# Patient Record
Sex: MALE | Race: ASIAN | NOT HISPANIC OR LATINO | Employment: FULL TIME | URBAN - METROPOLITAN AREA
[De-identification: names, ages, dates, MRNs, and addresses within clinical notes are randomized per-mention and may not be internally consistent; named-entity substitution may affect disease eponyms.]

---

## 2021-07-07 ENCOUNTER — TELEPHONE (OUTPATIENT)
Dept: FAMILY MEDICINE | Facility: CLINIC | Age: 61
End: 2021-07-07

## 2021-07-28 ENCOUNTER — OFFICE VISIT (OUTPATIENT)
Dept: CARDIOLOGY | Facility: CLINIC | Age: 61
End: 2021-07-28
Payer: COMMERCIAL

## 2021-07-28 VITALS
HEIGHT: 67 IN | OXYGEN SATURATION: 98 % | HEART RATE: 66 BPM | WEIGHT: 169.31 LBS | BODY MASS INDEX: 26.57 KG/M2 | DIASTOLIC BLOOD PRESSURE: 64 MMHG | SYSTOLIC BLOOD PRESSURE: 118 MMHG

## 2021-07-28 DIAGNOSIS — E78.5 HYPERLIPIDEMIA, UNSPECIFIED HYPERLIPIDEMIA TYPE: Primary | ICD-10-CM

## 2021-07-28 DIAGNOSIS — I25.10 CORONARY ARTERY DISEASE INVOLVING NATIVE CORONARY ARTERY OF NATIVE HEART WITHOUT ANGINA PECTORIS: ICD-10-CM

## 2021-07-28 PROCEDURE — 93005 ELECTROCARDIOGRAM TRACING: CPT

## 2021-07-28 PROCEDURE — 1159F MED LIST DOCD IN RCRD: CPT | Mod: CPTII,S$GLB,, | Performed by: INTERNAL MEDICINE

## 2021-07-28 PROCEDURE — 99204 OFFICE O/P NEW MOD 45 MIN: CPT | Mod: S$GLB,,, | Performed by: INTERNAL MEDICINE

## 2021-07-28 PROCEDURE — 99999 PR PBB SHADOW E&M-EST. PATIENT-LVL III: CPT | Mod: PBBFAC,,, | Performed by: INTERNAL MEDICINE

## 2021-07-28 PROCEDURE — 1126F PR PAIN SEVERITY QUANTIFIED, NO PAIN PRESENT: ICD-10-PCS | Mod: CPTII,S$GLB,, | Performed by: INTERNAL MEDICINE

## 2021-07-28 PROCEDURE — 1126F AMNT PAIN NOTED NONE PRSNT: CPT | Mod: CPTII,S$GLB,, | Performed by: INTERNAL MEDICINE

## 2021-07-28 PROCEDURE — 1160F PR REVIEW ALL MEDS BY PRESCRIBER/CLIN PHARMACIST DOCUMENTED: ICD-10-PCS | Mod: CPTII,S$GLB,, | Performed by: INTERNAL MEDICINE

## 2021-07-28 PROCEDURE — 99999 PR PBB SHADOW E&M-EST. PATIENT-LVL III: ICD-10-PCS | Mod: PBBFAC,,, | Performed by: INTERNAL MEDICINE

## 2021-07-28 PROCEDURE — 93010 ELECTROCARDIOGRAM REPORT: CPT | Mod: S$GLB,,, | Performed by: INTERNAL MEDICINE

## 2021-07-28 PROCEDURE — 93010 EKG 12-LEAD: ICD-10-PCS | Mod: S$GLB,,, | Performed by: INTERNAL MEDICINE

## 2021-07-28 PROCEDURE — 1160F RVW MEDS BY RX/DR IN RCRD: CPT | Mod: CPTII,S$GLB,, | Performed by: INTERNAL MEDICINE

## 2021-07-28 PROCEDURE — 3008F PR BODY MASS INDEX (BMI) DOCUMENTED: ICD-10-PCS | Mod: CPTII,S$GLB,, | Performed by: INTERNAL MEDICINE

## 2021-07-28 PROCEDURE — 3008F BODY MASS INDEX DOCD: CPT | Mod: CPTII,S$GLB,, | Performed by: INTERNAL MEDICINE

## 2021-07-28 PROCEDURE — 99204 PR OFFICE/OUTPT VISIT, NEW, LEVL IV, 45-59 MIN: ICD-10-PCS | Mod: S$GLB,,, | Performed by: INTERNAL MEDICINE

## 2021-07-28 PROCEDURE — 1159F PR MEDICATION LIST DOCUMENTED IN MEDICAL RECORD: ICD-10-PCS | Mod: CPTII,S$GLB,, | Performed by: INTERNAL MEDICINE

## 2021-07-28 RX ORDER — OLMESARTAN MEDOXOMIL AND HYDROCHLOROTHIAZIDE 40/12.5 40; 12.5 MG/1; MG/1
1 TABLET ORAL DAILY
COMMUNITY

## 2021-07-28 RX ORDER — ROSUVASTATIN CALCIUM 20 MG/1
20 TABLET, COATED ORAL DAILY
COMMUNITY
Start: 2021-03-16 | End: 2022-01-27

## 2021-07-28 RX ORDER — ASPIRIN 81 MG/1
81 TABLET ORAL
COMMUNITY

## 2022-01-21 ENCOUNTER — TELEPHONE (OUTPATIENT)
Dept: CARDIOLOGY | Facility: CLINIC | Age: 62
End: 2022-01-21
Payer: COMMERCIAL

## 2022-01-21 NOTE — TELEPHONE ENCOUNTER
LM for pt with new appt date and time. Left office phone number for pt to call back if appointment needs to be rescheduled.

## 2022-01-27 ENCOUNTER — OFFICE VISIT (OUTPATIENT)
Dept: CARDIOLOGY | Facility: CLINIC | Age: 62
End: 2022-01-27
Payer: COMMERCIAL

## 2022-01-27 VITALS
HEART RATE: 66 BPM | HEIGHT: 67 IN | OXYGEN SATURATION: 99 % | SYSTOLIC BLOOD PRESSURE: 124 MMHG | WEIGHT: 170.19 LBS | DIASTOLIC BLOOD PRESSURE: 68 MMHG | BODY MASS INDEX: 26.71 KG/M2

## 2022-01-27 DIAGNOSIS — I25.10 CORONARY ARTERY DISEASE INVOLVING NATIVE CORONARY ARTERY OF NATIVE HEART WITHOUT ANGINA PECTORIS: ICD-10-CM

## 2022-01-27 DIAGNOSIS — E78.5 HYPERLIPIDEMIA LDL GOAL <70: Primary | ICD-10-CM

## 2022-01-27 PROCEDURE — 3078F PR MOST RECENT DIASTOLIC BLOOD PRESSURE < 80 MM HG: ICD-10-PCS | Mod: CPTII,S$GLB,, | Performed by: INTERNAL MEDICINE

## 2022-01-27 PROCEDURE — 99999 PR PBB SHADOW E&M-EST. PATIENT-LVL III: ICD-10-PCS | Mod: PBBFAC,,, | Performed by: INTERNAL MEDICINE

## 2022-01-27 PROCEDURE — 3078F DIAST BP <80 MM HG: CPT | Mod: CPTII,S$GLB,, | Performed by: INTERNAL MEDICINE

## 2022-01-27 PROCEDURE — 99999 PR PBB SHADOW E&M-EST. PATIENT-LVL III: CPT | Mod: PBBFAC,,, | Performed by: INTERNAL MEDICINE

## 2022-01-27 PROCEDURE — 93005 ELECTROCARDIOGRAM TRACING: CPT

## 2022-01-27 PROCEDURE — 99214 OFFICE O/P EST MOD 30 MIN: CPT | Mod: 25,S$GLB,, | Performed by: INTERNAL MEDICINE

## 2022-01-27 PROCEDURE — 3074F SYST BP LT 130 MM HG: CPT | Mod: CPTII,S$GLB,, | Performed by: INTERNAL MEDICINE

## 2022-01-27 PROCEDURE — 3008F PR BODY MASS INDEX (BMI) DOCUMENTED: ICD-10-PCS | Mod: CPTII,S$GLB,, | Performed by: INTERNAL MEDICINE

## 2022-01-27 PROCEDURE — 1159F PR MEDICATION LIST DOCUMENTED IN MEDICAL RECORD: ICD-10-PCS | Mod: CPTII,S$GLB,, | Performed by: INTERNAL MEDICINE

## 2022-01-27 PROCEDURE — 99214 PR OFFICE/OUTPT VISIT, EST, LEVL IV, 30-39 MIN: ICD-10-PCS | Mod: 25,S$GLB,, | Performed by: INTERNAL MEDICINE

## 2022-01-27 PROCEDURE — 3008F BODY MASS INDEX DOCD: CPT | Mod: CPTII,S$GLB,, | Performed by: INTERNAL MEDICINE

## 2022-01-27 PROCEDURE — 3074F PR MOST RECENT SYSTOLIC BLOOD PRESSURE < 130 MM HG: ICD-10-PCS | Mod: CPTII,S$GLB,, | Performed by: INTERNAL MEDICINE

## 2022-01-27 PROCEDURE — 1159F MED LIST DOCD IN RCRD: CPT | Mod: CPTII,S$GLB,, | Performed by: INTERNAL MEDICINE

## 2022-01-27 PROCEDURE — 93010 ELECTROCARDIOGRAM REPORT: CPT | Mod: S$GLB,,, | Performed by: INTERNAL MEDICINE

## 2022-01-27 PROCEDURE — 93010 EKG 12-LEAD: ICD-10-PCS | Mod: S$GLB,,, | Performed by: INTERNAL MEDICINE

## 2022-01-27 RX ORDER — ROSUVASTATIN CALCIUM 40 MG/1
40 TABLET, COATED ORAL NIGHTLY
Qty: 90 TABLET | Refills: 3 | Status: SHIPPED | OUTPATIENT
Start: 2022-01-27 | End: 2023-02-03

## 2022-01-27 NOTE — PROGRESS NOTES
Cardiology    1/27/2022  2:09 PM    Problem list  Patient Active Problem List   Diagnosis    Coronary artery disease involving native coronary artery without angina pectoris    Hyperlipidemia LDL goal <70       CC:  No complaints.      HPI:  Doing well.  No angina, SOB, PND, OLIVERA, syncope.  Had labs done with PCP (scanned in) which showed A1C of 5.8 and LDL 81, .  Has not discussed labs with PCP yet.      Medications  Current Outpatient Medications   Medication Sig Dispense Refill    aspirin (ECOTRIN) 81 MG EC tablet Take 81 mg by mouth.      olmesartan-hydrochlorothiazide (BENICAR HCT) 40-12.5 mg Tab Take 1 tablet by mouth once daily.      rosuvastatin (CRESTOR) 20 MG tablet Take 20 mg by mouth once daily.       No current facility-administered medications for this visit.      Prior to Admission medications    Medication Sig Start Date End Date Taking? Authorizing Provider   aspirin (ECOTRIN) 81 MG EC tablet Take 81 mg by mouth.   Yes Historical Provider   olmesartan-hydrochlorothiazide (BENICAR HCT) 40-12.5 mg Tab Take 1 tablet by mouth once daily.   Yes Historical Provider   rosuvastatin (CRESTOR) 20 MG tablet Take 20 mg by mouth once daily. 3/16/21  Yes Historical Provider         History  No past medical history on file.  No past surgical history on file.  Social History     Socioeconomic History    Marital status:    Tobacco Use    Smoking status: Former Smoker    Smokeless tobacco: Never Used    Tobacco comment: quit 30yrs ago         Allergies  Review of patient's allergies indicates:  No Known Allergies      Review of Systems   Review of Systems   Constitutional: Negative for decreased appetite, fever and weight loss.   HENT: Negative for congestion and nosebleeds.    Eyes: Negative for double vision, vision loss in left eye, vision loss in right eye and visual disturbance.   Cardiovascular: Negative for chest pain, claudication, cyanosis, dyspnea on exertion, irregular  heartbeat, leg swelling, near-syncope, orthopnea, palpitations, paroxysmal nocturnal dyspnea and syncope.   Respiratory: Negative for cough, hemoptysis, shortness of breath, sleep disturbances due to breathing, snoring, sputum production and wheezing.    Endocrine: Negative for cold intolerance and heat intolerance.   Skin: Negative for nail changes and rash.   Musculoskeletal: Negative for joint pain, muscle cramps, muscle weakness and myalgias.   Gastrointestinal: Negative for change in bowel habit, heartburn, hematemesis, hematochezia, hemorrhoids and melena.   Neurological: Negative for dizziness, focal weakness and headaches.         Physical Exam  Wt Readings from Last 1 Encounters:   01/27/22 77.2 kg (170 lb 3.2 oz)     BP Readings from Last 3 Encounters:   01/27/22 124/68   07/28/21 118/64     Pulse Readings from Last 1 Encounters:   01/27/22 66     Body mass index is 26.66 kg/m².    Physical Exam  Constitutional:       Appearance: He is well-developed.   HENT:      Head: Atraumatic.   Eyes:      General: No scleral icterus.  Neck:      Vascular: Normal carotid pulses. No carotid bruit, hepatojugular reflux or JVD.   Cardiovascular:      Rate and Rhythm: Normal rate and regular rhythm.      Chest Wall: PMI is not displaced.      Pulses: Intact distal pulses.           Carotid pulses are 2+ on the right side and 2+ on the left side.       Radial pulses are 2+ on the right side and 2+ on the left side.        Dorsalis pedis pulses are 2+ on the right side and 2+ on the left side.      Heart sounds: Normal heart sounds, S1 normal and S2 normal. No murmur heard.  No friction rub.   Pulmonary:      Effort: Pulmonary effort is normal. No respiratory distress.      Breath sounds: Normal breath sounds. No stridor. No wheezing or rales.   Chest:      Chest wall: No tenderness.   Abdominal:      General: Bowel sounds are normal.      Palpations: Abdomen is soft.   Musculoskeletal:      Cervical back: Neck supple. No  edema.   Skin:     General: Skin is warm and dry.      Nails: There is no clubbing.   Neurological:      Mental Status: He is alert and oriented to person, place, and time.   Psychiatric:         Behavior: Behavior normal.         Thought Content: Thought content normal.             EKG:  Sinus rate 64.      Assessment  1. Hyperlipidemia LDL goal <70  Not at goal    2. Coronary artery disease involving native coronary artery of native heart without angina pectoris  Stable, no angina        Plan and Discussion  Increase rosuvastatin to 40mg qd to achieve LDL goal < 70.  Encourage to avoid sweets since A1C is 5.8.  EKG given to patient to share with PCP at next visit.    Follow Up  6 months with labs      Avery Boyd MD, F.A.C.C, F.S.C.A.I.

## 2022-07-28 ENCOUNTER — OFFICE VISIT (OUTPATIENT)
Dept: CARDIOLOGY | Facility: CLINIC | Age: 62
End: 2022-07-28
Payer: COMMERCIAL

## 2022-07-28 VITALS
DIASTOLIC BLOOD PRESSURE: 62 MMHG | HEART RATE: 62 BPM | WEIGHT: 167 LBS | OXYGEN SATURATION: 97 % | HEIGHT: 67 IN | BODY MASS INDEX: 26.21 KG/M2 | SYSTOLIC BLOOD PRESSURE: 120 MMHG

## 2022-07-28 DIAGNOSIS — I25.10 CORONARY ARTERY DISEASE INVOLVING NATIVE CORONARY ARTERY OF NATIVE HEART WITHOUT ANGINA PECTORIS: ICD-10-CM

## 2022-07-28 DIAGNOSIS — E78.5 HYPERLIPIDEMIA LDL GOAL <70: Primary | ICD-10-CM

## 2022-07-28 PROCEDURE — 3008F BODY MASS INDEX DOCD: CPT | Mod: CPTII,S$GLB,, | Performed by: INTERNAL MEDICINE

## 2022-07-28 PROCEDURE — 3078F PR MOST RECENT DIASTOLIC BLOOD PRESSURE < 80 MM HG: ICD-10-PCS | Mod: CPTII,S$GLB,, | Performed by: INTERNAL MEDICINE

## 2022-07-28 PROCEDURE — 3078F DIAST BP <80 MM HG: CPT | Mod: CPTII,S$GLB,, | Performed by: INTERNAL MEDICINE

## 2022-07-28 PROCEDURE — 3074F SYST BP LT 130 MM HG: CPT | Mod: CPTII,S$GLB,, | Performed by: INTERNAL MEDICINE

## 2022-07-28 PROCEDURE — 93010 ELECTROCARDIOGRAM REPORT: CPT | Mod: S$GLB,,, | Performed by: INTERNAL MEDICINE

## 2022-07-28 PROCEDURE — 1159F MED LIST DOCD IN RCRD: CPT | Mod: CPTII,S$GLB,, | Performed by: INTERNAL MEDICINE

## 2022-07-28 PROCEDURE — 93005 ELECTROCARDIOGRAM TRACING: CPT

## 2022-07-28 PROCEDURE — 3074F PR MOST RECENT SYSTOLIC BLOOD PRESSURE < 130 MM HG: ICD-10-PCS | Mod: CPTII,S$GLB,, | Performed by: INTERNAL MEDICINE

## 2022-07-28 PROCEDURE — 99214 PR OFFICE/OUTPT VISIT, EST, LEVL IV, 30-39 MIN: ICD-10-PCS | Mod: S$GLB,,, | Performed by: INTERNAL MEDICINE

## 2022-07-28 PROCEDURE — 99214 OFFICE O/P EST MOD 30 MIN: CPT | Mod: S$GLB,,, | Performed by: INTERNAL MEDICINE

## 2022-07-28 PROCEDURE — 1159F PR MEDICATION LIST DOCUMENTED IN MEDICAL RECORD: ICD-10-PCS | Mod: CPTII,S$GLB,, | Performed by: INTERNAL MEDICINE

## 2022-07-28 PROCEDURE — 99999 PR PBB SHADOW E&M-EST. PATIENT-LVL III: CPT | Mod: PBBFAC,,, | Performed by: INTERNAL MEDICINE

## 2022-07-28 PROCEDURE — 99999 PR PBB SHADOW E&M-EST. PATIENT-LVL III: ICD-10-PCS | Mod: PBBFAC,,, | Performed by: INTERNAL MEDICINE

## 2022-07-28 PROCEDURE — 93010 EKG 12-LEAD: ICD-10-PCS | Mod: S$GLB,,, | Performed by: INTERNAL MEDICINE

## 2022-07-28 PROCEDURE — 3008F PR BODY MASS INDEX (BMI) DOCUMENTED: ICD-10-PCS | Mod: CPTII,S$GLB,, | Performed by: INTERNAL MEDICINE

## 2022-07-28 NOTE — PROGRESS NOTES
Cardiology    7/28/2022  1:40 PM    Problem list  Patient Active Problem List   Diagnosis    Coronary artery disease involving native coronary artery without angina pectoris    Hyperlipidemia LDL goal <70       CC:  f/u    HPI:  Had labs on 7/22/22 with PCP:  , HDL 56, , LDL 80, A1C 5.8, normal LFT's.  He is doing very well.  He denies any angina.  His blood pressure is well controlled at home.  He tolerates his medication.  His Crestor was increased to 40 mg at the last visit.    Medications  Current Outpatient Medications   Medication Sig Dispense Refill    aspirin (ECOTRIN) 81 MG EC tablet Take 81 mg by mouth.      olmesartan-hydrochlorothiazide (BENICAR HCT) 40-12.5 mg Tab Take 1 tablet by mouth once daily.      rosuvastatin (CRESTOR) 40 MG Tab Take 1 tablet (40 mg total) by mouth every evening. 90 tablet 3     No current facility-administered medications for this visit.      Prior to Admission medications    Medication Sig Start Date End Date Taking? Authorizing Provider   aspirin (ECOTRIN) 81 MG EC tablet Take 81 mg by mouth.   Yes Historical Provider   olmesartan-hydrochlorothiazide (BENICAR HCT) 40-12.5 mg Tab Take 1 tablet by mouth once daily.   Yes Historical Provider   rosuvastatin (CRESTOR) 40 MG Tab Take 1 tablet (40 mg total) by mouth every evening. 1/27/22 1/27/23 Yes Avery Boyd MD         History  No past medical history on file.  No past surgical history on file.  Social History     Socioeconomic History    Marital status:    Tobacco Use    Smoking status: Former Smoker    Smokeless tobacco: Never Used    Tobacco comment: quit 30yrs ago         Allergies  Review of patient's allergies indicates:  No Known Allergies      Review of Systems   Review of Systems   Constitutional: Negative for decreased appetite, fever and weight loss.   HENT: Negative for congestion and nosebleeds.    Eyes: Negative for double vision, vision loss in left eye, vision loss in right  eye and visual disturbance.   Cardiovascular: Negative for chest pain, claudication, cyanosis, dyspnea on exertion, irregular heartbeat, leg swelling, near-syncope, orthopnea, palpitations, paroxysmal nocturnal dyspnea and syncope.   Respiratory: Negative for cough, hemoptysis, shortness of breath, sleep disturbances due to breathing, snoring, sputum production and wheezing.    Endocrine: Negative for cold intolerance and heat intolerance.   Skin: Negative for nail changes and rash.   Musculoskeletal: Negative for joint pain, muscle cramps, muscle weakness and myalgias.   Gastrointestinal: Negative for change in bowel habit, heartburn, hematemesis, hematochezia, hemorrhoids and melena.   Neurological: Negative for dizziness, focal weakness and headaches.         Physical Exam  Wt Readings from Last 1 Encounters:   07/28/22 75.8 kg (167 lb)     BP Readings from Last 3 Encounters:   07/28/22 120/62   01/27/22 124/68   07/28/21 118/64     Pulse Readings from Last 1 Encounters:   07/28/22 62     Body mass index is 26.16 kg/m².    Physical Exam  Constitutional:       Appearance: He is well-developed.   HENT:      Head: Atraumatic.   Eyes:      General: No scleral icterus.  Neck:      Vascular: Normal carotid pulses. No carotid bruit, hepatojugular reflux or JVD.   Cardiovascular:      Rate and Rhythm: Normal rate and regular rhythm.      Chest Wall: PMI is not displaced.      Pulses: Intact distal pulses.           Carotid pulses are 2+ on the right side and 2+ on the left side.       Radial pulses are 2+ on the right side and 2+ on the left side.        Dorsalis pedis pulses are 2+ on the right side and 2+ on the left side.      Heart sounds: Normal heart sounds, S1 normal and S2 normal. No murmur heard.    No friction rub.   Pulmonary:      Effort: Pulmonary effort is normal. No respiratory distress.      Breath sounds: Normal breath sounds. No stridor. No wheezing or rales.   Chest:      Chest wall: No tenderness.    Abdominal:      General: Bowel sounds are normal.      Palpations: Abdomen is soft.   Musculoskeletal:      Cervical back: Neck supple. No edema.   Skin:     General: Skin is warm and dry.      Nails: There is no clubbing.   Neurological:      Mental Status: He is alert and oriented to person, place, and time.   Psychiatric:         Behavior: Behavior normal.         Thought Content: Thought content normal.         EKG:  Normal sinus rhythm.    Assessment  1. Hyperlipidemia LDL goal <70  On simvastatin 40 mg     2. Coronary artery disease involving native coronary artery of native heart without angina pectoris  Stable angina    3.  HTN controlled        Plan and Discussion  Continue current medications.    Follow Up  6 months      Avery Boyd MD, F.A.C.C, F.S.C.A.I.

## 2023-02-02 ENCOUNTER — LAB VISIT (OUTPATIENT)
Dept: LAB | Facility: OTHER | Age: 63
End: 2023-02-02
Attending: INTERNAL MEDICINE
Payer: COMMERCIAL

## 2023-02-02 ENCOUNTER — OFFICE VISIT (OUTPATIENT)
Dept: CARDIOLOGY | Facility: CLINIC | Age: 63
End: 2023-02-02
Payer: COMMERCIAL

## 2023-02-02 VITALS
HEART RATE: 55 BPM | DIASTOLIC BLOOD PRESSURE: 80 MMHG | WEIGHT: 173 LBS | SYSTOLIC BLOOD PRESSURE: 132 MMHG | BODY MASS INDEX: 27.15 KG/M2 | OXYGEN SATURATION: 98 % | HEIGHT: 67 IN

## 2023-02-02 DIAGNOSIS — I25.10 CORONARY ARTERY DISEASE INVOLVING NATIVE CORONARY ARTERY OF NATIVE HEART WITHOUT ANGINA PECTORIS: ICD-10-CM

## 2023-02-02 DIAGNOSIS — E78.5 HYPERLIPIDEMIA LDL GOAL <70: Primary | ICD-10-CM

## 2023-02-02 LAB
ALBUMIN SERPL BCP-MCNC: 4.4 G/DL (ref 3.5–5.2)
ALP SERPL-CCNC: 58 U/L (ref 55–135)
ALT SERPL W/O P-5'-P-CCNC: 33 U/L (ref 10–44)
ANION GAP SERPL CALC-SCNC: 7 MMOL/L (ref 8–16)
AST SERPL-CCNC: 30 U/L (ref 10–40)
BILIRUB SERPL-MCNC: 0.6 MG/DL (ref 0.1–1)
BUN SERPL-MCNC: 15 MG/DL (ref 8–23)
CALCIUM SERPL-MCNC: 9.7 MG/DL (ref 8.7–10.5)
CHLORIDE SERPL-SCNC: 106 MMOL/L (ref 95–110)
CHOLEST SERPL-MCNC: 166 MG/DL (ref 120–199)
CHOLEST/HDLC SERPL: 3.9 {RATIO} (ref 2–5)
CO2 SERPL-SCNC: 28 MMOL/L (ref 23–29)
COMPLEXED PSA SERPL-MCNC: 0.74 NG/ML (ref 0–4)
CREAT SERPL-MCNC: 0.8 MG/DL (ref 0.5–1.4)
ERYTHROCYTE [DISTWIDTH] IN BLOOD BY AUTOMATED COUNT: 13.1 % (ref 11.5–14.5)
EST. GFR  (NO RACE VARIABLE): >60 ML/MIN/1.73 M^2
GLUCOSE SERPL-MCNC: 93 MG/DL (ref 70–110)
HCT VFR BLD AUTO: 50.9 % (ref 40–54)
HDLC SERPL-MCNC: 43 MG/DL (ref 40–75)
HDLC SERPL: 25.9 % (ref 20–50)
HGB BLD-MCNC: 16.5 G/DL (ref 14–18)
LDLC SERPL CALC-MCNC: 97.8 MG/DL (ref 63–159)
MCH RBC QN AUTO: 30.4 PG (ref 27–31)
MCHC RBC AUTO-ENTMCNC: 32.4 G/DL (ref 32–36)
MCV RBC AUTO: 94 FL (ref 82–98)
NONHDLC SERPL-MCNC: 123 MG/DL
PLATELET # BLD AUTO: 217 K/UL (ref 150–450)
PMV BLD AUTO: 9.6 FL (ref 9.2–12.9)
POTASSIUM SERPL-SCNC: 4.4 MMOL/L (ref 3.5–5.1)
PROT SERPL-MCNC: 7.2 G/DL (ref 6–8.4)
RBC # BLD AUTO: 5.43 M/UL (ref 4.6–6.2)
SODIUM SERPL-SCNC: 141 MMOL/L (ref 136–145)
TRIGL SERPL-MCNC: 126 MG/DL (ref 30–150)
TSH SERPL DL<=0.005 MIU/L-ACNC: 0.92 UIU/ML (ref 0.4–4)
WBC # BLD AUTO: 4.57 K/UL (ref 3.9–12.7)

## 2023-02-02 PROCEDURE — 1159F MED LIST DOCD IN RCRD: CPT | Mod: CPTII,S$GLB,, | Performed by: INTERNAL MEDICINE

## 2023-02-02 PROCEDURE — 93005 ELECTROCARDIOGRAM TRACING: CPT

## 2023-02-02 PROCEDURE — 36415 COLL VENOUS BLD VENIPUNCTURE: CPT | Performed by: INTERNAL MEDICINE

## 2023-02-02 PROCEDURE — 3075F SYST BP GE 130 - 139MM HG: CPT | Mod: CPTII,S$GLB,, | Performed by: INTERNAL MEDICINE

## 2023-02-02 PROCEDURE — 85027 COMPLETE CBC AUTOMATED: CPT | Performed by: INTERNAL MEDICINE

## 2023-02-02 PROCEDURE — 80061 LIPID PANEL: CPT | Performed by: INTERNAL MEDICINE

## 2023-02-02 PROCEDURE — 93010 EKG 12-LEAD: ICD-10-PCS | Mod: S$GLB,,, | Performed by: INTERNAL MEDICINE

## 2023-02-02 PROCEDURE — 1159F PR MEDICATION LIST DOCUMENTED IN MEDICAL RECORD: ICD-10-PCS | Mod: CPTII,S$GLB,, | Performed by: INTERNAL MEDICINE

## 2023-02-02 PROCEDURE — 93010 ELECTROCARDIOGRAM REPORT: CPT | Mod: S$GLB,,, | Performed by: INTERNAL MEDICINE

## 2023-02-02 PROCEDURE — 3008F PR BODY MASS INDEX (BMI) DOCUMENTED: ICD-10-PCS | Mod: CPTII,S$GLB,, | Performed by: INTERNAL MEDICINE

## 2023-02-02 PROCEDURE — 80053 COMPREHEN METABOLIC PANEL: CPT | Performed by: INTERNAL MEDICINE

## 2023-02-02 PROCEDURE — 99999 PR PBB SHADOW E&M-EST. PATIENT-LVL III: ICD-10-PCS | Mod: PBBFAC,,, | Performed by: INTERNAL MEDICINE

## 2023-02-02 PROCEDURE — 3075F PR MOST RECENT SYSTOLIC BLOOD PRESS GE 130-139MM HG: ICD-10-PCS | Mod: CPTII,S$GLB,, | Performed by: INTERNAL MEDICINE

## 2023-02-02 PROCEDURE — 3079F PR MOST RECENT DIASTOLIC BLOOD PRESSURE 80-89 MM HG: ICD-10-PCS | Mod: CPTII,S$GLB,, | Performed by: INTERNAL MEDICINE

## 2023-02-02 PROCEDURE — 99999 PR PBB SHADOW E&M-EST. PATIENT-LVL III: CPT | Mod: PBBFAC,,, | Performed by: INTERNAL MEDICINE

## 2023-02-02 PROCEDURE — 99214 OFFICE O/P EST MOD 30 MIN: CPT | Mod: S$GLB,,, | Performed by: INTERNAL MEDICINE

## 2023-02-02 PROCEDURE — 84153 ASSAY OF PSA TOTAL: CPT | Performed by: INTERNAL MEDICINE

## 2023-02-02 PROCEDURE — 3079F DIAST BP 80-89 MM HG: CPT | Mod: CPTII,S$GLB,, | Performed by: INTERNAL MEDICINE

## 2023-02-02 PROCEDURE — 99214 PR OFFICE/OUTPT VISIT, EST, LEVL IV, 30-39 MIN: ICD-10-PCS | Mod: S$GLB,,, | Performed by: INTERNAL MEDICINE

## 2023-02-02 PROCEDURE — 3008F BODY MASS INDEX DOCD: CPT | Mod: CPTII,S$GLB,, | Performed by: INTERNAL MEDICINE

## 2023-02-02 PROCEDURE — 84443 ASSAY THYROID STIM HORMONE: CPT | Performed by: INTERNAL MEDICINE

## 2023-02-02 RX ORDER — AMLODIPINE BESYLATE 5 MG/1
5 TABLET ORAL DAILY
Qty: 90 TABLET | Refills: 3 | Status: SHIPPED | OUTPATIENT
Start: 2023-02-02 | End: 2024-02-02

## 2023-02-02 NOTE — PROGRESS NOTES
Cardiology    2/2/2023  11:08 AM    Problem list  Patient Active Problem List   Diagnosis    Coronary artery disease involving native coronary artery without angina pectoris    Hyperlipidemia LDL goal <70       CC:  F/u    HPI:  Patient is here for follow-up.  He did not have any labs done for this visit.  Denies any chest pain or shortness of breath.  He reports that when it is cold, both his hands will turn white.      Medications  Current Outpatient Medications   Medication Sig Dispense Refill    aspirin (ECOTRIN) 81 MG EC tablet Take 81 mg by mouth.      olmesartan-hydrochlorothiazide (BENICAR HCT) 40-12.5 mg Tab Take 1 tablet by mouth once daily.      rosuvastatin (CRESTOR) 40 MG Tab Take 1 tablet (40 mg total) by mouth every evening. 90 tablet 3     No current facility-administered medications for this visit.      Prior to Admission medications    Medication Sig Start Date End Date Taking? Authorizing Provider   aspirin (ECOTRIN) 81 MG EC tablet Take 81 mg by mouth.   Yes Historical Provider   olmesartan-hydrochlorothiazide (BENICAR HCT) 40-12.5 mg Tab Take 1 tablet by mouth once daily.   Yes Historical Provider   rosuvastatin (CRESTOR) 40 MG Tab Take 1 tablet (40 mg total) by mouth every evening. 1/27/22 1/27/23  Avery Boyd MD         History  No past medical history on file.  No past surgical history on file.  Social History     Socioeconomic History    Marital status:    Tobacco Use    Smoking status: Former    Smokeless tobacco: Never    Tobacco comments:     quit 30yrs ago         Allergies  Review of patient's allergies indicates:  No Known Allergies      Review of Systems   Review of Systems   Constitutional: Negative for decreased appetite, fever and weight loss.   HENT:  Negative for congestion and nosebleeds.    Eyes:  Negative for double vision, vision loss in left eye, vision loss in right eye and visual disturbance.   Cardiovascular:  Negative for chest pain, claudication,  cyanosis, dyspnea on exertion, irregular heartbeat, leg swelling, near-syncope, orthopnea, palpitations, paroxysmal nocturnal dyspnea and syncope.   Respiratory:  Negative for cough, hemoptysis, shortness of breath, sleep disturbances due to breathing, snoring, sputum production and wheezing.    Endocrine: Negative for cold intolerance and heat intolerance.   Skin:  Negative for nail changes and rash.   Musculoskeletal:  Negative for joint pain, muscle cramps, muscle weakness and myalgias.   Gastrointestinal:  Negative for change in bowel habit, heartburn, hematemesis, hematochezia, hemorrhoids and melena.   Neurological:  Negative for dizziness, focal weakness and headaches.       Physical Exam  Wt Readings from Last 1 Encounters:   02/02/23 78.5 kg (173 lb)     BP Readings from Last 3 Encounters:   02/02/23 132/80   07/28/22 120/62   01/27/22 124/68     Pulse Readings from Last 1 Encounters:   02/02/23 (!) 55     Body mass index is 27.1 kg/m².    Physical Exam  Constitutional:       Appearance: He is well-developed.   HENT:      Head: Atraumatic.   Eyes:      General: No scleral icterus.  Neck:      Vascular: Normal carotid pulses. No carotid bruit, hepatojugular reflux or JVD.   Cardiovascular:      Rate and Rhythm: Normal rate and regular rhythm.      Chest Wall: PMI is not displaced.      Pulses: Intact distal pulses.           Carotid pulses are 2+ on the right side and 2+ on the left side.       Radial pulses are 2+ on the right side and 2+ on the left side.        Dorsalis pedis pulses are 2+ on the right side and 2+ on the left side.      Heart sounds: Normal heart sounds, S1 normal and S2 normal. No murmur heard.    No friction rub.   Pulmonary:      Effort: Pulmonary effort is normal. No respiratory distress.      Breath sounds: Normal breath sounds. No stridor. No wheezing or rales.   Chest:      Chest wall: No tenderness.   Abdominal:      General: Bowel sounds are normal.      Palpations: Abdomen is  soft.   Musculoskeletal:      Cervical back: Neck supple. No edema.   Skin:     General: Skin is warm and dry.      Nails: There is no clubbing.   Neurological:      Mental Status: He is alert and oriented to person, place, and time.   Psychiatric:         Behavior: Behavior normal.         Thought Content: Thought content normal.           Assessment  1. Hyperlipidemia LDL goal <70  On statin    2. Coronary artery disease involving native coronary artery of native heart without angina pectoris  Stable, no angina.        Plan and Discussion  Discussed that his EKG showed normal sinus rhythm heart rate of 56.  Review his pictures of his hands which his fingertips turn white when it is cold.  He has normal radial pulses and capillary refills in both hands.  It is possible that he has spasm.  Will try amlodipine 5 mg daily.  Will get labs done today    Follow Up  3 months      Avery Boyd MD, F.A.C.C, F.S.C.A.I.        35 minutes were spent in chart review, documentation and review of results, and evaluation, treatment, and counseling of patient on the same day of service.    Disclaimer: This document was created using voice recognition software (&[employer] Direct). Although it may be edited, this document may contain errors related to incorrect recognition of the spoken word. Please call the physician if clarification is needed.

## 2023-03-06 ENCOUNTER — TELEPHONE (OUTPATIENT)
Dept: CARDIOLOGY | Facility: CLINIC | Age: 63
End: 2023-03-06
Payer: COMMERCIAL

## 2023-03-06 NOTE — TELEPHONE ENCOUNTER
Called pt to reschedule follow up appointment with Dr. Boyd. LM for pt with new appt date and time. Will mail letter to pt with appointment information.

## 2023-05-12 ENCOUNTER — OFFICE VISIT (OUTPATIENT)
Dept: CARDIOLOGY | Facility: CLINIC | Age: 63
End: 2023-05-12
Payer: COMMERCIAL

## 2023-05-12 VITALS
SYSTOLIC BLOOD PRESSURE: 116 MMHG | OXYGEN SATURATION: 98 % | HEART RATE: 58 BPM | WEIGHT: 169 LBS | DIASTOLIC BLOOD PRESSURE: 78 MMHG | BODY MASS INDEX: 26.47 KG/M2

## 2023-05-12 DIAGNOSIS — E78.5 HYPERLIPIDEMIA LDL GOAL <70: Primary | ICD-10-CM

## 2023-05-12 DIAGNOSIS — I25.10 CORONARY ARTERY DISEASE INVOLVING NATIVE CORONARY ARTERY OF NATIVE HEART WITHOUT ANGINA PECTORIS: ICD-10-CM

## 2023-05-12 PROCEDURE — 1159F PR MEDICATION LIST DOCUMENTED IN MEDICAL RECORD: ICD-10-PCS | Mod: CPTII,S$GLB,, | Performed by: INTERNAL MEDICINE

## 2023-05-12 PROCEDURE — 3078F PR MOST RECENT DIASTOLIC BLOOD PRESSURE < 80 MM HG: ICD-10-PCS | Mod: CPTII,S$GLB,, | Performed by: INTERNAL MEDICINE

## 2023-05-12 PROCEDURE — 99214 OFFICE O/P EST MOD 30 MIN: CPT | Mod: S$GLB,,, | Performed by: INTERNAL MEDICINE

## 2023-05-12 PROCEDURE — 99999 PR PBB SHADOW E&M-EST. PATIENT-LVL III: CPT | Mod: PBBFAC,,, | Performed by: INTERNAL MEDICINE

## 2023-05-12 PROCEDURE — 3008F PR BODY MASS INDEX (BMI) DOCUMENTED: ICD-10-PCS | Mod: CPTII,S$GLB,, | Performed by: INTERNAL MEDICINE

## 2023-05-12 PROCEDURE — 99999 PR PBB SHADOW E&M-EST. PATIENT-LVL III: ICD-10-PCS | Mod: PBBFAC,,, | Performed by: INTERNAL MEDICINE

## 2023-05-12 PROCEDURE — 3074F PR MOST RECENT SYSTOLIC BLOOD PRESSURE < 130 MM HG: ICD-10-PCS | Mod: CPTII,S$GLB,, | Performed by: INTERNAL MEDICINE

## 2023-05-12 PROCEDURE — 99214 PR OFFICE/OUTPT VISIT, EST, LEVL IV, 30-39 MIN: ICD-10-PCS | Mod: S$GLB,,, | Performed by: INTERNAL MEDICINE

## 2023-05-12 PROCEDURE — 3078F DIAST BP <80 MM HG: CPT | Mod: CPTII,S$GLB,, | Performed by: INTERNAL MEDICINE

## 2023-05-12 PROCEDURE — 3074F SYST BP LT 130 MM HG: CPT | Mod: CPTII,S$GLB,, | Performed by: INTERNAL MEDICINE

## 2023-05-12 PROCEDURE — 1159F MED LIST DOCD IN RCRD: CPT | Mod: CPTII,S$GLB,, | Performed by: INTERNAL MEDICINE

## 2023-05-12 PROCEDURE — 3008F BODY MASS INDEX DOCD: CPT | Mod: CPTII,S$GLB,, | Performed by: INTERNAL MEDICINE

## 2023-05-12 RX ORDER — EVOLOCUMAB 140 MG/ML
140 INJECTION, SOLUTION SUBCUTANEOUS
Qty: 2 ML | Refills: 3 | Status: ACTIVE | OUTPATIENT
Start: 2023-05-12

## 2023-05-12 NOTE — PROGRESS NOTES
Cardiology    5/12/2023  10:41 AM    Problem list  Patient Active Problem List   Diagnosis    Coronary artery disease involving native coronary artery without angina pectoris    Hyperlipidemia LDL goal <70       CC:  F/u    HPI:  He is doing well.  He denies any angina, shortness of breath, dyspnea on exertion.  At the last visit amlodipine was given for possible vasospasm in his fingertips when exposure to the cold.  He still has the same problem when he carries frozen items.  Had labs done and LDL was 98.    Medications  Current Outpatient Medications   Medication Sig Dispense Refill    amLODIPine (NORVASC) 5 MG tablet Take 1 tablet (5 mg total) by mouth once daily. 90 tablet 3    aspirin (ECOTRIN) 81 MG EC tablet Take 81 mg by mouth.      olmesartan-hydrochlorothiazide (BENICAR HCT) 40-12.5 mg Tab Take 1 tablet by mouth once daily.      rosuvastatin (CRESTOR) 40 MG Tab TAKE 1 TABLET BY MOUTH ONCE DAILY IN THE EVENING 90 tablet 0     No current facility-administered medications for this visit.      Prior to Admission medications    Medication Sig Start Date End Date Taking? Authorizing Provider   amLODIPine (NORVASC) 5 MG tablet Take 1 tablet (5 mg total) by mouth once daily. 2/2/23 2/2/24 Yes Avery Boyd MD   aspirin (ECOTRIN) 81 MG EC tablet Take 81 mg by mouth.   Yes Historical Provider   olmesartan-hydrochlorothiazide (BENICAR HCT) 40-12.5 mg Tab Take 1 tablet by mouth once daily.   Yes Historical Provider   rosuvastatin (CRESTOR) 40 MG Tab TAKE 1 TABLET BY MOUTH ONCE DAILY IN THE EVENING 2/3/23  Yes Avery Boyd MD         History  No past medical history on file.  No past surgical history on file.  Social History     Socioeconomic History    Marital status:    Tobacco Use    Smoking status: Former    Smokeless tobacco: Never    Tobacco comments:     quit 30yrs ago         Allergies  Review of patient's allergies indicates:  No Known Allergies      Review of Systems   Review of Systems    Constitutional: Negative for decreased appetite, fever and weight loss.   HENT:  Negative for congestion and nosebleeds.    Eyes:  Negative for double vision, vision loss in left eye, vision loss in right eye and visual disturbance.   Cardiovascular:  Negative for chest pain, claudication, cyanosis, dyspnea on exertion, irregular heartbeat, leg swelling, near-syncope, orthopnea, palpitations, paroxysmal nocturnal dyspnea and syncope.   Respiratory:  Negative for cough, hemoptysis, shortness of breath, sleep disturbances due to breathing, snoring, sputum production and wheezing.    Endocrine: Negative for cold intolerance and heat intolerance.   Skin:  Negative for nail changes and rash.   Musculoskeletal:  Negative for joint pain, muscle cramps, muscle weakness and myalgias.   Gastrointestinal:  Negative for change in bowel habit, heartburn, hematemesis, hematochezia, hemorrhoids and melena.   Neurological:  Negative for dizziness, focal weakness and headaches.       Physical Exam  Wt Readings from Last 1 Encounters:   05/12/23 76.7 kg (169 lb)     BP Readings from Last 3 Encounters:   05/12/23 116/78   02/02/23 132/80   07/28/22 120/62     Pulse Readings from Last 1 Encounters:   05/12/23 (!) 58     Body mass index is 26.47 kg/m².    Physical Exam  Constitutional:       Appearance: He is well-developed.   HENT:      Head: Atraumatic.   Eyes:      General: No scleral icterus.  Neck:      Vascular: Normal carotid pulses. No carotid bruit, hepatojugular reflux or JVD.   Cardiovascular:      Rate and Rhythm: Normal rate and regular rhythm.      Chest Wall: PMI is not displaced.      Pulses: Intact distal pulses.           Carotid pulses are 2+ on the right side and 2+ on the left side.       Radial pulses are 2+ on the right side and 2+ on the left side.        Dorsalis pedis pulses are 2+ on the right side and 2+ on the left side.      Heart sounds: Normal heart sounds, S1 normal and S2 normal. No murmur heard.     No friction rub.   Pulmonary:      Effort: Pulmonary effort is normal. No respiratory distress.      Breath sounds: Normal breath sounds. No stridor. No wheezing or rales.   Chest:      Chest wall: No tenderness.   Abdominal:      General: Bowel sounds are normal.      Palpations: Abdomen is soft.   Musculoskeletal:      Cervical back: Neck supple. No edema.   Skin:     General: Skin is warm and dry.      Nails: There is no clubbing.   Neurological:      Mental Status: He is alert and oriented to person, place, and time.   Psychiatric:         Behavior: Behavior normal.         Thought Content: Thought content normal.           Assessment  1. Hyperlipidemia LDL goal <70  Not at goal despite max dose of rosuvastatin 40mg.  - Comprehensive Metabolic Panel; Future  - Lipid Panel; Future    2. Coronary artery disease involving native coronary artery of native heart without angina pectoris  Stable, no angina        Plan and Discussion  Discussed his lab results which showed LDL 98 which is not at goal despite taking maximum dose of rosuvastatin 40 milligrams.  Discussed PCSK9 and he has agreed to try Repatha.    Follow Up  6 months with labs      Avery Boyd MD, F.A.C.C, F.S.C.A.I.        35 minutes were spent in chart review, documentation and review of results, and evaluation, treatment, and counseling of patient on the same day of service.    Disclaimer: This document was created using voice recognition software ([employer]). Although it may be edited, this document may contain errors related to incorrect recognition of the spoken word. Please call the physician if clarification is needed.

## 2023-05-18 ENCOUNTER — TELEPHONE (OUTPATIENT)
Dept: PHARMACY | Facility: CLINIC | Age: 63
End: 2023-05-18
Payer: COMMERCIAL

## 2023-05-18 NOTE — TELEPHONE ENCOUNTER
Hello, this is Sammie Eli, clinical pharmacist with [facility] that is part of your care team.  We have begun working on your prescription that your doctor has sent us. Our next steps include:     Working with your insurance company to obtain approval for your medication  Working with you to ensure your medication is affordable     We will be calling you along the way with updates on your medication but if you have any concerns or receive information that you would like to discuss please reach us at [phone].    Welcome call outcome: Left voicemail. See if patient would like a $5 copay card.

## 2023-05-22 ENCOUNTER — SPECIALTY PHARMACY (OUTPATIENT)
Dept: PHARMACY | Facility: CLINIC | Age: 63
End: 2023-05-22
Payer: COMMERCIAL

## 2023-05-23 ENCOUNTER — SPECIALTY PHARMACY (OUTPATIENT)
Dept: PHARMACY | Facility: CLINIC | Age: 63
End: 2023-05-23
Payer: COMMERCIAL

## 2023-05-23 NOTE — TELEPHONE ENCOUNTER
Specialty Pharmacy - Initial Clinical Assessment    Specialty Medication Orders Linked to Encounter      Flowsheet Row Most Recent Value   Medication #1 evolocumab (REPATHA SURECLICK) 140 mg/mL PnIj (Order#[id number], Rx#[id number])          Patient Diagnosis   I25.10 - Coronary artery disease involving native coronary artery without angina pectoris  E78.5 - Hyperlipidemia LDL goal <70    Subjective    Darrell Corona is a 63 y.o. male, who is followed by the specialty pharmacy service for management and education.    Recent Encounters       Date Type Provider Description    05/23/2023 Specialty Pharmacy Sammie Eli, Justin Initial Clinical Assessment    05/22/2023 Specialty Pharmacy Sammie Eli, Justin Referral Authorization            Current Outpatient Medications   Medication Sig    amLODIPine (NORVASC) 5 MG tablet Take 1 tablet (5 mg total) by mouth once daily.    aspirin (ECOTRIN) 81 MG EC tablet Take 81 mg by mouth.    evolocumab (REPATHA SURECLICK) 140 mg/mL PnIj Inject 1 mL (140 mg total) into the skin every 14 (fourteen) days.    olmesartan-hydrochlorothiazide (BENICAR HCT) 40-12.5 mg Tab Take 1 tablet by mouth once daily.    rosuvastatin (CRESTOR) 40 MG Tab TAKE 1 TABLET BY MOUTH ONCE DAILY IN THE EVENING   Last reviewed on 5/12/2023 10:29 AM by Dina Cannon RN    Review of patient's allergies indicates:  No Known AllergiesLast reviewed on  5/12/2023 10:29 AM by Dina Cannon          Assessment Questions - Documented Responses      Flowsheet Row Most Recent Value   Assessment    Goals of Therapy Status Discussed (new start)   Status of the patients ability to self-administer: Is Able   All education points have been covered with patient? Yes, supplemental printed education provided   Assesment completed? Yes   Plan Therapy being initiated   Do you need to open a clinical intervention (i-vent)? No   Do you want to schedule first shipment? Yes   Medication #1 Assessment Info    Patient status New medication  "  Is this medication appropriate for the patient? Yes   Is this medication effective? Not yet started          Refill Questions - Documented Responses      Flowsheet Row Most Recent Value   Refill Delivery Questions    How will the patient receive the medication? [facility]   Shipping Address Home   Address in [facility] confirmed and updated if neccessary? Yes   Expected Copay ($) 5   Is the patient able to afford the medication copay? Yes   Payment Method CC on file   Days supply of Refill 28   Supplies needed? No supplies needed   Refill activity completed? Yes   Refill activity plan Refill scheduled   Shipment/Pickup Date: 05/24/23            Objective    He has no past medical history on file.    Tried/failed medications: statins    BP Readings from Last 4 Encounters:   05/12/23 116/78   02/02/23 132/80   07/28/22 120/62   01/27/22 124/68     Ht Readings from Last 4 Encounters:   02/02/23 5' 7" (1.702 m)   07/28/22 5' 7" (1.702 m)   01/27/22 5' 7" (1.702 m)   07/28/21 5' 7" (1.702 m)     Wt Readings from Last 4 Encounters:   05/12/23 76.7 kg (169 lb)   02/02/23 78.5 kg (173 lb)   07/28/22 75.8 kg (167 lb)   01/27/22 77.2 kg (170 lb 3.2 oz)       The goals of prescribed drug therapy management include:  Supporting patient to meet the prescriber's medical treatment objectives  Improving or maintaining quality of life  Maintaining optimal therapy adherence  Minimizing and managing side effects      Goals of Therapy Status: Discussed (new start)    Assessment/Plan  Patient plans to start therapy on        Indication, dosage, appropriateness, effectiveness, safety and convenience of his specialty medication(s) were reviewed today.     Patient Education   Patient received education on the following:   Expectations and possible outcomes of therapy  Proper use, timely administration, and missed dose management  Duration of therapy  Side effects, including prevention, minimization, and management  Contraindications " and safety precautions  New or changed medications, including prescribe and over the counter medications and supplements  Reviews recommended vaccinations, as appropriate  Storage, safe handling, and disposal    Pt expressed understanding and had no questions at this time.    Tasks added this encounter   No tasks added.   Tasks due within next 3 months   No tasks due.     Sammie Eli, PharmD  [facility]  [address] [facility] [address]  Phone: [phone]  Fax: [phone]

## 2023-06-14 ENCOUNTER — PATIENT MESSAGE (OUTPATIENT)
Dept: PHARMACY | Facility: CLINIC | Age: 63
End: 2023-06-14
Payer: COMMERCIAL

## 2023-06-20 ENCOUNTER — PATIENT MESSAGE (OUTPATIENT)
Dept: PHARMACY | Facility: CLINIC | Age: 63
End: 2023-06-20
Payer: COMMERCIAL

## 2023-06-30 ENCOUNTER — SPECIALTY PHARMACY (OUTPATIENT)
Dept: PHARMACY | Facility: CLINIC | Age: 63
End: 2023-06-30
Payer: COMMERCIAL

## 2023-06-30 NOTE — TELEPHONE ENCOUNTER
Specialty Pharmacy - Refill Coordination    Specialty Medication Orders Linked to Encounter      Flowsheet Row Most Recent Value   Medication #1 evolocumab (REPATHA SURECLICK) 140 mg/mL PnIj (Order#[id number], Rx#[id number])          Refill Questions - Documented Responses      Flowsheet Row Most Recent Value   Patient Availability and HIPAA Verification    Does patient want to proceed with activity? Unable to Reach          We have had multiple attempts to the patient and have been unsuccessful to reach the patient. We will stop reaching out to the patient but in the event that the patient needs the med and contacts us, we will communicate and begin dispensing for the patient. At your next visit with the patient, please review the importance of being in contact with our specialty pharmacy as a part of our care team.      Sammie Eli, PharmD  [facility]  [address] [place] [id number]  Phone: [phone]  Fax: [phone]

## 2023-07-10 RX ORDER — ROSUVASTATIN CALCIUM 40 MG/1
TABLET, COATED ORAL
Qty: 90 TABLET | Refills: 0 | Status: SHIPPED | OUTPATIENT
Start: 2023-07-10

## 2023-09-11 ENCOUNTER — TELEPHONE (OUTPATIENT)
Dept: CARDIOLOGY | Facility: CLINIC | Age: 63
End: 2023-09-11
Payer: COMMERCIAL

## 2023-09-11 NOTE — TELEPHONE ENCOUNTER
Rescheduled cardiology follow up appointment due to MD out of office. Mailed letter to pt with new appt date and time.

## 2023-11-08 ENCOUNTER — LAB VISIT (OUTPATIENT)
Dept: LAB | Facility: HOSPITAL | Age: 63
End: 2023-11-08
Attending: INTERNAL MEDICINE
Payer: COMMERCIAL

## 2023-11-08 DIAGNOSIS — E78.5 HYPERLIPIDEMIA LDL GOAL <70: ICD-10-CM

## 2023-11-08 LAB
ALBUMIN SERPL BCP-MCNC: 4.2 G/DL (ref 3.5–5.2)
ALP SERPL-CCNC: 61 U/L (ref 55–135)
ALT SERPL W/O P-5'-P-CCNC: 23 U/L (ref 10–44)
ANION GAP SERPL CALC-SCNC: 11 MMOL/L (ref 8–16)
AST SERPL-CCNC: 25 U/L (ref 10–40)
BILIRUB SERPL-MCNC: 0.8 MG/DL (ref 0.1–1)
BUN SERPL-MCNC: 15 MG/DL (ref 8–23)
CALCIUM SERPL-MCNC: 9.2 MG/DL (ref 8.7–10.5)
CHLORIDE SERPL-SCNC: 105 MMOL/L (ref 95–110)
CHOLEST SERPL-MCNC: 200 MG/DL (ref 120–199)
CHOLEST/HDLC SERPL: 4.2 {RATIO} (ref 2–5)
CO2 SERPL-SCNC: 24 MMOL/L (ref 23–29)
CREAT SERPL-MCNC: 0.8 MG/DL (ref 0.5–1.4)
EST. GFR  (NO RACE VARIABLE): >60 ML/MIN/1.73 M^2
GLUCOSE SERPL-MCNC: 96 MG/DL (ref 70–110)
HDLC SERPL-MCNC: 48 MG/DL (ref 40–75)
HDLC SERPL: 24 % (ref 20–50)
LDLC SERPL CALC-MCNC: 132.6 MG/DL (ref 63–159)
NONHDLC SERPL-MCNC: 152 MG/DL
POTASSIUM SERPL-SCNC: 3.8 MMOL/L (ref 3.5–5.1)
PROT SERPL-MCNC: 6.8 G/DL (ref 6–8.4)
SODIUM SERPL-SCNC: 140 MMOL/L (ref 136–145)
TRIGL SERPL-MCNC: 97 MG/DL (ref 30–150)

## 2023-11-08 PROCEDURE — 80053 COMPREHEN METABOLIC PANEL: CPT | Performed by: INTERNAL MEDICINE

## 2023-11-08 PROCEDURE — 80061 LIPID PANEL: CPT | Performed by: INTERNAL MEDICINE

## 2023-11-08 PROCEDURE — 36415 COLL VENOUS BLD VENIPUNCTURE: CPT | Performed by: INTERNAL MEDICINE

## 2023-11-14 ENCOUNTER — OFFICE VISIT (OUTPATIENT)
Dept: CARDIOLOGY | Facility: CLINIC | Age: 63
End: 2023-11-14
Payer: COMMERCIAL

## 2023-11-14 VITALS
BODY MASS INDEX: 26.25 KG/M2 | OXYGEN SATURATION: 98 % | SYSTOLIC BLOOD PRESSURE: 132 MMHG | WEIGHT: 167.63 LBS | DIASTOLIC BLOOD PRESSURE: 80 MMHG | HEART RATE: 72 BPM

## 2023-11-14 DIAGNOSIS — I25.10 CORONARY ARTERY DISEASE INVOLVING NATIVE CORONARY ARTERY OF NATIVE HEART WITHOUT ANGINA PECTORIS: Primary | ICD-10-CM

## 2023-11-14 DIAGNOSIS — E78.5 HYPERLIPIDEMIA LDL GOAL <70: ICD-10-CM

## 2023-11-14 PROCEDURE — 99999 PR PBB SHADOW E&M-EST. PATIENT-LVL III: ICD-10-PCS | Mod: PBBFAC,,, | Performed by: INTERNAL MEDICINE

## 2023-11-14 PROCEDURE — 93010 ELECTROCARDIOGRAM REPORT: CPT | Mod: ,,, | Performed by: INTERNAL MEDICINE

## 2023-11-14 PROCEDURE — 93005 ELECTROCARDIOGRAM TRACING: CPT

## 2023-11-14 PROCEDURE — 99999 PR PBB SHADOW E&M-EST. PATIENT-LVL III: CPT | Mod: PBBFAC,,, | Performed by: INTERNAL MEDICINE

## 2023-11-14 PROCEDURE — 99214 PR OFFICE/OUTPT VISIT, EST, LEVL IV, 30-39 MIN: ICD-10-PCS | Mod: 25,S$GLB,, | Performed by: INTERNAL MEDICINE

## 2023-11-14 PROCEDURE — 99214 OFFICE O/P EST MOD 30 MIN: CPT | Mod: 25,S$GLB,, | Performed by: INTERNAL MEDICINE

## 2023-11-14 PROCEDURE — 93010 EKG 12-LEAD: ICD-10-PCS | Mod: ,,, | Performed by: INTERNAL MEDICINE

## 2024-05-13 ENCOUNTER — LAB VISIT (OUTPATIENT)
Dept: LAB | Facility: HOSPITAL | Age: 64
End: 2024-05-13
Attending: INTERNAL MEDICINE
Payer: COMMERCIAL

## 2024-05-13 DIAGNOSIS — E78.5 HYPERLIPIDEMIA LDL GOAL <70: ICD-10-CM

## 2024-05-13 LAB
ALBUMIN SERPL BCP-MCNC: 4.2 G/DL (ref 3.5–5.2)
ALP SERPL-CCNC: 62 U/L (ref 55–135)
ALT SERPL W/O P-5'-P-CCNC: 29 U/L (ref 10–44)
ANION GAP SERPL CALC-SCNC: 9 MMOL/L (ref 8–16)
AST SERPL-CCNC: 29 U/L (ref 10–40)
BILIRUB SERPL-MCNC: 0.7 MG/DL (ref 0.1–1)
BUN SERPL-MCNC: 12 MG/DL (ref 8–23)
CALCIUM SERPL-MCNC: 9.5 MG/DL (ref 8.7–10.5)
CHLORIDE SERPL-SCNC: 106 MMOL/L (ref 95–110)
CHOLEST SERPL-MCNC: 133 MG/DL (ref 120–199)
CHOLEST/HDLC SERPL: 2.8 {RATIO} (ref 2–5)
CO2 SERPL-SCNC: 25 MMOL/L (ref 23–29)
CREAT SERPL-MCNC: 0.8 MG/DL (ref 0.5–1.4)
EST. GFR  (NO RACE VARIABLE): >60 ML/MIN/1.73 M^2
GLUCOSE SERPL-MCNC: 108 MG/DL (ref 70–110)
HDLC SERPL-MCNC: 47 MG/DL (ref 40–75)
HDLC SERPL: 35.3 % (ref 20–50)
LDLC SERPL CALC-MCNC: 68.6 MG/DL (ref 63–159)
NONHDLC SERPL-MCNC: 86 MG/DL
POTASSIUM SERPL-SCNC: 4.3 MMOL/L (ref 3.5–5.1)
PROT SERPL-MCNC: 7 G/DL (ref 6–8.4)
SODIUM SERPL-SCNC: 140 MMOL/L (ref 136–145)
TRIGL SERPL-MCNC: 87 MG/DL (ref 30–150)

## 2024-05-13 PROCEDURE — 36415 COLL VENOUS BLD VENIPUNCTURE: CPT | Performed by: INTERNAL MEDICINE

## 2024-05-13 PROCEDURE — 80053 COMPREHEN METABOLIC PANEL: CPT | Performed by: INTERNAL MEDICINE

## 2024-05-13 PROCEDURE — 80061 LIPID PANEL: CPT | Performed by: INTERNAL MEDICINE

## 2024-05-16 ENCOUNTER — OFFICE VISIT (OUTPATIENT)
Dept: CARDIOLOGY | Facility: CLINIC | Age: 64
End: 2024-05-16
Payer: COMMERCIAL

## 2024-05-16 VITALS
BODY MASS INDEX: 27.03 KG/M2 | SYSTOLIC BLOOD PRESSURE: 112 MMHG | DIASTOLIC BLOOD PRESSURE: 64 MMHG | WEIGHT: 172.63 LBS | HEART RATE: 72 BPM

## 2024-05-16 DIAGNOSIS — E78.5 HYPERLIPIDEMIA LDL GOAL <70: ICD-10-CM

## 2024-05-16 DIAGNOSIS — I25.10 CORONARY ARTERY DISEASE INVOLVING NATIVE CORONARY ARTERY OF NATIVE HEART WITHOUT ANGINA PECTORIS: Primary | ICD-10-CM

## 2024-05-16 PROCEDURE — 99214 OFFICE O/P EST MOD 30 MIN: CPT | Mod: S$GLB,,, | Performed by: INTERNAL MEDICINE

## 2024-05-16 PROCEDURE — 99999 PR PBB SHADOW E&M-EST. PATIENT-LVL III: CPT | Mod: PBBFAC,,, | Performed by: INTERNAL MEDICINE

## 2024-05-16 RX ORDER — ROSUVASTATIN CALCIUM 40 MG/1
40 TABLET, COATED ORAL NIGHTLY
Qty: 90 TABLET | Refills: 3 | Status: SHIPPED | OUTPATIENT
Start: 2024-05-16

## 2024-05-16 NOTE — PROGRESS NOTES
Cardiology    5/16/2024  10:29 AM    Problem list  Patient Active Problem List   Diagnosis    Coronary artery disease involving native coronary artery without angina pectoris    Hyperlipidemia LDL goal <70       CC:  Six-month follow-up    HPI:  He has been doing well.  He denies any angina, dyspnea on exertion, palpitation or syncope.  He has not received his Repatha.  He is started exercising by walking 2 miles every day.  He feels great.  He also has not been taking his blood pressure medication on a daily basis because his blood pressure is well controlled.  He is on omega-3 supplements.    Medications  Current Outpatient Medications   Medication Sig Dispense Refill    aspirin (ECOTRIN) 81 MG EC tablet Take 81 mg by mouth.      olmesartan-hydrochlorothiazide (BENICAR HCT) 40-12.5 mg Tab Take 1 tablet by mouth once daily.      amLODIPine (NORVASC) 5 MG tablet Take 1 tablet (5 mg total) by mouth once daily. 90 tablet 3    rosuvastatin (CRESTOR) 40 MG Tab Take 1 tablet (40 mg total) by mouth every evening. 90 tablet 3     No current facility-administered medications for this visit.      Prior to Admission medications    Medication Sig Start Date End Date Taking? Authorizing Provider   aspirin (ECOTRIN) 81 MG EC tablet Take 81 mg by mouth.   Yes Provider, Historical   olmesartan-hydrochlorothiazide (BENICAR HCT) 40-12.5 mg Tab Take 1 tablet by mouth once daily.   Yes Provider, Historical   evolocumab (REPATHA SURECLICK) 140 mg/mL PnIj Inject 1 mL (140 mg total) into the skin every 14 (fourteen) days. 5/12/23 5/16/24 Yes Avery Boyd MD   rosuvastatin (CRESTOR) 40 MG Tab TAKE 1 TABLET BY MOUTH ONCE DAILY IN THE EVENING 7/10/23 5/16/24 Yes Avery Boyd MD   amLODIPine (NORVASC) 5 MG tablet Take 1 tablet (5 mg total) by mouth once daily. 2/2/23 2/2/24  Avery Boyd MD   rosuvastatin (CRESTOR) 40 MG Tab Take 1 tablet (40 mg total) by mouth every evening. 5/16/24   Avery Boyd MD          History  No past medical history on file.  No past surgical history on file.  Social History     Socioeconomic History    Marital status:    Tobacco Use    Smoking status: Former    Smokeless tobacco: Never    Tobacco comments:     quit 30yrs ago         Allergies  Review of patient's allergies indicates:  No Known Allergies      Review of Systems   Review of Systems   Constitutional: Negative for decreased appetite, fever and weight loss.   HENT:  Negative for congestion and nosebleeds.    Eyes:  Negative for double vision, vision loss in left eye, vision loss in right eye and visual disturbance.   Cardiovascular:  Negative for chest pain, claudication, cyanosis, dyspnea on exertion, irregular heartbeat, leg swelling, near-syncope, orthopnea, palpitations, paroxysmal nocturnal dyspnea and syncope.   Respiratory:  Negative for cough, hemoptysis, shortness of breath, sleep disturbances due to breathing, snoring, sputum production and wheezing.    Endocrine: Negative for cold intolerance and heat intolerance.   Skin:  Negative for nail changes and rash.   Musculoskeletal:  Negative for joint pain, muscle cramps, muscle weakness and myalgias.   Gastrointestinal:  Negative for change in bowel habit, heartburn, hematemesis, hematochezia, hemorrhoids and melena.   Neurological:  Negative for dizziness, focal weakness and headaches.         Physical Exam  Wt Readings from Last 1 Encounters:   05/16/24 78.3 kg (172 lb 9.6 oz)     BP Readings from Last 3 Encounters:   05/16/24 112/64   11/14/23 132/80   05/12/23 116/78     Pulse Readings from Last 1 Encounters:   05/16/24 72     Body mass index is 27.03 kg/m².    Physical Exam  Constitutional:       Appearance: He is well-developed.   HENT:      Head: Atraumatic.   Eyes:      General: No scleral icterus.  Neck:      Vascular: Normal carotid pulses. No carotid bruit, hepatojugular reflux or JVD.   Cardiovascular:      Rate and Rhythm: Normal rate and regular  rhythm.      Chest Wall: PMI is not displaced.      Pulses: Intact distal pulses.           Carotid pulses are 2+ on the right side and 2+ on the left side.       Radial pulses are 2+ on the right side and 2+ on the left side.        Dorsalis pedis pulses are 2+ on the right side and 2+ on the left side.      Heart sounds: Normal heart sounds, S1 normal and S2 normal. No murmur heard.     No friction rub.   Pulmonary:      Effort: Pulmonary effort is normal. No respiratory distress.      Breath sounds: Normal breath sounds. No stridor. No wheezing or rales.   Chest:      Chest wall: No tenderness.   Abdominal:      General: Bowel sounds are normal.      Palpations: Abdomen is soft.   Musculoskeletal:      Cervical back: Neck supple. No edema.   Skin:     General: Skin is warm and dry.      Nails: There is no clubbing.   Neurological:      Mental Status: He is alert and oriented to person, place, and time.   Psychiatric:         Behavior: Behavior normal.         Thought Content: Thought content normal.             Assessment  1. Coronary artery disease involving native coronary artery of native heart without angina pectoris  Stable.  Continue current treatment and monitor for anginal symptoms    2. Hyperlipidemia LDL goal <70  At goal with LDL of 68 on rosuvastatin 40 mg.  Patient has not been on Repatha.  - Comprehensive Metabolic Panel; Future  - Lipid Panel; Future        Plan and Discussion  Discussed his lab results which showed improved LDL to 68 on rosuvastatin only.  He did not receive his Repatha.  He has been exercising daily.  His blood pressure is well controlled.  Recommend to continue with current regimen.    Follow Up  6 months with labs      Avery Boyd MD, F.A.C.C, F.S.C.A.I.      Total professional time spent for the encounter: 30 minutes  Time was spent preparing to see the patient, reviewing results of prior testing, obtaining and/or reviewing separately obtained history, performing a  medically appropriate examination and interview, counseling and educating the patient/family, ordering medications/tests/procedures, referring and communicating with other health care professionals, documenting clinical information in the electronic health record, and independently interpreting results.    Disclaimer: This document was created using voice recognition software (BitDefender Direct). Although it may be edited, this document may contain errors related to incorrect recognition of the spoken word. Please call the physician if clarification is needed.

## 2024-07-17 ENCOUNTER — OFFICE VISIT (OUTPATIENT)
Dept: CARDIOLOGY | Facility: CLINIC | Age: 64
End: 2024-07-17
Payer: COMMERCIAL

## 2024-07-17 VITALS — BODY MASS INDEX: 27.17 KG/M2 | DIASTOLIC BLOOD PRESSURE: 72 MMHG | SYSTOLIC BLOOD PRESSURE: 126 MMHG | WEIGHT: 173.5 LBS

## 2024-07-17 DIAGNOSIS — E78.5 HYPERLIPIDEMIA LDL GOAL <70: ICD-10-CM

## 2024-07-17 DIAGNOSIS — R06.09 DYSPNEA ON EXERTION: ICD-10-CM

## 2024-07-17 DIAGNOSIS — I25.10 CORONARY ARTERY DISEASE INVOLVING NATIVE CORONARY ARTERY OF NATIVE HEART WITHOUT ANGINA PECTORIS: Primary | ICD-10-CM

## 2024-07-17 PROCEDURE — 99214 OFFICE O/P EST MOD 30 MIN: CPT | Mod: S$GLB,,, | Performed by: INTERNAL MEDICINE

## 2024-07-17 PROCEDURE — 99999 PR PBB SHADOW E&M-EST. PATIENT-LVL III: CPT | Mod: PBBFAC,,, | Performed by: INTERNAL MEDICINE

## 2024-07-17 NOTE — PROGRESS NOTES
Cardiology    7/17/2024  2:12 PM    Problem list  Patient Active Problem List   Diagnosis    Coronary artery disease involving native coronary artery without angina pectoris    Hyperlipidemia LDL goal <70    Dyspnea on exertion       CC:  OLIVERA    HPI:  Patient called and asked for same-day appointment to evaluate his OLIVERA.  He was seen in May.  Since then, he has been having OLIVERA/SOB when he is walking upstairs carrying things.  No PND.  He went to see his PCP who ordered CXR (atelectasis) and referred to pulmonary MD.  He has been compliant with his meds.      Medications  Current Outpatient Medications   Medication Sig Dispense Refill    aspirin (ECOTRIN) 81 MG EC tablet Take 81 mg by mouth.      olmesartan-hydrochlorothiazide (BENICAR HCT) 40-12.5 mg Tab Take 1 tablet by mouth once daily.      rosuvastatin (CRESTOR) 40 MG Tab Take 1 tablet (40 mg total) by mouth every evening. 90 tablet 3    amLODIPine (NORVASC) 5 MG tablet Take 1 tablet (5 mg total) by mouth once daily. 90 tablet 3     No current facility-administered medications for this visit.      Prior to Admission medications    Medication Sig Start Date End Date Taking? Authorizing Provider   aspirin (ECOTRIN) 81 MG EC tablet Take 81 mg by mouth.   Yes Provider, Historical   olmesartan-hydrochlorothiazide (BENICAR HCT) 40-12.5 mg Tab Take 1 tablet by mouth once daily.   Yes Provider, Historical   rosuvastatin (CRESTOR) 40 MG Tab Take 1 tablet (40 mg total) by mouth every evening. 5/16/24  Yes Avery Boyd MD   amLODIPine (NORVASC) 5 MG tablet Take 1 tablet (5 mg total) by mouth once daily. 2/2/23 2/2/24  Avery Boyd MD         History  No past medical history on file.  No past surgical history on file.  Social History     Socioeconomic History    Marital status:    Tobacco Use    Smoking status: Former    Smokeless tobacco: Never    Tobacco comments:     quit 30yrs ago         Allergies  Review of patient's allergies indicates:  No  Known Allergies      Review of Systems   Review of Systems   Constitutional: Negative for decreased appetite, fever and weight loss.   HENT:  Negative for congestion and nosebleeds.    Eyes:  Negative for double vision, vision loss in left eye, vision loss in right eye and visual disturbance.   Cardiovascular:  Positive for dyspnea on exertion. Negative for chest pain, claudication, cyanosis, irregular heartbeat, leg swelling, near-syncope, orthopnea, palpitations, paroxysmal nocturnal dyspnea and syncope.   Respiratory:  Negative for cough, hemoptysis, shortness of breath, sleep disturbances due to breathing, snoring, sputum production and wheezing.    Endocrine: Negative for cold intolerance and heat intolerance.   Skin:  Negative for nail changes and rash.   Musculoskeletal:  Negative for joint pain, muscle cramps, muscle weakness and myalgias.   Gastrointestinal:  Negative for change in bowel habit, heartburn, hematemesis, hematochezia, hemorrhoids and melena.   Neurological:  Negative for dizziness, focal weakness and headaches.         Physical Exam  Wt Readings from Last 1 Encounters:   07/17/24 78.7 kg (173 lb 8 oz)     BP Readings from Last 3 Encounters:   07/17/24 126/72   05/16/24 112/64   11/14/23 132/80     Pulse Readings from Last 1 Encounters:   07/17/24 (P) 70     Body mass index is 27.17 kg/m².    Physical Exam  Constitutional:       Appearance: He is well-developed.   HENT:      Head: Atraumatic.   Eyes:      General: No scleral icterus.  Neck:      Vascular: Normal carotid pulses. No carotid bruit, hepatojugular reflux or JVD.   Cardiovascular:      Rate and Rhythm: Normal rate and regular rhythm.      Chest Wall: PMI is not displaced.      Pulses: Intact distal pulses.           Carotid pulses are 2+ on the right side and 2+ on the left side.       Radial pulses are 2+ on the right side and 2+ on the left side.        Dorsalis pedis pulses are 2+ on the right side and 2+ on the left side.       Heart sounds: Normal heart sounds, S1 normal and S2 normal. No murmur heard.     No friction rub.   Pulmonary:      Effort: Pulmonary effort is normal. No respiratory distress.      Breath sounds: Normal breath sounds. No stridor. No wheezing or rales.   Chest:      Chest wall: No tenderness.   Abdominal:      General: Bowel sounds are normal.      Palpations: Abdomen is soft.   Musculoskeletal:      Cervical back: Neck supple. No edema.   Skin:     General: Skin is warm and dry.      Nails: There is no clubbing.   Neurological:      Mental Status: He is alert and oriented to person, place, and time.   Psychiatric:         Behavior: Behavior normal.         Thought Content: Thought content normal.             Assessment  1. Coronary artery disease involving native coronary artery of native heart without angina pectoris  OLIVERA is concerning for angina and being evaluated    2. Hyperlipidemia LDL goal <70  On statin    3. Dyspnea on exertion  Being evaluated         Plan and Discussion  Discussed his EKG which showed normal sinus rhythm rate 63, no acute changes.  His CXR showed atelectasis.  Given his prior history of CAD with stent, will get stress echo to evaluate his OLIVERA which could be anginal equivalent.      Follow Up  1 month      Avery Boyd MD, F.A.C.C, F.S.C.A.I.      Total professional time spent for the encounter: 30 minutes  Time was spent preparing to see the patient, reviewing results of prior testing, obtaining and/or reviewing separately obtained history, performing a medically appropriate examination and interview, counseling and educating the patient/family, ordering medications/tests/procedures, referring and communicating with other health care professionals, documenting clinical information in the electronic health record, and independently interpreting results.    Disclaimer: This document was created using voice recognition software (M*Modal Fluency Direct). Although it may be edited, this  document may contain errors related to incorrect recognition of the spoken word. Please call the physician if clarification is needed.

## 2024-07-23 ENCOUNTER — HOSPITAL ENCOUNTER (OUTPATIENT)
Dept: CARDIOLOGY | Facility: OTHER | Age: 64
Discharge: HOME | End: 2024-07-23
Attending: INTERNAL MEDICINE
Payer: COMMERCIAL

## 2024-07-23 VITALS
SYSTOLIC BLOOD PRESSURE: 109 MMHG | BODY MASS INDEX: 27.15 KG/M2 | SYSTOLIC BLOOD PRESSURE: 126 MMHG | WEIGHT: 173 LBS | HEART RATE: 70 BPM | WEIGHT: 173 LBS | HEIGHT: 67 IN | HEIGHT: 67 IN | DIASTOLIC BLOOD PRESSURE: 72 MMHG | DIASTOLIC BLOOD PRESSURE: 57 MMHG | HEART RATE: 70 BPM | BODY MASS INDEX: 27.15 KG/M2

## 2024-07-23 DIAGNOSIS — I25.10 CORONARY ARTERY DISEASE INVOLVING NATIVE CORONARY ARTERY OF NATIVE HEART WITHOUT ANGINA PECTORIS: ICD-10-CM

## 2024-07-23 DIAGNOSIS — R06.09 DYSPNEA ON EXERTION: ICD-10-CM

## 2024-07-23 LAB
ASCENDING AORTA: 2.99 CM
AV INDEX (PROSTH): 0.76
AV MEAN GRADIENT: 6 MMHG
AV PEAK GRADIENT: 10 MMHG
AV VALVE AREA BY VELOCITY RATIO: 2.76 CM²
AV VALVE AREA: 2.49 CM²
AV VELOCITY RATIO: 0.85
BSA FOR ECHO PROCEDURE: 1.93 M2
BSA FOR ECHO PROCEDURE: 1.93 M2
CV ECHO LV RWT: 0.3 CM
CV ECHO LV RWT: 0.41 CM
CV STRESS BASE HR: 70 BPM
DIASTOLIC BLOOD PRESSURE: 57 MMHG
DOP CALC AO PEAK VEL: 1.55 M/S
DOP CALC AO VTI: 32.7 CM
DOP CALC LVOT AREA: 3.3 CM2
DOP CALC LVOT DIAMETER: 2.04 CM
DOP CALC LVOT PEAK VEL: 1.31 M/S
DOP CALC LVOT STROKE VOLUME: 81.34 CM3
DOP CALC RVOT PEAK VEL: 1.15 M/S
DOP CALCLVOT PEAK VEL VTI: 24.9 CM
E WAVE DECELERATION TIME: 234.32 MSEC
E/A RATIO: 0.86
E/E' RATIO: 5.79 M/S
ECHO LV POSTERIOR WALL: 0.74 CM (ref 0.6–1.1)
ECHO LV POSTERIOR WALL: 0.92 CM (ref 0.6–1.1)
FRACTIONAL SHORTENING: 36 % (ref 28–44)
FRACTIONAL SHORTENING: 41 % (ref 28–44)
INTERVENTRICULAR SEPTUM: 0.75 CM (ref 0.6–1.1)
INTERVENTRICULAR SEPTUM: 0.9 CM (ref 0.6–1.1)
IVC DIAMETER: 1.38 CM
LA MAJOR: 5.66 CM
LA MINOR: 5.01 CM
LA WIDTH: 3.5 CM
LEFT ATRIUM AREA SYSTOLIC (APICAL 2 CHAMBER): 15.07 CM2
LEFT ATRIUM AREA SYSTOLIC (APICAL 4 CHAMBER): 16.61 CM2
LEFT ATRIUM SIZE: 3.25 CM
LEFT ATRIUM VOLUME INDEX MOD: 19.7 ML/M2
LEFT ATRIUM VOLUME INDEX: 27 ML/M2
LEFT ATRIUM VOLUME MOD: 37.52 CM3
LEFT ATRIUM VOLUME: 51.39 CM3
LEFT INTERNAL DIMENSION IN SYSTOLE: 2.65 CM (ref 2.1–4)
LEFT INTERNAL DIMENSION IN SYSTOLE: 3.17 CM (ref 2.1–4)
LEFT VENTRICLE DIASTOLIC VOLUME INDEX: 48.7 ML/M2
LEFT VENTRICLE DIASTOLIC VOLUME INDEX: 61.55 ML/M2
LEFT VENTRICLE DIASTOLIC VOLUME: 116.94 ML
LEFT VENTRICLE DIASTOLIC VOLUME: 92.53 ML
LEFT VENTRICLE END SYSTOLIC VOLUME APICAL 2 CHAMBER: 35.57 ML
LEFT VENTRICLE END SYSTOLIC VOLUME APICAL 4 CHAMBER: 37.7 ML
LEFT VENTRICLE MASS INDEX: 65 G/M2
LEFT VENTRICLE MASS INDEX: 71 G/M2
LEFT VENTRICLE SYSTOLIC VOLUME INDEX: 13.6 ML/M2
LEFT VENTRICLE SYSTOLIC VOLUME INDEX: 21.1 ML/M2
LEFT VENTRICLE SYSTOLIC VOLUME: 25.86 ML
LEFT VENTRICLE SYSTOLIC VOLUME: 40.03 ML
LEFT VENTRICULAR INTERNAL DIMENSION IN DIASTOLE: 4.5 CM (ref 3.5–6)
LEFT VENTRICULAR INTERNAL DIMENSION IN DIASTOLE: 4.98 CM (ref 3.5–6)
LEFT VENTRICULAR MASS: 123.18 G
LEFT VENTRICULAR MASS: 134.81 G
LV LATERAL E/E' RATIO: 5 M/S
LV SEPTAL E/E' RATIO: 6.88 M/S
LVED V (TEICH): 116.94 ML
LVED V (TEICH): 92.53 ML
LVES V (TEICH): 25.86 ML
LVES V (TEICH): 40.03 ML
LVOT MG: 3.93 MMHG
LVOT MV: 0.95 CM/S
MV PEAK A VEL: 0.64 M/S
MV PEAK E VEL: 0.55 M/S
MV STENOSIS PRESSURE HALF TIME: 67.95 MS
MV VALVE AREA P 1/2 METHOD: 3.24 CM2
OHS CV CPX 1 MINUTE RECOVERY HEART RATE: 104 BPM
OHS CV CPX 85 PERCENT MAX PREDICTED HEART RATE MALE: 132
OHS CV CPX ESTIMATED METS: 12
OHS CV CPX MAX PREDICTED HEART RATE: 155
OHS CV CPX PATIENT IS FEMALE: 0
OHS CV CPX PATIENT IS MALE: 1
OHS CV CPX PEAK DIASTOLIC BLOOD PRESSURE: 101 MMHG
OHS CV CPX PEAK HEAR RATE: 131 BPM
OHS CV CPX PEAK RATE PRESSURE PRODUCT: NORMAL
OHS CV CPX PEAK SYSTOLIC BLOOD PRESSURE: 210 MMHG
OHS CV CPX PERCENT MAX PREDICTED HEART RATE ACHIEVED: 85
OHS CV CPX RATE PRESSURE PRODUCT PRESENTING: 7630
PISA MRMAX VEL: 4.22 M/S
PISA TR MAX VEL: 2.97 M/S
RA MAJOR: 5.13 CM
RA PRESSURE ESTIMATED: 3 MMHG
RA WIDTH: 3.7 CM
RIGHT VENTRICLE DIASTOLIC BASEL DIMENSION: 3.3 CM
RV TB RVSP: 6 MMHG
RV TISSUE DOPPLER FREE WALL SYSTOLIC VELOCITY 1 (APICAL 4 CHAMBER VIEW): 19.15 CM/S
SINUS: 3.3 CM
STJ: 2.36 CM
STRESS ECHO POST EXERCISE DUR MIN: 9 MINUTES
STRESS ECHO POST EXERCISE DUR SEC: 31 SECONDS
SYSTOLIC BLOOD PRESSURE: 109 MMHG
TDI LATERAL: 0.11 M/S
TDI SEPTAL: 0.08 M/S
TDI: 0.1 M/S
TR MAX PG: 35 MMHG
TRICUSPID ANNULAR PLANE SYSTOLIC EXCURSION: 1.94 CM
TV REST PULMONARY ARTERY PRESSURE: 38 MMHG
Z-SCORE OF LEFT VENTRICULAR DIMENSION IN END DIASTOLE: -0.68
Z-SCORE OF LEFT VENTRICULAR DIMENSION IN END DIASTOLE: -1.7
Z-SCORE OF LEFT VENTRICULAR DIMENSION IN END SYSTOLE: -0.28
Z-SCORE OF LEFT VENTRICULAR DIMENSION IN END SYSTOLE: -1.7

## 2024-07-23 PROCEDURE — 93351 STRESS TTE COMPLETE: CPT | Mod: 26,,, | Performed by: INTERNAL MEDICINE

## 2024-07-23 PROCEDURE — 93351 STRESS TTE COMPLETE: CPT

## 2024-07-23 PROCEDURE — 93306 TTE W/DOPPLER COMPLETE: CPT | Mod: 26,,, | Performed by: INTERNAL MEDICINE

## 2024-07-23 PROCEDURE — 93306 TTE W/DOPPLER COMPLETE: CPT

## 2024-07-29 ENCOUNTER — TELEPHONE (OUTPATIENT)
Dept: PULMONOLOGY | Facility: CLINIC | Age: 64
End: 2024-07-29
Payer: COMMERCIAL

## 2024-07-29 DIAGNOSIS — R06.02 SOB (SHORTNESS OF BREATH): Primary | ICD-10-CM

## 2024-07-30 ENCOUNTER — HOSPITAL ENCOUNTER (OUTPATIENT)
Dept: RADIOLOGY | Facility: HOSPITAL | Age: 64
Discharge: HOME | End: 2024-07-30
Attending: INTERNAL MEDICINE
Payer: COMMERCIAL

## 2024-07-30 ENCOUNTER — HOSPITAL ENCOUNTER (OUTPATIENT)
Dept: PULMONOLOGY | Facility: CLINIC | Age: 64
Discharge: HOME | End: 2024-07-30
Payer: COMMERCIAL

## 2024-07-30 ENCOUNTER — OFFICE VISIT (OUTPATIENT)
Dept: PULMONOLOGY | Facility: CLINIC | Age: 64
End: 2024-07-30
Payer: COMMERCIAL

## 2024-07-30 VITALS
DIASTOLIC BLOOD PRESSURE: 69 MMHG | OXYGEN SATURATION: 99 % | HEART RATE: 66 BPM | BODY MASS INDEX: 26.96 KG/M2 | HEIGHT: 67 IN | WEIGHT: 171.75 LBS | SYSTOLIC BLOOD PRESSURE: 118 MMHG

## 2024-07-30 VITALS — WEIGHT: 171.75 LBS | BODY MASS INDEX: 26.96 KG/M2 | HEIGHT: 67 IN

## 2024-07-30 DIAGNOSIS — J84.9 INTERSTITIAL PULMONARY DISEASE, UNSPECIFIED: Primary | ICD-10-CM

## 2024-07-30 DIAGNOSIS — R06.02 SOB (SHORTNESS OF BREATH): ICD-10-CM

## 2024-07-30 DIAGNOSIS — J84.9 INTERSTITIAL PULMONARY DISEASE, UNSPECIFIED: ICD-10-CM

## 2024-07-30 DIAGNOSIS — R06.09 DYSPNEA ON EXERTION: ICD-10-CM

## 2024-07-30 DIAGNOSIS — J98.4 RESTRICTIVE LUNG DISEASE: ICD-10-CM

## 2024-07-30 LAB
DLCO SINGLE BREATH LLN: 18.71
DLCO SINGLE BREATH PRE REF: 49.5 %
DLCO SINGLE BREATH REF: 25.64
DLCOC SBVA LLN: 2.66
DLCOC SBVA REF: 3.93
DLCOC SINGLE BREATH LLN: 18.71
DLCOC SINGLE BREATH REF: 25.64
DLCOCSBVAULN: 5.21
DLCOCSINGLEBREATHULN: 32.57
DLCOSINGLEBREATHULN: 32.57
DLCOSINGLEBREATHZSCORE: -3.07
DLCOVA LLN: 2.66
DLCOVA PRE REF: 87 %
DLCOVA PRE: 3.42 ML/(MIN*MMHG*L) (ref 2.66–5.21)
DLCOVA REF: 3.93
DLCOVAULN: 5.21
ERV LLN: -16448.95
ERV PRE REF: 90.7 %
ERV REF: 1.05
ERVULN: ABNORMAL
FEF 25 75 LLN: 1.5
FEF 25 75 PRE REF: 43.4 %
FEF 25 75 REF: 3.21
FET100 CHG: 4.9 %
FEV05 LLN: 1.31
FEV05 REF: 2.45
FEV1 CHG: -0.7 %
FEV1 FVC LLN: 68
FEV1 FVC PRE REF: 93.3 %
FEV1 FVC REF: 79
FEV1 LLN: 2.05
FEV1 PRE REF: 65.6 %
FEV1 REF: 2.81
FEV1FVCZSCORE: -0.8
FEV1ZSCORE: -2.06
FRCPLETH LLN: 2.49
FRCPLETH PREREF: 68.2 %
FRCPLETH REF: 3.48
FRCPLETHULN: 4.46
FVC CHG: -1.4 %
FVC LLN: 2.64
FVC PRE REF: 70.3 %
FVC REF: 3.54
FVCZSCORE: -1.91
IVC PRE: 2.42 L (ref 2.64–4.46)
IVC SINGLE BREATH LLN: 2.64
IVC SINGLE BREATH PRE REF: 68.3 %
IVC SINGLE BREATH REF: 3.54
IVCSINGLEBREATHULN: 4.46
LLN IC: -9999997.14
PEF LLN: 5.81
PEF PRE REF: 56.3 %
PEF REF: 7.8
PHYSICIAN COMMENT: ABNORMAL
POST FEF 25 75: 1.34 L/S (ref 1.5–4.92)
POST FET 100: 7.41 SEC
POST FEV1 FVC: 74.62 % (ref 68.09–89.54)
POST FEV1: 1.83 L (ref 2.05–3.53)
POST FEV5: 1.38 L (ref 1.31–3.58)
POST FVC: 2.46 L (ref 2.64–4.46)
POST PEF: 4.19 L/S (ref 5.81–9.79)
PRE DLCO: 12.7 ML/(MIN*MMHG) (ref 18.71–32.57)
PRE ERV: 0.95 L (ref -16448.95–16451.05)
PRE FEF 25 75: 1.39 L/S (ref 1.5–4.92)
PRE FET 100: 7.07 SEC
PRE FEV05 REF: 58.1 %
PRE FEV1 FVC: 74.1 % (ref 68.09–89.54)
PRE FEV1: 1.85 L (ref 2.05–3.53)
PRE FEV5: 1.42 L (ref 1.31–3.58)
PRE FRC PL: 2.37 L (ref 2.49–4.46)
PRE FVC: 2.49 L (ref 2.64–4.46)
PRE IC: 1.54 L (ref -9999997.14–#######.####)
PRE PEF: 4.39 L/S (ref 5.81–9.79)
PRE REF IC: 53.9 %
PRE RV: 1.42 L (ref 1.75–3.1)
PRE TLC: 3.91 L (ref 5.37–7.67)
RAW PRE REF: 151.8 %
RAW PRE: 4.64 CMH2O*S/L (ref 3.06–3.06)
RAW REF: 3.06
REF IC: 2.86
RV LLN: 1.75
RV PRE REF: 58.5 %
RV REF: 2.43
RVTLC LLN: 30
RVTLC PRE REF: 92.3 %
RVTLC PRE: 36.3 % (ref 30.33–48.29)
RVTLC REF: 39
RVTLCULN: 48
RVULN: 3.1
SGAW PRE REF: 86.5 %
SGAW PRE: 0.07 1/(CMH2O*S) (ref 0.08–0.08)
SGAW REF: 0.08
TLC LLN: 5.37
TLC PRE REF: 60 %
TLC REF: 6.52
TLC ULN: 7.67
TLCZSCORE: -3.72
ULN IC: ABNORMAL
VA PRE: 3.71 L (ref 6.37–6.37)
VA SINGLE BREATH LLN: 6.37
VA SINGLE BREATH PRE REF: 58.3 %
VA SINGLE BREATH REF: 6.37
VASINGLEBREATHULN: 6.37
VC LLN: 2.64
VC PRE REF: 70.3 %
VC PRE: 2.49 L (ref 2.64–4.46)
VC REF: 3.54
VC ULN: 4.46

## 2024-07-30 PROCEDURE — 99999 PR PBB SHADOW E&M-EST. PATIENT-LVL III: CPT | Mod: PBBFAC,,, | Performed by: INTERNAL MEDICINE

## 2024-07-30 PROCEDURE — 71250 CT THORAX DX C-: CPT | Mod: 26,,, | Performed by: RADIOLOGY

## 2024-07-30 PROCEDURE — 71250 CT THORAX DX C-: CPT | Mod: TC

## 2024-07-30 RX ORDER — OMEPRAZOLE 40 MG/1
40 CAPSULE, DELAYED RELEASE ORAL
COMMUNITY
Start: 2024-05-21

## 2024-07-30 RX ORDER — SODIUM PICOSULFATE, MAGNESIUM OXIDE, AND ANHYDROUS CITRIC ACID 12; 3.5; 1 G/175ML; G/175ML; MG/175ML
LIQUID ORAL
COMMUNITY
Start: 2024-05-21

## 2024-07-30 NOTE — PROGRESS NOTES
"Subjective:     Reason for visit: shortness of breath     Patient ID:  Darrell Corona is a 64 y.o. male    History:  Mr. Corona is a 65 yo with HTN, high cholesterol and a very remote smoking history that presents with SOB. He started to notice shortness of breath with his exercise routine 2 months ago.  He has no previous history of lung disease.  He did smoke for about 15 years but quit over 25 years ago.  His dad has a history of COPD and he did not smoke.  His daughter has asthma.  No other family history of lung disease.  No autoimmune diseases and no joint pains or rashes.       Additional Pulmonary History:  Childhood Illnesses:  none  Occupational:  real estate  Environmental:  NA  Tobacco/Smoking:  15 pack year history, quit over 2 years ago.     Objective:     Vitals:    07/30/24 1150   BP: 118/69   Pulse: 66   SpO2: 99%   Weight: 77.9 kg (171 lb 11.8 oz)   Height: 5' 7" (1.702 m)         Physical Exam  Constitutional:       General: He is not in acute distress.     Appearance: He is not diaphoretic.   HENT:      Head: Normocephalic and atraumatic.      Right Ear: External ear normal.      Left Ear: External ear normal.   Eyes:      Conjunctiva/sclera: Conjunctivae normal.      Pupils: Pupils are equal, round, and reactive to light.   Neck:      Trachea: No tracheal deviation.   Cardiovascular:      Rate and Rhythm: Normal rate and regular rhythm.      Heart sounds: Normal heart sounds. No murmur heard.  Pulmonary:      Effort: Pulmonary effort is normal. No respiratory distress.      Breath sounds: No stridor. Rales (bilateral bases up to mid lung fields) present. No wheezing.   Abdominal:      General: Bowel sounds are normal. There is no distension.      Palpations: Abdomen is soft.      Tenderness: There is no abdominal tenderness.   Musculoskeletal:         General: Normal range of motion.      Cervical back: Normal range of motion and neck supple.   Skin:     General: Skin is warm and dry.      Findings: No " erythema.   Neurological:      Mental Status: He is alert and oriented to person, place, and time.      Gait: Gait is intact.   Psychiatric:         Mood and Affect: Mood and affect normal.         Cognition and Memory: Memory normal.         Judgment: Judgment normal.          Personal Diagnostic Review and Interpretation  Reviewed CXR from outside facility - basilar changes     Pertinent Studies Reviewed & Interpreted:     Pulmonary Function Tests:   Restriction noted on PFTs today with decreased DLCO    6 Minute Walk Tests:   No changes in oxygen    Echocardiograms:   Results for orders placed during the hospital encounter of 07/23/24    Echo    Interpretation Summary    Left Ventricle: The left ventricle is normal in size. Normal wall thickness. There is normal systolic function with a visually estimated ejection fraction of 55 - 60%. Global longitudinal strain is normal; -17.0%. There is normal diastolic function.    Right Ventricle: Normal right ventricular cavity size. Wall thickness is normal. Systolic function is normal.    Tricuspid Valve: There is mild regurgitation.    IVC/SVC: Normal venous pressure at 3 mmHg.      Other Pertinent Laboratories:  NA      Assessment & Plan:       Problem List Items Addressed This Visit          Pulmonary    Restrictive lung disease    Current Assessment & Plan     Noted on PFTs at this visit  CT scan ordered  Autoimmune screen ordered: PRESTON, CCP, RF, CBC, CMP              Cardiac/Vascular    Dyspnea on exertion    Current Assessment & Plan     Normal cardiac workup recently  Concern for possible underlying ILD           Other Visit Diagnoses       Interstitial pulmonary disease, unspecified    -  Primary    Relevant Orders    CT Chest Without Contrast    PRESTON    CYCLIC CITRUL PEPTIDE ANTIBODY, IGG    RHEUMATOID FACTOR    CBC auto differential    Comprehensive Metabolic Panel    QUANTIFERON GOLD TB             RETURN TO CLINIC IN 3 MONTHS       Portions of the record may  have been created with voice-recognition software. Occasional wrong-word or sound-a-like substitutions may have occurred due to the inherent limitations of voice-recognition software. Read the chart carefully and recognize, using context, where substitutions have occurred.    Esthela Watts M.D.  Pulmonary/Critical Care

## 2024-07-30 NOTE — ASSESSMENT & PLAN NOTE
Noted on PFTs at this visit  CT scan ordered  Autoimmune screen ordered: PRESTON, CCP, RF, CBC, CMP

## 2024-07-31 NOTE — PROCEDURES
Darrell Corona is a 64 y.o.  male patient, who presents for a 6 minute walk test ordered by MD Mac.  The diagnosis is Shortness of Breath.  The patient's BMI is 26.9 kg/m2.  Predicted distance (lower limit of normal) is 384.99 meters.      Test Results:    The test was completed without stopping.  The total time walked was 360 seconds.  During walking, the patient reported:  Dyspnea.  The patient used no assistive devices during testing.  Oxygen saturation was measured with forehead pulse oximetry probe.     07/30/2024---------Distance: 426.72 meters (1400 feet)     O2 Sat % Supplemental Oxygen Heart Rate Blood Pressure Alexa Scale   Pre-exercise  (Resting) 99 % Room Air 66 bpm 118/69 mmHg 3   During Exercise 93 % Room Air 85 bpm 157/79 mmHg 5-6   Post-exercise  (Recovery) 97 % Room Air  73 bpm 132/79 mmHg      Recovery Time: 85 seconds    Performing nurse/tech: Emerald MCKEON      PREVIOUS STUDY:   The patient has not had a previous study.      CLINICAL INTERPRETATION:  Six minute walk distance is 426.72 meters (1400 feet) with heavy dyspnea.  During exercise, there was significant desaturation while breathing room air.  Blood pressure increased significantly and Heart rate remained stable with walking.  The patient did not report non-pulmonary symptoms during exercise.  No previous study performed.  Based upon age and body mass index, exercise capacity is normal.

## 2024-08-04 ENCOUNTER — PATIENT MESSAGE (OUTPATIENT)
Dept: PULMONOLOGY | Facility: CLINIC | Age: 64
End: 2024-08-04
Payer: COMMERCIAL

## 2024-08-04 DIAGNOSIS — J84.9 INTERSTITIAL PULMONARY DISEASE, UNSPECIFIED: Primary | ICD-10-CM

## 2024-08-05 ENCOUNTER — OFFICE VISIT (OUTPATIENT)
Dept: CARDIOLOGY | Facility: CLINIC | Age: 64
End: 2024-08-05
Payer: COMMERCIAL

## 2024-08-05 ENCOUNTER — LAB VISIT (OUTPATIENT)
Dept: LAB | Facility: OTHER | Age: 64
End: 2024-08-05
Attending: INTERNAL MEDICINE
Payer: COMMERCIAL

## 2024-08-05 VITALS
DIASTOLIC BLOOD PRESSURE: 62 MMHG | HEART RATE: 72 BPM | SYSTOLIC BLOOD PRESSURE: 130 MMHG | OXYGEN SATURATION: 94 % | WEIGHT: 171.88 LBS | BODY MASS INDEX: 26.98 KG/M2 | HEIGHT: 67 IN

## 2024-08-05 DIAGNOSIS — J98.4 RESTRICTIVE LUNG DISEASE: Primary | ICD-10-CM

## 2024-08-05 DIAGNOSIS — I25.10 CORONARY ARTERY DISEASE INVOLVING NATIVE CORONARY ARTERY OF NATIVE HEART WITHOUT ANGINA PECTORIS: ICD-10-CM

## 2024-08-05 DIAGNOSIS — R06.09 DYSPNEA ON EXERTION: ICD-10-CM

## 2024-08-05 DIAGNOSIS — E78.5 HYPERLIPIDEMIA LDL GOAL <70: ICD-10-CM

## 2024-08-05 DIAGNOSIS — J84.9 INTERSTITIAL PULMONARY DISEASE, UNSPECIFIED: ICD-10-CM

## 2024-08-05 LAB
ALBUMIN SERPL BCP-MCNC: 3.5 G/DL (ref 3.5–5.2)
ALP SERPL-CCNC: 74 U/L (ref 55–135)
ALT SERPL W/O P-5'-P-CCNC: 31 U/L (ref 10–44)
ANION GAP SERPL CALC-SCNC: 10 MMOL/L (ref 8–16)
AST SERPL-CCNC: 35 U/L (ref 10–40)
BASOPHILS # BLD AUTO: 0.02 K/UL (ref 0–0.2)
BASOPHILS NFR BLD: 0.3 % (ref 0–1.9)
BILIRUB SERPL-MCNC: 0.4 MG/DL (ref 0.1–1)
BUN SERPL-MCNC: 10 MG/DL (ref 8–23)
CALCIUM SERPL-MCNC: 9.1 MG/DL (ref 8.7–10.5)
CCP AB SER IA-ACNC: 0.6 U/ML
CHLORIDE SERPL-SCNC: 106 MMOL/L (ref 95–110)
CO2 SERPL-SCNC: 24 MMOL/L (ref 23–29)
CREAT SERPL-MCNC: 0.8 MG/DL (ref 0.5–1.4)
DIFFERENTIAL METHOD BLD: ABNORMAL
EOSINOPHIL # BLD AUTO: 0.2 K/UL (ref 0–0.5)
EOSINOPHIL NFR BLD: 2.3 % (ref 0–8)
ERYTHROCYTE [DISTWIDTH] IN BLOOD BY AUTOMATED COUNT: 12.8 % (ref 11.5–14.5)
EST. GFR  (NO RACE VARIABLE): >60 ML/MIN/1.73 M^2
GLUCOSE SERPL-MCNC: 117 MG/DL (ref 70–110)
HCT VFR BLD AUTO: 45.9 % (ref 40–54)
HGB BLD-MCNC: 14.9 G/DL (ref 14–18)
IMM GRANULOCYTES # BLD AUTO: 0.02 K/UL (ref 0–0.04)
IMM GRANULOCYTES NFR BLD AUTO: 0.3 % (ref 0–0.5)
LYMPHOCYTES # BLD AUTO: 0.9 K/UL (ref 1–4.8)
LYMPHOCYTES NFR BLD: 13.4 % (ref 18–48)
MCH RBC QN AUTO: 29.3 PG (ref 27–31)
MCHC RBC AUTO-ENTMCNC: 32.5 G/DL (ref 32–36)
MCV RBC AUTO: 90 FL (ref 82–98)
MONOCYTES # BLD AUTO: 0.9 K/UL (ref 0.3–1)
MONOCYTES NFR BLD: 13.3 % (ref 4–15)
NEUTROPHILS # BLD AUTO: 4.6 K/UL (ref 1.8–7.7)
NEUTROPHILS NFR BLD: 70.4 % (ref 38–73)
NRBC BLD-RTO: 0 /100 WBC
PLATELET # BLD AUTO: 255 K/UL (ref 150–450)
PMV BLD AUTO: 9.3 FL (ref 9.2–12.9)
POTASSIUM SERPL-SCNC: 4.2 MMOL/L (ref 3.5–5.1)
PROT SERPL-MCNC: 6.5 G/DL (ref 6–8.4)
RBC # BLD AUTO: 5.09 M/UL (ref 4.6–6.2)
RHEUMATOID FACT SERPL-ACNC: <13 IU/ML (ref 0–15)
SODIUM SERPL-SCNC: 140 MMOL/L (ref 136–145)
WBC # BLD AUTO: 6.48 K/UL (ref 3.9–12.7)

## 2024-08-05 PROCEDURE — 86200 CCP ANTIBODY: CPT | Performed by: INTERNAL MEDICINE

## 2024-08-05 PROCEDURE — 85025 COMPLETE CBC W/AUTO DIFF WBC: CPT | Performed by: INTERNAL MEDICINE

## 2024-08-05 PROCEDURE — 86039 ANTINUCLEAR ANTIBODIES (ANA): CPT | Performed by: INTERNAL MEDICINE

## 2024-08-05 PROCEDURE — 99214 OFFICE O/P EST MOD 30 MIN: CPT | Mod: S$GLB,,, | Performed by: INTERNAL MEDICINE

## 2024-08-05 PROCEDURE — 86038 ANTINUCLEAR ANTIBODIES: CPT | Performed by: INTERNAL MEDICINE

## 2024-08-05 PROCEDURE — 99999 PR PBB SHADOW E&M-EST. PATIENT-LVL III: CPT | Mod: PBBFAC,,, | Performed by: INTERNAL MEDICINE

## 2024-08-05 PROCEDURE — 86235 NUCLEAR ANTIGEN ANTIBODY: CPT | Mod: 59 | Performed by: INTERNAL MEDICINE

## 2024-08-05 PROCEDURE — 86431 RHEUMATOID FACTOR QUANT: CPT | Performed by: INTERNAL MEDICINE

## 2024-08-05 PROCEDURE — 86225 DNA ANTIBODY NATIVE: CPT | Performed by: INTERNAL MEDICINE

## 2024-08-05 PROCEDURE — 80053 COMPREHEN METABOLIC PANEL: CPT | Performed by: INTERNAL MEDICINE

## 2024-08-05 PROCEDURE — 36415 COLL VENOUS BLD VENIPUNCTURE: CPT | Performed by: INTERNAL MEDICINE

## 2024-08-05 RX ORDER — PREDNISONE 10 MG/1
TABLET ORAL
Qty: 147 TABLET | Refills: 0 | Status: SHIPPED | OUTPATIENT
Start: 2024-08-05 | End: 2024-09-16

## 2024-08-06 ENCOUNTER — PATIENT MESSAGE (OUTPATIENT)
Dept: CRITICAL CARE MEDICINE | Facility: HOSPITAL | Age: 64
End: 2024-08-06
Payer: COMMERCIAL

## 2024-08-06 DIAGNOSIS — J98.4 RESTRICTIVE LUNG DISEASE: Primary | ICD-10-CM

## 2024-08-06 DIAGNOSIS — R76.8 ANA POSITIVE: ICD-10-CM

## 2024-08-06 LAB
ANA PATTERN 1: NORMAL
ANA SER QL IF: POSITIVE
ANA TITR SER IF: NORMAL {TITER}

## 2024-08-07 LAB
ANTI SM ANTIBODY: 0.08 RATIO (ref 0–0.99)
ANTI SM/RNP ANTIBODY: 0.1 RATIO (ref 0–0.99)
ANTI-SM INTERPRETATION: NEGATIVE
ANTI-SM/RNP INTERPRETATION: NEGATIVE
ANTI-SSA ANTIBODY: 0.07 RATIO (ref 0–0.99)
ANTI-SSA INTERPRETATION: NEGATIVE
ANTI-SSB ANTIBODY: 0.05 RATIO (ref 0–0.99)
ANTI-SSB INTERPRETATION: NEGATIVE
DSDNA AB SER-ACNC: NORMAL [IU]/ML

## 2024-08-28 ENCOUNTER — TELEPHONE (OUTPATIENT)
Dept: TRANSPLANT | Facility: CLINIC | Age: 64
End: 2024-08-28
Payer: COMMERCIAL

## 2024-08-28 NOTE — TELEPHONE ENCOUNTER
Instructions received for the ALD referral from Dr. Reina.  Called the patient's daughter Benita but received no answer. Left a voice message requesting a return call.    1052: Received a return call from the patient's daughter Benita. She was aware of the ALD referral and she asked for the earliest available appointment with Dr. Reina. Offered an appointment at 1000 am tomorrow, 8/29/24, and she accepted. Provided directions to our clinic location, which she repeated correctly. All questions at this time were answered to her satisfaction.     ----- Message from Carleen Reina DO sent at 8/28/2024  9:55 AM CDT -----    This patient is a father of one of the pulmonary fellows friends.  Has newly diagnosed IPF and needs to establish with ALD.  His daughter, Benita [facility] [phone], can help schedule his visit.  No testing as he is UTD.  Recently saw Dr. Watst but can transition to ALD.  Thanks

## 2024-08-29 ENCOUNTER — OFFICE VISIT (OUTPATIENT)
Dept: TRANSPLANT | Facility: CLINIC | Age: 64
End: 2024-08-29
Payer: COMMERCIAL

## 2024-08-29 ENCOUNTER — PATIENT MESSAGE (OUTPATIENT)
Dept: TRANSPLANT | Facility: CLINIC | Age: 64
End: 2024-08-29

## 2024-08-29 ENCOUNTER — HOSPITAL ENCOUNTER (OUTPATIENT)
Dept: PULMONOLOGY | Facility: CLINIC | Age: 64
Discharge: HOME | End: 2024-08-29
Payer: COMMERCIAL

## 2024-08-29 ENCOUNTER — TELEPHONE (OUTPATIENT)
Dept: TRANSPLANT | Facility: CLINIC | Age: 64
End: 2024-08-29
Payer: COMMERCIAL

## 2024-08-29 ENCOUNTER — LAB VISIT (OUTPATIENT)
Dept: LAB | Facility: HOSPITAL | Age: 64
End: 2024-08-29
Payer: COMMERCIAL

## 2024-08-29 VITALS — HEIGHT: 67 IN | WEIGHT: 171.94 LBS | BODY MASS INDEX: 26.99 KG/M2

## 2024-08-29 VITALS
WEIGHT: 171.94 LBS | BODY MASS INDEX: 26.99 KG/M2 | DIASTOLIC BLOOD PRESSURE: 73 MMHG | HEART RATE: 71 BPM | OXYGEN SATURATION: 96 % | RESPIRATION RATE: 18 BRPM | TEMPERATURE: 99 F | SYSTOLIC BLOOD PRESSURE: 121 MMHG | HEIGHT: 67 IN

## 2024-08-29 DIAGNOSIS — J84.9 INTERSTITIAL PULMONARY DISEASE, UNSPECIFIED: ICD-10-CM

## 2024-08-29 DIAGNOSIS — J84.9 ILD (INTERSTITIAL LUNG DISEASE): Primary | ICD-10-CM

## 2024-08-29 DIAGNOSIS — J84.9 ILD (INTERSTITIAL LUNG DISEASE): ICD-10-CM

## 2024-08-29 DIAGNOSIS — E78.5 HYPERLIPIDEMIA LDL GOAL <70: ICD-10-CM

## 2024-08-29 LAB
ALBUMIN SERPL BCP-MCNC: 3.4 G/DL (ref 3.5–5.2)
ALP SERPL-CCNC: 62 U/L (ref 55–135)
ALT SERPL W/O P-5'-P-CCNC: 31 U/L (ref 10–44)
ANION GAP SERPL CALC-SCNC: 9 MMOL/L (ref 8–16)
AST SERPL-CCNC: 22 U/L (ref 10–40)
BILIRUB SERPL-MCNC: 0.6 MG/DL (ref 0.1–1)
BUN SERPL-MCNC: 12 MG/DL (ref 8–23)
CALCIUM SERPL-MCNC: 9.7 MG/DL (ref 8.7–10.5)
CHLORIDE SERPL-SCNC: 101 MMOL/L (ref 95–110)
CHOLEST SERPL-MCNC: 173 MG/DL (ref 120–199)
CHOLEST/HDLC SERPL: 3.4 {RATIO} (ref 2–5)
CK SERPL-CCNC: 30 U/L (ref 20–200)
CO2 SERPL-SCNC: 26 MMOL/L (ref 23–29)
CREAT SERPL-MCNC: 0.8 MG/DL (ref 0.5–1.4)
CRP SERPL-MCNC: 35.3 MG/L (ref 0–8.2)
DLCO ADJ PRE: 8.87 ML/(MIN*MMHG) (ref 18.71–32.57)
DLCO SINGLE BREATH LLN: 18.71
DLCO SINGLE BREATH PRE REF: 34.9 %
DLCO SINGLE BREATH REF: 25.64
DLCOC SBVA LLN: 2.66
DLCOC SBVA PRE REF: 93.4 %
DLCOC SBVA REF: 3.93
DLCOC SINGLE BREATH LLN: 18.71
DLCOC SINGLE BREATH PRE REF: 34.6 %
DLCOC SINGLE BREATH REF: 25.64
DLCOCSBVAULN: 5.21
DLCOCSINGLEBREATHULN: 32.57
DLCOCSINGLEBREATHZSCORE: -3.98
DLCOSINGLEBREATHULN: 32.57
DLCOSINGLEBREATHZSCORE: -3.96
DLCOVA LLN: 2.66
DLCOVA PRE REF: 94.2 %
DLCOVA PRE: 3.71 ML/(MIN*MMHG*L) (ref 2.66–5.21)
DLCOVA REF: 3.93
DLCOVAULN: 5.21
DLVAADJ PRE: 3.68 ML/(MIN*MMHG*L) (ref 2.66–5.21)
ERV LLN: -16448.95
ERV PRE REF: 38.5 %
ERV REF: 1.05
ERVULN: ABNORMAL
ERYTHROCYTE [SEDIMENTATION RATE] IN BLOOD BY PHOTOMETRIC METHOD: 8 MM/HR (ref 0–23)
EST. GFR  (NO RACE VARIABLE): >60 ML/MIN/1.73 M^2
FEF 25 75 LLN: 1.5
FEF 25 75 PRE REF: 33 %
FEF 25 75 REF: 3.21
FEV05 LLN: 1.31
FEV05 REF: 2.45
FEV1 FVC LLN: 68
FEV1 FVC PRE REF: 93.3 %
FEV1 FVC REF: 79
FEV1 LLN: 2.05
FEV1 PRE REF: 49.5 %
FEV1 REF: 2.81
FEV1FVCZSCORE: -0.8
FEV1ZSCORE: -2.94
FRCPLETH LLN: 2.49
FRCPLETH PREREF: 58.9 %
FRCPLETH REF: 3.48
FRCPLETHULN: 4.46
FVC LLN: 2.63
FVC PRE REF: 53.1 %
FVC REF: 3.54
FVCZSCORE: -3.03
GLUCOSE SERPL-MCNC: 139 MG/DL (ref 70–110)
HDLC SERPL-MCNC: 51 MG/DL (ref 40–75)
HDLC SERPL: 29.5 % (ref 20–50)
IVC PRE: 1.61 L (ref 2.63–4.46)
IVC SINGLE BREATH LLN: 2.63
IVC SINGLE BREATH PRE REF: 45.5 %
IVC SINGLE BREATH REF: 3.54
IVCSINGLEBREATHULN: 4.46
LDLC SERPL CALC-MCNC: 100.8 MG/DL (ref 63–159)
LLN IC: -9999997.14
NONHDLC SERPL-MCNC: 122 MG/DL
PEF LLN: 5.81
PEF PRE REF: 40.7 %
PEF REF: 7.8
POTASSIUM SERPL-SCNC: 4.3 MMOL/L (ref 3.5–5.1)
PRE DLCO: 8.95 ML/(MIN*MMHG) (ref 18.71–32.57)
PRE ERV: 0.4 L (ref -16448.95–16451.05)
PRE FEF 25 75: 1.06 L/S (ref 1.5–4.92)
PRE FET 100: 6.38 SEC
PRE FEV05 REF: 43.3 %
PRE FEV1 FVC: 74.05 % (ref 68.07–89.54)
PRE FEV1: 1.39 L (ref 2.05–3.53)
PRE FEV5: 1.06 L (ref 1.31–3.58)
PRE FRC PL: 2.05 L (ref 2.49–4.46)
PRE FVC: 1.88 L (ref 2.63–4.46)
PRE IC: 1.48 L (ref -9999997.14–#######.####)
PRE PEF: 3.17 L/S (ref 5.81–9.79)
PRE REF IC: 51.6 %
PRE RV: 1.65 L (ref 1.75–3.1)
PRE TLC: 3.53 L (ref 5.37–7.67)
PROT SERPL-MCNC: 7.1 G/DL (ref 6–8.4)
RAW PRE REF: 187 %
RAW PRE: 5.72 CMH2O*S/L (ref 3.06–3.06)
RAW REF: 3.06
REF IC: 2.86
RV LLN: 1.75
RV PRE REF: 67.8 %
RV REF: 2.43
RVTLC LLN: 30
RVTLC PRE REF: 118.7 %
RVTLC PRE: 46.67 % (ref 30.33–48.29)
RVTLC REF: 39
RVTLCULN: 48
RVULN: 3.1
SGAW PRE REF: 79.3 %
SGAW PRE: 0.07 1/(CMH2O*S) (ref 0.08–0.08)
SGAW REF: 0.08
SODIUM SERPL-SCNC: 136 MMOL/L (ref 136–145)
TLC LLN: 5.37
TLC PRE REF: 54.1 %
TLC REF: 6.52
TLC ULN: 7.67
TLCZSCORE: -4.27
TRIGL SERPL-MCNC: 106 MG/DL (ref 30–150)
ULN IC: ABNORMAL
VA PRE: 2.41 L (ref 6.37–6.37)
VA SINGLE BREATH LLN: 6.37
VA SINGLE BREATH PRE REF: 37.9 %
VA SINGLE BREATH REF: 6.37
VASINGLEBREATHULN: 6.37
VC LLN: 2.63
VC PRE REF: 53.1 %
VC PRE: 1.88 L (ref 2.63–4.46)
VC REF: 3.54
VC ULN: 4.46

## 2024-08-29 PROCEDURE — 82085 ASSAY OF ALDOLASE: CPT | Mod: TXP | Performed by: INTERNAL MEDICINE

## 2024-08-29 PROCEDURE — 36415 COLL VENOUS BLD VENIPUNCTURE: CPT | Mod: NTX | Performed by: INTERNAL MEDICINE

## 2024-08-29 PROCEDURE — 99999 PR PBB SHADOW E&M-EST. PATIENT-LVL IV: CPT | Mod: PBBFAC,TXP,, | Performed by: INTERNAL MEDICINE

## 2024-08-29 PROCEDURE — 86140 C-REACTIVE PROTEIN: CPT | Mod: TXP | Performed by: INTERNAL MEDICINE

## 2024-08-29 PROCEDURE — 80053 COMPREHEN METABOLIC PANEL: CPT | Mod: TXP | Performed by: INTERNAL MEDICINE

## 2024-08-29 PROCEDURE — 86235 NUCLEAR ANTIGEN ANTIBODY: CPT | Mod: 59,TXP | Performed by: INTERNAL MEDICINE

## 2024-08-29 PROCEDURE — 85652 RBC SED RATE AUTOMATED: CPT | Mod: TXP | Performed by: INTERNAL MEDICINE

## 2024-08-29 PROCEDURE — 80061 LIPID PANEL: CPT | Mod: TXP | Performed by: INTERNAL MEDICINE

## 2024-08-29 PROCEDURE — 82550 ASSAY OF CK (CPK): CPT | Mod: TXP | Performed by: INTERNAL MEDICINE

## 2024-08-29 NOTE — PROCEDURES
Darrell Corona is a 64 y.o.   male patient, who presents for a 6 minute walk test ordered by DO Latosha.  The diagnosis is Interstitial Lung Disease.  The patient's BMI is 26.9 kg/m2. Predicted distance (lower limit of normal) is 384.99 meters.    Test Results:    The test was completed without stopping.  The total time walked was 360 seconds.  During walking, the patient reported:  Dyspnea.  The patient used no assistive devices during testing.  Oxygen saturation was measured with forehead pulse oximetry probe.     08/29/2024---------Distance: 396.24 meters (1300 feet)     O2 Sat % Supplemental Oxygen Heart Rate Blood Pressure Alexa Scale   Pre-exercise  (Resting) 95 % Room Air 84 bpm 108/67  mmHg 4   During Exercise 88 % Room Air 98 bpm 130/71  mmHg 7-8   Post-exercise   96 % Room Air  84 bpm 117/74  mmHg      Recovery Time: 95 seconds    Oxygen Qualification:     O2 Sat % Supplemental Oxygen Heart Rate Blood Pressure Alexa Scale   Pre-exercise  (Resting) 97 % 2 L/M  84 bpm  117/74 mmHg 3    During Exercise 95 %  2 L/M  92 bpm  132/77 mmHg 3    Post-exercise   97 %  2 L/M  88 bpm        Performing nurse/tech: [employer] RRT      PREVIOUS STUDY:   07/30/2024---------Distance: 426.72 meters (1400 feet)       O2 Sat % Supplemental Oxygen Heart Rate Blood Pressure Alexa Scale   Pre-exercise  (Resting) 99 % Room Air 66 bpm 118/69 mmHg 3   During Exercise 93 % Room Air 85 bpm 157/79 mmHg 5-6   Post-exercise  (Recovery) 97 % Room Air  73 bpm 132/79 mmHg         CLINICAL INTERPRETATION:  Six minute walk distance is 396.24 meters (1300 feet) with very heavy dyspnea.  During exercise, there was significant desaturation while breathing room air.  Both blood pressure and heart rate remained stable with walking.  The patient did not report non-pulmonary symptoms during exercise.  The patient may benefit from using supplemental oxygen during exertion.  Since the previous study in July 2024, exercise capacity is unchanged.  Based upon  age and body mass index, exercise capacity is normal.   Oxygen saturation did improve while breathing supplemental oxygen.

## 2024-08-29 NOTE — PROGRESS NOTES
ADVANCED LUNG DISEASE CLINIC INITIAL VISIT                                                                                                                                            Reason for Visit:  ILD    Referring Physician: Esthela Watts MD    History of Present Illness: Darrell Corona is a 64 y.o. male who is on 0L of oxygen.  He is on no assisted ventilation.  His New York Heart Association Class is III and a Karnofsky score of 70% - Cares for self: Unable to carry on normal activity or active work. He is not diabetic.    Requires Supplemental O2: No    Massive Hemoptysis: 0 occurrences  (Enter the number of times in the last year)    Exacerbations: 0 occurrences  (Enter the number of times in the last year)    Microbiology Infections: No    Pt presents today for initial evaluation of pulmonary fibrosis.  He states that he was in his usual state of health until June, when he went on a trip to [place].  On that trip, he noticed that when he walked up 5 flights of stairs he became very winded.  He usually walks up to 5 miles a day and is able to walk 10-15 flights of stairs without difficulty.  When he returned to [place], he was seen by his PCP who noted abnormal breath sounds and referred him to pulmonary.  He was seen by Dr. Watts and was noted to have pulmonary fibrosis on his CT chest.  He desaturated to 93% on 6MWT.  He had an PRESTON that was positive and a negative RF.  He has continued to have ongoing dyspnea that has been progressive since June.  He denies any illnesses around the onset of symptoms or since onset of symptoms.  Denies any lower extremity swelling.  No palpitations but does monitor his pulse ox and notices tachycardia after he exerts himself.  He states that he monitors his oxygen and will see dips into the low 80's after he exerts himself.  He recently had an episode while at [employer] loading groceries where he became lightheaded and felt like he was going to pass out.  He sat in the car and  returned to baseline.  He then was seen by Dr. Goyal at [facility] and started on a steroid course.  He has not noticed any difference with steroid use.  Labs for SSc and Sjogren's were checked and are pending.  He was referred to Dr. Scott for clinical trial consideration.  He has never had bronchoscopy or chest biopsy.      He works in real [employer] and does not have any occupational exposures.  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  No pulmonary toxic medications.  Does have dogs at home.  No history of dog allergies.  No exposures to birds, chickens, down bedding, or zheng/chicken coops.  No environmental exposures.  No mold in the home.  He previously smoked but quit 30 years ago.  No history of lung disease up to this point.  No family history of lung disease.  No family history of autoimmune disease.  He does endorse Raynaud's.  He does have skin thickening and fissuring of his fingers with thickness on his PIPs.  No rashes.  No muscle symptoms.  Does have fatigue.  No joint pains.  No sinus disease or reflux.       History reviewed. No pertinent past medical history.    History reviewed. No pertinent surgical history.    Allergies: Patient has no known allergies.    Current Outpatient Medications   Medication Sig    aspirin (ECOTRIN) 81 MG EC tablet Take 81 mg by mouth.    olmesartan-hydrochlorothiazide (BENICAR HCT) 40-12.5 mg Tab Take 1 tablet by mouth once daily.    predniSONE (DELTASONE) 10 MG tablet Take 6 tablets (60 mg total) by mouth once daily for 7 days, THEN 5 tablets (50 mg total) once daily for 7 days, THEN 4 tablets (40 mg total) once daily for 7 days, THEN 3 tablets (30 mg total) once daily for 7 days, THEN 2 tablets (20 mg total) once daily for 7 days, THEN 1 tablet (10 mg total) once daily for 7 days.    rosuvastatin (CRESTOR) 40 MG Tab Take 1 tablet (40 mg total) by mouth every evening.     No current  facility-administered medications for this visit.         There is no immunization history on file for this patient.  Family History:  No family history on file.  Social History     Substance and Sexual Activity   Alcohol Use Yes    Alcohol/week: 2.0 standard drinks of alcohol    Types: 1 Glasses of wine, 1 Cans of beer per week    Comment: 1 beer and wine every day      Social History     Substance and Sexual Activity   Drug Use Never      Social History     Socioeconomic History    Marital status:    Tobacco Use    Smoking status: Former     Current packs/day: 0.00     Types: Cigarettes     Quit date: 1991     Years since quittin.6    Smokeless tobacco: Never    Tobacco comments:     quit 30yrs ago   Substance and Sexual Activity    Alcohol use: Yes     Alcohol/week: 2.0 standard drinks of alcohol     Types: 1 Glasses of wine, 1 Cans of beer per week     Comment: 1 beer and wine every day    Drug use: Never    Sexual activity: Yes   Social History Narrative    ** Merged History Encounter **          Social Determinants of Health     Financial Resource Strain: Patient Declined (2024)    Overall Financial Resource Strain (CARDIA)     Difficulty of Paying Living Expenses: Patient declined   Food Insecurity: Patient Declined (2024)    Hunger Vital Sign     Worried About Running Out of Food in the Last Year: Patient declined     Ran Out of Food in the Last Year: Patient declined   Physical Activity: Inactive (2024)    Exercise Vital Sign     Days of Exercise per Week: 0 days     Minutes of Exercise per Session: 0 min   Stress: Stress Concern Present (2024)    [employer] - Occupational Stress Questionnaire     Feeling of Stress : To some extent   Housing Stability: Unknown (2024)    Housing Stability Vital Sign     Unable to Pay for Housing in the Last Year: No     Review of Systems   Constitutional:  Positive for malaise/fatigue. Negative for  "chills, diaphoresis, fever and weight loss.   HENT:  Negative for congestion, ear discharge, ear pain, hearing loss, nosebleeds, sinus pain, sore throat and tinnitus.    Eyes:  Negative for blurred vision, double vision, photophobia, pain, discharge and redness.   Respiratory:  Positive for cough and shortness of breath. Negative for hemoptysis, sputum production, wheezing and stridor.    Cardiovascular:  Negative for chest pain, palpitations, orthopnea, claudication, leg swelling and PND.   Gastrointestinal:  Negative for abdominal pain, blood in stool, constipation, diarrhea, heartburn, melena, nausea and vomiting.   Genitourinary:  Negative for dysuria, flank pain, frequency, hematuria and urgency.   Musculoskeletal:  Negative for back pain, falls, joint pain, myalgias and neck pain.   Skin:  Negative for itching and rash.   Neurological:  Negative for dizziness, tingling, tremors, sensory change, speech change, focal weakness, seizures, loss of consciousness, weakness and headaches.   Endo/Heme/Allergies:  Negative for environmental allergies and polydipsia. Does not bruise/bleed easily.   Psychiatric/Behavioral:  Negative for depression, hallucinations, memory loss, substance abuse and suicidal ideas. The patient is not nervous/anxious and does not have insomnia.      Vitals  /73 (BP Location: Left arm, Patient Position: Sitting, BP Method: Medium (Automatic))   Pulse 71   Temp 98.7 °F (37.1 °C) (Oral)   Resp 18   Ht 5' 7" (1.702 m)   Wt 78 kg (171 lb 15.3 oz)   SpO2 96%   BMI 26.93 kg/m²   Physical Exam  Vitals and nursing note reviewed.   Constitutional:       General: He is not in acute distress.     Appearance: He is well-developed. He is not diaphoretic.   HENT:      Head: Normocephalic and atraumatic.      Nose: Nose normal.      Mouth/Throat:      Pharynx: No oropharyngeal exudate.   Eyes:      General: No scleral icterus.     Conjunctiva/sclera: Conjunctivae normal.      Pupils: Pupils are " equal, round, and reactive to light.   Neck:      Thyroid: No thyromegaly.      Vascular: No JVD.   Cardiovascular:      Rate and Rhythm: Normal rate and regular rhythm.      Heart sounds: Normal heart sounds. No murmur heard.     No friction rub. No gallop.   Pulmonary:      Effort: Pulmonary effort is normal. No respiratory distress.      Breath sounds: No stridor. Rales (bibasilar) present. No wheezing.   Abdominal:      General: Bowel sounds are normal. There is no distension.      Palpations: Abdomen is soft.      Tenderness: There is no abdominal tenderness.   Musculoskeletal:         General: Normal range of motion.      Cervical back: Normal range of motion and neck supple.   Skin:     General: Skin is warm and dry.      Findings: No erythema.      Comments: Skin thickening and fissuring of fingers and hands bilaterally.  Consistent with mechanics hands   Neurological:      Mental Status: He is alert and oriented to person, place, and time.      Cranial Nerves: No cranial nerve deficit.   Psychiatric:         Judgment: Judgment normal.         Labs:  Hospital Outpatient Visit on 08/29/2024   Component Date Value    Pre FVC 08/29/2024 1.88 (L)     PRE FEV5 08/29/2024 1.06 (L)     Pre FEV1 08/29/2024 1.39 (L)     Pre FEV1 FVC 08/29/2024 74.05     Pre FEF 25 75 08/29/2024 1.06 (L)     Pre PEF 08/29/2024 3.17 (L)     Pre  08/29/2024 6.38     Pre DLCO 08/29/2024 8.95 (L)     DLCO ADJ PRE 08/29/2024 8.87 (L)     DLCOVA PRE 08/29/2024 3.71     DLVAAdj PRE 08/29/2024 3.68     VA PRE 08/29/2024 2.41 (L)     IVC PRE 08/29/2024 1.61 (L)     Pre TLC 08/29/2024 3.53 (L)     VC PRE 08/29/2024 1.88 (L)     PRE IC 08/29/2024 1.48     Pre FRC PL 08/29/2024 2.05 (L)     Pre ERV 08/29/2024 0.40     Pre RV 08/29/2024 1.65 (L)     RVTLC PRE 08/29/2024 46.67     Raw PRE 08/29/2024 5.72 (H)     sGaw PRE 08/29/2024 0.07 (L)     FVC Ref 08/29/2024 3.54     FVC LLN 08/29/2024 2.63     FVC Pre Ref 08/29/2024 53.1     FEV05 REF  08/29/2024 2.45     FEV05 LLN 08/29/2024 1.31     PRE FEV05 REF 08/29/2024 43.3     FEV1 Ref 08/29/2024 2.81     FEV1 LLN 08/29/2024 2.05     FEV1 Pre Ref 08/29/2024 49.5     FEV1 FVC Ref 08/29/2024 79     FEV1 FVC LLN 08/29/2024 68     FEV1 FVC Pre Ref 08/29/2024 93.3     FEF 25 75 Ref 08/29/2024 3.21     FEF 25 75 LLN 08/29/2024 1.50     FEF 25 75 Pre Ref 08/29/2024 33.0     PEF Ref 08/29/2024 7.80     PEF LLN 08/29/2024 5.81     PEF Pre Ref 08/29/2024 40.7     TLC Ref 08/29/2024 6.52     TLC LLN 08/29/2024 5.37     TLC ULN 08/29/2024 7.67     TLC Pre Ref 08/29/2024 54.1     VC Ref 08/29/2024 3.54     VC LLN 08/29/2024 2.63     VC ULN 08/29/2024 4.46     VC Pre Ref 08/29/2024 53.1     REF IC 08/29/2024 2.86     LLN IC 08/29/2024 -8532847.14     ULN IC 08/29/2024 68759112.86     PRE REF IC 08/29/2024 51.6     FRCpleth Ref 08/29/2024 3.48     FRCpleth LLN 08/29/2024 2.49     FRC PLETH ULN 08/29/2024 4.46     FRCpleth PreRef 08/29/2024 58.9     ERV Ref 08/29/2024 1.05     ERV LLN 08/29/2024 -72779.95     ERV ULN 08/29/2024 58767.05     ERV Pre Ref 08/29/2024 38.5     RV Ref 08/29/2024 2.43     RV LLN 08/29/2024 1.75     RV ULN 08/29/2024 3.10     RV Pre Ref 08/29/2024 67.8     RVTLC Ref 08/29/2024 39     RVTLC LLN 08/29/2024 30     RV TLC ULN 08/29/2024 48     RVTLC Pre Ref 08/29/2024 118.7     Raw Ref 08/29/2024 3.06     Raw Pre Ref 08/29/2024 187.0     sGaw Ref 08/29/2024 0.08     sGaw Pre Ref 08/29/2024 79.3     DLCO Single Breath Ref 08/29/2024 25.64     DLCO Single Breath LLN 08/29/2024 18.71     DLCO SINGLEBREATH ULN 08/29/2024 32.57     DLCO Single Breath Pre R* 08/29/2024 34.9     DLCOc Single Breath Ref 08/29/2024 25.64     DLCOc Single Breath LLN 08/29/2024 18.71     DLCOC SINGLEBREATH ULN 08/29/2024 32.57     DLCOc Single Breath Pre * 08/29/2024 34.6     DLCOVA Ref 08/29/2024 3.93     DLCOVA LLN 08/29/2024 2.66     DLCOVA ULN 08/29/2024 5.21     DLCOVA Pre Ref 08/29/2024 94.2     DLCOc SBVA Ref 08/29/2024  3.93     DLCOc SBVA LLN 08/29/2024 2.66     DLCOCSBVA ULN 08/29/2024 5.21     DLCOc SBVA Pre Ref 08/29/2024 93.4     VA Single Breath Ref 08/29/2024 6.37     VA Single Breath LLN 08/29/2024 6.37     VA SINGLEBREATH ULN 08/29/2024 6.37     VA Single Breath Pre Ref 08/29/2024 37.9     IVC Single Breath Ref 08/29/2024 3.54     IVC Single Breath LLN 08/29/2024 2.63     IVC SINGLEBREATH ULN 08/29/2024 4.46     IVC Single Breath Pre Ref 08/29/2024 45.5     FVCZSCORE 08/29/2024 -3.03     LAG3XTLWGM 08/29/2024 -2.94     PEG8CRUOOLBIC 08/29/2024 -0.80     TLCZSCORE 08/29/2024 -4.27     DLCOSINGLEBREATHZSCORE 08/29/2024 -3.96     DLCOcSingleBreathZSCORE 08/29/2024 -3.98    Lab Visit on 08/29/2024   Component Date Value    Sodium 08/29/2024 136     Potassium 08/29/2024 4.3     Chloride 08/29/2024 101     CO2 08/29/2024 26     Glucose 08/29/2024 139 (H)     BUN 08/29/2024 12     Creatinine 08/29/2024 0.8     Calcium 08/29/2024 9.7     Total Protein 08/29/2024 7.1     Albumin 08/29/2024 3.4 (L)     Total Bilirubin 08/29/2024 0.6     Alkaline Phosphatase 08/29/2024 62     AST 08/29/2024 22     ALT 08/29/2024 31     eGFR 08/29/2024 >60.0     Anion Gap 08/29/2024 9     Cholesterol 08/29/2024 173     Triglycerides 08/29/2024 106     HDL 08/29/2024 51     LDL Cholesterol 08/29/2024 100.8     HDL/Cholesterol Ratio 08/29/2024 29.5     Total Cholesterol/HDL Ra* 08/29/2024 3.4     Non-HDL Cholesterol 08/29/2024 122     CPK 08/29/2024 30     CRP 08/29/2024 35.3 (H)     Sed Rate 08/29/2024 8            8/29/2024     4:19 PM 7/30/2024     4:20 PM   Pulmonary Function Tests   FVC 1.88 liters 2.49 liters   FEV1 1.39 liters 1.85 liters   TLC (liters) 3.53 liters 3.91 liters   DLCO (ml/mmHg sec) 8.95 ml/mmHg sec 12.7 ml/mmHg sec   FVC% 53 70   FEV1% 49 65   FEF 25-75 1.06 1.39   FEF 25-75% 33 43   TLC% 54 60   RV 1.65 1.42   RV% 67.8 58   DLCO% 34 49         8/29/2024     3:35 PM 7/30/2024     1:06 PM   6MW   6MWT Status completed without  stopping completed without stopping   Patient Reported Dyspnea Dyspnea   Was O2 used? No No   6MW Distance walked (feet) 1300 feet 1400 feet   Distance walked (meters) 396.24 meters 426.72 meters   Did patient stop? No No   Oxygen Saturation 95 % 99 %   Supplemental Oxygen Room Air Room Air   Heart Rate 84 bpm 66 bpm   Blood Pressure 108/67 118/69   Alexa Dyspnea Rating  somewhat heavy moderate   Oxygen Saturation 88 % 93 %   Supplemental Oxygen Room Air Room Air   Heart Rate 98 bpm 85 bpm   Blood Pressure 130/71 157/79   Alexa Dyspnea Rating  very heavy heavy   Recovery Time (seconds) 95 seconds 85 seconds   Oxygen Saturation 96 % 97 %   Supplemental Oxygen Room Air Room Air   Heart Rate 84 bpm 73 bpm       Imaging:  Results for orders placed during the hospital encounter of 07/30/24    CT Chest Without Contrast    Narrative  EXAMINATION:  CT CHEST WITHOUT CONTRAST    CLINICAL HISTORY:  Interstitial lung disease; Interstitial pulmonary disease, unspecified    TECHNIQUE:  Noncontrast images were obtained through the chest per protocol.  Coronal and sagittal images were reviewed.    COMPARISON:  None.    FINDINGS:  Structures at the base of the neck are unremarkable.  No axillary adenopathy.  Few shotty nonenlarged mediastinal nodes, nonspecific and possibly reactive.  No anterior pericardial effusion.  Aortic arch and coronary calcific atherosclerosis.  The trachea is clear.    The lungs are symmetrically expanded.  Patchy peripheral and lower lung predominant interstitial thickening and opacities.  More mild degree of ground-glass and reticulation peripherally at the upper lungs and apices.  A few subpleural subcentimeter nodular foci, for reference at the upper lobe measuring 0.5 cm (series 2, image 39).  A few punctate right upper lobe nodules (for reference series 3, image 87).  No evident honeycombing, bronchiectasis, or air trapping.  No pleural effusion or pneumothorax.    Limited images through the unenhanced  upper abdomen demonstrate no acute abnormality.  No aggressive osseous lesion.  Suspected degree of ossification at the posterior longitudinal ligament at C6-C7.  Multilevel spine DJD.    Impression  Patchy peripheral and lower lung interstitial thickening and opacities.  Overall appearance is nonspecific and could relate to sequela of fibrotic change versus other infectious or non-infectious inflammatory sequela.  No evident honeycombing, bronchiectasis, or significant air trapping by this exam.    Few scattered subcentimeter pulmonary nodules, technically indeterminate.  Attention at follow-up.    Additional findings as above.      Electronically signed by: Osvaldo Daniel  Date:    07/31/2024  Time:    11:21        Cardiodiagnostics:  Results for orders placed during the hospital encounter of 07/23/24    Echo    Interpretation Summary    Left Ventricle: The left ventricle is normal in size. Normal wall thickness. There is normal systolic function with a visually estimated ejection fraction of 55 - 60%. Global longitudinal strain is normal; -17.0%. There is normal diastolic function.    Right Ventricle: Normal right ventricular cavity size. Wall thickness is normal. Systolic function is normal.    Tricuspid Valve: There is mild regurgitation.    IVC/SVC: Normal venous pressure at 3 mmHg.        Assessment:  1. ILD (interstitial lung disease)    2. Interstitial pulmonary disease, unspecified      Plan:   Pt sent for initial evaluation of ILD.  CT chest shows bibasilar interstitial fibrotic changes.  Serologies have shown a positive PRESTON 1:640 speckled with negative RF and anti CCP.  Sjogren's and scleroderma serologies from [facility] pending.  Has Raynaud's and evidence of mechanics hands on exam.  He has had a significant drop in his FVC (24% drop) and DLCO (29%) in a 1 month period.  6MWT today shows desat to 88% on RA when last month he only desaturated to 93%.  I am concerned for a rapidly progressive ILD, ??MDA5;  myositis panel ordered and pending.  CRP elevated, CK normal, myomarker panel and aldolase pending.  Repeat CT chest ordered.  Is on steroids currently.  Continue with steroids.  Will add OFEV given the progression although if it is related to myositis ILD, will likely need more aggressive immunosuppression.    Follow-up in 2 weeks with PFTs and 6MWT.  Will need DMARD panel at the time of visit.        Carleen Reina D.O.  Pulmonary/Critical Care and Lung Transplantation  [facility]

## 2024-08-29 NOTE — LETTER
August 29, 2024                      [facility] 1st [facility]  [address]  [facility] [address]  Phone: [phone]   Patient: Darrell Corona   MR Number: [id number]   YOB: 1960   Date of Visit: 8/29/2024       Dear       Thank you for referring Darrell Corona to me for evaluation. Attached you will find relevant portions of my assessment and plan of care.    If you have questions, please do not hesitate to call me. I look forward to following Darrell Corona along with you.    Sincerely,    Carleen Reina, DO    Enclosure    If you would like to receive this communication electronically, please contact [email or web address] or [phone] to request [employer] Link access.    [employer] Link is a tool which provides read-only access to select patient information with whom you have a relationship. Its easy to use and provides real time access to review your patients record including encounter summaries, notes, results, and demographic information.    If you feel you have received this communication in error or would no longer like to receive these types of communications, please e-mail [email or web address]

## 2024-08-29 NOTE — TELEPHONE ENCOUNTER
Called patient's daughter Benita to discuss Dr. Reina's plan of care. She verbalized her understanding and agreement with scheduling the CT scan. She asked if any appointments were available at the [facility] tomorrow. Reviewed the available times and she picked the 1020 appointment slot. She asked that I call her brother Rey to confirm the appointment for tomorrow, as he will be providing transportation for the patient. All questions at this time were answered to Benita's satisfaction.  Called Rey Corona at [phone] but received no answer. Left a voice message asking him to call back if the date, time and/or location for the CT appointment were not convenient.      ----- Message from Carleen Reina DO sent at 8/29/2024  3:58 PM CDT -----    Would you all be able to help schedule this macie CT chest with ILD protocol.  Order is in.  He's had a significant drop in his PFTs in 1 month.  Will get the CT chest and I will reach out to him regarding treatment with OFEV.

## 2024-08-30 ENCOUNTER — TELEPHONE (OUTPATIENT)
Dept: TRANSPLANT | Facility: CLINIC | Age: 64
End: 2024-08-30
Payer: COMMERCIAL

## 2024-08-30 ENCOUNTER — SOCIAL WORK (OUTPATIENT)
Dept: TRANSPLANT | Facility: CLINIC | Age: 64
End: 2024-08-30
Payer: COMMERCIAL

## 2024-08-30 ENCOUNTER — HOSPITAL ENCOUNTER (OUTPATIENT)
Dept: RADIOLOGY | Facility: HOSPITAL | Age: 64
Discharge: HOME | End: 2024-08-30
Attending: INTERNAL MEDICINE
Payer: COMMERCIAL

## 2024-08-30 ENCOUNTER — PATIENT MESSAGE (OUTPATIENT)
Dept: TRANSPLANT | Facility: CLINIC | Age: 64
End: 2024-08-30
Payer: COMMERCIAL

## 2024-08-30 DIAGNOSIS — J84.9 ILD (INTERSTITIAL LUNG DISEASE): ICD-10-CM

## 2024-08-30 DIAGNOSIS — J84.9 ILD (INTERSTITIAL LUNG DISEASE): Primary | ICD-10-CM

## 2024-08-30 LAB — ALDOLASE SERPL-CCNC: 6.4 U/L (ref 1.2–7.6)

## 2024-08-30 PROCEDURE — 71250 CT THORAX DX C-: CPT | Mod: TC,TXP

## 2024-08-30 PROCEDURE — 71250 CT THORAX DX C-: CPT | Mod: 26,NTX,, | Performed by: RADIOLOGY

## 2024-08-30 NOTE — TELEPHONE ENCOUNTER
Received order from Dr. Reina for home Oxygen based on the qualification study done yesterday. Order forwarded to Maryan Zuniga LMSW for processing with [facility].   Portal message sent to patient to update him.

## 2024-08-30 NOTE — PROGRESS NOTES
THO routed internal oxygen orders to [facility] (ph: [phone], fx: [phone]). THO in communication with JOEY Davis. SW following and available.

## 2024-08-30 NOTE — TELEPHONE ENCOUNTER
Per Dr. Reina's clinic note from 8/29/24, patient should RTC in 2 weeks with lab work (DMARD panel), PFTs and a 6 MWT. Appointments have been scheduled on 9/12/24.  Called patient's son Rey and reviewed the provider's plan. He verbalized his understanding and accepted the appointments on 9/12/24 for his father. Informed him that I will mail appointment slips today. Rey denied having any questions at this time.      ----- Message from Carleen Saab sent at 8/30/2024 11:08 AM CDT -----  Regarding: Appt  Contact: Rey  [phone]        Appt Type: 3 week follow up      Date/Time Preference: Next  rosa     Treating Provider: Carleen Reina DO    Caller Name: Rey pt's son     Contact Prefer: [phone]     Comments/Notes:  Called to schedule a 3 weeks

## 2024-09-05 ENCOUNTER — OFFICE VISIT (OUTPATIENT)
Dept: RHEUMATOLOGY | Facility: CLINIC | Age: 64
End: 2024-09-05
Payer: COMMERCIAL

## 2024-09-05 ENCOUNTER — HOSPITAL ENCOUNTER (INPATIENT)
Facility: HOSPITAL | Age: 64
LOS: 5 days | Discharge: HOME | DRG: 196 | End: 2024-09-10
Attending: STUDENT IN AN ORGANIZED HEALTH CARE EDUCATION/TRAINING PROGRAM | Admitting: STUDENT IN AN ORGANIZED HEALTH CARE EDUCATION/TRAINING PROGRAM
Payer: COMMERCIAL

## 2024-09-05 VITALS
HEIGHT: 67 IN | SYSTOLIC BLOOD PRESSURE: 161 MMHG | HEART RATE: 70 BPM | DIASTOLIC BLOOD PRESSURE: 86 MMHG | BODY MASS INDEX: 26.09 KG/M2 | WEIGHT: 166.25 LBS

## 2024-09-05 DIAGNOSIS — M62.81 MUSCLE WEAKNESS OF PROXIMAL EXTREMITY: ICD-10-CM

## 2024-09-05 DIAGNOSIS — J84.9 INTERSTITIAL PULMONARY DISEASE, UNSPECIFIED: ICD-10-CM

## 2024-09-05 DIAGNOSIS — R06.02 SOB (SHORTNESS OF BREATH): ICD-10-CM

## 2024-09-05 DIAGNOSIS — R76.8 HEPATITIS B CORE ANTIBODY POSITIVE: ICD-10-CM

## 2024-09-05 DIAGNOSIS — J84.9 ILD (INTERSTITIAL LUNG DISEASE): Primary | ICD-10-CM

## 2024-09-05 DIAGNOSIS — J96.01 ACUTE HYPOXEMIC RESPIRATORY FAILURE: Primary | ICD-10-CM

## 2024-09-05 DIAGNOSIS — J84.9 ILD (INTERSTITIAL LUNG DISEASE): ICD-10-CM

## 2024-09-05 DIAGNOSIS — R76.8 POSITIVE ANA (ANTINUCLEAR ANTIBODY): ICD-10-CM

## 2024-09-05 DIAGNOSIS — R07.9 CHEST PAIN: ICD-10-CM

## 2024-09-05 DIAGNOSIS — I73.01 RAYNAUD'S DISEASE WITH GANGRENE: ICD-10-CM

## 2024-09-05 DIAGNOSIS — J98.4 RESTRICTIVE LUNG DISEASE: ICD-10-CM

## 2024-09-05 LAB
ALBUMIN SERPL BCP-MCNC: 3.5 G/DL (ref 3.5–5.2)
ALP SERPL-CCNC: 70 U/L (ref 55–135)
ALT SERPL W/O P-5'-P-CCNC: 39 U/L (ref 10–44)
ANION GAP SERPL CALC-SCNC: 13 MMOL/L (ref 8–16)
AST SERPL-CCNC: 32 U/L (ref 10–40)
BASOPHILS # BLD AUTO: 0.05 K/UL (ref 0–0.2)
BASOPHILS NFR BLD: 0.5 % (ref 0–1.9)
BILIRUB SERPL-MCNC: 0.6 MG/DL (ref 0.1–1)
BILIRUB UR QL STRIP: NEGATIVE
BUN SERPL-MCNC: 8 MG/DL (ref 8–23)
CALCIUM SERPL-MCNC: 9.3 MG/DL (ref 8.7–10.5)
CHLORIDE SERPL-SCNC: 99 MMOL/L (ref 95–110)
CLARITY UR REFRACT.AUTO: CLEAR
CO2 SERPL-SCNC: 25 MMOL/L (ref 23–29)
COLOR UR AUTO: COLORLESS
CREAT SERPL-MCNC: 0.9 MG/DL (ref 0.5–1.4)
DIFFERENTIAL METHOD BLD: ABNORMAL
EOSINOPHIL # BLD AUTO: 0.4 K/UL (ref 0–0.5)
EOSINOPHIL NFR BLD: 3.7 % (ref 0–8)
ERYTHROCYTE [DISTWIDTH] IN BLOOD BY AUTOMATED COUNT: 14 % (ref 11.5–14.5)
EST. GFR  (NO RACE VARIABLE): >60 ML/MIN/1.73 M^2
GLUCOSE SERPL-MCNC: 85 MG/DL (ref 70–110)
GLUCOSE UR QL STRIP: ABNORMAL
HCT VFR BLD AUTO: 53.8 % (ref 40–54)
HCV AB SERPL QL IA: NORMAL
HGB BLD-MCNC: 17.3 G/DL (ref 14–18)
HGB UR QL STRIP: NEGATIVE
HIV 1+2 AB+HIV1 P24 AG SERPL QL IA: NORMAL
IMM GRANULOCYTES # BLD AUTO: 0.1 K/UL (ref 0–0.04)
IMM GRANULOCYTES NFR BLD AUTO: 1 % (ref 0–0.5)
KETONES UR QL STRIP: ABNORMAL
LEUKOCYTE ESTERASE UR QL STRIP: NEGATIVE
LYMPHOCYTES # BLD AUTO: 1.4 K/UL (ref 1–4.8)
LYMPHOCYTES NFR BLD: 14 % (ref 18–48)
MCH RBC QN AUTO: 29.3 PG (ref 27–31)
MCHC RBC AUTO-ENTMCNC: 32.2 G/DL (ref 32–36)
MCV RBC AUTO: 91 FL (ref 82–98)
MONOCYTES # BLD AUTO: 0.9 K/UL (ref 0.3–1)
MONOCYTES NFR BLD: 9.6 % (ref 4–15)
NEUTROPHILS # BLD AUTO: 7 K/UL (ref 1.8–7.7)
NEUTROPHILS NFR BLD: 71.2 % (ref 38–73)
NITRITE UR QL STRIP: NEGATIVE
NRBC BLD-RTO: 0 /100 WBC
OHS QRS DURATION: 86 MS
OHS QRS DURATION: 88 MS
OHS QTC CALCULATION: 276 MS
OHS QTC CALCULATION: 339 MS
PH UR STRIP: 7 [PH] (ref 5–8)
PLATELET # BLD AUTO: 214 K/UL (ref 150–450)
PMV BLD AUTO: 9.9 FL (ref 9.2–12.9)
POTASSIUM SERPL-SCNC: 3.2 MMOL/L (ref 3.5–5.1)
PROT SERPL-MCNC: 7.5 G/DL (ref 6–8.4)
PROT UR QL STRIP: NEGATIVE
RBC # BLD AUTO: 5.91 M/UL (ref 4.6–6.2)
SODIUM SERPL-SCNC: 137 MMOL/L (ref 136–145)
SP GR UR STRIP: 1.01 (ref 1–1.03)
URN SPEC COLLECT METH UR: ABNORMAL
WBC # BLD AUTO: 9.8 K/UL (ref 3.9–12.7)

## 2024-09-05 PROCEDURE — 96375 TX/PRO/DX INJ NEW DRUG ADDON: CPT | Mod: NTX

## 2024-09-05 PROCEDURE — 86803 HEPATITIS C AB TEST: CPT | Mod: NTX | Performed by: PHYSICIAN ASSISTANT

## 2024-09-05 PROCEDURE — 25000003 PHARM REV CODE 250: Mod: NTX | Performed by: STUDENT IN AN ORGANIZED HEALTH CARE EDUCATION/TRAINING PROGRAM

## 2024-09-05 PROCEDURE — 81003 URINALYSIS AUTO W/O SCOPE: CPT | Mod: NTX | Performed by: NURSE PRACTITIONER

## 2024-09-05 PROCEDURE — 87389 HIV-1 AG W/HIV-1&-2 AB AG IA: CPT | Mod: NTX | Performed by: PHYSICIAN ASSISTANT

## 2024-09-05 PROCEDURE — 25000003 PHARM REV CODE 250: Mod: NTX

## 2024-09-05 PROCEDURE — 63600175 PHARM REV CODE 636 W HCPCS: Mod: NTX | Performed by: STUDENT IN AN ORGANIZED HEALTH CARE EDUCATION/TRAINING PROGRAM

## 2024-09-05 PROCEDURE — 99285 EMERGENCY DEPT VISIT HI MDM: CPT | Mod: 25,NTX

## 2024-09-05 PROCEDURE — 93005 ELECTROCARDIOGRAM TRACING: CPT | Mod: NTX

## 2024-09-05 PROCEDURE — 99223 1ST HOSP IP/OBS HIGH 75: CPT | Mod: NTX,,, | Performed by: PHYSICIAN ASSISTANT

## 2024-09-05 PROCEDURE — 20600001 HC STEP DOWN PRIVATE ROOM: Mod: NTX

## 2024-09-05 PROCEDURE — 93010 ELECTROCARDIOGRAM REPORT: CPT | Mod: NTX,,, | Performed by: INTERNAL MEDICINE

## 2024-09-05 PROCEDURE — 99999 PR PBB SHADOW E&M-EST. PATIENT-LVL III: CPT | Mod: PBBFAC,TXP,, | Performed by: STUDENT IN AN ORGANIZED HEALTH CARE EDUCATION/TRAINING PROGRAM

## 2024-09-05 PROCEDURE — 96365 THER/PROPH/DIAG IV INF INIT: CPT | Mod: NTX

## 2024-09-05 PROCEDURE — 85025 COMPLETE CBC W/AUTO DIFF WBC: CPT | Mod: NTX | Performed by: NURSE PRACTITIONER

## 2024-09-05 PROCEDURE — 80053 COMPREHEN METABOLIC PANEL: CPT | Mod: NTX | Performed by: NURSE PRACTITIONER

## 2024-09-05 RX ORDER — DILTIAZEM HYDROCHLORIDE 5 MG/ML
0.25 INJECTION INTRAVENOUS
Status: COMPLETED | OUTPATIENT
Start: 2024-09-05 | End: 2024-09-05

## 2024-09-05 RX ORDER — GLUCAGON 1 MG
1 KIT INJECTION
Status: DISCONTINUED | OUTPATIENT
Start: 2024-09-05 | End: 2024-09-10 | Stop reason: HOSPADM

## 2024-09-05 RX ORDER — ACETAMINOPHEN 325 MG/1
650 TABLET ORAL EVERY 4 HOURS PRN
Status: DISCONTINUED | OUTPATIENT
Start: 2024-09-05 | End: 2024-09-10 | Stop reason: HOSPADM

## 2024-09-05 RX ORDER — LOSARTAN POTASSIUM AND HYDROCHLOROTHIAZIDE 12.5; 5 MG/1; MG/1
1 TABLET ORAL DAILY
Status: DISCONTINUED | OUTPATIENT
Start: 2024-09-06 | End: 2024-09-06

## 2024-09-05 RX ORDER — PANTOPRAZOLE SODIUM 40 MG/1
40 TABLET, DELAYED RELEASE ORAL DAILY
Status: DISCONTINUED | OUTPATIENT
Start: 2024-09-06 | End: 2024-09-10 | Stop reason: HOSPADM

## 2024-09-05 RX ORDER — IBUPROFEN 200 MG
16 TABLET ORAL
Status: DISCONTINUED | OUTPATIENT
Start: 2024-09-05 | End: 2024-09-10 | Stop reason: HOSPADM

## 2024-09-05 RX ORDER — ENOXAPARIN SODIUM 100 MG/ML
40 INJECTION SUBCUTANEOUS EVERY 24 HOURS
Status: DISCONTINUED | OUTPATIENT
Start: 2024-09-05 | End: 2024-09-10 | Stop reason: HOSPADM

## 2024-09-05 RX ORDER — IBUPROFEN 200 MG
24 TABLET ORAL
Status: DISCONTINUED | OUTPATIENT
Start: 2024-09-05 | End: 2024-09-10 | Stop reason: HOSPADM

## 2024-09-05 RX ORDER — POTASSIUM CHLORIDE 20 MEQ/1
40 TABLET, EXTENDED RELEASE ORAL ONCE
Status: COMPLETED | OUTPATIENT
Start: 2024-09-05 | End: 2024-09-05

## 2024-09-05 RX ORDER — ATORVASTATIN CALCIUM 40 MG/1
80 TABLET, FILM COATED ORAL DAILY
Status: DISCONTINUED | OUTPATIENT
Start: 2024-09-06 | End: 2024-09-10 | Stop reason: HOSPADM

## 2024-09-05 RX ORDER — ONDANSETRON HYDROCHLORIDE 2 MG/ML
4 INJECTION, SOLUTION INTRAVENOUS EVERY 8 HOURS PRN
Status: DISCONTINUED | OUTPATIENT
Start: 2024-09-05 | End: 2024-09-10 | Stop reason: HOSPADM

## 2024-09-05 RX ORDER — ASPIRIN 81 MG/1
81 TABLET ORAL DAILY
Status: DISCONTINUED | OUTPATIENT
Start: 2024-09-06 | End: 2024-09-10 | Stop reason: HOSPADM

## 2024-09-05 RX ORDER — NALOXONE HCL 0.4 MG/ML
0.02 VIAL (ML) INJECTION
Status: DISCONTINUED | OUTPATIENT
Start: 2024-09-05 | End: 2024-09-10 | Stop reason: HOSPADM

## 2024-09-05 RX ORDER — SULFAMETHOXAZOLE AND TRIMETHOPRIM 400; 80 MG/1; MG/1
1 TABLET ORAL
Status: DISCONTINUED | OUTPATIENT
Start: 2024-09-06 | End: 2024-09-10 | Stop reason: HOSPADM

## 2024-09-05 RX ORDER — SODIUM CHLORIDE 0.9 % (FLUSH) 0.9 %
10 SYRINGE (ML) INJECTION EVERY 12 HOURS PRN
Status: DISCONTINUED | OUTPATIENT
Start: 2024-09-05 | End: 2024-09-10 | Stop reason: HOSPADM

## 2024-09-05 RX ADMIN — POTASSIUM CHLORIDE 40 MEQ: 1500 TABLET, EXTENDED RELEASE ORAL at 04:09

## 2024-09-05 RX ADMIN — METHYLPREDNISOLONE SODIUM SUCCINATE 1000 MG: 1 INJECTION INTRAMUSCULAR; INTRAVENOUS at 03:09

## 2024-09-05 RX ADMIN — DILTIAZEM HYDROCHLORIDE 19.5 MG: 5 INJECTION, SOLUTION INTRAVENOUS at 03:09

## 2024-09-05 RX ADMIN — ENOXAPARIN SODIUM 40 MG: 40 INJECTION SUBCUTANEOUS at 05:09

## 2024-09-05 NOTE — FIRST PROVIDER EVALUATION
Emergency Department TeleTriage Encounter Note      CHIEF COMPLAINT    Chief Complaint   Patient presents with    Multiple complaints     Hx pul fibrosis , sent from rheum clinic for admission       VITAL SIGNS   Initial Vitals [09/05/24 1325]   BP Pulse Resp Temp SpO2   (!) 167/87 -- 18 98.7 °F (37.1 °C) --      MAP       --            ALLERGIES    Review of patient's allergies indicates:  No Known Allergies    PROVIDER TRIAGE NOTE  Sent from rheumatology clinic. Recently diagnosed with pulmonary fibrosis. Sent for admission for further treatment due to rapid progression.     Limited physical exam via telehealth: The patient is awake, alert, answering questions appropriately and is not in respiratory distress.  As the Teletriage provider, I performed an initial assessment and ordered appropriate labs and imaging studies, if any, to facilitate the patient's care once placed in the ED. Once a room is available, care and a full evaluation will be completed by an alternate ED provider.  Any additional orders and the final disposition will be determined by that provider.  All imaging and labs will not be followed-up by the Teletriage Team, including myself.          ORDERS  Labs Reviewed   HIV 1 / 2 ANTIBODY   HEPATITIS C ANTIBODY       ED Orders (720h ago, onward)      Start Ordered     Status Ordering Provider    09/05/24 1338 09/05/24 1338  Urinalysis, Reflex to Urine Culture Urine, Clean Catch  STAT         Ordered CLIF ASHRAF    09/05/24 1337 09/05/24 1338  Saline lock IV  Once         Ordered CLIF ASHRAF    09/05/24 1337 09/05/24 1338  CBC auto differential  STAT         Ordered CLIF ASHRAF    09/05/24 1337 09/05/24 1338  Comprehensive Metabolic Panel  STAT         Ordered CLIF ASHRAF    09/05/24 1328 09/05/24 1328  HIV 1/2 Ag/Ab (4th Gen)  STAT         Ordered AVIVA GONZALES    09/05/24 1328 09/05/24 1328  Hepatitis C Antibody  STAT         Ordered AVIVA GONZALES               Virtual Visit Note: The provider triage portion of this emergency department evaluation and documentation was performed via [employer], a HIPAA-compliant telemedicine application, in concert with a tele-presenter in the room. A face to face patient evaluation with one of my colleagues will occur once the patient is placed in an emergency department room.      DISCLAIMER: This note was prepared with [employer] voice recognition transcription software. Garbled syntax, mangled pronouns, and other bizarre constructions may be attributed to that software system.

## 2024-09-05 NOTE — SUBJECTIVE & OBJECTIVE
History reviewed. No pertinent past medical history.    History reviewed. No pertinent surgical history.    Review of patient's allergies indicates:  No Known Allergies    (Not in a hospital admission)    Family History    None       Tobacco Use    Smoking status: Former     Current packs/day: 0.00     Types: Cigarettes     Quit date: 1991     Years since quittin.7    Smokeless tobacco: Never    Tobacco comments:     quit 30yrs ago   Substance and Sexual Activity    Alcohol use: Yes     Alcohol/week: 2.0 standard drinks of alcohol     Types: 1 Glasses of wine, 1 Cans of beer per week     Comment: 1 beer and wine every day    Drug use: Never    Sexual activity: Yes     Review of Systems   Constitutional:  Positive for activity change and fatigue. Negative for appetite change, chills and fever.   HENT:  Negative for congestion, postnasal drip, rhinorrhea, sinus pressure and sinus pain.    Respiratory:  Positive for cough and shortness of breath. Negative for wheezing.    Cardiovascular:  Negative for chest pain, palpitations and leg swelling.   Gastrointestinal:  Negative for abdominal distention, abdominal pain, constipation, diarrhea, nausea and vomiting.   Genitourinary:  Negative for decreased urine volume, difficulty urinating and hematuria.   Skin:  Positive for rash.        Mechanics hands   Allergic/Immunologic: Negative for immunocompromised state.   Neurological:  Negative for dizziness, syncope, light-headedness and headaches.   Psychiatric/Behavioral:  Negative for agitation and behavioral problems.      Objective:     Vital Signs (Most Recent):  Temp: 98.7 °F (37.1 °C) (24 1325)  Pulse: 72 (24 1401)  Resp: 20 (24 1401)  BP: (!) 175/93 (24 1401)  SpO2: 100 % (24 1438) Vital Signs (24h Range):  Temp:  [98.7 °F (37.1 °C)] 98.7 °F (37.1 °C)  Pulse:  [70-72] 72  Resp:  [18-20] 20  SpO2:  [100 %] 100 %  BP: (161-175)/(86-93) 175/93     Weight: 77.1 kg (170 lb)  Body mass  index is 26.63 kg/m².    No intake or output data in the 24 hours ending 09/05/24 1548       Physical Exam  Vitals and nursing note reviewed.   Constitutional:       General: He is not in acute distress.     Appearance: He is normal weight. He is not ill-appearing.      Interventions: Nasal cannula in place.   HENT:      Head: Normocephalic and atraumatic.      Nose: Nose normal. No congestion or rhinorrhea.   Eyes:      General: No scleral icterus.     Extraocular Movements: Extraocular movements intact.      Conjunctiva/sclera: Conjunctivae normal.   Cardiovascular:      Rate and Rhythm: Normal rate.   Pulmonary:      Effort: Pulmonary effort is normal. No respiratory distress.      Breath sounds: No stridor. Rales (bibasilar) present. No wheezing or rhonchi.   Abdominal:      General: Abdomen is flat. Bowel sounds are normal. There is no distension.      Palpations: Abdomen is soft.      Tenderness: There is no abdominal tenderness.   Musculoskeletal:      Right lower leg: No edema.      Left lower leg: No edema.   Skin:     General: Skin is warm and dry.      Comments: Mechanics hands   Neurological:      General: No focal deficit present.      Mental Status: He is oriented to person, place, and time.   Psychiatric:         Mood and Affect: Mood normal.         Behavior: Behavior normal.              Significant Labs:  CBC:  Recent Labs   Lab 09/05/24  1347   WBC 9.80   RBC 5.91   HGB 17.3   HCT 53.8      MCV 91   MCH 29.3   MCHC 32.2     BMP:  Recent Labs   Lab 09/05/24  1347      K 3.2*   CL 99   CO2 25   BUN 8   CREATININE 0.9   CALCIUM 9.3        I have reviewed all pertinent labs within the past 24 hours.    Diagnostic Results:  Labs: Reviewed  CT: Reviewed

## 2024-09-05 NOTE — ED NOTES
Unable to obtain SPO2 on Pt. Patient c/o mild SOB, no obvious signs of respiratory distress noted. Cyanosis noted to fingers. Patient placed on 2 LPM O2.

## 2024-09-05 NOTE — PROGRESS NOTES
I have personally reviewed the history, confirmed exam findings, and discussed assessment and plan with fellow.     Saw Dr. Reina in [facility]  8/29/24:      Pt presents today for initial evaluation of pulmonary fibrosis. He states that he was in his usual state of health until June, when he went on a trip to [place]. On that trip, he noticed that when he walked up 5 flights of stairs he became very winded. He usually walks up to 5 miles a day and is able to walk 10-15 flights of stairs without difficulty. When he returned to [place], he was seen by his PCP who noted abnormal breath sounds and referred him to pulmonary. He was seen by Dr. Watts and was noted to have pulmonary fibrosis on his CT chest. He desaturated to 93% on 6MWT. He had an PRESTON that was positive and a negative RF. He has continued to have ongoing dyspnea that has been progressive since June. He denies any illnesses around the onset of symptoms or since onset of symptoms. Denies any lower extremity swelling. No palpitations but does monitor his pulse ox and notices tachycardia after he exerts himself. He states that he monitors his oxygen and will see dips into the low 80's after he exerts himself. 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 He sat in the car and returned to baseline. He then was seen by Dr. Goyal at [facility] and started on a steroid course. He has not noticed any difference with steroid use. Labs for SSc and Sjogren's were checked and are pending. He was referred to Dr. Scott for clinical trial consideration. He has never had bronchoscopy or chest biopsy.   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 No pulmonary toxic medications. Does have dogs at home. No history of dog allergies. No exposures to birds, chickens, down bedding, or zheng/chicken coops. No environmental exposures. No mold in the home. He  previously smoked but quit 30 years ago. No history of lung disease up to this point. No family history of lung disease. No family history of autoimmune disease. He does endorse Raynaud's. He does have skin thickening and fissuring of his fingers with thickness on his PIPs. No rashes. No muscle symptoms. Does have fatigue. No joint pains. No sinus disease or reflux.   She found :Skin thickening and fissuring of fingers and hands bilaterally.  Consistent with mechanics hands      Latest Reference Range & Units 08/05/24 14:30   WBC 3.90 - 12.70 K/uL 6.48   RBC 4.60 - 6.20 M/uL 5.09   Hemoglobin 14.0 - 18.0 g/dL 14.9   Hematocrit 40.0 - 54.0 % 45.9   MCV 82 - 98 fL 90   MCH 27.0 - 31.0 pg 29.3   MCHC 32.0 - 36.0 g/dL 32.5   RDW 11.5 - 14.5 % 12.8   Platelet Count 150 - 450 K/uL 255   MPV 9.2 - 12.9 fL 9.3   Gran % 38.0 - 73.0 % 70.4   Lymph % 18.0 - 48.0 % 13.4 (L)   Mono % 4.0 - 15.0 % 13.3   Eos % 0.0 - 8.0 % 2.3   Basophil % 0.0 - 1.9 % 0.3   Immature Granulocytes 0.0 - 0.5 % 0.3   Gran # (ANC) 1.8 - 7.7 K/uL 4.6   Lymph # 1.0 - 4.8 K/uL 0.9 (L)   Mono # 0.3 - 1.0 K/uL 0.9   Eos # 0.0 - 0.5 K/uL 0.2   Baso # 0.00 - 0.20 K/uL 0.02   Immature Grans (Abs) 0.00 - 0.04 K/uL 0.02   nRBC 0 /100 WBC 0   Differential Method  Automated   Sodium 136 - 145 mmol/L 140   Potassium 3.5 - 5.1 mmol/L 4.2   Chloride 95 - 110 mmol/L 106   CO2 23 - 29 mmol/L 24   Anion Gap 8 - 16 mmol/L 10   BUN 8 - 23 mg/dL 10   Creatinine 0.5 - 1.4 mg/dL 0.8   eGFR >60 mL/min/1.73 m^2 >60   Glucose 70 - 110 mg/dL 117 (H)   Calcium 8.7 - 10.5 mg/dL 9.1   ALP 55 - 135 U/L 74   PROTEIN TOTAL 6.0 - 8.4 g/dL 6.5   Albumin 3.5 - 5.2 g/dL 3.5   BILIRUBIN TOTAL 0.1 - 1.0 mg/dL 0.4   AST 10 - 40 U/L 35   ALT 10 - 44 U/L 31   PRESTON Screen Negative <1:80  Positive !   PRESTON Titer 1  1:640   PRESTON PATTERN 1  Speckled   ds DNA Ab Negative 1:10  Negative 1:10   Anti-SSA Antibody 0.00 - 0.99 Ratio 0.07   Anti-SSA Interpretation Negative  Negative   Anti-SSB Antibody 0.00  - 0.99 Ratio 0.05   Anti-SSB Interpretation Negative  Negative   Anti Sm Antibody 0.00 - 0.99 Ratio 0.08   Anti-Sm Interpretation Negative  Negative   Anti Sm/RNP Antibody 0.00 - 0.99 Ratio 0.10   Anti-Sm/RNP Interpretation Negative  Negative   CCP Antibodies <5.0 U/mL 0.6   Rheumatoid Factor 0.0 - 15.0 IU/mL <13.0   (L): Data is abnormally low  (H): Data is abnormally high  !: Data is abnormal   Latest Reference Range & Units 08/29/24 11:16   CPK 20 - 200 U/L 30      Latest Reference Range & Units 08/29/24 11:16   Aldolase 1.2 - 7.6 U/L 6.4       PFTs        FVC          TLC          RV          DLco    8/29/24    53.1           54.1        67.8         34.9  7/30/24    70.3           60           58.5         49.5      EXAMINATION:  CT CHEST WITHOUT CONTRAST     CLINICAL HISTORY:  Interstitial lung disease; Interstitial pulmonary disease, unspecified     TECHNIQUE:  Noncontrast images were obtained through the chest per protocol.  Coronal and sagittal images were reviewed.     COMPARISON:  None.     FINDINGS:  Structures at the base of the neck are unremarkable.  No axillary adenopathy.  Few shotty nonenlarged mediastinal nodes, nonspecific and possibly reactive.  No anterior pericardial effusion.  Aortic arch and coronary calcific atherosclerosis.  The trachea is clear.     The lungs are symmetrically expanded.  Patchy peripheral and lower lung predominant interstitial thickening and opacities.  More mild degree of ground-glass and reticulation peripherally at the upper lungs and apices.  A few subpleural subcentimeter nodular foci, for reference at the upper lobe measuring 0.5 cm (series 2, image 39).  A few punctate right upper lobe nodules (for reference series 3, image 87).  No evident honeycombing, bronchiectasis, or air trapping.  No pleural effusion or pneumothorax.     Limited images through the unenhanced upper abdomen demonstrate no acute abnormality.  No aggressive osseous lesion.  Suspected degree of  ossification at the posterior longitudinal ligament at C6-C7.  Multilevel spine DJD.     Impression:     Patchy peripheral and lower lung interstitial thickening and opacities.  Overall appearance is nonspecific and could relate to sequela of fibrotic change versus other infectious or non-infectious inflammatory sequela.  No evident honeycombing, bronchiectasis, or significant air trapping by this exam.     Few scattered subcentimeter pulmonary nodules, technically indeterminate.  Attention at follow-up.     Additional findings as above.        Electronically signed by:Osvaldo Daniel  Date:                                            07/31/2024  Time:                                           11:21            08/29/2024---------Distance: 396.24 meters (1300 feet)       O2 Sat % Supplemental Oxygen Heart Rate Blood Pressure Alexa Scale   Pre-exercise  (Resting) 95 % Room Air 84 bpm 108/67  mmHg 4   During Exercise 88 % Room Air 98 bpm 130/71  mmHg 7-8   Post-exercise    96 % Room Air  84 bpm 117/74  mmHg      CLINICAL INTERPRETATION:  Six minute walk distance is 396.24 meters (1300 feet) with very heavy dyspnea.  During exercise, there was significant desaturation while breathing room air.  Both blood pressure and heart rate remained stable with walking.  The patient did not report non-pulmonary symptoms during exercise.  The patient may benefit from using supplemental oxygen during exertion.  Since the previous study in July 2024, exercise capacity is unchanged.  Based upon age and body mass index, exercise capacity is normal.   Oxygen saturation did improve while breathing supplemental oxygen.    EXAMINATION:  CT CHEST HIGH RESOLUTION WITHOUT CONTRAST     CLINICAL HISTORY:  ILD; Interstitial pulmonary disease, unspecified     TECHNIQUE:  Low dose axial images, sagittal and coronal reformations were obtained from the thoracic inlet to the lung bases. Contrast was not administered.     COMPARISON:  07/30/2024      FINDINGS:  The heart is borderline enlarged.  There is aortic and coronary atherosclerosis and increased numbers of shoddy subcentimeter mediastinal lymph nodes.     Images through the lung bases are degraded by motion artifact.  In the upper and mid lung zones there is multifocal subpleural reticulonodular change with areas of architectural distortion.  In the mid and lower lung zones there are patchy dependent and subpleural areas of coalescent airspace opacity with some superimposed minimal peripheral bronchiectasis that is similar to the prior exam.  No honeycombing.  Expiratory images demonstrate no significant air trapping.  Overall, findings are unchanged from the prior exam.     There are calcifications along the dependent aspect of the gallbladder which may reflect layering stones versus less likely incomplete gallbladder wall calcification.     Impression:     Stable changes of interstitial lung disease with possible UIP pattern.  Areas of persistent coalescent opacity may reflect organizing pneumonia.        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  Raynaud's progressive since 2014  's Hands  Subtle  nailfold capillary dropout and minimal capillary dilatation left ring and little finger  PRESTON+ 1:640 speckled neg profile   Proximal muscle weakness  4.6/5 deltoids bilateral,  4.8/5 biceps and  triceps bilat, 4.6/5 psoas bilateral, rest 5/5  Anti-synthetase syndrome      ANCA, MPO, PR3 SPEP immunoglobulins, immunofixation APLS, GBM, cryoglobulins, C3 C4 Ig4 RNAP III, pre-DMARD labs vitamin D  To ED now for admission to [facility] 1st dose of Solu-Medrol 1 g IV in ED, then daily to complete 3-5 day course  IVIg over 5 days  Cyclophosphamide 750mg/m2 IV  Tacrolimus 2mg twice daily adjust dose based on CBC, RFP, Mg trough level. Monitor BP closely  Bactrim-DS M-W-F for PJP prophylaxis  Pantoprazole 40mg before breakfast  Cam Gao and Salma to consult in hospital  Consult Pulmonary in hospital   [facility] pending

## 2024-09-05 NOTE — CONSULTS
[facility]  Lung Transplant  Consult Note    Patient Name: Darrell Corona  MRN: [id number]  Admission Date: 9/5/2024  Hospital Length of Stay: 0 days  Attending Physician: Jesús Wisdom MD  Primary Care Provider: Nikhil Palma MD     Inpatient consult to Lung Transplant  Consult performed by: Rosemarie White PA-C  Consult ordered by: Jesús Wisdom MD        Subjective:     History of Present Illness:  Darrell Corona is a 64 y.o. male who is on 0L of oxygen.  He is on no assisted ventilation.  His New [place] Heart Association Class is III and a Karnofsky score of 70% - Cares for self: Unable to carry on normal activity or active work. He is not diabetic.     Requires Supplemental O2: No     Massive Hemoptysis: 0 occurrences  (Enter the number of times in the last year)     Exacerbations: 0 occurrences  (Enter the number of times in the last year)     Microbiology Infections: No     Pt presents today for initial evaluation of pulmonary fibrosis.  He states that he was in his usual state of health until June, when he went on a trip to [place].  On that trip, he noticed that when he walked up 5 flights of stairs he became very winded.  He usually walks up to 5 miles a day and is able to walk 10-15 flights of stairs without difficulty.  When he returned to [place], he was seen by his PCP who noted abnormal breath sounds and referred him to pulmonary.  He was seen by Dr. Watts and was noted to have pulmonary fibrosis on his CT chest.  He desaturated to 93% on 6MWT.  He had an PRESTON that was positive and a negative RF.  He has continued to have ongoing dyspnea that has been progressive since June.  He denies any illnesses around the onset of symptoms or since onset of symptoms.  Denies any lower extremity swelling.  No palpitations but does monitor his pulse ox and notices tachycardia after he exerts himself.  He states that he monitors his oxygen and will see dips into the low 80's after he exerts  himself.  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  He sat in the car and returned to baseline.  He then was seen by Dr. Goyal at [facility] and started on a steroid course.  He has not noticed any difference with steroid use.  Labs for SSc and Sjogren's were checked and are pending.  He was referred to Dr. Scott for clinical trial consideration.  He has never had bronchoscopy or chest biopsy.       He works in real [employer] and does not have any occupational exposures.  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  No pulmonary toxic medications.  Does have dogs at home.  No history of dog allergies.  No exposures to birds, chickens, down bedding, or zheng/chicken coops.  No environmental exposures.  No mold in the home.  He previously smoked but quit 30 years ago.  No history of lung disease up to this point.  No family history of lung disease.  No family history of autoimmune disease.  He does endorse Raynaud's.  He does have skin thickening and fissuring of his fingers with thickness on his PIPs.  No rashes.  No muscle symptoms.  Does have fatigue.  No joint pains.  No sinus disease or reflux.         Patient presented to the ED for progressive dyspnea over the weekend. Autoimmune panel pending. Concerns for rapidly progressive ILD and will be admitted to hospital medicine for solumedrol, IVIG, rituximab. Rheumatology following with plans to start tacrolimus as inpatient as well.     History reviewed. No pertinent past medical history.    History reviewed. No pertinent surgical history.    Review of patient's allergies indicates:  No Known Allergies    (Not in a hospital admission)    Family History    None       Tobacco Use    Smoking status: Former     Current packs/day: 0.00     Types: Cigarettes     Quit date: 1991     Years since quittin.7     Smokeless tobacco: Never    Tobacco comments:     quit 30yrs ago   Substance and Sexual Activity    Alcohol use: Yes     Alcohol/week: 2.0 standard drinks of alcohol     Types: 1 Glasses of wine, 1 Cans of beer per week     Comment: 1 beer and wine every day    Drug use: Never    Sexual activity: Yes     Review of Systems   Constitutional:  Positive for activity change and fatigue. Negative for appetite change, chills and fever.   HENT:  Negative for congestion, postnasal drip, rhinorrhea, sinus pressure and sinus pain.    Respiratory:  Positive for cough and shortness of breath. Negative for wheezing.    Cardiovascular:  Negative for chest pain, palpitations and leg swelling.   Gastrointestinal:  Negative for abdominal distention, abdominal pain, constipation, diarrhea, nausea and vomiting.   Genitourinary:  Negative for decreased urine volume, difficulty urinating and hematuria.   Skin:  Positive for rash.        Mechanics hands   Allergic/Immunologic: Negative for immunocompromised state.   Neurological:  Negative for dizziness, syncope, light-headedness and headaches.   Psychiatric/Behavioral:  Negative for agitation and behavioral problems.      Objective:     Vital Signs (Most Recent):  Temp: 98.7 °F (37.1 °C) (09/05/24 1325)  Pulse: 72 (09/05/24 1401)  Resp: 20 (09/05/24 1401)  BP: (!) 175/93 (09/05/24 1401)  SpO2: 100 % (09/05/24 1438) Vital Signs (24h Range):  Temp:  [98.7 °F (37.1 °C)] 98.7 °F (37.1 °C)  Pulse:  [70-72] 72  Resp:  [18-20] 20  SpO2:  [100 %] 100 %  BP: (161-175)/(86-93) 175/93     Weight: 77.1 kg (170 lb)  Body mass index is 26.63 kg/m².    No intake or output data in the 24 hours ending 09/05/24 1548       Physical Exam  Vitals and nursing note reviewed.   Constitutional:       General: He is not in acute distress.     Appearance: He is normal weight. He is not ill-appearing.      Interventions: Nasal cannula in place.   HENT:      Head: Normocephalic and atraumatic.      Nose: Nose  normal. No congestion or rhinorrhea.   Eyes:      General: No scleral icterus.     Extraocular Movements: Extraocular movements intact.      Conjunctiva/sclera: Conjunctivae normal.   Cardiovascular:      Rate and Rhythm: Normal rate.   Pulmonary:      Effort: Pulmonary effort is normal. No respiratory distress.      Breath sounds: No stridor. Rales (bibasilar) present. No wheezing or rhonchi.   Abdominal:      General: Abdomen is flat. Bowel sounds are normal. There is no distension.      Palpations: Abdomen is soft.      Tenderness: There is no abdominal tenderness.   Musculoskeletal:      Right lower leg: No edema.      Left lower leg: No edema.   Skin:     General: Skin is warm and dry.      Comments: Mechanics hands   Neurological:      General: No focal deficit present.      Mental Status: He is oriented to person, place, and time.   Psychiatric:         Mood and Affect: Mood normal.         Behavior: Behavior normal.              Significant Labs:  CBC:  Recent Labs   Lab 09/05/24  1347   WBC 9.80   RBC 5.91   HGB 17.3   HCT 53.8      MCV 91   MCH 29.3   MCHC 32.2     BMP:  Recent Labs   Lab 09/05/24  1347      K 3.2*   CL 99   CO2 25   BUN 8   CREATININE 0.9   CALCIUM 9.3        I have reviewed all pertinent labs within the past 24 hours.    Diagnostic Results:  Labs: Reviewed  CT: Reviewed  Assessment/Plan:     Pulmonary  * Acute hypoxemic respiratory failure  Patient with Hypoxic Respiratory failure which is Acute on chronic.  he is on home oxygen at 2 LPM. Supplemental oxygen was provided and noted-      .   Signs/symptoms of respiratory failure include- tachypnea, increased work of breathing, and lethargy. Contributing diagnoses includes - Interstitial lung disease Labs and images were reviewed. Patient Has recent ABG, which has been reviewed. Will treat underlying causes and adjust management of respiratory failure as follows-     Will treat for rapidly progressive ILD. Rheum/LUT consulted.  Admit to hospital medicine. Maintain oxygen sats >88%    ILD (interstitial lung disease)  Patient send to advanced lung disease clinic 8/29 for evaluation of rapidly progressive ILD. CT chest shows bibasilar interstitial fibrotic changes. Serologies have shown a positive PRESTON 1:640 speckled with negative RF and anti CCP. Sjogren's and scleroderma serologies from [facility] pending. Has Raynaud's and evidence of mechanics hands on exam. He has had a significant drop in his FVC (24% drop) and DLCO (29%) in a 1 month period. 6MWT as outpatient one week ago with desat to 88% on RA when last month he only desaturated to 93%. I am concerned for a rapidly progressive ILD, ??MDA5; myositis panel ordered and pending. CRP elevated, CK normal, myomarker panel and aldolase pending. Is on steroids currently.     Admit to hospital medicine with rheum/LUT consult for 1g solumedrol, IVIG, rituximab. Plans to start tacrolimus as inpatient as well.        Thank you for your consult. I will follow-up with patient. Please contact us if you have any additional questions.    Rosemarie White PA-C  Lung Transplant  [facility]

## 2024-09-05 NOTE — ASSESSMENT & PLAN NOTE
Patient with Hypoxic Respiratory failure which is Acute on chronic.  he is not on home oxygen. Supplemental oxygen was provided and noted-      .   Signs/symptoms of respiratory failure include- tachypnea. Contributing diagnoses includes - Interstitial lung disease Labs and images were reviewed. Patient Has not had a recent ABG. Will treat underlying causes and adjust management of respiratory failure as follows- As above

## 2024-09-05 NOTE — H&P
[facility]  Hospital Medicine  History & Physical    Patient Name: Darrell Corona  MRN: [id number]  Patient Class: IP- Inpatient  Admission Date: 9/5/2024  Attending Physician: Mulugeta Francisco*   Primary Care Provider: Nikhil Palma MD         Patient information was obtained from patient, relative(s), past medical records, and ER records.     Subjective:     Principal Problem:ILD (interstitial lung disease)    Chief Complaint:   Chief Complaint   Patient presents with    Multiple complaints     Hx pul fibrosis , sent from rheum clinic for admission        HPI: Darrell Corona is a 64 y.o. male with past medical history of HTN, HLD, Reynaud's, who presents from rheumatology clinic for concern for progressive pulmonary fibrosis. Patient was being evaluated outpatient by pulmonary for progressive shortness of breath over the course of several months. Per patient's family at bedside he used to be very active, able to walk 4-5 miles per day or up several flights of stairs without difficulty. However his shortness of breath has progressed to where he will have to stop after a block to catch his breath. He had an PRESTON that was positive and a negative RF (positive PRESTON 1:640 speckled with negative RF and anti CCP). CT chest was obtained which showed stable changes of interstitial lung disease with possible UIP pattern. Areas of persistent coalescent opacity may reflect organizing pneumonia. He was also assessed for oxygen outpatient, and he desaturated and required oxygen to be ordered. He was started on steroid taper which was supposed to end this week, but has not noted improvement, and in fact has worsened. He then saw rheumatology in clinic and was instructed to present to ED. No reported environmental or occupational exposures. He has a history of reynaud's which has been present for years. Denies chest pain, abdominal pain, nausea / vomiting, fever/chills, joint pains, URI symptoms.  In the ED, patient was  hemodynamically stable. Reportedly had episode of SVT while in ED with HR 160s, during which patient was asymptomatic. Vagal maneuvers were attempted but were without success. Patient was then given diltiazem with apparent conversion to NSR. Pulmonary was consulted and he was given 1 g solumedrol. He was admitted to hospital medicine.     History reviewed. No pertinent past medical history.    History reviewed. No pertinent surgical history.    Review of patient's allergies indicates:  No Known Allergies    No current facility-administered medications on file prior to encounter.     Current Outpatient Medications on File Prior to Encounter   Medication Sig    aspirin (ECOTRIN) 81 MG EC tablet Take 81 mg by mouth.    olmesartan-hydrochlorothiazide (BENICAR HCT) 40-12.5 mg Tab Take 1 tablet by mouth once daily.    predniSONE (DELTASONE) 10 MG tablet Take 6 tablets (60 mg total) by mouth once daily for 7 days, THEN 5 tablets (50 mg total) once daily for 7 days, THEN 4 tablets (40 mg total) once daily for 7 days, THEN 3 tablets (30 mg total) once daily for 7 days, THEN 2 tablets (20 mg total) once daily for 7 days, THEN 1 tablet (10 mg total) once daily for 7 days.    rosuvastatin (CRESTOR) 40 MG Tab Take 1 tablet (40 mg total) by mouth every evening. (Patient not taking: Reported on 2024)     Family History    None       Tobacco Use    Smoking status: Former     Current packs/day: 0.00     Types: Cigarettes     Quit date: 1991     Years since quittin.7    Smokeless tobacco: Never    Tobacco comments:     quit 30yrs ago   Substance and Sexual Activity    Alcohol use: Yes     Alcohol/week: 2.0 standard drinks of alcohol     Types: 1 Glasses of wine, 1 Cans of beer per week     Comment: 1 beer and wine every day    Drug use: Never    Sexual activity: Yes     Review of Systems   Constitutional:  Negative for chills and fever.   HENT:  Negative for congestion and sore throat.    Respiratory:  Positive for cough  and shortness of breath.    Cardiovascular:  Negative for chest pain and leg swelling.   Gastrointestinal:  Negative for abdominal pain, nausea and vomiting.   Genitourinary:  Negative for decreased urine volume, dysuria, frequency and urgency.   Musculoskeletal:  Negative for arthralgias, joint swelling and myalgias.   Skin:  Positive for color change. Negative for pallor.   Neurological:  Negative for dizziness, light-headedness and headaches.   Psychiatric/Behavioral:  Negative for agitation and confusion.      Objective:     Vital Signs (Most Recent):  Temp: 98.7 °F (37.1 °C) (09/05/24 1325)  Pulse: 72 (09/05/24 1401)  Resp: 20 (09/05/24 1401)  BP: (!) 175/93 (09/05/24 1401)  SpO2: 100 % (09/05/24 1438) Vital Signs (24h Range):  Temp:  [98.7 °F (37.1 °C)] 98.7 °F (37.1 °C)  Pulse:  [70-72] 72  Resp:  [18-20] 20  SpO2:  [100 %] 100 %  BP: (161-175)/(86-93) 175/93     Weight: 77.1 kg (170 lb)  Body mass index is 26.63 kg/m².     Physical Exam  Constitutional:       General: He is not in acute distress.     Appearance: Normal appearance. He is not toxic-appearing or diaphoretic.   HENT:      Head: Normocephalic and atraumatic.   Cardiovascular:      Rate and Rhythm: Normal rate and regular rhythm.      Heart sounds: No murmur heard.     No friction rub. No gallop.   Pulmonary:      Effort: Pulmonary effort is normal. No respiratory distress.      Breath sounds: Rales present. No wheezing.   Abdominal:      General: Abdomen is flat. There is no distension.      Palpations: Abdomen is soft.      Tenderness: There is no abdominal tenderness. There is no guarding or rebound.   Musculoskeletal:      Cervical back: Normal range of motion and neck supple.      Right lower leg: No edema.      Left lower leg: No edema.      Comments: 's hands   Skin:     General: Skin is warm and dry.   Neurological:      Mental Status: He is alert.                Significant Labs: All pertinent labs within the past 24 hours have  been reviewed.    Significant Imaging: I have reviewed all pertinent imaging results/findings within the past 24 hours.  Assessment/Plan:     * ILD (interstitial lung disease)  64 y.o. male with past medical history of HTN, HLD, Reynaud's, who presents from rheumatology clinic for concern for progressive pulmonary fibrosis. Worsening shortness of breath over the course of several months. Was finishing prednisone taper without improvement.   - positive PRESTON 1:640 speckled with negative RF and anti CCP  - CT chest showed stable changes of interstitial lung disease with possible UIP pattern. Areas of persistent coalescent opacity may reflect organizing pneumonia.   - Pulmonary consulted and he was given 1 g solumedrol. Planned for 5 day course  - Protonix and PJP PPx  - Rheumatology consulted    Acute hypoxemic respiratory failure  Patient with Hypoxic Respiratory failure which is Acute on chronic.  he is not on home oxygen. Supplemental oxygen was provided and noted-      .   Signs/symptoms of respiratory failure include- tachypnea. Contributing diagnoses includes - Interstitial lung disease Labs and images were reviewed. Patient Has not had a recent ABG. Will treat underlying causes and adjust management of respiratory failure as follows- As above    Hyperlipidemia LDL goal <70  Continue statin        VTE Risk Mitigation (From admission, onward)           Ordered     enoxaparin injection 40 mg  Daily         09/05/24 1658     IP VTE HIGH RISK PATIENT  Once         09/05/24 1658     Place sequential compression device  Until discontinued         09/05/24 1658                          Mulugeta Francisco MD  Department of Hospital Medicine  [facility]

## 2024-09-05 NOTE — PROGRESS NOTES
Subjective:      Patient ID: Darrell Corona is a 64 y.o. male.    Chief Complaint: ILD     HPI: 64-year-old man with HTN and newly diagnosed ILD presents to clinic with his son for establishment of care. Referred by Dr. Watts (Pulmonary) for positive PRESTON and restrictive lung disease.    Patient states he felt well until June, when he went on a trip to [place]. On that trip, he noticed shortness of breath on exertion. He felt winded after walking up a few flights of stairs. Prior to that trip, he could walk up to 5 miles a day and climb 10-15 flights of stairs without difficulty. On his return to [place], he saw his PCP who noted abnormal breath sounds and referred him to pulmonary. He was seen by Dr. Watts and was noted to have pulmonary fibrosis on his CT chest. He was started on high dose PO steroids, but patient notes no improvement in his breathing. He then was seen by Dr. Goyal at [facility]. Patient's daughter wanted to consider trials for investigational meds, so patient was referred to Dr. Scott (appt pending).    He was then seen by Dr. Reina with [facility] on 8/29. She noted a significant drop in his FVC (24% drop) and DLCO (29%) in a 1-month period.  She was concerned for a rapidly progressive ILD. Plan at that time was repeat CT chest, continue steroids, start Ovef given progression, and follow up in 2 weeks with PFTs and 6MWT.      Today, patient reports that his breathing continues to worsen. He can only walk a block before he starts to feel short of breath. Denies history of lung disease or lung issues in the past. He notes worsening discoloration and thickening of both hands in past couple months. History of Raynaud's since 2014, at that time his left index finger would turn white. Four years later he noticed fingers of both hands would turn white. Four years after that the fingers turned purple. Then two to three months ago, he started to notice thickening, cracking, and fissures of his  "fingers. Additionally, he endorses unintentional weight loss, fatigue, and proximal upper extremity weakness. Otherwise, no fever, headaches, visual disturbance, vision loss, red/pain eyes, mucosal ulcers, dry eyes, dry mouth, CP, palpitations, GI/ complaints, muscle pain, joint pain or swelling.     History:  Social: Former tobacco user: 15 years but quit over 25 years ago. Drinks 3 glasses of tay per day. No drug use.   Family: Father: COPD and he did not smoke.  Daughter: asthma. No family history of autoimmune disease.    Works in real estate, formerly in restaurant business     Review of Systems   Constitutional:  Positive for fatigue and unexpected weight change (6-7 lb in last month). Negative for fever.   HENT:  Negative for facial swelling, mouth sores, rhinorrhea, sore throat and trouble swallowing.    Eyes:  Negative for pain and redness.   Respiratory:  Positive for cough and shortness of breath.    Cardiovascular: Negative.  Negative for chest pain, palpitations and leg swelling.   Gastrointestinal:  Negative for abdominal pain, constipation and diarrhea.   Genitourinary: Negative.  Negative for genital sores.   Musculoskeletal:  Negative for arthralgias, joint swelling and myalgias.        Muscle weakness of bilateral arms   Skin:  Positive for rash.   Neurological:  Positive for weakness. Negative for headaches.   Hematological:  Does not bruise/bleed easily.        Objective:   BP (!) 161/86   Pulse 70   Ht 5' 7" (1.702 m)   Wt 75.4 kg (166 lb 3.6 oz)   BMI 26.03 kg/m²   Physical Exam   Constitutional: He is oriented to person, place, and time. normal appearance. No distress.   HENT:   Head: Normocephalic and atraumatic.   Right Ear: External ear normal.   Left Ear: External ear normal.   Nose: Nose normal.   Mouth/Throat: Mucous membranes are moist. No oropharyngeal exudate or posterior oropharyngeal erythema.   Eyes: Pupils are equal, round, and reactive to light. Conjunctivae are normal. " Right eye exhibits no discharge. Left eye exhibits no discharge. No scleral icterus.   Cardiovascular: Normal rate, regular rhythm and normal pulses.   No murmur heard.  Pulmonary/Chest: Effort normal. He has no wheezes.   Pulmonary Comments: Crackles throughout bilateral lung fields  Abdominal: Bowel sounds are normal. He exhibits no distension. There is no abdominal tenderness.   Musculoskeletal:         General: No swelling.      Cervical back: Normal range of motion.      Right lower leg: No edema.      Left lower leg: No edema.   Neurological: He is oriented to person, place, and time.   Skin: Lesion and rash noted.   Raynauds, skin thickening and fissuring of his fingers tips - images attached       Right Side Rheumatological Exam     Muscle Strength (0-5 scale):  Deltoid:  4.6  Biceps: 4.8/5   Triceps:  4.8  : 4.8/5   Iliopsoas: 4.6  Quadriceps:  5   Distal Lower Extremity: 5    Left Side Rheumatological Exam     Muscle Strength (0-5 scale):  Deltoid:  4.6  Biceps: 4.8/5   Triceps:  4.8  :  4.8/5   Iliopsoas: 4.6  Quadriceps:  5   Distal Lower Extremity: 5                        Imaging:   CT CHEST HIGH RESOLUTION WITHOUT CONTRAST ( 08/30/2024)  The heart is borderline enlarged.  There is aortic and coronary atherosclerosis and increased numbers of shoddy subcentimeter mediastinal lymph nodes.  Images through the lung bases are degraded by motion artifact.  In the upper and mid lung zones there is multifocal subpleural reticulonodular change with areas of architectural distortion.  In the mid and lower lung zones there are patchy dependent and subpleural areas of coalescent airspace opacity with some superimposed minimal peripheral bronchiectasis that is similar to the prior exam.  No honeycombing.  Expiratory images demonstrate no significant air trapping.  Overall, findings are unchanged from the prior exam.  Impression:  Stable changes of interstitial lung disease with possible UIP pattern.  Areas  of persistent coalescent opacity may reflect organizing pneumonia.    Echo 7/2024    Left Ventricle: The left ventricle is normal in size. Normal wall thickness. There is normal systolic function with a visually estimated ejection fraction of 55 - 60%. Global longitudinal strain is normal; -17.0%. There is normal diastolic function.    Right Ventricle: Normal right ventricular cavity size. Wall thickness is normal. Systolic function is normal.    Tricuspid Valve: There is mild regurgitation.    IVC/SVC: Normal venous pressure at 3 mmHg.    Pulmonary Function Tests 8/29/2024 7/30/2024    FVC 1.88 liters 2.49 liters   FEV1 1.39 liters 1.85 liters   TLC (liters) 3.53 liters 3.91 liters   DLCO (ml/mmHg sec) 8.95 ml/mmHg sec 12.7 ml/mmHg sec   FVC% 53 70   FEV1% 49 65   TLC% 54 60   DLCO% 34 49        Assessment:     HPI: 64-year-old man with HTN and newly diagnosed ILD presents to clinic for evaluation. Physical exam notable for bilateral diffused lung crackles,  hands, Raynaud's and proximal muscle weakness. He has had a significant drop in his FVC (24% drop) and DLCO (29%) in a 1 month period. Concern for rapidly progressive ILD due to Anti-tRNA synthetases vs MDA-5 myositis.    - CBC and CMP wnl. positive PRESTON 1:640 speckled. Negative profile (SSA, SSB, ds DNA, Sm/RNP). RF and CCP negative. CK and aldolase wnl. ESR 8. CRP 35    1. ILD (interstitial lung disease)    2. Positive PRESTON (antinuclear antibody)    3. Raynaud's disease with gangrene    4. Muscle weakness of proximal extremity        Plan:     - Given worsening pulmonary function, advised patient to going to ED for admission for pulse steroids and IVIG. Recommend 1st dose of IV solumedrol 1 gm in ED  - Recommend pulse steroids x 3-5 days, IVIG over 5 days, Cyclophosphamide, and Tacrolimus while inpatient. Sign out given to inpatient Rheumatology team   - Protonix and PJP PPx   - Follow myomarker results. Check: ANCA/MPO/PR3, SPEP, immunoglobulins,  immunofixation APLS, GBM, cryoglobulins, C3, C4, UA/UPCR, APLS labs, pre-DMARDs labs  - Pulmonary consult while inpatient    This patient was examined with Dr. Barcenas. Plan discussed with the patient and pt's son. Advised to go to ED, called ED and provided patient's information.    Problem List Items Addressed This Visit          Pulmonary    ILD (interstitial lung disease) - Primary    Relevant Orders    ANTI-NEUTROPHILIC CYTOPLASMIC ANTIBODY    Urinalysis    Protein / creatinine ratio, urine    PROTEIN ELECTROPHORESIS, SERUM    Immunofixation Electrophoresis    C3 COMPLEMENT    Cardiolipin antibody    Beta-2 Glycoprotein Abs (IgA, IgG, IgM)    C4 COMPLEMENT    IgG 1, 2, 3, and 4    IMMUNOGLOBULINS (IGG, IGA, IGM) QUANTITATIVE    CRYOGLOBULIN    LUPUS ANTICOAGULANT (DRVVT)    GLOMERULAR BASEMENT MEMBRANE ANTIBODIES     Other Visit Diagnoses       Positive PRESTON (antinuclear antibody)        Relevant Orders    ANTI-NEUTROPHILIC CYTOPLASMIC ANTIBODY    Urinalysis    Protein / creatinine ratio, urine    PROTEIN ELECTROPHORESIS, SERUM    Immunofixation Electrophoresis    C3 COMPLEMENT    Cardiolipin antibody    Beta-2 Glycoprotein Abs (IgA, IgG, IgM)    C4 COMPLEMENT    IgG 1, 2, 3, and 4    IMMUNOGLOBULINS (IGG, IGA, IGM) QUANTITATIVE    CRYOGLOBULIN    LUPUS ANTICOAGULANT (DRVVT)    GLOMERULAR BASEMENT MEMBRANE ANTIBODIES    Raynaud's disease with gangrene        Relevant Orders    ANTI-NEUTROPHILIC CYTOPLASMIC ANTIBODY    Urinalysis    Protein / creatinine ratio, urine    PROTEIN ELECTROPHORESIS, SERUM    Immunofixation Electrophoresis    C3 COMPLEMENT    Cardiolipin antibody    Beta-2 Glycoprotein Abs (IgA, IgG, IgM)    C4 COMPLEMENT    IgG 1, 2, 3, and 4    IMMUNOGLOBULINS (IGG, IGA, IGM) QUANTITATIVE    CRYOGLOBULIN    LUPUS ANTICOAGULANT (DRVVT)    GLOMERULAR BASEMENT MEMBRANE ANTIBODIES    Muscle weakness of proximal extremity              Mary Trevino MD  PGY-4, Rheumatology Fellow

## 2024-09-05 NOTE — ASSESSMENT & PLAN NOTE
Patient send to [facility]29 for evaluation of rapidly progressive ILD. CT chest shows bibasilar interstitial fibrotic changes. Serologies have shown a positive PRESTON 1:640 speckled with negative RF and anti CCP. Sjogren's and scleroderma [place] [facility] [place]. Has Raynaud's and evidence of mechanics hands on exam. He has had a significant drop in his FVC (24% drop) and DLCO (29%) in a 1 month period. 6MWT as outpatient one week ago with desat to 88% on RA when last month he only desaturated to 93%. I am concerned for a rapidly progressive ILD, ??MDA5; myositis panel ordered and pending. CRP elevated, CK normal, myomarker panel and aldolase pending. Is on steroids currently.     Admit to hospital medicine with rheum/LUT consult for 1g solumedrol, IVIG, rituximab. Plans to start tacrolimus as inpatient as well.

## 2024-09-05 NOTE — ASSESSMENT & PLAN NOTE
64 y.o. male with past medical history of HTN, HLD, Reynaud's, who presents from rheumatology clinic for concern for progressive pulmonary fibrosis. Worsening shortness of breath over the course of several months. Was finishing prednisone taper without improvement.   - positive PRESTON 1:640 speckled with negative RF and anti CCP  - CT chest showed stable changes of interstitial lung disease with possible UIP pattern. Areas of persistent coalescent opacity may reflect organizing pneumonia.   - Pulmonary consulted and he was given 1 g solumedrol. Planned for 5 day course  - Protonix and PJP PPx  - Rheumatology consulted

## 2024-09-05 NOTE — ED PROVIDER NOTES
Encounter Date: 2024       History     Chief Complaint   Patient presents with    Multiple complaints     Hx pul fibrosis , sent from rheum clinic for admission     Darrell Chloe is a 63 yo male with PMH HTN, HLD, CAD, and recently diagnosed pulmonary fibrosis presenting to the ED from rheumatology clinic for treatment initiation for rapidly progressive ILD. History obtained from son due to language barrier. A few months ago pt noticed SOB on exertion. Pt used to walk 3-5 miles a few times a week and now can barely walk a block before becoming short of breath. Has also experienced a dry cough upon inspiration, upper back pain upon deep inspiration, BL arm weakness, purple/blue discoloration of finger tips, and clubbing. Endorses hx of raynaud's for 10 years. Originally finger tips would turn white and cold but recently have turned purple/blue. Endorses lightheadedness when becoming SOB but denies LOC and has never fallen. Endorses numbness in all 10 fingers. Denies CP, palpitations, vision changes,headache, tingling, fevers, chills, diarrhea, constipation, dysuria, nausea, and vomiting.    Workup obtained from EMR: CT chest (24) showed interstitial thickening and opacities in lower lung zones, in peripheral distribution; 6 min walk (24)resulted in SpO2 decrease from 99% at rest to 93% with exertion (1400 ft); PFT's (24) showed moderate restriction and moderately decreased DLCO. Positive PRESTON 1:640 speckled, negative profile. RF and CCP negative.      Review of patient's allergies indicates:  No Known Allergies  History reviewed. No pertinent past medical history.  History reviewed. No pertinent surgical history.  No family history on file.  Social History     Tobacco Use    Smoking status: Former     Current packs/day: 0.00     Types: Cigarettes     Quit date: 1991     Years since quittin.7    Smokeless tobacco: Never    Tobacco comments:     quit 30yrs ago   Substance Use Topics    Alcohol use:  Yes     Alcohol/week: 2.0 standard drinks of alcohol     Types: 1 Glasses of wine, 1 Cans of beer per week     Comment: 1 beer and wine every day    Drug use: Never     Review of Systems   Constitutional:  Negative for appetite change, chills, diaphoresis, fatigue and fever.   HENT:  Negative for postnasal drip, rhinorrhea, sinus pressure, sinus pain, sore throat and trouble swallowing.    Respiratory:  Positive for cough and shortness of breath. Negative for choking, chest tightness, wheezing and stridor.    Cardiovascular:  Negative for chest pain, palpitations and leg swelling.   Gastrointestinal:  Negative for abdominal distention, abdominal pain, blood in stool, constipation, diarrhea, nausea and vomiting.   Genitourinary:  Negative for dysuria, hematuria and urgency.   Musculoskeletal:  Negative for arthralgias.   Skin:  Positive for color change.   Neurological:  Positive for weakness, light-headedness and numbness. Negative for headaches.   Psychiatric/Behavioral:  Negative for behavioral problems. The patient is not nervous/anxious.        Physical Exam     Initial Vitals   BP Pulse Resp Temp SpO2   09/05/24 1325 09/05/24 1325 09/05/24 1325 09/05/24 1325 09/05/24 1438   (!) 167/87 72 18 98.7 °F (37.1 °C) 100 %      MAP       --                Physical Exam    Constitutional: He appears well-developed and well-nourished. He is not diaphoretic. No distress.   HENT:   Head: Normocephalic and atraumatic.   Mouth/Throat: Oropharynx is clear and moist.   Eyes: Conjunctivae and EOM are normal. Pupils are equal, round, and reactive to light.   Neck: Neck supple.   Normal range of motion.  Cardiovascular:  Normal rate, regular rhythm, normal heart sounds and intact distal pulses.           No murmur heard.  Pulmonary/Chest: No accessory muscle usage. No tachypnea. No respiratory distress. He has rales in the right lower field and the left lower field.   Abdominal: Abdomen is soft. Bowel sounds are normal. He  exhibits no distension. There is no abdominal tenderness.   Musculoskeletal:         General: No tenderness or edema. Normal range of motion.      Cervical back: Normal range of motion and neck supple.     Neurological: He is alert and oriented to person, place, and time. He has normal reflexes. No cranial nerve deficit or sensory deficit. He exhibits abnormal muscle tone (weakness of BL upper arms). He displays a negative Romberg sign.   Skin: There is cyanosis. Nails show clubbing.   Purple/blue discoloration of all 10 fingertips. Dry and fissuring skin. Cold to the touch. Capillary refill >3sec.   Psychiatric: He has a normal mood and affect. His behavior is normal. Judgment and thought content normal.         ED Course   Procedures  Labs Reviewed   CBC W/ AUTO DIFFERENTIAL - Abnormal       Result Value    WBC 9.80      RBC 5.91      Hemoglobin 17.3      Hematocrit 53.8      MCV 91      MCH 29.3      MCHC 32.2      RDW 14.0      Platelets 214      MPV 9.9      Immature Granulocytes 1.0 (*)     Gran # (ANC) 7.0      Immature Grans (Abs) 0.10 (*)     Lymph # 1.4      Mono # 0.9      Eos # 0.4      Baso # 0.05      nRBC 0      Gran % 71.2      Lymph % 14.0 (*)     Mono % 9.6      Eosinophil % 3.7      Basophil % 0.5      Differential Method Automated      Narrative:     Release to patient->Immediate   COMPREHENSIVE METABOLIC PANEL - Abnormal    Sodium 137      Potassium 3.2 (*)     Chloride 99      CO2 25      Glucose 85      BUN 8      Creatinine 0.9      Calcium 9.3      Total Protein 7.5      Albumin 3.5      Total Bilirubin 0.6      Alkaline Phosphatase 70      AST 32      ALT 39      eGFR >60.0      Anion Gap 13      Narrative:     Release to patient->Immediate   HIV 1 / 2 ANTIBODY    HIV 1/2 Ag/Ab Non-reactive      Narrative:     Release to patient->Immediate   HEPATITIS C ANTIBODY    Hepatitis C Ab Non-reactive      Narrative:     Release to patient->Immediate   URINALYSIS, REFLEX TO URINE CULTURE           Imaging Results              X-Ray Chest AP Portable (Final result)  Result time 09/05/24 15:48:46      Final result by Ross Tirado III, MD (09/05/24 15:48:46)                   Impression:      Pulmonary edema pneumonia aspiration or sepsis.      Electronically signed by: Ross Tirado MD  Date:    09/05/2024  Time:    15:48               Narrative:    EXAMINATION:  XR CHEST AP PORTABLE    CLINICAL HISTORY:  sob;    FINDINGS:  Heart size is upper normal.  There is bibasal edema versus infiltrate and pleural fluid left greater than right.                                       Medications   methylPREDNISolone sodium succinate (SOLU-MEDROL) 1,000 mg in D5W 100 mL IVPB (0 mg Intravenous Stopped 9/5/24 1616)   diltiaZEM injection 19.5 mg (19.5 mg Intravenous Given 9/5/24 1507)   potassium chloride SA CR tablet 40 mEq (40 mEq Oral Given 9/5/24 1621)     Medical Decision Making  Darrell Corona is a 65 yo male with PMH HTN, HLD, CAD, and recently diagnosed pulmonary fibrosis presenting to the ED from rheumatology clinic for treatment initiation for rapidly progressive ILD. Hypertensive on admission at 175/93, sating 97% on 2L O2, pulse and RR WNL. Per rheumatology note, will give 1 dose of solu-medrol 1g and admit to hospital medicine for further treatment. Pt is comfortable and stable, will conduct basic labs, EKG, and chest xray before admitting.     At around 2:45pm pt spontaneously went into SVT while lying in ED bed. Endorses feeling his heart race but denies CP, SOB, vision changes. BP elevated at 180/103. Pulse 160. Attempted modified valsalva without success. Given diltiazem 19.5mg for rate control. Converted back to sinus rhythm with pulse of 79 and BP of 148/83.     CBC unremarkable. CMP revealing hypokalemia at 3.2. Given one dose of oral 40meq Kcl in ED. Chest xray revealing bibasilar edema vs infiltrate and BL pleural effusion L>R indicating possible pulmonary edema vs pneumonia vs aspiration.      Tolerating IV solu-medrol. Consulted lung transplant per their recommendation, no changes in treatment at this time. Per rheumatology recs, will admit pt for continued treatment of rapidly progressive pulmonary fibrosis and new oxygen requirement         Amount and/or Complexity of Data Reviewed  Radiology: ordered.    Risk  Prescription drug management.               ED Course as of 09/05/24 1636   Thu Sep 05, 2024   1502 Patient found to be in narrow complex, regular tachycardia, appears consistent with SVT with heart rates in the 160s. Attempted modified vagal maneuver without success. BP is stable/high, and patient is feeling asymptomatic. Will attempt rate control with diltiazem .25 mg/kg [MB]   1521 Patient tolerated diltiazem and well, repeat EKG shows normal sinus rhythm rate 73. [MB]      ED Course User Index  [MB] Jesús Wisdom MD                             Clinical Impression:  Final diagnoses:  [R06.02] SOB (shortness of breath)          ED Disposition Condition    Admit                 Cindi Gaviria MD  Resident  09/05/24 [id number]

## 2024-09-05 NOTE — ASSESSMENT & PLAN NOTE
Patient with Hypoxic Respiratory failure which is Acute on chronic.  he is on home oxygen at 2 LPM. Supplemental oxygen was provided and noted-      .   Signs/symptoms of respiratory failure include- tachypnea, increased work of breathing, and lethargy. Contributing diagnoses includes - Interstitial lung disease Labs and images were reviewed. Patient Has recent ABG, which has been reviewed. Will treat underlying causes and adjust management of respiratory failure as follows-     Will treat for rapidly progressive ILD. Rheum/LUT consulted. Admit to hospital medicine. Maintain oxygen sats >88%

## 2024-09-05 NOTE — HPI
Darrell Corona is a 64 y.o. male with past medical history of HTN, HLD, Reynaud's, who presents from rheumatology clinic for concern for progressive pulmonary fibrosis. Patient was being evaluated outpatient by pulmonary for progressive shortness of breath over the course of several months. Per patient's family at bedside he used to be very active, able to walk 4-5 miles per day or up several flights of stairs without difficulty. However his shortness of breath has progressed to where he will have to stop after a block to catch his breath. He had an PRESTON that was positive and a negative RF (positive PRESTON 1:640 speckled with negative RF and anti CCP). CT chest was obtained which showed stable changes of interstitial lung disease with possible UIP pattern. Areas of persistent coalescent opacity may reflect organizing pneumonia. He was also assessed for oxygen outpatient, and he desaturated and required oxygen to be ordered. He was started on steroid taper which was supposed to end this week, but has not noted improvement, and in fact has worsened. He then saw rheumatology in clinic and was instructed to present to ED. No reported environmental or occupational exposures. He has a history of reynaud's which has been present for years. Denies chest pain, abdominal pain, nausea / vomiting, fever/chills, joint pains, URI symptoms.  In the ED, patient was hemodynamically stable. Reportedly had episode of SVT while in ED with HR 160s, during which patient was asymptomatic. Vagal maneuvers were attempted but were without success. Patient was then given diltiazem with apparent conversion to NSR. Pulmonary was consulted and he was given 1 g solumedrol. He was admitted to hospital medicine.

## 2024-09-05 NOTE — HPI
Darrell Corona is a 64 y.o. male who is on 0L of oxygen.  He is on no assisted ventilation.  His New York Heart Association Class is III and a Karnofsky score of 70% - Cares for self: Unable to carry on normal activity or active work. He is not diabetic.     Requires Supplemental O2: No     Massive Hemoptysis: 0 occurrences  (Enter the number of times in the last year)     Exacerbations: 0 occurrences  (Enter the number of times in the last year)     Microbiology Infections: No     Pt presents today for initial evaluation of pulmonary fibrosis.  He states that he was in his usual state of health until June, when he went on a trip to [place].  On that trip, he noticed that when he walked up 5 flights of stairs he became very winded.  He usually walks up to 5 miles a day and is able to walk 10-15 flights of stairs without difficulty.  When he returned to [place], he was seen by his PCP who noted abnormal breath sounds and referred him to pulmonary.  He was seen by Dr. Watts and was noted to have pulmonary fibrosis on his CT chest.  He desaturated to 93% on 6MWT.  He had an PRESTON that was positive and a negative RF.  He has continued to have ongoing dyspnea that has been progressive since June.  He denies any illnesses around the onset of symptoms or since onset of symptoms.  Denies any lower extremity swelling.  No palpitations but does monitor his pulse ox and notices tachycardia after he exerts himself.  He states that he monitors his oxygen and will see dips into the low 80's after he exerts himself.  He recently had an episode while at [employer] loading groceries where he became lightheaded and felt like he was going to pass out.  He sat in the car and returned to baseline.  He then was seen by Dr. Goyal at [facility] and started on a steroid course.  He has not noticed any difference with steroid use.  Labs for SSc and Sjogren's were checked and are pending.  He was referred to Dr. Scott for clinical trial consideration.   He has never had bronchoscopy or chest biopsy.       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  No pulmonary toxic medications.  Does have dogs at home.  No history of dog allergies.  No exposures to birds, chickens, down bedding, or zheng/chicken coops.  No environmental exposures.  No mold in the home.  He previously smoked but quit 30 years ago.  No history of lung disease up to this point.  No family history of lung disease.  No family history of autoimmune disease.  He does endorse Raynaud's.  He does have skin thickening and fissuring of his fingers with thickness on his PIPs.  No rashes.  No muscle symptoms.  Does have fatigue.  No joint pains.  No sinus disease or reflux.         Patient presented to the ED for progressive dyspnea over the weekend. Autoimmune panel pending. Concerns for rapidly progressive ILD and will be admitted to hospital medicine for solumedrol, IVIG, rituximab. Rheumatology following with plans to start tacrolimus as inpatient as well.

## 2024-09-05 NOTE — PROGRESS NOTES
9/4/2024     2:27 PM   Rapid3 Question Responses and Scores   MDHAQ Score 1   Psychologic Score 1.1   Pain Score 2   When you awakened in the morning OVER THE LAST WEEK, did you feel stiff? No   Fatigue Score 8   Global Health Score 8   RAPID3 Score 4.44    Answers submitted by the patient for this visit:  Rheumatology Questionnaire (Submitted on 9/4/2024)  fever: No  eye redness: No  mouth sores: No  headaches: No  shortness of breath: Yes  chest pain: No  trouble swallowing: No  diarrhea: No  constipation: No  unexpected weight change: Yes  genital sore: No  During the last 3 days, have you had a skin rash?: Yes  Bruises or bleeds easily: No  cough: Yes

## 2024-09-05 NOTE — SUBJECTIVE & OBJECTIVE
History reviewed. No pertinent past medical history.    History reviewed. No pertinent surgical history.    Review of patient's allergies indicates:  No Known Allergies    No current facility-administered medications on file prior to encounter.     Current Outpatient Medications on File Prior to Encounter   Medication Sig    aspirin (ECOTRIN) 81 MG EC tablet Take 81 mg by mouth.    olmesartan-hydrochlorothiazide (BENICAR HCT) 40-12.5 mg Tab Take 1 tablet by mouth once daily.    predniSONE (DELTASONE) 10 MG tablet Take 6 tablets (60 mg total) by mouth once daily for 7 days, THEN 5 tablets (50 mg total) once daily for 7 days, THEN 4 tablets (40 mg total) once daily for 7 days, THEN 3 tablets (30 mg total) once daily for 7 days, THEN 2 tablets (20 mg total) once daily for 7 days, THEN 1 tablet (10 mg total) once daily for 7 days.    rosuvastatin (CRESTOR) 40 MG Tab Take 1 tablet (40 mg total) by mouth every evening. (Patient not taking: Reported on 2024)     Family History    None       Tobacco Use    Smoking status: Former     Current packs/day: 0.00     Types: Cigarettes     Quit date: 1991     Years since quittin.7    Smokeless tobacco: Never    Tobacco comments:     quit 30yrs ago   Substance and Sexual Activity    Alcohol use: Yes     Alcohol/week: 2.0 standard drinks of alcohol     Types: 1 Glasses of wine, 1 Cans of beer per week     Comment: 1 beer and wine every day    Drug use: Never    Sexual activity: Yes     Review of Systems   Constitutional:  Negative for chills and fever.   HENT:  Negative for congestion and sore throat.    Respiratory:  Positive for cough and shortness of breath.    Cardiovascular:  Negative for chest pain and leg swelling.   Gastrointestinal:  Negative for abdominal pain, nausea and vomiting.   Genitourinary:  Negative for decreased urine volume, dysuria, frequency and urgency.   Musculoskeletal:  Negative for arthralgias, joint swelling and myalgias.   Skin:  Positive  for color change. Negative for pallor.   Neurological:  Negative for dizziness, light-headedness and headaches.   Psychiatric/Behavioral:  Negative for agitation and confusion.      Objective:     Vital Signs (Most Recent):  Temp: 98.7 °F (37.1 °C) (09/05/24 1325)  Pulse: 72 (09/05/24 1401)  Resp: 20 (09/05/24 1401)  BP: (!) 175/93 (09/05/24 1401)  SpO2: 100 % (09/05/24 1438) Vital Signs (24h Range):  Temp:  [98.7 °F (37.1 °C)] 98.7 °F (37.1 °C)  Pulse:  [70-72] 72  Resp:  [18-20] 20  SpO2:  [100 %] 100 %  BP: (161-175)/(86-93) 175/93     Weight: 77.1 kg (170 lb)  Body mass index is 26.63 kg/m².     Physical Exam  Constitutional:       General: He is not in acute distress.     Appearance: Normal appearance. He is not toxic-appearing or diaphoretic.   HENT:      Head: Normocephalic and atraumatic.   Cardiovascular:      Rate and Rhythm: Normal rate and regular rhythm.      Heart sounds: No murmur heard.     No friction rub. No gallop.   Pulmonary:      Effort: Pulmonary effort is normal. No respiratory distress.      Breath sounds: Rales present. No wheezing.   Abdominal:      General: Abdomen is flat. There is no distension.      Palpations: Abdomen is soft.      Tenderness: There is no abdominal tenderness. There is no guarding or rebound.   Musculoskeletal:      Cervical back: Normal range of motion and neck supple.      Right lower leg: No edema.      Left lower leg: No edema.      Comments: 's hands   Skin:     General: Skin is warm and dry.   Neurological:      Mental Status: He is alert.                Significant Labs: All pertinent labs within the past 24 hours have been reviewed.    Significant Imaging: I have reviewed all pertinent imaging results/findings within the past 24 hours.

## 2024-09-06 LAB
ALBUMIN SERPL BCP-MCNC: 3.2 G/DL (ref 3.5–5.2)
ALP SERPL-CCNC: 66 U/L (ref 55–135)
ALT SERPL W/O P-5'-P-CCNC: 31 U/L (ref 10–44)
ANION GAP SERPL CALC-SCNC: 13 MMOL/L (ref 8–16)
AST SERPL-CCNC: 20 U/L (ref 10–40)
BASOPHILS # BLD AUTO: 0 K/UL (ref 0–0.2)
BASOPHILS NFR BLD: 0 % (ref 0–1.9)
BILIRUB SERPL-MCNC: 0.5 MG/DL (ref 0.1–1)
BUN SERPL-MCNC: 17 MG/DL (ref 8–23)
C3 SERPL-MCNC: 159 MG/DL (ref 50–180)
C4 SERPL-MCNC: 46 MG/DL (ref 11–44)
CALCIUM SERPL-MCNC: 9.8 MG/DL (ref 8.7–10.5)
CHLORIDE SERPL-SCNC: 101 MMOL/L (ref 95–110)
CO2 SERPL-SCNC: 21 MMOL/L (ref 23–29)
CREAT SERPL-MCNC: 0.9 MG/DL (ref 0.5–1.4)
DIFFERENTIAL METHOD BLD: ABNORMAL
EOSINOPHIL # BLD AUTO: 0 K/UL (ref 0–0.5)
EOSINOPHIL NFR BLD: 0 % (ref 0–8)
ERYTHROCYTE [DISTWIDTH] IN BLOOD BY AUTOMATED COUNT: 13.7 % (ref 11.5–14.5)
EST. GFR  (NO RACE VARIABLE): >60 ML/MIN/1.73 M^2
GLUCOSE SERPL-MCNC: 165 MG/DL (ref 70–110)
HAV IGG SER QL IA: REACTIVE
HBV CORE AB SERPL QL IA: REACTIVE
HBV SURFACE AB SER-ACNC: 6.28 MIU/ML
HBV SURFACE AB SER-ACNC: NORMAL M[IU]/ML
HBV SURFACE AG SERPL QL IA: NORMAL
HCT VFR BLD AUTO: 51 % (ref 40–54)
HGB BLD-MCNC: 17.2 G/DL (ref 14–18)
IGA SERPL-MCNC: 240 MG/DL (ref 40–350)
IGG SERPL-MCNC: 893 MG/DL (ref 650–1600)
IGM SERPL-MCNC: 71 MG/DL (ref 50–300)
IMM GRANULOCYTES # BLD AUTO: 0.05 K/UL (ref 0–0.04)
IMM GRANULOCYTES NFR BLD AUTO: 0.9 % (ref 0–0.5)
LYMPHOCYTES # BLD AUTO: 0.6 K/UL (ref 1–4.8)
LYMPHOCYTES NFR BLD: 11.4 % (ref 18–48)
MCH RBC QN AUTO: 29.8 PG (ref 27–31)
MCHC RBC AUTO-ENTMCNC: 33.7 G/DL (ref 32–36)
MCV RBC AUTO: 88 FL (ref 82–98)
MONOCYTES # BLD AUTO: 0.1 K/UL (ref 0.3–1)
MONOCYTES NFR BLD: 1.9 % (ref 4–15)
NEUTROPHILS # BLD AUTO: 4.6 K/UL (ref 1.8–7.7)
NEUTROPHILS NFR BLD: 85.8 % (ref 38–73)
NRBC BLD-RTO: 0 /100 WBC
PLATELET # BLD AUTO: 260 K/UL (ref 150–450)
PMV BLD AUTO: 9.6 FL (ref 9.2–12.9)
POTASSIUM SERPL-SCNC: 4 MMOL/L (ref 3.5–5.1)
PROT SERPL-MCNC: 7.2 G/DL (ref 6–8.4)
RBC # BLD AUTO: 5.77 M/UL (ref 4.6–6.2)
SODIUM SERPL-SCNC: 135 MMOL/L (ref 136–145)
TREPONEMA PALLIDUM IGG+IGM AB [PRESENCE] IN SERUM OR PLASMA BY IMMUNOASSAY: NONREACTIVE
VARICELLA INTERPRETATION: POSITIVE
VARICELLA ZOSTER IGG: 1700
WBC # BLD AUTO: 5.34 K/UL (ref 3.9–12.7)

## 2024-09-06 PROCEDURE — 80053 COMPREHEN METABOLIC PANEL: CPT | Mod: NTX | Performed by: STUDENT IN AN ORGANIZED HEALTH CARE EDUCATION/TRAINING PROGRAM

## 2024-09-06 PROCEDURE — 86036 ANCA SCREEN EACH ANTIBODY: CPT | Mod: 59,NTX | Performed by: STUDENT IN AN ORGANIZED HEALTH CARE EDUCATION/TRAINING PROGRAM

## 2024-09-06 PROCEDURE — 30233S1 TRANSFUSION OF NONAUTOLOGOUS GLOBULIN INTO PERIPHERAL VEIN, PERCUTANEOUS APPROACH: ICD-10-PCS | Performed by: INTERNAL MEDICINE

## 2024-09-06 PROCEDURE — 86480 TB TEST CELL IMMUN MEASURE: CPT | Mod: NTX | Performed by: STUDENT IN AN ORGANIZED HEALTH CARE EDUCATION/TRAINING PROGRAM

## 2024-09-06 PROCEDURE — 82595 ASSAY OF CRYOGLOBULIN: CPT | Mod: NTX | Performed by: STUDENT IN AN ORGANIZED HEALTH CARE EDUCATION/TRAINING PROGRAM

## 2024-09-06 PROCEDURE — 25500020 PHARM REV CODE 255: Mod: NTX | Performed by: STUDENT IN AN ORGANIZED HEALTH CARE EDUCATION/TRAINING PROGRAM

## 2024-09-06 PROCEDURE — 99900035 HC TECH TIME PER 15 MIN (STAT): Mod: NTX

## 2024-09-06 PROCEDURE — 85610 PROTHROMBIN TIME: CPT | Mod: NTX | Performed by: STUDENT IN AN ORGANIZED HEALTH CARE EDUCATION/TRAINING PROGRAM

## 2024-09-06 PROCEDURE — 20600001 HC STEP DOWN PRIVATE ROOM: Mod: NTX

## 2024-09-06 PROCEDURE — 87517 HEPATITIS B DNA QUANT: CPT | Mod: NTX | Performed by: STUDENT IN AN ORGANIZED HEALTH CARE EDUCATION/TRAINING PROGRAM

## 2024-09-06 PROCEDURE — 87340 HEPATITIS B SURFACE AG IA: CPT | Mod: NTX | Performed by: STUDENT IN AN ORGANIZED HEALTH CARE EDUCATION/TRAINING PROGRAM

## 2024-09-06 PROCEDURE — 63600175 PHARM REV CODE 636 W HCPCS: Mod: JZ,JG,NTX | Performed by: STUDENT IN AN ORGANIZED HEALTH CARE EDUCATION/TRAINING PROGRAM

## 2024-09-06 PROCEDURE — 94761 N-INVAS EAR/PLS OXIMETRY MLT: CPT | Mod: NTX

## 2024-09-06 PROCEDURE — 86147 CARDIOLIPIN ANTIBODY EA IG: CPT | Mod: 59,NTX | Performed by: STUDENT IN AN ORGANIZED HEALTH CARE EDUCATION/TRAINING PROGRAM

## 2024-09-06 PROCEDURE — 84165 PROTEIN E-PHORESIS SERUM: CPT | Mod: 26,NTX,, | Performed by: PATHOLOGY

## 2024-09-06 PROCEDURE — 85025 COMPLETE CBC W/AUTO DIFF WBC: CPT | Mod: NTX | Performed by: STUDENT IN AN ORGANIZED HEALTH CARE EDUCATION/TRAINING PROGRAM

## 2024-09-06 PROCEDURE — 82784 ASSAY IGA/IGD/IGG/IGM EACH: CPT | Mod: 59,NTX | Performed by: STUDENT IN AN ORGANIZED HEALTH CARE EDUCATION/TRAINING PROGRAM

## 2024-09-06 PROCEDURE — 86160 COMPLEMENT ANTIGEN: CPT | Mod: NTX | Performed by: STUDENT IN AN ORGANIZED HEALTH CARE EDUCATION/TRAINING PROGRAM

## 2024-09-06 PROCEDURE — 86706 HEP B SURFACE ANTIBODY: CPT | Mod: NTX | Performed by: STUDENT IN AN ORGANIZED HEALTH CARE EDUCATION/TRAINING PROGRAM

## 2024-09-06 PROCEDURE — 85613 RUSSELL VIPER VENOM DILUTED: CPT | Mod: NTX | Performed by: STUDENT IN AN ORGANIZED HEALTH CARE EDUCATION/TRAINING PROGRAM

## 2024-09-06 PROCEDURE — 86160 COMPLEMENT ANTIGEN: CPT | Mod: 59,NTX | Performed by: STUDENT IN AN ORGANIZED HEALTH CARE EDUCATION/TRAINING PROGRAM

## 2024-09-06 PROCEDURE — 86704 HEP B CORE ANTIBODY TOTAL: CPT | Mod: NTX | Performed by: STUDENT IN AN ORGANIZED HEALTH CARE EDUCATION/TRAINING PROGRAM

## 2024-09-06 PROCEDURE — 86682 HELMINTH ANTIBODY: CPT | Mod: NTX | Performed by: STUDENT IN AN ORGANIZED HEALTH CARE EDUCATION/TRAINING PROGRAM

## 2024-09-06 PROCEDURE — 86146 BETA-2 GLYCOPROTEIN ANTIBODY: CPT | Mod: 59,NTX | Performed by: STUDENT IN AN ORGANIZED HEALTH CARE EDUCATION/TRAINING PROGRAM

## 2024-09-06 PROCEDURE — 83516 IMMUNOASSAY NONANTIBODY: CPT | Mod: NTX | Performed by: STUDENT IN AN ORGANIZED HEALTH CARE EDUCATION/TRAINING PROGRAM

## 2024-09-06 PROCEDURE — 86334 IMMUNOFIX E-PHORESIS SERUM: CPT | Mod: NTX | Performed by: STUDENT IN AN ORGANIZED HEALTH CARE EDUCATION/TRAINING PROGRAM

## 2024-09-06 PROCEDURE — 86593 SYPHILIS TEST NON-TREP QUANT: CPT | Mod: NTX | Performed by: STUDENT IN AN ORGANIZED HEALTH CARE EDUCATION/TRAINING PROGRAM

## 2024-09-06 PROCEDURE — 82787 IGG 1 2 3 OR 4 EACH: CPT | Mod: 59,NTX | Performed by: STUDENT IN AN ORGANIZED HEALTH CARE EDUCATION/TRAINING PROGRAM

## 2024-09-06 PROCEDURE — 84165 PROTEIN E-PHORESIS SERUM: CPT | Mod: NTX | Performed by: STUDENT IN AN ORGANIZED HEALTH CARE EDUCATION/TRAINING PROGRAM

## 2024-09-06 PROCEDURE — 36415 COLL VENOUS BLD VENIPUNCTURE: CPT | Mod: NTX | Performed by: STUDENT IN AN ORGANIZED HEALTH CARE EDUCATION/TRAINING PROGRAM

## 2024-09-06 PROCEDURE — 63600175 PHARM REV CODE 636 W HCPCS: Mod: NTX | Performed by: STUDENT IN AN ORGANIZED HEALTH CARE EDUCATION/TRAINING PROGRAM

## 2024-09-06 PROCEDURE — 51798 US URINE CAPACITY MEASURE: CPT | Mod: NTX

## 2024-09-06 PROCEDURE — 86334 IMMUNOFIX E-PHORESIS SERUM: CPT | Mod: 26,NTX,, | Performed by: PATHOLOGY

## 2024-09-06 PROCEDURE — 86790 VIRUS ANTIBODY NOS: CPT | Mod: NTX | Performed by: STUDENT IN AN ORGANIZED HEALTH CARE EDUCATION/TRAINING PROGRAM

## 2024-09-06 PROCEDURE — A9585 GADOBUTROL INJECTION: HCPCS | Mod: NTX | Performed by: STUDENT IN AN ORGANIZED HEALTH CARE EDUCATION/TRAINING PROGRAM

## 2024-09-06 PROCEDURE — 25000003 PHARM REV CODE 250: Mod: NTX | Performed by: STUDENT IN AN ORGANIZED HEALTH CARE EDUCATION/TRAINING PROGRAM

## 2024-09-06 PROCEDURE — 85730 THROMBOPLASTIN TIME PARTIAL: CPT | Mod: NTX | Performed by: STUDENT IN AN ORGANIZED HEALTH CARE EDUCATION/TRAINING PROGRAM

## 2024-09-06 PROCEDURE — 86787 VARICELLA-ZOSTER ANTIBODY: CPT | Mod: NTX | Performed by: STUDENT IN AN ORGANIZED HEALTH CARE EDUCATION/TRAINING PROGRAM

## 2024-09-06 RX ORDER — AMLODIPINE BESYLATE 5 MG/1
5 TABLET ORAL DAILY
Status: DISCONTINUED | OUTPATIENT
Start: 2024-09-07 | End: 2024-09-10 | Stop reason: HOSPADM

## 2024-09-06 RX ORDER — GADOBUTROL 604.72 MG/ML
8 INJECTION INTRAVENOUS
Status: COMPLETED | OUTPATIENT
Start: 2024-09-06 | End: 2024-09-06

## 2024-09-06 RX ADMIN — ASPIRIN 81 MG: 81 TABLET, COATED ORAL at 08:09

## 2024-09-06 RX ADMIN — HUMAN IMMUNOGLOBULIN G 30 G: 20 LIQUID INTRAVENOUS at 04:09

## 2024-09-06 RX ADMIN — ENOXAPARIN SODIUM 40 MG: 40 INJECTION SUBCUTANEOUS at 05:09

## 2024-09-06 RX ADMIN — LOSARTAN POTASSIUM AND HYDROCHLOROTHIAZIDE 1 TABLET: 50; 12.5 TABLET, FILM COATED ORAL at 08:09

## 2024-09-06 RX ADMIN — SULFAMETHOXAZOLE AND TRIMETHOPRIM 1 TABLET: 400; 80 TABLET ORAL at 08:09

## 2024-09-06 RX ADMIN — GADOBUTROL 8 ML: 604.72 INJECTION INTRAVENOUS at 11:09

## 2024-09-06 RX ADMIN — PANTOPRAZOLE SODIUM 40 MG: 40 TABLET, DELAYED RELEASE ORAL at 08:09

## 2024-09-06 RX ADMIN — ATORVASTATIN CALCIUM 80 MG: 40 TABLET, FILM COATED ORAL at 08:09

## 2024-09-06 RX ADMIN — METHYLPREDNISOLONE SODIUM SUCCINATE 1000 MG: 1 INJECTION INTRAMUSCULAR; INTRAVENOUS at 12:09

## 2024-09-06 NOTE — SUBJECTIVE & OBJECTIVE
Interval hx:  Pt feels ready to discharge. Breathing is still difficult with activity, but he has been off of oxygen at rest for many days.    Explained issues with receiving cyclophosphamide inpatient, pt expressed understanding and is scheduled for  currently.     History reviewed. No pertinent past medical history.    History reviewed. No pertinent surgical history.      There is no immunization history on file for this patient.    Review of patient's allergies indicates:  No Known Allergies  Current Facility-Administered Medications   Medication Frequency    acetaminophen tablet 650 mg Q4H PRN    [START ON 2024] amLODIPine tablet 5 mg Daily    aspirin EC tablet 81 mg Daily    atorvastatin tablet 80 mg Daily    dextrose 10% bolus 125 mL 125 mL PRN    dextrose 10% bolus 250 mL 250 mL PRN    enoxaparin injection 40 mg Daily    glucagon (human recombinant) injection 1 mg PRN    glucose chewable tablet 16 g PRN    glucose chewable tablet 24 g PRN    Immune Globulin G (IGG)-PRO-IGA 10 % injection (Privigen) 10 % injection 30 g Q24H    methylPREDNISolone sodium succinate (SOLU-MEDROL) 1,000 mg in D5W 100 mL IVPB Q24H    naloxone 0.4 mg/mL injection 0.02 mg PRN    ondansetron injection 4 mg Q8H PRN    pantoprazole EC tablet 40 mg Daily    sodium chloride 0.9% flush 10 mL Q12H PRN    sulfamethoxazole-trimethoprim 400-80mg per tablet 1 tablet Every Mon, Wed, Fri     Family History    None       Tobacco Use    Smoking status: Former     Current packs/day: 0.00     Types: Cigarettes     Quit date: 1991     Years since quittin.7    Smokeless tobacco: Never    Tobacco comments:     quit 30yrs ago   Substance and Sexual Activity    Alcohol use: Yes     Alcohol/week: 2.0 standard drinks of alcohol     Types: 1 Glasses of wine, 1 Cans of beer per week     Comment: 1 beer and wine every day    Drug use: Never    Sexual activity: Yes     Review of Systems   Constitutional:  Positive for fatigue. Negative for fever  and unexpected weight change.   HENT:  Negative for facial swelling.    Eyes:  Negative for visual disturbance.   Respiratory:  Positive for cough and shortness of breath.    Cardiovascular:  Negative for chest pain.   Gastrointestinal:  Negative for constipation and diarrhea.   Genitourinary:  Negative for dysuria.   Musculoskeletal:  Negative for arthralgias.   Skin:  Negative for rash.   Neurological:  Positive for weakness. Negative for headaches.   Hematological:  Negative for adenopathy.   Psychiatric/Behavioral:  Negative for behavioral problems.      Objective:     Vital Signs (Most Recent):  Temp: 98.2 °F (36.8 °C) (09/06/24 1645)  Pulse: 67 (09/06/24 1730)  Resp: 18 (09/06/24 1554)  BP: 135/62 (09/06/24 1730)  SpO2: 100 % (09/06/24 1730) Vital Signs (24h Range):  Temp:  [97.7 °F (36.5 °C)-98.6 °F (37 °C)] 98.2 °F (36.8 °C)  Pulse:  [66-86] 67  Resp:  [17-20] 18  SpO2:  [95 %-100 %] 100 %  BP: (111-135)/(53-68) 135/62     Weight: 75.6 kg (166 lb 10.7 oz) (09/05/24 2300)  Body mass index is 26.1 kg/m².  Body surface area is 1.89 meters squared.      Intake/Output Summary (Last 24 hours) at 9/6/2024 1741  Last data filed at 9/6/2024 1735  Gross per 24 hour   Intake 1310 ml   Output 1170 ml   Net 140 ml         Physical Exam   Constitutional: He is oriented to person, place, and time. No distress.   HENT:   Head: Normocephalic and atraumatic.   Cardiovascular: Normal rate, regular rhythm and normal heart sounds.   Pulmonary/Chest: Effort normal.   Pulmonary Comments: Fine crackles present at bilateral bases. Conversational dyspnea resolved.  Abdominal: Soft. There is no abdominal tenderness.   Musculoskeletal:         General: No tenderness. Normal range of motion.   Neurological: He is alert and oriented to person, place, and time. He displays weakness (Does have diminished tricep strength (4/5) bilaterally - all other muscle groups 5/5).   Skin: Skin is warm and dry.   Fissures present on hands - color of  hands much improved from pictures taken at 9/5 visit.   Psychiatric: His behavior is normal. Judgment and thought content normal.        Significant Labs:  All pertinent lab results from the last 24 hours have been reviewed.    Significant Imaging:  Imaging results within the past 24 hours have been reviewed.

## 2024-09-06 NOTE — ED NOTES
Received report from Radha MCKEON. Assumed care of patient once he transferred to [facility]. Patient is alert and resting comfortably in bed in NAD. Cardiac monitoring continued. Family member at bedside. Patient updated on plan of care, pt denies any needs or complaints at this time. Bed locked in lowest position, side rails up x2, call light within reach. VSS.

## 2024-09-06 NOTE — PLAN OF CARE
[facility] ([place]  Initial Discharge Assessment      Discharge Plan A and Plan B have been determined by review of patient's clinical status, future medical and therapeutic needs, and coverage/benefits for post-acute care in coordination with multidisciplinary team members.      Primary Care Provider: Nikhil Palma MD    Admission Diagnosis: SOB (shortness of breath) [R06.02]  Chest pain [R07.9]  Acute hypoxemic respiratory failure [J96.01]    Admission Date: 9/5/2024  Expected Discharge Date: 9/9/2024    Transition of Care Barriers: None    Payor: ALLIED BENEFIT SYSTEM / Plan: ALLIED BENEFIT SYSTEM PPO / Product Type: PPO /     Extended Emergency Contact Information  Primary Emergency Contact: CoronaBenita lugo  Mobile Phone: [phone]  Relation: Daughter   needed? No  Secondary Emergency Contact: Rey CROONA  Mobile Phone: [phone]  Relation: Son  Preferred language: English   needed? No    Discharge Plan A: Home with family  Discharge Plan B: Home      [address] [place] [address] [facility]  [address] [facility]  [place] [address]  Phone: [phone] Fax: [phone]      Initial Assessment (most recent)       Adult Discharge Assessment - 09/06/24 0900          Discharge Assessment    Assessment Type Discharge Planning Assessment     Confirmed/corrected address, phone number and insurance Yes     Confirmed Demographics Correct on Facesheet     Source of Information patient     When was your last doctors appointment? 07/10/24   Busy - Default status  Nikhil Palma MD    Communicated BENJI with patient/caregiver Yes     Reason For Admission ILD (interstitial lung disease)     People in Home spouse;child(stephy), dependent     Facility Arrived From: home     Do you expect to return to your current living situation? Yes     Do you have help at home or someone to help you manage your care at home? Yes     Who are your caregiver(s) and their phone  number(s)? Benita Corona (Daughter)  [phone] (Mobile)     Prior to hospitilization cognitive status: Alert/Oriented;No Deficits     Current cognitive status: Alert/Oriented;No Deficits     Walking or Climbing Stairs Difficulty no     Dressing/Bathing Difficulty no     Home Accessibility wheelchair accessible     Equipment Currently Used at Home oxygen     Readmission within 30 days? No     Patient currently being followed by outpatient case management? No     Do you currently have service(s) that help you manage your care at home? No     Do you take prescription medications? Yes     Do you have prescription coverage? Yes     Coverage ALLIED BENEFIT SYSTEM - ALLIED BENEFIT SYSTEM PPO     Do you have any problems affording any of your prescribed medications? No     Is the patient taking medications as prescribed? yes     Who is going to help you get home at discharge? Benita Corona (Daughter)  [phone] (Mobile)     How do you get to doctors appointments? car, drives self;family or friend will provide     Are you on dialysis? No     Do you take coumadin? No     Discharge Plan A Home with family     Discharge Plan B Home     DME Needed Upon Discharge  none     Discharge Plan discussed with: Patient     Transition of Care Barriers None        Physical Activity    On average, how many days per week do you engage in moderate to strenuous exercise (like a brisk walk)? 5 days     On average, how many minutes do you engage in exercise at this level? 20 min        Financial Resource Strain    How hard is it for you to pay for the very basics like food, housing, medical care, and heating? Not hard at all        Housing Stability    In the last 12 months, was there a time when you were not able to pay the mortgage or rent on time? No     At any time in the past 12 months, were you homeless or living in a shelter (including now)? No        Transportation Needs    Has the lack of transportation kept you from medical  appointments, meetings, work or from getting things needed for daily living? No        Food Insecurity    Within the past 12 months, you worried that your food would run out before you got the money to buy more. Never true     Within the past 12 months, the food you bought just didn't last and you didn't have money to get more. Never true        Stress    Do you feel stress - tense, restless, nervous, or anxious, or unable to sleep at night because your mind is troubled all the time - these days? Only a little        Social Isolation    How often do you feel lonely or isolated from those around you?  Never        Alcohol Use    Q1: How often do you have a drink containing alcohol? Never     Q2: How many drinks containing alcohol do you have on a typical day when you are drinking? Patient does not drink     Q3: How often do you have six or more drinks on one occasion? Never        Utilities    In the past 12 months has the electric, gas, oil, or water company threatened to shut off services in your home? No        Health Literacy    How often do you need to have someone help you when you read instructions, pamphlets, or other written material from your doctor or pharmacy? Never        OTHER    Name(s) of People in Home Corona,Benita (Daughter)  [phone] (Mobile)                          CM met with patient at bedside to complete discharge planning assessment.  Patient alert and oriented xs 4.  Patient verified all demographic information on facesheet is correct.  Patient verified PCP is Dr Nikhil craig  .  Patient verified primary health insurance is  ALLIED BENEFIT SYSTEM - ALLIED BENEFIT SYSTEM PPO  and MEDICARE A&B.  Patient is agreeable with bedside medication delivery.   Patient is not active with home health  (patient declines  ) and has listed DME.  Patient with NO POA or Living Will.  Patient not on dialysis or medication coumadin.  Patient with no 30 day admission.  Patient with no financial issues at  this time.  Patient son or daughter  will provide transportation upon discharge from facility.  Patient will have assistance from his wife upon discharge Patient independent with ADLs. All questions answered regarding Case Management Discharge Planning, patient verbalized understanding.  Discharge booklet with CM's contact information given to patient.       09/06/24 6763   Post-Acute Status   Post-Acute Authorization HME   HME Status Pending therapy documentation   Coverage ALLIED BENEFIT SYSTEM - ALLIED BENEFIT SYSTEM PPO   Discharge Delays None known at this time         Syeda Eisenberg RN  Case Management  [facility]  [phone]

## 2024-09-06 NOTE — SUBJECTIVE & OBJECTIVE
Interval History: Patient admitted to hospital medicine. Reports breathing improved now that he is on oxygen. Otherwise feels well.     Review of Systems  Objective:     Vital Signs (Most Recent):  Temp: 98.6 °F (37 °C) (09/06/24 1132)  Pulse: 66 (09/06/24 1132)  Resp: 18 (09/06/24 1132)  BP: 119/60 (09/06/24 1132)  SpO2: 95 % (09/06/24 1132) Vital Signs (24h Range):  Temp:  [97.7 °F (36.5 °C)-98.6 °F (37 °C)] 98.6 °F (37 °C)  Pulse:  [66-86] 66  Resp:  [17-20] 18  SpO2:  [95 %-100 %] 95 %  BP: (116-175)/(60-93) 119/60     Weight: 75.6 kg (166 lb 10.7 oz)  Body mass index is 26.1 kg/m².    Intake/Output Summary (Last 24 hours) at 9/6/2024 1339  Last data filed at 9/6/2024 0822  Gross per 24 hour   Intake 840 ml   Output 350 ml   Net 490 ml      Physical Exam  Constitutional:       General: He is not in acute distress.     Appearance: Normal appearance. He is not toxic-appearing or diaphoretic.   HENT:      Head: Normocephalic and atraumatic.   Cardiovascular:      Rate and Rhythm: Normal rate and regular rhythm.      Heart sounds: No murmur heard.     No friction rub. No gallop.   Pulmonary:      Effort: Pulmonary effort is normal. No respiratory distress.      Breath sounds: Rales present. No wheezing.   Abdominal:      General: Abdomen is flat. There is no distension.      Palpations: Abdomen is soft.      Tenderness: There is no abdominal tenderness. There is no guarding or rebound.   Musculoskeletal:      Cervical back: Normal range of motion and neck supple.      Right lower leg: No edema.      Left lower leg: No edema.      Comments: 's hands   Skin:     General: Skin is warm and dry.   Neurological:      Mental Status: He is alert.         Computed MELD 3.0 unavailable. One or more values for this score either were not found within the given timeframe or did not fit some other criterion.  Computed MELD-Na unavailable. One or more values for this score either were not found within the given timeframe  "or did not fit some other criterion.      Significant Labs:  CBC:  Recent Labs   Lab 09/05/24  1347 09/06/24  0511   WBC 9.80 5.34   HGB 17.3 17.2   HCT 53.8 51.0    260     CMP:  Recent Labs   Lab 09/05/24  1347 09/06/24  0511    135*   K 3.2* 4.0   CL 99 101   CO2 25 21*   GLU 85 165*   BUN 8 17   CREATININE 0.9 0.9   CALCIUM 9.3 9.8   PROT 7.5 7.2   ALBUMIN 3.5 3.2*   BILITOT 0.6 0.5   ALKPHOS 70 66   AST 32 20   ALT 39 31   ANIONGAP 13 13     PTINR:  No results for input(s): "INR" in the last 48 hours.    Significant Procedures:   Dobutamine Stress Test with Color Flow: No results found for this or any previous visit.    "

## 2024-09-06 NOTE — PROGRESS NOTES
[facility] ([address] Medicine  Progress Note    Patient Name: Darrell Corona  MRN: [id number]  Patient Class: IP- Inpatient   Admission Date: 9/5/2024  Length of Stay: 1 days  Attending Physician: Mulugeta Francisco*  Primary Care Provider: Nikhil Palma MD        Subjective:     Principal Problem:ILD (interstitial lung disease)        HPI:  Darrell Corona is a 64 y.o. male with past medical history of HTN, HLD, Reynaud's, who presents from rheumatology clinic for concern for progressive pulmonary fibrosis. Patient was being evaluated outpatient by pulmonary for progressive shortness of breath over the course of several months. Per patient's family at bedside he used to be very active, able to walk 4-5 miles per day or up several flights of stairs without difficulty. However his shortness of breath has progressed to where he will have to stop after a block to catch his breath. He had an PRESTON that was positive and a negative RF (positive PRESTON 1:640 speckled with negative RF and anti CCP). CT chest was obtained which showed stable changes of interstitial lung disease with possible UIP pattern. Areas of persistent coalescent opacity may reflect organizing pneumonia. He was also assessed for oxygen outpatient, and he desaturated and required oxygen to be ordered. He was started on steroid taper which was supposed to end this week, but has not noted improvement, and in fact has worsened. He then saw rheumatology in clinic and was instructed to present to ED. No reported environmental or occupational exposures. He has a history of reynaud's which has been present for years. Denies chest pain, abdominal pain, nausea / vomiting, fever/chills, joint pains, URI symptoms.  In the ED, patient was hemodynamically stable. Reportedly had episode of SVT while in ED with HR 160s, during which patient was asymptomatic. Vagal maneuvers were attempted but were without success. Patient was then given diltiazem with  apparent conversion to NSR. Pulmonary was consulted and he was given 1 g solumedrol. He was admitted to hospital medicine.     Overview/Hospital Course:  No notes on file    Interval History: Patient admitted to hospital medicine. Reports breathing improved now that he is on oxygen. Otherwise feels well.     Review of Systems  Objective:     Vital Signs (Most Recent):  Temp: 98.6 °F (37 °C) (09/06/24 1132)  Pulse: 66 (09/06/24 1132)  Resp: 18 (09/06/24 1132)  BP: 119/60 (09/06/24 1132)  SpO2: 95 % (09/06/24 1132) Vital Signs (24h Range):  Temp:  [97.7 °F (36.5 °C)-98.6 °F (37 °C)] 98.6 °F (37 °C)  Pulse:  [66-86] 66  Resp:  [17-20] 18  SpO2:  [95 %-100 %] 95 %  BP: (116-175)/(60-93) 119/60     Weight: 75.6 kg (166 lb 10.7 oz)  Body mass index is 26.1 kg/m².    Intake/Output Summary (Last 24 hours) at 9/6/2024 1339  Last data filed at 9/6/2024 0822  Gross per 24 hour   Intake 840 ml   Output 350 ml   Net 490 ml      Physical Exam  Constitutional:       General: He is not in acute distress.     Appearance: Normal appearance. He is not toxic-appearing or diaphoretic.   HENT:      Head: Normocephalic and atraumatic.   Cardiovascular:      Rate and Rhythm: Normal rate and regular rhythm.      Heart sounds: No murmur heard.     No friction rub. No gallop.   Pulmonary:      Effort: Pulmonary effort is normal. No respiratory distress.      Breath sounds: Rales present. No wheezing.   Abdominal:      General: Abdomen is flat. There is no distension.      Palpations: Abdomen is soft.      Tenderness: There is no abdominal tenderness. There is no guarding or rebound.   Musculoskeletal:      Cervical back: Normal range of motion and neck supple.      Right lower leg: No edema.      Left lower leg: No edema.      Comments: 's hands   Skin:     General: Skin is warm and dry.   Neurological:      Mental Status: He is alert.         Computed MELD 3.0 unavailable. One or more values for this score either were not found  "within the given timeframe or did not fit some other criterion.  Computed MELD-Na unavailable. One or more values for this score either were not found within the given timeframe or did not fit some other criterion.      Significant Labs:  CBC:  Recent Labs   Lab 09/05/24  1347 09/06/24  0511   WBC 9.80 5.34   HGB 17.3 17.2   HCT 53.8 51.0    260     CMP:  Recent Labs   Lab 09/05/24  1347 09/06/24  0511    135*   K 3.2* 4.0   CL 99 101   CO2 25 21*   GLU 85 165*   BUN 8 17   CREATININE 0.9 0.9   CALCIUM 9.3 9.8   PROT 7.5 7.2   ALBUMIN 3.5 3.2*   BILITOT 0.6 0.5   ALKPHOS 70 66   AST 32 20   ALT 39 31   ANIONGAP 13 13     PTINR:  No results for input(s): "INR" in the last 48 hours.    Significant Procedures:   Dobutamine Stress Test with Color Flow: No results found for this or any previous visit.      Assessment/Plan:      * ILD (interstitial lung disease)  64 y.o. male with past medical history of HTN, HLD, Reynaud's, who presents from rheumatology clinic for concern for progressive pulmonary fibrosis. Worsening shortness of breath over the course of several months. Was finishing prednisone taper without improvement.   - positive PRESTON 1:640 speckled with negative RF and anti CCP  - CT chest showed stable changes of interstitial lung disease with possible UIP pattern. Areas of persistent coalescent opacity may reflect organizing pneumonia.   - Pulmonary consulted and he was given 1 g solumedrol. Planned for 5 day course  - Protonix and PJP PPx  - Rheumatology consulted    Acute hypoxemic respiratory failure  Patient with Hypoxic Respiratory failure which is Acute on chronic.  he is not on home oxygen. Supplemental oxygen was provided and noted-      .   Signs/symptoms of respiratory failure include- tachypnea. Contributing diagnoses includes - Interstitial lung disease Labs and images were reviewed. Patient Has not had a recent ABG. Will treat underlying causes and adjust management of respiratory " failure as follows- As above    Hyperlipidemia LDL goal <70  Continue statin        VTE Risk Mitigation (From admission, onward)           Ordered     enoxaparin injection 40 mg  Daily         09/05/24 1658     IP VTE HIGH RISK PATIENT  Once         09/05/24 1658     Place sequential compression device  Until discontinued         09/05/24 1658                    Discharge Planning   BENJI: 9/9/2024     Code Status: Full Code   Is the patient medically ready for discharge?:     Reason for patient still in hospital (select all that apply): Patient trending condition, Treatment, and Consult recommendations           Mulugeta Francisco MD  Department of Hospital Medicine   [facility] - Stepdown Flex ([place])

## 2024-09-06 NOTE — PLAN OF CARE
VSS on 2L NC sat 97%. Pt denies at SOB or CP. Per MD, no need for tele monitoring. Fall precautions in place    Problem: Adult Inpatient Plan of Care  Goal: Absence of Hospital-Acquired Illness or Injury  Outcome: Progressing     Problem: Pulmonary Impairment  Goal: Improved Activity Tolerance  Outcome: Progressing     Problem: Gas Exchange Impaired  Goal: Optimal Gas Exchange  Outcome: Progressing

## 2024-09-06 NOTE — CONSULTS
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    Rheumatologic History  - Newly diagnosed ILD after having dyspnea with exertion on a trip to [place] 6/2024. Was referred to pulm by PCP and noted to have pulmonary fibrosis on HRCT chest 8/30/2024 in UIP pattern. Had also been evaluated by cardiology without abnormality.   - Other symptoms noted: Raynaud's phenomenon, skin thickening of fingers, 's hands, nl CK, negative JASEN, negative Scl-70 high complements   Myomarker panel in process  - Serologies: PRESTON 1:640 speckled, negative RF/CCP  - Current treatment (prior to hospitalization): Prednisone taper  - Seen by [facility] 9/5 where he endorsed worsening SOB, worsening raynaud's, thickening of fingers, unintentional weight loss, fatigue, proximal muscle weakness. Show to have purple digits and digital ulcers at fingertips as well as sequalae of 's hands as well as upper extremity proximal muscle weakness.  - At that time was recommended to get 1g/day of solumedrol x3-5 days and to consider IVIG, cylophosphamide and tacro while IP. Also ordered lab work including ANCA labs, SPEP, GBM, cryo, C3/C4 (elevated), UA, APLS, pre-DMARD labs    Current Hospitalization  - Sent in for IV treatments directly from the rheumatology clinic 9/5. Received first dose of pulse steroids 9/5.    Initial evaluation - Pt stated he felt much better. Was off oxygen and speaking without difficulty first visit, in afternoon was back on 2L and  having some slight conversational dyspnea.    Pt confirmed that he has been feeling short of breath since a trip to [place] 6/2024. At the time he also started having some full body fatigue as well as he noticed some cracks and skin thickening of his fingers. He has a hx of raynaud's that he began to notice 10 years ago, but had never had an appearance like this before.    He denied any GERD symptoms or dysphagia. He has a remote smoking hx. No occupational exposures that he knows of. Has had some weight loss (~7 lbs) in the last month. He denies sinus issues, chest pain. Has a light cough with some sputum production, but moreso had noticed dyspnea.     He has a hx of CAD from many years ago - follows with cardiology here and has had normal maintenance follow up.     He takes ASA, crestor and a blood pressure medicine at home. Is otherwise a very active raj w/o many other health issues. Denied any hx of liver infection or hepatitis b.    Fam hx - no known family hx of autoimmune disease.    Surg hx - none    Soc hx - Prior smoker, quit 25 years ago. Does drink up to 3 glasses of wine per day.    History reviewed. No pertinent past medical history.    History reviewed. No pertinent surgical history.      There is no immunization history on file for this patient.    Review of patient's allergies indicates:  No Known Allergies  Current Facility-Administered Medications   Medication Frequency    acetaminophen tablet 650 mg Q4H PRN    [START ON 9/7/2024] amLODIPine tablet 5 mg Daily    aspirin EC tablet 81 mg Daily    atorvastatin tablet 80 mg Daily    dextrose 10% bolus 125 mL 125 mL PRN    dextrose 10% bolus 250 mL 250 mL PRN    enoxaparin injection 40 mg Daily    glucagon (human recombinant) injection 1 mg PRN    glucose chewable tablet 16 g PRN    glucose chewable tablet 24 g PRN    Immune Globulin G (IGG)-PRO-IGA 10 % injection (Privigen) 10 % injection 30 g Q24H    methylPREDNISolone sodium succinate (SOLU-MEDROL)  1,000 mg in D5W 100 mL IVPB Q24H    naloxone 0.4 mg/mL injection 0.02 mg PRN    ondansetron injection 4 mg Q8H PRN    pantoprazole EC tablet 40 mg Daily    sodium chloride 0.9% flush 10 mL Q12H PRN    sulfamethoxazole-trimethoprim 400-80mg per tablet 1 tablet Every Mon, Wed, Fri     Family History    None       Tobacco Use    Smoking status: Former     Current packs/day: 0.00     Types: Cigarettes     Quit date: 1991     Years since quittin.7    Smokeless tobacco: Never    Tobacco comments:     quit 30yrs ago   Substance and Sexual Activity    Alcohol use: Yes     Alcohol/week: 2.0 standard drinks of alcohol     Types: 1 Glasses of wine, 1 Cans of beer per week     Comment: 1 beer and wine every day    Drug use: Never    Sexual activity: Yes     Review of Systems   Constitutional:  Positive for fatigue. Negative for fever and unexpected weight change.   HENT:  Negative for facial swelling.    Eyes:  Negative for visual disturbance.   Respiratory:  Positive for cough and shortness of breath.    Cardiovascular:  Negative for chest pain.   Gastrointestinal:  Negative for constipation and diarrhea.   Genitourinary:  Negative for dysuria.   Musculoskeletal:  Negative for arthralgias.   Skin:  Negative for rash.   Neurological:  Positive for weakness. Negative for headaches.   Hematological:  Negative for adenopathy.   Psychiatric/Behavioral:  Negative for behavioral problems.      Objective:     Vital Signs (Most Recent):  Temp: 98.2 °F (36.8 °C) (24 1645)  Pulse: 67 (24 1730)  Resp: 18 (24 1554)  BP: 135/62 (24 1730)  SpO2: 100 % (24 1730) Vital Signs (24h Range):  Temp:  [97.7 °F (36.5 °C)-98.6 °F (37 °C)] 98.2 °F (36.8 °C)  Pulse:  [66-86] 67  Resp:  [17-20] 18  SpO2:  [95 %-100 %] 100 %  BP: (111-135)/(53-68) 135/62     Weight: 75.6 kg (166 lb 10.7 oz) (24 2300)  Body mass index is 26.1 kg/m².  Body surface area is 1.89 meters squared.      Intake/Output Summary (Last 24  hours) at 9/6/2024 1741  Last data filed at 9/6/2024 1735  Gross per 24 hour   Intake 1310 ml   Output 1170 ml   Net 140 ml         Physical Exam   Constitutional: He is oriented to person, place, and time. No distress.   HENT:   Head: Normocephalic and atraumatic.   Cardiovascular: Normal rate, regular rhythm and normal heart sounds.   Pulmonary/Chest: Effort normal.   Pulmonary Comments: Fine crackles present at bilateral bases. Mild conversational dyspnea  Abdominal: Soft. There is no abdominal tenderness.   Musculoskeletal:         General: No tenderness. Normal range of motion.      Comments: Had good muscle strength per exam.   Neurological: He is alert and oriented to person, place, and time.   Skin: Skin is warm and dry.   Fissures present on hands - color of hands much improved from pictures taken at 9/5 visit.   Psychiatric: His behavior is normal. Judgment and thought content normal.        Significant Labs:  All pertinent lab results from the last 24 hours have been reviewed.    Significant Imaging:  Imaging results within the past 24 hours have been reviewed.  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    - Pt began having symptoms of dyspnea on exertion 6/2023 - he had also at that time noted generalized fatigue and fissures on the tips of his fingers. Has a hx of raynaud's from 10 years ago that had gotten progressively worse as well as a remote cardiac hx, otherwise no other symptoms.   - Pt was sent here after being evaluated in the rheumatology clinic. PFTs from his last visit with Dr. Reina were much worse after only a month. This prompted admission for IV treatment.  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 However,  presentation could be consistent with a mixed connective tissue disease (although SmRNP negative) or scleroderma w/ hx of raynauds (which is also present with anti-synthetase). Only + serology was PRESTON of 1:640 w/ negative JASEN (including Scl-70). HRCT w/ UIP pattern ILD.     Recommendations:  - Will follow ACR rapidly progressive ILD protocol with pulse dose steroids (plan for 5 days of 1g/day) as well as 5 days of IVIG. Consent obtained with patient, son was present via the phone as well  - Will likely add cyclophosphamide after +hep B core worked up and assessed by hepatology  - Will also get MRI bilateral humerus  - F/u myomarker panel  - F/u RNP-III   - Will consider CT A/P for sake of cancer screening        Thank you for your consult. I will follow-up with patient. Please contact us if you have any additional questions.    Eulalia Martell MD  Rheumatology  [facility] ([place])

## 2024-09-06 NOTE — HPI
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    Rheumatologic History  - Newly diagnosed ILD after having dyspnea with exertion on a trip to [place] 6/2024. Was referred to pulm by PCP and noted to have pulmonary fibrosis on HRCT chest 8/30/2024 in UIP pattern. Had also been evaluated by cardiology without abnormality.   - Other symptoms noted: Raynaud's phenomenon, skin thickening of fingers, 's hands, nl CK, negative JASEN, negative Scl-70 high complements   Myomarker panel in process  - Serologies: PRESTON 1:640 speckled, negative RF/CCP  - Current treatment (prior to hospitalization): Prednisone taper  - Seen by [facility] 9/5 where he endorsed worsening SOB, worsening raynaud's, thickening of fingers, unintentional weight loss, fatigue, proximal muscle weakness. Show to have purple digits and digital ulcers at fingertips as well as sequalae of 's hands as well as upper extremity proximal muscle weakness.  - At that time was recommended to get 1g/day of solumedrol x3-5 days and to consider IVIG, cylophosphamide and tacro while IP. Also ordered lab work including ANCA labs, SPEP, GBM, cryo, C3/C4 (elevated), UA, APLS, pre-DMARD labs    Current Hospitalization  - Sent in for IV treatments directly from the rheumatology clinic 9/5. Received first dose of pulse steroids 9/5.  - Started IVIG 9/6 with plan for 5 day course    Initial evaluation - Pt stated he felt much better. Was off oxygen and speaking without difficulty first visit, in afternoon was back on 2L and having some slight conversational dyspnea.    Pt confirmed that he has been feeling short of breath since a trip to [place] 6/2024. At the time he also started having some full body fatigue as well as he noticed some cracks and skin thickening of his fingers. He has a hx of raynaud's that he began to notice 10 years ago,  but had never had an appearance like this before.    He denied any GERD symptoms or dysphagia. He has a remote smoking hx. No occupational exposures that he knows of. Has had some weight loss (~7 lbs) in the last month. He denies sinus issues, chest pain. Has a light cough with some sputum production, but moreso had noticed dyspnea.

## 2024-09-06 NOTE — ASSESSMENT & PLAN NOTE
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    - Pt began having symptoms of dyspnea on exertion 6/2023 - he had also at that time noted generalized fatigue and fissures on the tips of his fingers. Has a hx of raynaud's from 10 years ago that had gotten progressively worse as well as a remote cardiac hx, otherwise no other symptoms.   - Pt was sent here after being evaluated in the rheumatology clinic. PFTs from his last visit with Dr. Reina were much worse after only a month. This prompted admission for IV treatment.  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 However, presentation could be consistent with a mixed connective tissue disease (although SmRNP negative) or scleroderma w/ hx of raynauds (which is also present with anti-synthetase). Only + serology was PRESTON of 1:640 w/ negative JASEN (including Scl-70). HRCT w/ UIP pattern ILD.   - MRI bilateral humerus mildly positive on right side, particularly in area of triceps  - CT A/P negative for malignancy   - Negative labwork: ANCA, lupus anticoagulant, anti-cardiolipin, RF, GBM, Scl-70  - Did have mild positive IgA for B-2 glycoprotein which is the least specific and unlikely to mean anything clinically    Recommendations:  - Will follow ACR rapidly progressive ILD protocol with pulse dose steroids (plan for 5 days of 1g/day, 9/5-9/9) as well as 5 days of IVIG (9/6-9/10)  - Hep B quant negative, however unfortunately CYC not covered as an inpatient - will work with case management and pharmacy team to get infusion scheduled as soon as possible.    Thanks to multi-disciplinary team, able to schedule for 9/12  - Will hold off on tacrolimus until pt follows with rheumatology outpatient  - Appreciate hepatology input, pt started on hepatitis prophylaxis  while IP  - F/u myomarker panel  - F/u RNP-III  - Pt planning to DC today

## 2024-09-06 NOTE — NURSING
PT AAO X4. Vitals stable, on 2L NC. Denied pain, SOB, discomfort. Pt independent with ADLs. Safety precaution in place. No acute events this shift.

## 2024-09-06 NOTE — PLAN OF CARE
PT AAO X4. Vitals stable, on 2L NC. Desats with activity. Denied pain, SOB, discomfort. Pt independent with ADLs. IVIG gave, and rate adjusted as order.  Safety precaution in place. No acute events this shift. Safety precaution in place.          Problem: Adult Inpatient Plan of Care  Goal: Plan of Care Review  Outcome: Progressing  Goal: Patient-Specific Goal (Individualized)  Outcome: Progressing  Goal: Absence of Hospital-Acquired Illness or Injury  Outcome: Progressing  Goal: Optimal Comfort and Wellbeing  Outcome: Progressing  Goal: Readiness for Transition of Care  Outcome: Progressing     Problem: Pulmonary Impairment  Goal: Improved Activity Tolerance  Outcome: Progressing  Goal: Effective Airway Clearance  Outcome: Progressing  Goal: Optimal Gas Exchange  Outcome: Progressing     Problem: Gas Exchange Impaired  Goal: Optimal Gas Exchange  Outcome: Progressing     Problem: Infection  Goal: Absence of Infection Signs and Symptoms  Outcome: Progressing

## 2024-09-07 LAB
ALBUMIN SERPL BCP-MCNC: 3.1 G/DL (ref 3.5–5.2)
ALP SERPL-CCNC: 61 U/L (ref 55–135)
ALT SERPL W/O P-5'-P-CCNC: 21 U/L (ref 10–44)
ANION GAP SERPL CALC-SCNC: 12 MMOL/L (ref 8–16)
AST SERPL-CCNC: 13 U/L (ref 10–40)
BASOPHILS # BLD AUTO: 0.01 K/UL (ref 0–0.2)
BASOPHILS NFR BLD: 0.1 % (ref 0–1.9)
BILIRUB SERPL-MCNC: 0.4 MG/DL (ref 0.1–1)
BM IGG SER-ACNC: <0.2 U
BUN SERPL-MCNC: 18 MG/DL (ref 8–23)
CALCIUM SERPL-MCNC: 9.9 MG/DL (ref 8.7–10.5)
CHLORIDE SERPL-SCNC: 100 MMOL/L (ref 95–110)
CO2 SERPL-SCNC: 23 MMOL/L (ref 23–29)
CREAT SERPL-MCNC: 0.8 MG/DL (ref 0.5–1.4)
DIFFERENTIAL METHOD BLD: ABNORMAL
EOSINOPHIL # BLD AUTO: 0 K/UL (ref 0–0.5)
EOSINOPHIL NFR BLD: 0 % (ref 0–8)
ERYTHROCYTE [DISTWIDTH] IN BLOOD BY AUTOMATED COUNT: 13.7 % (ref 11.5–14.5)
EST. GFR  (NO RACE VARIABLE): >60 ML/MIN/1.73 M^2
GAMMA INTERFERON BACKGROUND BLD IA-ACNC: 0 IU/ML
GLUCOSE SERPL-MCNC: 164 MG/DL (ref 70–110)
HCT VFR BLD AUTO: 51.6 % (ref 40–54)
HGB BLD-MCNC: 16.2 G/DL (ref 14–18)
IMM GRANULOCYTES # BLD AUTO: 0.13 K/UL (ref 0–0.04)
IMM GRANULOCYTES NFR BLD AUTO: 0.9 % (ref 0–0.5)
LYMPHOCYTES # BLD AUTO: 0.9 K/UL (ref 1–4.8)
LYMPHOCYTES NFR BLD: 6 % (ref 18–48)
M TB IFN-G CD4+ BCKGRND COR BLD-ACNC: 0.01 IU/ML
M TB IFN-G CD4+ BCKGRND COR BLD-ACNC: 0.01 IU/ML
MCH RBC QN AUTO: 28.7 PG (ref 27–31)
MCHC RBC AUTO-ENTMCNC: 31.4 G/DL (ref 32–36)
MCV RBC AUTO: 92 FL (ref 82–98)
MITOGEN IGNF BCKGRD COR BLD-ACNC: 0.71 IU/ML
MONOCYTES # BLD AUTO: 0.4 K/UL (ref 0.3–1)
MONOCYTES NFR BLD: 3 % (ref 4–15)
NEUTROPHILS # BLD AUTO: 13.1 K/UL (ref 1.8–7.7)
NEUTROPHILS NFR BLD: 90 % (ref 38–73)
NRBC BLD-RTO: 0 /100 WBC
PLATELET # BLD AUTO: 237 K/UL (ref 150–450)
PMV BLD AUTO: 9.9 FL (ref 9.2–12.9)
POTASSIUM SERPL-SCNC: 4.2 MMOL/L (ref 3.5–5.1)
PROT SERPL-MCNC: 7.6 G/DL (ref 6–8.4)
RBC # BLD AUTO: 5.64 M/UL (ref 4.6–6.2)
SODIUM SERPL-SCNC: 135 MMOL/L (ref 136–145)
TB GOLD PLUS: NEGATIVE
WBC # BLD AUTO: 14.52 K/UL (ref 3.9–12.7)

## 2024-09-07 PROCEDURE — 82397 CHEMILUMINESCENT ASSAY: CPT | Mod: NTX | Performed by: STUDENT IN AN ORGANIZED HEALTH CARE EDUCATION/TRAINING PROGRAM

## 2024-09-07 PROCEDURE — 85025 COMPLETE CBC W/AUTO DIFF WBC: CPT | Mod: NTX | Performed by: STUDENT IN AN ORGANIZED HEALTH CARE EDUCATION/TRAINING PROGRAM

## 2024-09-07 PROCEDURE — 20600001 HC STEP DOWN PRIVATE ROOM: Mod: NTX

## 2024-09-07 PROCEDURE — 80053 COMPREHEN METABOLIC PANEL: CPT | Mod: NTX | Performed by: STUDENT IN AN ORGANIZED HEALTH CARE EDUCATION/TRAINING PROGRAM

## 2024-09-07 PROCEDURE — 63600175 PHARM REV CODE 636 W HCPCS: Mod: JZ,JG,NTX | Performed by: STUDENT IN AN ORGANIZED HEALTH CARE EDUCATION/TRAINING PROGRAM

## 2024-09-07 PROCEDURE — 25000003 PHARM REV CODE 250: Mod: NTX | Performed by: STUDENT IN AN ORGANIZED HEALTH CARE EDUCATION/TRAINING PROGRAM

## 2024-09-07 PROCEDURE — 86235 NUCLEAR ANTIGEN ANTIBODY: CPT | Mod: NTX | Performed by: STUDENT IN AN ORGANIZED HEALTH CARE EDUCATION/TRAINING PROGRAM

## 2024-09-07 PROCEDURE — 63600175 PHARM REV CODE 636 W HCPCS: Mod: NTX | Performed by: STUDENT IN AN ORGANIZED HEALTH CARE EDUCATION/TRAINING PROGRAM

## 2024-09-07 PROCEDURE — 83520 IMMUNOASSAY QUANT NOS NONAB: CPT | Mod: NTX | Performed by: STUDENT IN AN ORGANIZED HEALTH CARE EDUCATION/TRAINING PROGRAM

## 2024-09-07 PROCEDURE — 99222 1ST HOSP IP/OBS MODERATE 55: CPT | Mod: NTX,,, | Performed by: INTERNAL MEDICINE

## 2024-09-07 RX ADMIN — ATORVASTATIN CALCIUM 80 MG: 40 TABLET, FILM COATED ORAL at 08:09

## 2024-09-07 RX ADMIN — ASPIRIN 81 MG: 81 TABLET, COATED ORAL at 08:09

## 2024-09-07 RX ADMIN — ENOXAPARIN SODIUM 40 MG: 40 INJECTION SUBCUTANEOUS at 05:09

## 2024-09-07 RX ADMIN — HUMAN IMMUNOGLOBULIN G 30 G: 20 LIQUID INTRAVENOUS at 04:09

## 2024-09-07 RX ADMIN — METHYLPREDNISOLONE SODIUM SUCCINATE 1000 MG: 1 INJECTION INTRAMUSCULAR; INTRAVENOUS at 12:09

## 2024-09-07 RX ADMIN — AMLODIPINE BESYLATE 5 MG: 5 TABLET ORAL at 08:09

## 2024-09-07 RX ADMIN — PANTOPRAZOLE SODIUM 40 MG: 40 TABLET, DELAYED RELEASE ORAL at 08:09

## 2024-09-07 NOTE — ASSESSMENT & PLAN NOTE
64 y.o. male with past medical history of HTN, HLD, Reynaud's, who presents from rheumatology clinic for concern for progressive pulmonary fibrosis. Worsening shortness of breath over the course of several months. Was finishing prednisone taper without improvement.   - positive PRESTON 1:640 speckled with negative RF and anti CCP  - CT chest showed stable changes of interstitial lung disease with possible UIP pattern. Areas of persistent coalescent opacity may reflect organizing pneumonia.   - Pulmonary consulted and he was given 1 g solumedrol. Planned for 5 day course  - Protonix and PJP PPx  - Rheumatology consulted\  - Started on IVIG

## 2024-09-07 NOTE — PROGRESS NOTES
[facility] ([address] Medicine  Progress Note    Patient Name: Darrell Corona  MRN: [id number]  Patient Class: IP- Inpatient   Admission Date: 9/5/2024  Length of Stay: 2 days  Attending Physician: Mulugeta Francisco*  Primary Care Provider: Nikhil Palma MD        Subjective:     Principal Problem:ILD (interstitial lung disease)        HPI:  Darrell Corona is a 64 y.o. male with past medical history of HTN, HLD, Reynaud's, who presents from rheumatology clinic for concern for progressive pulmonary fibrosis. Patient was being evaluated outpatient by pulmonary for progressive shortness of breath over the course of several months. Per patient's family at bedside he used to be very active, able to walk 4-5 miles per day or up several flights of stairs without difficulty. However his shortness of breath has progressed to where he will have to stop after a block to catch his breath. He had an PRESTON that was positive and a negative RF (positive PRESTON 1:640 speckled with negative RF and anti CCP). CT chest was obtained which showed stable changes of interstitial lung disease with possible UIP pattern. Areas of persistent coalescent opacity may reflect organizing pneumonia. He was also assessed for oxygen outpatient, and he desaturated and required oxygen to be ordered. He was started on steroid taper which was supposed to end this week, but has not noted improvement, and in fact has worsened. He then saw rheumatology in clinic and was instructed to present to ED. No reported environmental or occupational exposures. He has a history of reynaud's which has been present for years. Denies chest pain, abdominal pain, nausea / vomiting, fever/chills, joint pains, URI symptoms.  In the ED, patient was hemodynamically stable. Reportedly had episode of SVT while in ED with HR 160s, during which patient was asymptomatic. Vagal maneuvers were attempted but were without success. Patient was then given diltiazem with  apparent conversion to NSR. Pulmonary was consulted and he was given 1 g solumedrol. He was admitted to hospital medicine.     Overview/Hospital Course:  No notes on file    Interval History: No acute events  Started on IVIG  AFebrile    Review of Systems  Objective:     Vital Signs (Most Recent):  Temp: 97.7 °F (36.5 °C) (09/07/24 1125)  Pulse: 70 (09/07/24 1125)  Resp: 17 (09/07/24 1125)  BP: 131/69 (09/07/24 1125)  SpO2: 96 % (09/07/24 1125) Vital Signs (24h Range):  Temp:  [97.6 °F (36.4 °C)-98.2 °F (36.8 °C)] 97.7 °F (36.5 °C)  Pulse:  [59-82] 70  Resp:  [17-20] 17  SpO2:  [95 %-100 %] 96 %  BP: (111-135)/(53-69) 131/69     Weight: 75.6 kg (166 lb 10.7 oz)  Body mass index is 26.1 kg/m².    Intake/Output Summary (Last 24 hours) at 9/7/2024 1253  Last data filed at 9/6/2024 1735  Gross per 24 hour   Intake 470 ml   Output 820 ml   Net -350 ml      Physical Exam  Constitutional:       General: He is not in acute distress.     Appearance: Normal appearance. He is not toxic-appearing or diaphoretic.   HENT:      Head: Normocephalic and atraumatic.   Cardiovascular:      Rate and Rhythm: Normal rate and regular rhythm.      Heart sounds: No murmur heard.     No friction rub. No gallop.   Pulmonary:      Effort: Pulmonary effort is normal. No respiratory distress.      Breath sounds: Rales present. No wheezing.   Abdominal:      General: Abdomen is flat. There is no distension.      Palpations: Abdomen is soft.      Tenderness: There is no abdominal tenderness. There is no guarding or rebound.   Musculoskeletal:      Cervical back: Normal range of motion and neck supple.      Right lower leg: No edema.      Left lower leg: No edema.      Comments: 's hands   Skin:     General: Skin is warm and dry.   Neurological:      Mental Status: He is alert.         Computed MELD 3.0 unavailable. One or more values for this score either were not found within the given timeframe or did not fit some other  "criterion.  Computed MELD-Na unavailable. One or more values for this score either were not found within the given timeframe or did not fit some other criterion.      Significant Labs:  CBC:  Recent Labs   Lab 09/05/24  1347 09/06/24  0511 09/07/24  0304   WBC 9.80 5.34 14.52*   HGB 17.3 17.2 16.2   HCT 53.8 51.0 51.6    260 237     CMP:  Recent Labs   Lab 09/05/24  1347 09/06/24  0511 09/07/24  0304    135* 135*   K 3.2* 4.0 4.2   CL 99 101 100   CO2 25 21* 23   GLU 85 165* 164*   BUN 8 17 18   CREATININE 0.9 0.9 0.8   CALCIUM 9.3 9.8 9.9   PROT 7.5 7.2 7.6   ALBUMIN 3.5 3.2* 3.1*   BILITOT 0.6 0.5 0.4   ALKPHOS 70 66 61   AST 32 20 13   ALT 39 31 21   ANIONGAP 13 13 12     PTINR:  No results for input(s): "INR" in the last 48 hours.    Significant Procedures:   Dobutamine Stress Test with Color Flow: No results found for this or any previous visit.        Assessment/Plan:      * ILD (interstitial lung disease)  64 y.o. male with past medical history of HTN, HLD, Reynaud's, who presents from rheumatology clinic for concern for progressive pulmonary fibrosis. Worsening shortness of breath over the course of several months. Was finishing prednisone taper without improvement.   - positive PRESTON 1:640 speckled with negative RF and anti CCP  - CT chest showed stable changes of interstitial lung disease with possible UIP pattern. Areas of persistent coalescent opacity may reflect organizing pneumonia.   - Pulmonary consulted and he was given 1 g solumedrol. Planned for 5 day course  - Protonix and PJP PPx  - Rheumatology consulted\  - Started on IVIG    Acute hypoxemic respiratory failure  Patient with Hypoxic Respiratory failure which is Acute on chronic.  he is not on home oxygen. Supplemental oxygen was provided and noted-      .   Signs/symptoms of respiratory failure include- tachypnea. Contributing diagnoses includes - Interstitial lung disease Labs and images were reviewed. Patient Has not had a recent " ABG. Will treat underlying causes and adjust management of respiratory failure as follows- As above    Hyperlipidemia LDL goal <70  Continue statin        VTE Risk Mitigation (From admission, onward)           Ordered     enoxaparin injection 40 mg  Daily         09/05/24 1658     IP VTE HIGH RISK PATIENT  Once         09/05/24 1658     Place sequential compression device  Until discontinued         09/05/24 1658                    Discharge Planning   BENJI: 9/9/2024     Code Status: Full Code   Is the patient medically ready for discharge?:     Reason for patient still in hospital (select all that apply): Patient trending condition, Treatment, and Consult recommendations  Discharge Plan A: Home with family   Discharge Delays: None known at this time      Mulugeta Francisco MD  Department of Hospital Medicine   [facility] - [facility]down Flex ([place])

## 2024-09-07 NOTE — CONSULTS
[facility]  Hepatology  Consult Note    Patient Name: Darrell Corona  MRN: [id number]  Admission Date: 2024  Hospital Length of Stay: 2 days  Code Status: Full Code   Attending Provider: Mulugeta Francisco*   Consulting Provider: Len Park MD  Primary Care Physician: Nikhil Palma MD  Principal Problem:ILD (interstitial lung disease)    Inpatient consult to Hepatology  Consult performed by: Len Park MD  Consult ordered by: Eulalia Martell MD        Subjective:     HPI: Darrell Corona is a 64 y.o. male with history of ILD, dermatomyositis, Raynaud's, HTN, and HLD who presented from rheumatology clinic with progressive pulm fibrosis. He has been started on steroids for further treatment per rheumatology. Hepatology was consulted given positive Hep B core total antibody on routine lab tests. Hep B surface Ab and Hep B surface Ag are negative. Patient denies any prior history of hepatitis or liver disease.      Social History     Socioeconomic History    Marital status:    Tobacco Use    Smoking status: Former     Current packs/day: 0.00     Types: Cigarettes     Quit date: 1991     Years since quittin.7    Smokeless tobacco: Never    Tobacco comments:     quit 30yrs ago   Substance and Sexual Activity    Alcohol use: Yes     Alcohol/week: 2.0 standard drinks of alcohol     Types: 1 Glasses of wine, 1 Cans of beer per week     Comment: 1 beer and wine every day    Drug use: Never    Sexual activity: Yes   Social History Narrative    ** Merged History Encounter **          Social Determinants of Health     Financial Resource Strain: Low Risk  (2024)    Overall Financial Resource Strain (CARDIA)     Difficulty of Paying Living Expenses: Not hard at all   Food Insecurity: No Food Insecurity (2024)    Hunger Vital Sign     Worried About Running Out of Food in the Last Year: Never true     Ran Out of Food in the Last Year: Never true   Transportation Needs: No  Transportation Needs (9/6/2024)    TRANSPORTATION NEEDS     Transportation : No   Physical Activity: Insufficiently Active (9/6/2024)    Exercise Vital Sign     Days of Exercise per Week: 5 days     Minutes of Exercise per Session: 20 min   Stress: No Stress Concern Present (9/6/2024)    [employer] [place] [employer] - Occupational Stress Questionnaire     Feeling of Stress : Only a little   Recent Concern: Stress - Stress Concern Present (8/28/2024)    [employer] [place] [employer] - Occupational Stress Questionnaire     Feeling of Stress : To some extent   Housing Stability: Low Risk  (9/6/2024)    Housing Stability Vital Sign     Unable to Pay for Housing in the Last Year: No     Homeless in the Last Year: No       No current facility-administered medications on file prior to encounter.     Current Outpatient Medications on File Prior to Encounter   Medication Sig Dispense Refill    aspirin (ECOTRIN) 81 MG EC tablet Take 81 mg by mouth.      olmesartan-hydrochlorothiazide (BENICAR HCT) 40-12.5 mg Tab Take 1 tablet by mouth once daily.      predniSONE (DELTASONE) 10 MG tablet Take 6 tablets (60 mg total) by mouth once daily for 7 days, THEN 5 tablets (50 mg total) once daily for 7 days, THEN 4 tablets (40 mg total) once daily for 7 days, THEN 3 tablets (30 mg total) once daily for 7 days, THEN 2 tablets (20 mg total) once daily for 7 days, THEN 1 tablet (10 mg total) once daily for 7 days. 147 tablet 0    rosuvastatin (CRESTOR) 40 MG Tab Take 1 tablet (40 mg total) by mouth every evening. (Patient not taking: Reported on 9/5/2024) 90 tablet 3       Review of patient's allergies indicates:  No Known Allergies    Review of Systems   Eyes:         No yellowing of eyes   Gastrointestinal:  Negative for abdominal pain, nausea and vomiting.   Skin:         No yellowing of skin        Objective:     Vitals:    09/07/24 1125   BP: 131/69   Pulse: 70   Resp: 17   Temp: 97.7 °F (36.5 °C)  "        Constitutional:  not in acute distress and well developed  HENT: Head: Normal, normocephalic, atraumatic.  Eyes: conjunctiva clear and sclera nonicteric  Respiratory: on nasal cannula  GI: soft, non-tender, without masses or organomegaly  Skin: normal color  Neurological: alert, oriented x3  Psychiatric: mood and affect are within normal limits, pt is a good historian; no memory problems were noted    Significant Labs:  Recent Labs   Lab 09/05/24  1347 09/06/24  0511 09/07/24  0304   HGB 17.3 17.2 16.2       Lab Results   Component Value Date    WBC 14.52 (H) 09/07/2024    HGB 16.2 09/07/2024    HCT 51.6 09/07/2024    MCV 92 09/07/2024     09/07/2024       Lab Results   Component Value Date     (L) 09/07/2024    K 4.2 09/07/2024     09/07/2024    CO2 23 09/07/2024    BUN 18 09/07/2024    CREATININE 0.8 09/07/2024    CALCIUM 9.9 09/07/2024    ANIONGAP 12 09/07/2024       Lab Results   Component Value Date    ALT 21 09/07/2024    AST 13 09/07/2024    ALKPHOS 61 09/07/2024    BILITOT 0.4 09/07/2024       No results found for: "INR"    Significant Imaging:  Reviewed pertinent radiology findings.       Assessment/Plan:     Darrell Corona is a 64 y.o. male with history of ILD, dermatomyositis, Raynaud's, HTN, and HLD who presented from rheumatology clinic with progressive pulm fibrosis. He has been started on steroids for further treatment per rheumatology. Hepatology was consulted given positive Hep B core total antibody on routine lab tests. Hep B surface Ab and Hep B surface Ag are negative. Patient denies any prior history of hepatitis or liver disease.    Problem List:  Positive Hep B total core Ab  ILD on steroids    Recommendations:  - Positive hep B total core Ab is likely from natural immunity and not from active Hep B  - Will follow hep B PCR that has been ordered  - If patient were to start biologic therapy such as Humira in the future, will need to be on anti-viral Hep B medications for the " duration of the biologic treatment.     Thank you for involving us in the care of Darrell Corona. Please call with any additional questions, concerns or changes in the patient's clinical status.     Discussed with Dr. Linn.    Len Park MD  Gastroenterology & Hepatology Fellow PGY-IV  [facility]

## 2024-09-07 NOTE — NURSING
Pt aao x4, no c/o pain. IGG infusing per ordered, vital checked per ordered. Tolerating well so far. Family at the bedside. Independent ADLs, call light within reach.

## 2024-09-07 NOTE — PLAN OF CARE
Problem: Adult Inpatient Plan of Care  Goal: Plan of Care Review  Outcome: Progressing  Goal: Patient-Specific Goal (Individualized)  Outcome: Progressing  Goal: Absence of Hospital-Acquired Illness or Injury  Outcome: Progressing  Goal: Optimal Comfort and Wellbeing  Outcome: Progressing  Goal: Readiness for Transition of Care  Outcome: Progressing     Problem: Pulmonary Impairment  Goal: Improved Activity Tolerance  Outcome: Progressing  Goal: Effective Airway Clearance  Outcome: Progressing  Goal: Optimal Gas Exchange  Outcome: Progressing     Problem: Gas Exchange Impaired  Goal: Optimal Gas Exchange  Outcome: Progressing     Problem: Infection  Goal: Absence of Infection Signs and Symptoms  Outcome: Progressing

## 2024-09-07 NOTE — PROGRESS NOTES
[facility] - Stepdown Flex ([place] [address])  Rheumatology  Progress Note    Patient Name: Darrell Corona  MRN: [id number]  Admission Date: 9/5/2024  Hospital Length of Stay: 2 days  Code Status: Full Code   Attending Provider: Mulugeta Francisco*  Primary Care Physician: Nikhil Palma MD  Principal Problem: ILD (interstitial lung disease)    Subjective:     HPI: Patient is a 65 yo M w/ a hx of HLD who was evaluated by rheumatology for the first time 9/5 and was sent in to the hospital for concern for ILD d/t dermatomyositis.    Rheumatologic History  - Newly diagnosed ILD after having dyspnea with exertion on a trip to [place] 6/2024. Was referred to pulm by PCP and noted to have pulmonary fibrosis on HRCT chest 8/30/2024 in UIP pattern. Had also been evaluated by cardiology without abnormality.   - Other symptoms noted: Raynaud's phenomenon, skin thickening of fingers, 's hands, nl CK, negative JASEN, negative Scl-70 high complements   Myomarker panel in process  - Serologies: PRESTON 1:640 speckled, negative RF/CCP  - Current treatment (prior to hospitalization): Prednisone taper  - Seen by [facility] 9/5 where he endorsed worsening SOB, worsening raynaud's, thickening of fingers, unintentional weight loss, fatigue, proximal muscle weakness. Show to have purple digits and digital ulcers at fingertips as well as sequalae of 's hands as well as upper extremity proximal muscle weakness.  - At that time was recommended to get 1g/day of solumedrol x3-5 days and to consider IVIG, cylophosphamide and tacro while IP. Also ordered lab work including ANCA labs, SPEP, GBM, cryo, C3/C4 (elevated), UA, APLS, pre-DMARD labs    Current Hospitalization  - Sent in for IV treatments directly from the rheumatology clinic 9/5. Received first dose of pulse steroids 9/5.    Initial evaluation - Pt stated he felt much better. Was off oxygen and speaking without difficulty first visit, in afternoon was back on 2L and having some  slight conversational dyspnea.    Pt confirmed that he has been feeling short of breath since a trip to [place] 6/2024. At the time he also started having some full body fatigue as well as he noticed some cracks and skin thickening of his fingers. He has a hx of raynaud's that he began to notice 10 years ago, but had never had an appearance like this before.    He denied any GERD symptoms or dysphagia. He has a remote smoking hx. No occupational exposures that he knows of. Has had some weight loss (~7 lbs) in the last month. He denies sinus issues, chest pain. Has a light cough with some sputum production, but moreso had noticed dyspnea.     Interval hx:  Pt continues to feel better. His hands do not bother him as much and he no longer gets short of breath while being still. Still does get dysnpea with walking around. Tolerated first dose of IVIG well.     History reviewed. No pertinent past medical history.    History reviewed. No pertinent surgical history.      There is no immunization history on file for this patient.    Review of patient's allergies indicates:  No Known Allergies  Current Facility-Administered Medications   Medication Frequency    acetaminophen tablet 650 mg Q4H PRN    [START ON 9/7/2024] amLODIPine tablet 5 mg Daily    aspirin EC tablet 81 mg Daily    atorvastatin tablet 80 mg Daily    dextrose 10% bolus 125 mL 125 mL PRN    dextrose 10% bolus 250 mL 250 mL PRN    enoxaparin injection 40 mg Daily    glucagon (human recombinant) injection 1 mg PRN    glucose chewable tablet 16 g PRN    glucose chewable tablet 24 g PRN    Immune Globulin G (IGG)-PRO-IGA 10 % injection (Privigen) 10 % injection 30 g Q24H    methylPREDNISolone sodium succinate (SOLU-MEDROL) 1,000 mg in D5W 100 mL IVPB Q24H    naloxone 0.4 mg/mL injection 0.02 mg PRN    ondansetron injection 4 mg Q8H PRN    pantoprazole EC tablet 40 mg Daily    sodium chloride 0.9% flush 10 mL Q12H PRN    sulfamethoxazole-trimethoprim 400-80mg per  tablet 1 tablet Every Mon, Wed, Fri     Family History    None       Tobacco Use    Smoking status: Former     Current packs/day: 0.00     Types: Cigarettes     Quit date: 1991     Years since quittin.7    Smokeless tobacco: Never    Tobacco comments:     quit 30yrs ago   Substance and Sexual Activity    Alcohol use: Yes     Alcohol/week: 2.0 standard drinks of alcohol     Types: 1 Glasses of wine, 1 Cans of beer per week     Comment: 1 beer and wine every day    Drug use: Never    Sexual activity: Yes     Review of Systems   Constitutional:  Positive for fatigue. Negative for fever and unexpected weight change.   HENT:  Negative for facial swelling.    Eyes:  Negative for visual disturbance.   Respiratory:  Positive for cough and shortness of breath.    Cardiovascular:  Negative for chest pain.   Gastrointestinal:  Negative for constipation and diarrhea.   Genitourinary:  Negative for dysuria.   Musculoskeletal:  Negative for arthralgias.   Skin:  Negative for rash.   Neurological:  Positive for weakness. Negative for headaches.   Hematological:  Negative for adenopathy.   Psychiatric/Behavioral:  Negative for behavioral problems.      Objective:     Vital Signs (Most Recent):  Temp: 98.2 °F (36.8 °C) (24 1645)  Pulse: 67 (24 1730)  Resp: 18 (24 1554)  BP: 135/62 (24 1730)  SpO2: 100 % (24 1730) Vital Signs (24h Range):  Temp:  [97.7 °F (36.5 °C)-98.6 °F (37 °C)] 98.2 °F (36.8 °C)  Pulse:  [66-86] 67  Resp:  [17-20] 18  SpO2:  [95 %-100 %] 100 %  BP: (111-135)/(53-68) 135/62     Weight: 75.6 kg (166 lb 10.7 oz) (24 2300)  Body mass index is 26.1 kg/m².  Body surface area is 1.89 meters squared.      Intake/Output Summary (Last 24 hours) at 2024 1741  Last data filed at 2024 1735  Gross per 24 hour   Intake 1310 ml   Output 1170 ml   Net 140 ml         Physical Exam   Constitutional: He is oriented to person, place, and time. No distress.   HENT:   Head:  Normocephalic and atraumatic.   Cardiovascular: Normal rate, regular rhythm and normal heart sounds.   Pulmonary/Chest: Effort normal.   Pulmonary Comments: Fine crackles present at bilateral bases. Conversational dyspnea resolved.  Abdominal: Soft. There is no abdominal tenderness.   Musculoskeletal:         General: No tenderness. Normal range of motion.      Comments: Had good muscle strength per exam.   Neurological: He is alert and oriented to person, place, and time.   Skin: Skin is warm and dry.   Fissures present on hands - color of hands much improved from pictures taken at 9/5 visit.   Psychiatric: His behavior is normal. Judgment and thought content normal.        Significant Labs:  All pertinent lab results from the last 24 hours have been reviewed.    Significant Imaging:  Imaging results within the past 24 hours have been reviewed.  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    - Pt began having symptoms of dyspnea on exertion 6/2023 - he had also at that time noted generalized fatigue and fissures on the tips of his fingers. Has a hx of raynaud's from 10 years ago that had gotten progressively worse as well as a remote cardiac hx, otherwise no other symptoms.   - Pt was sent here after being evaluated in the rheumatology clinic. PFTs from his last visit with Dr. Reina were much worse after only a month. This prompted admission for IV treatment.  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 However, presentation could be consistent with a mixed connective tissue disease (although SmRNP negative) or scleroderma w/ hx of raynauds (which is also present with anti-synthetase). Only + serology was PRESTON of 1:640 w/ negative JASEN  (including Scl-70). HRCT w/ UIP pattern ILD.     Recommendations:  - Will follow ACR rapidly progressive ILD protocol with pulse dose steroids (plan for 5 days of 1g/day, 9/5-9/9) as well as 5 days of IVIG (9/6-9/10). Consent obtained with patient, son was present via the phone as well  - Will likely add cyclophosphamide after +hep B core worked up and assessed by hepatology (quant pending)  - Will also get MRI bilateral humerus   Awaiting right arm   Left arm without sequelae of inflammation  - F/u myomarker panel  - F/u RNP-III   - Will consider CT A/P for sake of cancer screening          Eulalia Martell MD  Rheumatology  [facility] ([place])

## 2024-09-07 NOTE — SUBJECTIVE & OBJECTIVE
Interval History: No acute events  Started on IVIG  AFebrile    Review of Systems  Objective:     Vital Signs (Most Recent):  Temp: 97.7 °F (36.5 °C) (09/07/24 1125)  Pulse: 70 (09/07/24 1125)  Resp: 17 (09/07/24 1125)  BP: 131/69 (09/07/24 1125)  SpO2: 96 % (09/07/24 1125) Vital Signs (24h Range):  Temp:  [97.6 °F (36.4 °C)-98.2 °F (36.8 °C)] 97.7 °F (36.5 °C)  Pulse:  [59-82] 70  Resp:  [17-20] 17  SpO2:  [95 %-100 %] 96 %  BP: (111-135)/(53-69) 131/69     Weight: 75.6 kg (166 lb 10.7 oz)  Body mass index is 26.1 kg/m².    Intake/Output Summary (Last 24 hours) at 9/7/2024 1253  Last data filed at 9/6/2024 1735  Gross per 24 hour   Intake 470 ml   Output 820 ml   Net -350 ml      Physical Exam  Constitutional:       General: He is not in acute distress.     Appearance: Normal appearance. He is not toxic-appearing or diaphoretic.   HENT:      Head: Normocephalic and atraumatic.   Cardiovascular:      Rate and Rhythm: Normal rate and regular rhythm.      Heart sounds: No murmur heard.     No friction rub. No gallop.   Pulmonary:      Effort: Pulmonary effort is normal. No respiratory distress.      Breath sounds: Rales present. No wheezing.   Abdominal:      General: Abdomen is flat. There is no distension.      Palpations: Abdomen is soft.      Tenderness: There is no abdominal tenderness. There is no guarding or rebound.   Musculoskeletal:      Cervical back: Normal range of motion and neck supple.      Right lower leg: No edema.      Left lower leg: No edema.      Comments: 's hands   Skin:     General: Skin is warm and dry.   Neurological:      Mental Status: He is alert.         Computed MELD 3.0 unavailable. One or more values for this score either were not found within the given timeframe or did not fit some other criterion.  Computed MELD-Na unavailable. One or more values for this score either were not found within the given timeframe or did not fit some other criterion.      Significant  "Labs:  CBC:  Recent Labs   Lab 09/05/24  1347 09/06/24  0511 09/07/24  0304   WBC 9.80 5.34 14.52*   HGB 17.3 17.2 16.2   HCT 53.8 51.0 51.6    260 237     CMP:  Recent Labs   Lab 09/05/24  1347 09/06/24  0511 09/07/24  0304    135* 135*   K 3.2* 4.0 4.2   CL 99 101 100   CO2 25 21* 23   GLU 85 165* 164*   BUN 8 17 18   CREATININE 0.9 0.9 0.8   CALCIUM 9.3 9.8 9.9   PROT 7.5 7.2 7.6   ALBUMIN 3.5 3.2* 3.1*   BILITOT 0.6 0.5 0.4   ALKPHOS 70 66 61   AST 32 20 13   ALT 39 31 21   ANIONGAP 13 13 12     PTINR:  No results for input(s): "INR" in the last 48 hours.    Significant Procedures:   Dobutamine Stress Test with Color Flow: No results found for this or any previous visit.      "

## 2024-09-08 LAB
ALBUMIN SERPL BCP-MCNC: 2.6 G/DL (ref 3.5–5.2)
ALP SERPL-CCNC: 55 U/L (ref 55–135)
ALT SERPL W/O P-5'-P-CCNC: 21 U/L (ref 10–44)
ANION GAP SERPL CALC-SCNC: 9 MMOL/L (ref 8–16)
AST SERPL-CCNC: 16 U/L (ref 10–40)
BASOPHILS # BLD AUTO: 0.02 K/UL (ref 0–0.2)
BASOPHILS NFR BLD: 0.1 % (ref 0–1.9)
BILIRUB SERPL-MCNC: 0.3 MG/DL (ref 0.1–1)
BUN SERPL-MCNC: 16 MG/DL (ref 8–23)
CALCIUM SERPL-MCNC: 8.8 MG/DL (ref 8.7–10.5)
CHLORIDE SERPL-SCNC: 105 MMOL/L (ref 95–110)
CO2 SERPL-SCNC: 22 MMOL/L (ref 23–29)
CREAT SERPL-MCNC: 0.8 MG/DL (ref 0.5–1.4)
DIFFERENTIAL METHOD BLD: ABNORMAL
EOSINOPHIL # BLD AUTO: 0 K/UL (ref 0–0.5)
EOSINOPHIL NFR BLD: 0 % (ref 0–8)
ERYTHROCYTE [DISTWIDTH] IN BLOOD BY AUTOMATED COUNT: 13.9 % (ref 11.5–14.5)
EST. GFR  (NO RACE VARIABLE): >60 ML/MIN/1.73 M^2
GLUCOSE SERPL-MCNC: 170 MG/DL (ref 70–110)
HCT VFR BLD AUTO: 44.4 % (ref 40–54)
HGB BLD-MCNC: 14.7 G/DL (ref 14–18)
IMM GRANULOCYTES # BLD AUTO: 0.08 K/UL (ref 0–0.04)
IMM GRANULOCYTES NFR BLD AUTO: 0.6 % (ref 0–0.5)
LYMPHOCYTES # BLD AUTO: 0.8 K/UL (ref 1–4.8)
LYMPHOCYTES NFR BLD: 5.6 % (ref 18–48)
MCH RBC QN AUTO: 29.3 PG (ref 27–31)
MCHC RBC AUTO-ENTMCNC: 33.1 G/DL (ref 32–36)
MCV RBC AUTO: 88 FL (ref 82–98)
MONOCYTES # BLD AUTO: 0.5 K/UL (ref 0.3–1)
MONOCYTES NFR BLD: 3.3 % (ref 4–15)
NEUTROPHILS # BLD AUTO: 12.4 K/UL (ref 1.8–7.7)
NEUTROPHILS NFR BLD: 90.4 % (ref 38–73)
NRBC BLD-RTO: 0 /100 WBC
PLATELET # BLD AUTO: 233 K/UL (ref 150–450)
PMV BLD AUTO: 10 FL (ref 9.2–12.9)
POTASSIUM SERPL-SCNC: 3.9 MMOL/L (ref 3.5–5.1)
PROT SERPL-MCNC: 7.1 G/DL (ref 6–8.4)
RBC # BLD AUTO: 5.02 M/UL (ref 4.6–6.2)
SODIUM SERPL-SCNC: 136 MMOL/L (ref 136–145)
WBC # BLD AUTO: 13.74 K/UL (ref 3.9–12.7)

## 2024-09-08 PROCEDURE — 20600001 HC STEP DOWN PRIVATE ROOM: Mod: NTX

## 2024-09-08 PROCEDURE — 80053 COMPREHEN METABOLIC PANEL: CPT | Mod: NTX | Performed by: STUDENT IN AN ORGANIZED HEALTH CARE EDUCATION/TRAINING PROGRAM

## 2024-09-08 PROCEDURE — 25000003 PHARM REV CODE 250: Mod: NTX | Performed by: STUDENT IN AN ORGANIZED HEALTH CARE EDUCATION/TRAINING PROGRAM

## 2024-09-08 PROCEDURE — 99222 1ST HOSP IP/OBS MODERATE 55: CPT | Mod: NTX,,, | Performed by: INTERNAL MEDICINE

## 2024-09-08 PROCEDURE — 63600175 PHARM REV CODE 636 W HCPCS: Mod: JZ,JG,NTX | Performed by: STUDENT IN AN ORGANIZED HEALTH CARE EDUCATION/TRAINING PROGRAM

## 2024-09-08 PROCEDURE — 85025 COMPLETE CBC W/AUTO DIFF WBC: CPT | Mod: NTX | Performed by: STUDENT IN AN ORGANIZED HEALTH CARE EDUCATION/TRAINING PROGRAM

## 2024-09-08 PROCEDURE — 25500020 PHARM REV CODE 255: Mod: NTX | Performed by: STUDENT IN AN ORGANIZED HEALTH CARE EDUCATION/TRAINING PROGRAM

## 2024-09-08 PROCEDURE — A9585 GADOBUTROL INJECTION: HCPCS | Mod: NTX | Performed by: STUDENT IN AN ORGANIZED HEALTH CARE EDUCATION/TRAINING PROGRAM

## 2024-09-08 PROCEDURE — 63600175 PHARM REV CODE 636 W HCPCS: Mod: NTX | Performed by: STUDENT IN AN ORGANIZED HEALTH CARE EDUCATION/TRAINING PROGRAM

## 2024-09-08 PROCEDURE — 25500020 PHARM REV CODE 255: Mod: NTX

## 2024-09-08 PROCEDURE — 36415 COLL VENOUS BLD VENIPUNCTURE: CPT | Mod: NTX | Performed by: STUDENT IN AN ORGANIZED HEALTH CARE EDUCATION/TRAINING PROGRAM

## 2024-09-08 RX ORDER — ACETAMINOPHEN 325 MG/1
650 TABLET ORAL
Status: CANCELLED | OUTPATIENT
Start: 2024-09-08

## 2024-09-08 RX ORDER — SODIUM CHLORIDE 0.9 % (FLUSH) 0.9 %
10 SYRINGE (ML) INJECTION
Status: CANCELLED | OUTPATIENT
Start: 2024-09-08

## 2024-09-08 RX ORDER — ACETAMINOPHEN 325 MG/1
650 TABLET ORAL EVERY 4 HOURS PRN
Status: CANCELLED | OUTPATIENT
Start: 2024-09-08

## 2024-09-08 RX ORDER — HEPARIN 100 UNIT/ML
500 SYRINGE INTRAVENOUS
Status: CANCELLED | OUTPATIENT
Start: 2024-09-08

## 2024-09-08 RX ORDER — GADOBUTROL 604.72 MG/ML
8 INJECTION INTRAVENOUS
Status: COMPLETED | OUTPATIENT
Start: 2024-09-08 | End: 2024-09-08

## 2024-09-08 RX ADMIN — IOHEXOL 15 ML: 350 INJECTION, SOLUTION INTRAVENOUS at 10:09

## 2024-09-08 RX ADMIN — ASPIRIN 81 MG: 81 TABLET, COATED ORAL at 08:09

## 2024-09-08 RX ADMIN — HUMAN IMMUNOGLOBULIN G 30 G: 20 LIQUID INTRAVENOUS at 03:09

## 2024-09-08 RX ADMIN — ENOXAPARIN SODIUM 40 MG: 40 INJECTION SUBCUTANEOUS at 05:09

## 2024-09-08 RX ADMIN — METHYLPREDNISOLONE SODIUM SUCCINATE 1000 MG: 1 INJECTION INTRAMUSCULAR; INTRAVENOUS at 12:09

## 2024-09-08 RX ADMIN — IOHEXOL 75 ML: 350 INJECTION, SOLUTION INTRAVENOUS at 10:09

## 2024-09-08 RX ADMIN — AMLODIPINE BESYLATE 5 MG: 5 TABLET ORAL at 08:09

## 2024-09-08 RX ADMIN — IOHEXOL 15 ML: 350 INJECTION, SOLUTION INTRAVENOUS at 07:09

## 2024-09-08 RX ADMIN — ATORVASTATIN CALCIUM 80 MG: 40 TABLET, FILM COATED ORAL at 08:09

## 2024-09-08 RX ADMIN — GADOBUTROL 8 ML: 604.72 INJECTION INTRAVENOUS at 09:09

## 2024-09-08 RX ADMIN — PANTOPRAZOLE SODIUM 40 MG: 40 TABLET, DELAYED RELEASE ORAL at 08:09

## 2024-09-08 NOTE — SUBJECTIVE & OBJECTIVE
Interval History: No acute events  Today is d4 solumedrol    Review of Systems  Objective:     Vital Signs (Most Recent):  Temp: 98.3 °F (36.8 °C) (09/08/24 1220)  Pulse: 71 (09/08/24 1220)  Resp: 18 (09/08/24 0726)  BP: 127/66 (09/08/24 1220)  SpO2: 100 % (09/08/24 1220) Vital Signs (24h Range):  Temp:  [97.5 °F (36.4 °C)-98.3 °F (36.8 °C)] 98.3 °F (36.8 °C)  Pulse:  [53-71] 71  Resp:  [18] 18  SpO2:  [93 %-100 %] 100 %  BP: (110-147)/(60-73) 127/66     Weight: 75.6 kg (166 lb 10.7 oz)  Body mass index is 26.1 kg/m².    Intake/Output Summary (Last 24 hours) at 9/8/2024 1400  Last data filed at 9/8/2024 0800  Gross per 24 hour   Intake 140 ml   Output --   Net 140 ml      Physical Exam  Constitutional:       General: He is not in acute distress.     Appearance: Normal appearance. He is not toxic-appearing or diaphoretic.   HENT:      Head: Normocephalic and atraumatic.   Cardiovascular:      Rate and Rhythm: Normal rate and regular rhythm.      Heart sounds: No murmur heard.     No friction rub. No gallop.   Pulmonary:      Effort: Pulmonary effort is normal. No respiratory distress.      Breath sounds: Rales present. No wheezing.   Abdominal:      General: Abdomen is flat. There is no distension.      Palpations: Abdomen is soft.      Tenderness: There is no abdominal tenderness. There is no guarding or rebound.   Musculoskeletal:      Cervical back: Normal range of motion and neck supple.      Right lower leg: No edema.      Left lower leg: No edema.      Comments: 's hands   Skin:     General: Skin is warm and dry.   Neurological:      Mental Status: He is alert.         Computed MELD 3.0 unavailable. One or more values for this score either were not found within the given timeframe or did not fit some other criterion.  Computed MELD-Na unavailable. One or more values for this score either were not found within the given timeframe or did not fit some other criterion.      Significant Labs:  CBC:  Recent  "Labs   Lab 09/07/24 0304 09/08/24 0318   WBC 14.52* 13.74*   HGB 16.2 14.7   HCT 51.6 44.4    233     CMP:  Recent Labs   Lab 09/07/24 0304 09/08/24 0318   * 136   K 4.2 3.9    105   CO2 23 22*   * 170*   BUN 18 16   CREATININE 0.8 0.8   CALCIUM 9.9 8.8   PROT 7.6 7.1   ALBUMIN 3.1* 2.6*   BILITOT 0.4 0.3   ALKPHOS 61 55   AST 13 16   ALT 21 21   ANIONGAP 12 9     PTINR:  No results for input(s): "INR" in the last 48 hours.    Significant Procedures:   Dobutamine Stress Test with Color Flow: No results found for this or any previous visit.      "

## 2024-09-08 NOTE — TREATMENT PLAN
Hepatology Treatment Plan    Darrell Corona is a 64 y.o. male admitted to hospital 9/5/2024 (Hospital Day: 4) due to ILD (interstitial lung disease).     Interval History  HBV PCR pending.    Objective  Temp:  [97.5 °F (36.4 °C)-98.3 °F (36.8 °C)] 98 °F (36.7 °C) (09/08 1633)  Pulse:  [53-89] 64 (09/08 1648)  BP: (124-149)/(61-75) 136/69 (09/08 1648)  Resp:  [18] 18 (09/08 1553)  SpO2:  [93 %-100 %] 99 % (09/08 1648)      Laboratory    Recent Labs   Lab 09/06/24  0511 09/07/24  0304 09/08/24  0318   HGB 17.2 16.2 14.7         Assessment and Plan  Darrell Corona is a 64 y.o. male with history of ILD, dermatomyositis, Raynaud's, HTN, and HLD who presented from rheumatology clinic with progressive pulm fibrosis. He has been started on steroids and IVIG for further treatment per rheumatology. Hepatology was consulted given positive Hep B core total antibody on routine lab tests. Hep B surface Ab and Hep B surface Ag are negative. Patient denies any prior history of hepatitis or liver disease.     Problem List:  Positive Hep B total core Ab  ILD on steroids     Recommendations:  - Positive hep B total core Ab is likely from natural immunity and not from active Hep B  - Will follow hep B PCR that has been ordered  - Since patient is to start biologic therapy per rheum in the next few days, patient will need to be on Vemlidy 25mg daily while on biologic and needs to be on this 6 months post completion of biologic treatment.    Thank you for involving us in the care of Darrell Corona. Please call with any additional questions, concerns or changes in the patient's clinical status.    Discussed with Dr. Linn.    Len Park MD  Gastroenterology Fellow, PGY IV

## 2024-09-08 NOTE — HOSPITAL COURSE
Admitted to hospital medicine and rheumatology and pulmonary consulted. Started on solumedrol and IVIG.

## 2024-09-08 NOTE — PROGRESS NOTES
[facility] ([address] Medicine  Progress Note    Patient Name: Darrell Corona  MRN: [id number]  Patient Class: IP- Inpatient   Admission Date: 9/5/2024  Length of Stay: 3 days  Attending Physician: Mulugeta Francisco*  Primary Care Provider: Nikhil Palma MD        Subjective:     Principal Problem:ILD (interstitial lung disease)        HPI:  Darrell Corona is a 64 y.o. male with past medical history of HTN, HLD, Reynaud's, who presents from rheumatology clinic for concern for progressive pulmonary fibrosis. Patient was being evaluated outpatient by pulmonary for progressive shortness of breath over the course of several months. Per patient's family at bedside he used to be very active, able to walk 4-5 miles per day or up several flights of stairs without difficulty. However his shortness of breath has progressed to where he will have to stop after a block to catch his breath. He had an PRESTON that was positive and a negative RF (positive PRESTON 1:640 speckled with negative RF and anti CCP). CT chest was obtained which showed stable changes of interstitial lung disease with possible UIP pattern. Areas of persistent coalescent opacity may reflect organizing pneumonia. He was also assessed for oxygen outpatient, and he desaturated and required oxygen to be ordered. He was started on steroid taper which was supposed to end this week, but has not noted improvement, and in fact has worsened. He then saw rheumatology in clinic and was instructed to present to ED. No reported environmental or occupational exposures. He has a history of reynaud's which has been present for years. Denies chest pain, abdominal pain, nausea / vomiting, fever/chills, joint pains, URI symptoms.  In the ED, patient was hemodynamically stable. Reportedly had episode of SVT while in ED with HR 160s, during which patient was asymptomatic. Vagal maneuvers were attempted but were without success. Patient was then given diltiazem with  apparent conversion to NSR. Pulmonary was consulted and he was given 1 g solumedrol. He was admitted to hospital medicine.     Overview/Hospital Course:  Admitted to hospital medicine and rheumatology and pulmonary consulted. Started on solumedrol and IVIG.     Interval History: No acute events  Today is d4 solumedrol    Review of Systems  Objective:     Vital Signs (Most Recent):  Temp: 98.3 °F (36.8 °C) (09/08/24 1220)  Pulse: 71 (09/08/24 1220)  Resp: 18 (09/08/24 0726)  BP: 127/66 (09/08/24 1220)  SpO2: 100 % (09/08/24 1220) Vital Signs (24h Range):  Temp:  [97.5 °F (36.4 °C)-98.3 °F (36.8 °C)] 98.3 °F (36.8 °C)  Pulse:  [53-71] 71  Resp:  [18] 18  SpO2:  [93 %-100 %] 100 %  BP: (110-147)/(60-73) 127/66     Weight: 75.6 kg (166 lb 10.7 oz)  Body mass index is 26.1 kg/m².    Intake/Output Summary (Last 24 hours) at 9/8/2024 1400  Last data filed at 9/8/2024 0800  Gross per 24 hour   Intake 140 ml   Output --   Net 140 ml      Physical Exam  Constitutional:       General: He is not in acute distress.     Appearance: Normal appearance. He is not toxic-appearing or diaphoretic.   HENT:      Head: Normocephalic and atraumatic.   Cardiovascular:      Rate and Rhythm: Normal rate and regular rhythm.      Heart sounds: No murmur heard.     No friction rub. No gallop.   Pulmonary:      Effort: Pulmonary effort is normal. No respiratory distress.      Breath sounds: Rales present. No wheezing.   Abdominal:      General: Abdomen is flat. There is no distension.      Palpations: Abdomen is soft.      Tenderness: There is no abdominal tenderness. There is no guarding or rebound.   Musculoskeletal:      Cervical back: Normal range of motion and neck supple.      Right lower leg: No edema.      Left lower leg: No edema.      Comments: 's hands   Skin:     General: Skin is warm and dry.   Neurological:      Mental Status: He is alert.         Computed MELD 3.0 unavailable. One or more values for this score either were  "not found within the given timeframe or did not fit some other criterion.  Computed MELD-Na unavailable. One or more values for this score either were not found within the given timeframe or did not fit some other criterion.      Significant Labs:  CBC:  Recent Labs   Lab 09/07/24  0304 09/08/24 0318   WBC 14.52* 13.74*   HGB 16.2 14.7   HCT 51.6 44.4    233     CMP:  Recent Labs   Lab 09/07/24  0304 09/08/24 0318   * 136   K 4.2 3.9    105   CO2 23 22*   * 170*   BUN 18 16   CREATININE 0.8 0.8   CALCIUM 9.9 8.8   PROT 7.6 7.1   ALBUMIN 3.1* 2.6*   BILITOT 0.4 0.3   ALKPHOS 61 55   AST 13 16   ALT 21 21   ANIONGAP 12 9     PTINR:  No results for input(s): "INR" in the last 48 hours.    Significant Procedures:   Dobutamine Stress Test with Color Flow: No results found for this or any previous visit.        Assessment/Plan:      * ILD (interstitial lung disease)  64 y.o. male with past medical history of HTN, HLD, Reynaud's, who presents from rheumatology clinic for concern for progressive pulmonary fibrosis. Worsening shortness of breath over the course of several months. Was finishing prednisone taper without improvement.   - positive PRESTON 1:640 speckled with negative RF and anti CCP  - CT chest showed stable changes of interstitial lung disease with possible UIP pattern. Areas of persistent coalescent opacity may reflect organizing pneumonia.   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 Planned for 5 day course  - Protonix and PJP PPx  - Rheumatology consulted  - Started on IVIG    Acute hypoxemic respiratory failure  Patient with Hypoxic Respiratory failure which is Acute on chronic.  he is not on home oxygen. Supplemental oxygen was provided and noted-      .   Signs/symptoms of respiratory failure include- tachypnea. Contributing diagnoses includes - Interstitial lung disease Labs and images were reviewed. Patient Has not had a recent " ABG. Will treat underlying causes and adjust management of respiratory failure as follows- As above    Hyperlipidemia LDL goal <70  Continue statin        VTE Risk Mitigation (From admission, onward)           Ordered     enoxaparin injection 40 mg  Daily         09/05/24 1658     IP VTE HIGH RISK PATIENT  Once         09/05/24 1658     Place sequential compression device  Until discontinued         09/05/24 1658                    Discharge Planning   BENJI: 9/9/2024     Code Status: Full Code   Is the patient medically ready for discharge?:     Reason for patient still in hospital (select all that apply): Patient trending condition and Treatment  Discharge Plan A: Home with family   Discharge Delays: None known at this time      Mulugeta Francisco MD  Department of Hospital Medicine   [facility] - [facility] ([place]-[facility])

## 2024-09-08 NOTE — PROGRESS NOTES
[facility] ([place] [id number])  Rheumatology  Progress Note    Patient Name: Darrell Corona  MRN: [id number]  Admission Date: 9/5/2024  Hospital Length of Stay: 3 days  Code Status: Full Code   Attending Provider: Mulugeta Francisco*  Primary Care Physician: Nikhil Palma MD  Principal Problem: ILD (interstitial lung disease)    Subjective:     HPI: Patient is a 63 yo M w/ a hx of HLD who was evaluated by rheumatology for the first time 9/5 and was sent in to the hospital for concern for ILD d/t dermatomyositis.    Rheumatologic History  - Newly diagnosed ILD after having dyspnea with exertion on a trip to [place] 6/2024. Was referred to pulm by PCP and noted to have pulmonary fibrosis on HRCT chest 8/30/2024 in UIP pattern. Had also been evaluated by cardiology without abnormality.   - Other symptoms noted: Raynaud's phenomenon, skin thickening of fingers, 's hands, nl CK, negative JASEN, negative Scl-70 high complements   Myomarker panel in process  - Serologies: PRESTON 1:640 speckled, negative RF/CCP  - Current treatment (prior to hospitalization): Prednisone taper  - Seen by [facility] 9/5 where he endorsed worsening SOB, worsening raynaud's, thickening of fingers, unintentional weight loss, fatigue, proximal muscle weakness. Show to have purple digits and digital ulcers at fingertips as well as sequalae of 's hands as well as upper extremity proximal muscle weakness.  - At that time was recommended to get 1g/day of solumedrol x3-5 days and to consider IVIG, cylophosphamide and tacro while IP. Also ordered lab work including ANCA labs, SPEP, GBM, cryo, C3/C4 (elevated), UA, APLS, pre-DMARD labs    Current Hospitalization  - Sent in for IV treatments directly from the rheumatology clinic 9/5. Received first dose of pulse steroids 9/5.    Initial evaluation - Pt stated he felt much better. Was off oxygen and speaking without difficulty first visit, in afternoon was back on 2L and having some  slight conversational dyspnea.    Pt confirmed that he has been feeling short of breath since a trip to [place] 6/2024. At the time he also started having some full body fatigue as well as he noticed some cracks and skin thickening of his fingers. He has a hx of raynaud's that he began to notice 10 years ago, but had never had an appearance like this before.    He denied any GERD symptoms or dysphagia. He has a remote smoking hx. No occupational exposures that he knows of. Has had some weight loss (~7 lbs) in the last month. He denies sinus issues, chest pain. Has a light cough with some sputum production, but moreso had noticed dyspnea.     Interval hx:  Pt continues to feel well. Was off of oxygen during exam today and able to stand without difficulty. Tolerating IVIG and steroid infusions well.    Still gets easily short of breath with activity.     Discussed cyclophosphamide and was consented with son on phone.     History reviewed. No pertinent past medical history.    History reviewed. No pertinent surgical history.      There is no immunization history on file for this patient.    Review of patient's allergies indicates:  No Known Allergies  Current Facility-Administered Medications   Medication Frequency    acetaminophen tablet 650 mg Q4H PRN    [START ON 9/7/2024] amLODIPine tablet 5 mg Daily    aspirin EC tablet 81 mg Daily    atorvastatin tablet 80 mg Daily    dextrose 10% bolus 125 mL 125 mL PRN    dextrose 10% bolus 250 mL 250 mL PRN    enoxaparin injection 40 mg Daily    glucagon (human recombinant) injection 1 mg PRN    glucose chewable tablet 16 g PRN    glucose chewable tablet 24 g PRN    Immune Globulin G (IGG)-PRO-IGA 10 % injection (Privigen) 10 % injection 30 g Q24H    methylPREDNISolone sodium succinate (SOLU-MEDROL) 1,000 mg in D5W 100 mL IVPB Q24H    naloxone 0.4 mg/mL injection 0.02 mg PRN    ondansetron injection 4 mg Q8H PRN    pantoprazole EC tablet 40 mg Daily    sodium chloride 0.9%  flush 10 mL Q12H PRN    sulfamethoxazole-trimethoprim 400-80mg per tablet 1 tablet Every Mon, Wed, Fri     Family History    None       Tobacco Use    Smoking status: Former     Current packs/day: 0.00     Types: Cigarettes     Quit date: 1991     Years since quittin.7    Smokeless tobacco: Never    Tobacco comments:     quit 30yrs ago   Substance and Sexual Activity    Alcohol use: Yes     Alcohol/week: 2.0 standard drinks of alcohol     Types: 1 Glasses of wine, 1 Cans of beer per week     Comment: 1 beer and wine every day    Drug use: Never    Sexual activity: Yes     Review of Systems   Constitutional:  Positive for fatigue. Negative for fever and unexpected weight change.   HENT:  Negative for facial swelling.    Eyes:  Negative for visual disturbance.   Respiratory:  Positive for cough and shortness of breath.    Cardiovascular:  Negative for chest pain.   Gastrointestinal:  Negative for constipation and diarrhea.   Genitourinary:  Negative for dysuria.   Musculoskeletal:  Negative for arthralgias.   Skin:  Negative for rash.   Neurological:  Positive for weakness. Negative for headaches.   Hematological:  Negative for adenopathy.   Psychiatric/Behavioral:  Negative for behavioral problems.      Objective:     Vital Signs (Most Recent):  Temp: 98.2 °F (36.8 °C) (24 1645)  Pulse: 67 (24 1730)  Resp: 18 (24 1554)  BP: 135/62 (24 1730)  SpO2: 100 % (24 1730) Vital Signs (24h Range):  Temp:  [97.7 °F (36.5 °C)-98.6 °F (37 °C)] 98.2 °F (36.8 °C)  Pulse:  [66-86] 67  Resp:  [17-20] 18  SpO2:  [95 %-100 %] 100 %  BP: (111-135)/(53-68) 135/62     Weight: 75.6 kg (166 lb 10.7 oz) (24 2300)  Body mass index is 26.1 kg/m².  Body surface area is 1.89 meters squared.      Intake/Output Summary (Last 24 hours) at 2024 1741  Last data filed at 2024 1735  Gross per 24 hour   Intake 1310 ml   Output 1170 ml   Net 140 ml         Physical Exam   Constitutional: He is oriented  to person, place, and time. No distress.   HENT:   Head: Normocephalic and atraumatic.   Cardiovascular: Normal rate, regular rhythm and normal heart sounds.   Pulmonary/Chest: Effort normal.   Pulmonary Comments: Fine crackles present at bilateral bases. Conversational dyspnea resolved.  Abdominal: Soft. There is no abdominal tenderness.   Musculoskeletal:         General: No tenderness. Normal range of motion.   Neurological: He is alert and oriented to person, place, and time.   Skin: Skin is warm and dry.   Fissures present on hands - color of hands much improved from pictures taken at 9/5 visit.   Psychiatric: His behavior is normal. Judgment and thought content normal.        Significant Labs:  All pertinent lab results from the last 24 hours have been reviewed.    Significant Imaging:  Imaging results within the past 24 hours have been reviewed.  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    - Pt began having symptoms of dyspnea on exertion 6/2023 - he had also at that time noted generalized fatigue and fissures on the tips of his fingers. Has a hx of raynaud's from 10 years ago that had gotten progressively worse as well as a remote cardiac hx, otherwise no other symptoms.   - Pt was sent here after being evaluated in the rheumatology clinic. PFTs from his last visit with Dr. Reina were much worse after only a month. This prompted admission for IV treatment.  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 However, presentation could be consistent with a mixed connective tissue disease (although SmRNP negative) or scleroderma w/ hx of raynauds (which is also present with anti-synthetase). Only + serology was PRESTON of 1:640 w/ negative JASEN  (including Scl-70). HRCT w/ UIP pattern ILD.     Recommendations:  - Will follow ACR rapidly progressive ILD protocol with pulse dose steroids (plan for 5 days of 1g/day, 9/5-9/9) as well as 5 days of IVIG (9/6-9/10). Consent obtained with patient, son was present via the phone as well  - Plan for cyclophosphamide tomorrow as long as hep B quant negative (pt consented today, orders placed)  - Will consider addition of tacrolimus, will discuss with primary rheumatologist  - Appreciate hepatology input, pt started on hepatitis prophylaxis today  - Will also get MRI bilateral humerus   Awaiting right arm   Left arm without sequelae of inflammation  - F/u myomarker panel  - F/u RNP-III /Scl-70  - Ordered CT A/P for cancer screening          Eulalia Martell MD  Rheumatology  [facility] ([place])

## 2024-09-08 NOTE — ASSESSMENT & PLAN NOTE
64 y.o. male with past medical history of HTN, HLD, Reynaud's, who presents from rheumatology clinic for concern for progressive pulmonary fibrosis. Worsening shortness of breath over the course of several months. Was finishing prednisone taper without improvement.   - positive PRESTON 1:640 speckled with negative RF and anti CCP  - CT chest showed stable changes of interstitial lung disease with possible UIP pattern. Areas of persistent coalescent opacity may reflect organizing pneumonia.   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 Planned for 5 day course  - Protonix and PJP PPx  - Rheumatology consulted  - Started on IVIG

## 2024-09-08 NOTE — CONSULTS
I have seen the patient, reviewed the blood work, imaging and other studies.I have personally interviewed and examined the patient at bedside.    65 yo M with history of of HLD admitted from rheum clinic on 9/5 due to concern for rapidly progressive ILD and myositis.  Patient started to have SOB and  hands  in 6/2023. He has history of Raynauds for about 10 years.  Patient was seen in ILD clinic and decline in PFTs in only a month so he was admitted for acute management of ILD.   Given ILD, Raynauds,  hands and ? 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 Work up so far with PRESTON + at 1:640 with negative profile.  Given progressive ILD, plan will be for Iv IG and pulse dose steroids for a total of 5 days.  Will plan on Cytoxan once hep B viral DNA is back.   Will discuss with his rheumatologist if he wanted us to add Prograf as inpatient.  Plan for 5 days of pulse dose steroids ( today is day 4) and then transition to prednisone 60mg a day  -continue IV Ig G for 5 days (today is day 3)- Follow up hep B DNA  Follow up MRI of right humerus  Follow myomarker panel  CT abdomen/pelvis for cancer screening

## 2024-09-08 NOTE — PLAN OF CARE
Problem: Adult Inpatient Plan of Care  Goal: Plan of Care Review  Outcome: Progressing  Goal: Patient-Specific Goal (Individualized)  Outcome: Progressing  Goal: Absence of Hospital-Acquired Illness or Injury  Outcome: Progressing  Goal: Optimal Comfort and Wellbeing  Outcome: Progressing  Goal: Readiness for Transition of Care  Outcome: Progressing     Problem: Pulmonary Impairment  Goal: Improved Activity Tolerance  Outcome: Progressing  Goal: Effective Airway Clearance  Outcome: Progressing  Goal: Optimal Gas Exchange  Outcome: Progressing     Problem: Gas Exchange Impaired  Goal: Optimal Gas Exchange  Outcome: Progressing     Problem: Infection  Goal: Absence of Infection Signs and Symptoms  Outcome: Progressing      (V5) oriented, appropriate

## 2024-09-08 NOTE — PLAN OF CARE
Problem: Adult Inpatient Plan of Care  Goal: Plan of Care Review  9/8/2024 1727 by Naya Tobar RN  Outcome: Progressing  9/8/2024 1717 by Naya Tobar RN  Outcome: Progressing  Goal: Patient-Specific Goal (Individualized)  9/8/2024 1727 by Naya Tobar RN  Outcome: Progressing  9/8/2024 1717 by Naya Tobar RN  Outcome: Progressing  Goal: Absence of Hospital-Acquired Illness or Injury  9/8/2024 1727 by Naya Tobar RN  Outcome: Progressing  9/8/2024 1717 by Naya Tobar RN  Outcome: Progressing  Goal: Optimal Comfort and Wellbeing  9/8/2024 1727 by Naya Tobar RN  Outcome: Progressing  9/8/2024 1717 by Naya Tobar RN  Outcome: Progressing  Goal: Readiness for Transition of Care  9/8/2024 1727 by Naya Tobar RN  Outcome: Progressing  9/8/2024 1717 by Naya Tobar RN  Outcome: Progressing     Problem: Pulmonary Impairment  Goal: Improved Activity Tolerance  9/8/2024 1727 by Naya Tobar RN  Outcome: Progressing  9/8/2024 1717 by Naya Tobar RN  Outcome: Progressing  Goal: Effective Airway Clearance  9/8/2024 1727 by Naya Tobar RN  Outcome: Progressing  9/8/2024 1717 by Naya Tobar RN  Outcome: Progressing  Goal: Optimal Gas Exchange  9/8/2024 1727 by Naya Tobar RN  Outcome: Progressing  9/8/2024 1717 by Naya Tobar RN  Outcome: Progressing     Problem: Gas Exchange Impaired  Goal: Optimal Gas Exchange  9/8/2024 1727 by Naya Tobar RN  Outcome: Progressing  9/8/2024 1717 by Naya Tobar RN  Outcome: Progressing     Problem: Infection  Goal: Absence of Infection Signs and Symptoms  9/8/2024 1727 by Naya Tobar RN  Outcome: Progressing  9/8/2024 1717 by Naya Tobar RN  Outcome: Progressing

## 2024-09-08 NOTE — NURSING
PA at bedside assessing pt Pt aaox4, no c/o pain. IGG done during the shift, tolerated well. MRI for R hummers done. Rheumatology done consent for cyclophosphamide with pt at the bedside. Consent witness and filled in pt's chart. CT pelvis pending. Pt independent ADLs, call light within reach, family at the bedside.

## 2024-09-09 LAB
ALBUMIN SERPL BCP-MCNC: 2.7 G/DL (ref 3.5–5.2)
ALBUMIN SERPL ELPH-MCNC: 3.48 G/DL (ref 3.35–5.55)
ALP SERPL-CCNC: 53 U/L (ref 55–135)
ALPHA1 GLOB SERPL ELPH-MCNC: 0.32 G/DL (ref 0.17–0.41)
ALPHA2 GLOB SERPL ELPH-MCNC: 1.08 G/DL (ref 0.43–0.99)
ALT SERPL W/O P-5'-P-CCNC: 26 U/L (ref 10–44)
ANCA AB TITR SER IF: NORMAL TITER
ANION GAP SERPL CALC-SCNC: 6 MMOL/L (ref 8–16)
AST SERPL-CCNC: 21 U/L (ref 10–40)
B-GLOBULIN SERPL ELPH-MCNC: 0.88 G/DL (ref 0.5–1.1)
B2 GLYCOPROT1 IGA SER QL: 10 U/ML
B2 GLYCOPROT1 IGG SER QL: <0.8 U/ML
B2 GLYCOPROT1 IGM SER QL: <2.4 U/ML
BASOPHILS # BLD AUTO: 0.03 K/UL (ref 0–0.2)
BASOPHILS NFR BLD: 0.3 % (ref 0–1.9)
BILIRUB SERPL-MCNC: 0.4 MG/DL (ref 0.1–1)
BUN SERPL-MCNC: 15 MG/DL (ref 8–23)
CALCIUM SERPL-MCNC: 8.9 MG/DL (ref 8.7–10.5)
CARDIOLIPIN IGG SER IA-ACNC: <9.4 GPL (ref 0–14.99)
CARDIOLIPIN IGM SER IA-ACNC: <9.4 MPL (ref 0–12.49)
CHLORIDE SERPL-SCNC: 100 MMOL/L (ref 95–110)
CO2 SERPL-SCNC: 26 MMOL/L (ref 23–29)
CONFIRM DRVVT STA-STACLOT: NORMAL S
CREAT SERPL-MCNC: 0.8 MG/DL (ref 0.5–1.4)
DIFFERENTIAL METHOD BLD: ABNORMAL
DRVVT SCREEN TO CONFIRM RATIO: NORMAL {RATIO}
ENA SCL70 AB SER-ACNC: <0.6 U/ML
EOSINOPHIL # BLD AUTO: 0 K/UL (ref 0–0.5)
EOSINOPHIL NFR BLD: 0 % (ref 0–8)
ERYTHROCYTE [DISTWIDTH] IN BLOOD BY AUTOMATED COUNT: 13.8 % (ref 11.5–14.5)
EST. GFR  (NO RACE VARIABLE): >60 ML/MIN/1.73 M^2
GAMMA GLOB SERPL ELPH-MCNC: 0.84 G/DL (ref 0.67–1.58)
GLUCOSE SERPL-MCNC: 138 MG/DL (ref 70–110)
HCT VFR BLD AUTO: 47.5 % (ref 40–54)
HEPARIN NT PPP QL: NORMAL
HEPATITIS B VIRUS DNA: NORMAL
HEPATITIS B VIRUS DNA: NORMAL
HEPATITIS B VIRUS PCR, QUANT: NOT DETECTED IU/ML
HEPATITIS B VIRUS PCR, QUANT: NOT DETECTED IU/ML
HGB BLD-MCNC: 15.4 G/DL (ref 14–18)
HMGCR IGG SER IA-ACNC: <20 CU
IGG1 SER-MCNC: 501 MG/DL (ref 382–929)
IGG2 SER-MCNC: 170 MG/DL (ref 242–700)
IGG3 SER-MCNC: 31 MG/DL (ref 22–176)
IGG4 SER-MCNC: 16 MG/DL (ref 4–86)
IMM GRANULOCYTES # BLD AUTO: 0.17 K/UL (ref 0–0.04)
IMM GRANULOCYTES NFR BLD AUTO: 1.8 % (ref 0–0.5)
INTERPRETATION SERPL IFE-IMP: NORMAL
LA 3 SCREEN W REFLEX-IMP: NORMAL
LMW HEPARIN IND PLT AB SER QL: NORMAL
LYMPHOCYTES # BLD AUTO: 0.6 K/UL (ref 1–4.8)
LYMPHOCYTES NFR BLD: 6.5 % (ref 18–48)
MCH RBC QN AUTO: 29.7 PG (ref 27–31)
MCHC RBC AUTO-ENTMCNC: 32.4 G/DL (ref 32–36)
MCV RBC AUTO: 92 FL (ref 82–98)
MIXING DRVVT/NORMAL: NORMAL %
MONOCYTES # BLD AUTO: 1 K/UL (ref 0.3–1)
MONOCYTES NFR BLD: 9.9 % (ref 4–15)
NEUTRALIZED DRVVT SCREEN RATIO: NORMAL
NEUTROPHILS # BLD AUTO: 7.9 K/UL (ref 1.8–7.7)
NEUTROPHILS NFR BLD: 81.5 % (ref 38–73)
NRBC BLD-RTO: 0 /100 WBC
P-ANCA TITR SER IF: NORMAL TITER
PATHOLOGIST INTERPRETATION IFE: NORMAL
PATHOLOGIST INTERPRETATION SPE: NORMAL
PLATELET # BLD AUTO: 185 K/UL (ref 150–450)
PMV BLD AUTO: 10.2 FL (ref 9.2–12.9)
POTASSIUM SERPL-SCNC: 3.8 MMOL/L (ref 3.5–5.1)
PROT SERPL-MCNC: 6.6 G/DL (ref 6–8.4)
PROT SERPL-MCNC: 7.6 G/DL (ref 6–8.4)
PROTHROMBIN TIME: 12.3 S (ref 12–15.5)
RBC # BLD AUTO: 5.18 M/UL (ref 4.6–6.2)
SCREEN APTT/NORMAL: 1.15
SCREEN APTT/NORMAL: NORMAL
SCREEN DRVVT/NORMAL: 0.91 %
SODIUM SERPL-SCNC: 132 MMOL/L (ref 136–145)
STRONGYLOIDES ANTIBODY IGG: NEGATIVE
THROMBIN TIME: NORMAL S
WBC # BLD AUTO: 9.66 K/UL (ref 3.9–12.7)

## 2024-09-09 PROCEDURE — 80053 COMPREHEN METABOLIC PANEL: CPT | Mod: NTX | Performed by: STUDENT IN AN ORGANIZED HEALTH CARE EDUCATION/TRAINING PROGRAM

## 2024-09-09 PROCEDURE — 63600175 PHARM REV CODE 636 W HCPCS: Mod: NTX | Performed by: STUDENT IN AN ORGANIZED HEALTH CARE EDUCATION/TRAINING PROGRAM

## 2024-09-09 PROCEDURE — 83516 IMMUNOASSAY NONANTIBODY: CPT | Mod: 59,NTX | Performed by: STUDENT IN AN ORGANIZED HEALTH CARE EDUCATION/TRAINING PROGRAM

## 2024-09-09 PROCEDURE — 85025 COMPLETE CBC W/AUTO DIFF WBC: CPT | Mod: NTX | Performed by: STUDENT IN AN ORGANIZED HEALTH CARE EDUCATION/TRAINING PROGRAM

## 2024-09-09 PROCEDURE — 83516 IMMUNOASSAY NONANTIBODY: CPT | Mod: NTX | Performed by: STUDENT IN AN ORGANIZED HEALTH CARE EDUCATION/TRAINING PROGRAM

## 2024-09-09 PROCEDURE — 25000003 PHARM REV CODE 250: Mod: NTX | Performed by: STUDENT IN AN ORGANIZED HEALTH CARE EDUCATION/TRAINING PROGRAM

## 2024-09-09 PROCEDURE — 99232 SBSQ HOSP IP/OBS MODERATE 35: CPT | Mod: NTX,,, | Performed by: INTERNAL MEDICINE

## 2024-09-09 PROCEDURE — 20600001 HC STEP DOWN PRIVATE ROOM: Mod: NTX

## 2024-09-09 PROCEDURE — 36415 COLL VENOUS BLD VENIPUNCTURE: CPT | Mod: NTX | Performed by: STUDENT IN AN ORGANIZED HEALTH CARE EDUCATION/TRAINING PROGRAM

## 2024-09-09 PROCEDURE — 63600175 PHARM REV CODE 636 W HCPCS: Mod: JZ,JG,NTX | Performed by: STUDENT IN AN ORGANIZED HEALTH CARE EDUCATION/TRAINING PROGRAM

## 2024-09-09 RX ORDER — SODIUM CHLORIDE 0.9 % (FLUSH) 0.9 %
10 SYRINGE (ML) INJECTION
Status: CANCELLED | OUTPATIENT
Start: 2024-10-07

## 2024-09-09 RX ORDER — ACETAMINOPHEN 325 MG/1
650 TABLET ORAL
Status: DISCONTINUED | OUTPATIENT
Start: 2024-09-09 | End: 2024-09-09

## 2024-09-09 RX ORDER — ACETAMINOPHEN 325 MG/1
650 TABLET ORAL
Status: CANCELLED | OUTPATIENT
Start: 2024-10-07

## 2024-09-09 RX ORDER — HEPARIN 100 UNIT/ML
500 SYRINGE INTRAVENOUS
Status: CANCELLED | OUTPATIENT
Start: 2024-10-07

## 2024-09-09 RX ORDER — ACETAMINOPHEN 325 MG/1
650 TABLET ORAL EVERY 4 HOURS PRN
Status: CANCELLED | OUTPATIENT
Start: 2024-10-07

## 2024-09-09 RX ORDER — PREDNISONE 20 MG/1
60 TABLET ORAL DAILY
Status: DISCONTINUED | OUTPATIENT
Start: 2024-09-10 | End: 2024-09-10 | Stop reason: HOSPADM

## 2024-09-09 RX ADMIN — SULFAMETHOXAZOLE AND TRIMETHOPRIM 1 TABLET: 400; 80 TABLET ORAL at 09:09

## 2024-09-09 RX ADMIN — ATORVASTATIN CALCIUM 80 MG: 40 TABLET, FILM COATED ORAL at 09:09

## 2024-09-09 RX ADMIN — ASPIRIN 81 MG: 81 TABLET, COATED ORAL at 09:09

## 2024-09-09 RX ADMIN — ENOXAPARIN SODIUM 40 MG: 40 INJECTION SUBCUTANEOUS at 05:09

## 2024-09-09 RX ADMIN — HUMAN IMMUNOGLOBULIN G 30 G: 20 LIQUID INTRAVENOUS at 06:09

## 2024-09-09 RX ADMIN — AMLODIPINE BESYLATE 5 MG: 5 TABLET ORAL at 09:09

## 2024-09-09 RX ADMIN — PANTOPRAZOLE SODIUM 40 MG: 40 TABLET, DELAYED RELEASE ORAL at 09:09

## 2024-09-09 RX ADMIN — METHYLPREDNISOLONE SODIUM SUCCINATE 1000 MG: 1 INJECTION INTRAMUSCULAR; INTRAVENOUS at 12:09

## 2024-09-09 NOTE — PLAN OF CARE
Problem: Pulmonary Impairment  Goal: Improved Activity Tolerance  Outcome: Progressing  Goal: Effective Airway Clearance  Outcome: Progressing  Goal: Optimal Gas Exchange  Outcome: Progressing     Problem: Gas Exchange Impaired  Goal: Optimal Gas Exchange  Outcome: Progressing     Problem: Infection  Goal: Absence of Infection Signs and Symptoms  Outcome: Progressing    Patient alert and oriented. VSS. Room air. Solu-medrol given as ordered. IGVG ordered and given. Voices no complaints of pain or discomfort.

## 2024-09-09 NOTE — PROGRESS NOTES
[facility] ([address] Medicine  Progress Note    Patient Name: Darrell Corona  MRN: [id number]  Patient Class: IP- Inpatient   Admission Date: 9/5/2024  Length of Stay: 4 days  Attending Physician: Mulugeta Francisco*  Primary Care Provider: Nikhil Palma MD        Subjective:     Principal Problem:ILD (interstitial lung disease)        HPI:  Darrell Corona is a 64 y.o. male with past medical history of HTN, HLD, Reynaud's, who presents from rheumatology clinic for concern for progressive pulmonary fibrosis. Patient was being evaluated outpatient by pulmonary for progressive shortness of breath over the course of several months. Per patient's family at bedside he used to be very active, able to walk 4-5 miles per day or up several flights of stairs without difficulty. However his shortness of breath has progressed to where he will have to stop after a block to catch his breath. He had an PRESTON that was positive and a negative RF (positive PRESTON 1:640 speckled with negative RF and anti CCP). CT chest was obtained which showed stable changes of interstitial lung disease with possible UIP pattern. Areas of persistent coalescent opacity may reflect organizing pneumonia. He was also assessed for oxygen outpatient, and he desaturated and required oxygen to be ordered. He was started on steroid taper which was supposed to end this week, but has not noted improvement, and in fact has worsened. He then saw rheumatology in clinic and was instructed to present to ED. No reported environmental or occupational exposures. He has a history of reynaud's which has been present for years. Denies chest pain, abdominal pain, nausea / vomiting, fever/chills, joint pains, URI symptoms.  In the ED, patient was hemodynamically stable. Reportedly had episode of SVT while in ED with HR 160s, during which patient was asymptomatic. Vagal maneuvers were attempted but were without success. Patient was then given diltiazem with  apparent conversion to NSR. Pulmonary was consulted and he was given 1 g solumedrol. He was admitted to hospital medicine.     Overview/Hospital Course:  Admitted to hospital medicine and rheumatology and pulmonary consulted. Started on solumedrol and IVIG.     Interval History: No acute events. Pt reports feels like breathing is improving  Per rheumatology may start cytoxan    Review of Systems  Objective:     Vital Signs (Most Recent):  Temp: 97.6 °F (36.4 °C) (09/09/24 1143)  Pulse: 61 (09/09/24 1143)  Resp: 18 (09/09/24 1143)  BP: (!) 131/59 (09/09/24 1143)  SpO2: 99 % (09/09/24 1143) Vital Signs (24h Range):  Temp:  [97.6 °F (36.4 °C)-98.7 °F (37.1 °C)] 97.6 °F (36.4 °C)  Pulse:  [50-89] 61  Resp:  [16-18] 18  SpO2:  [95 %-100 %] 99 %  BP: (131-170)/(59-82) 131/59     Weight: 75.3 kg (166 lb)  Body mass index is 26 kg/m².    Intake/Output Summary (Last 24 hours) at 9/9/2024 1236  Last data filed at 9/8/2024 2025  Gross per 24 hour   Intake 240 ml   Output 1000 ml   Net -760 ml      Physical Exam  Constitutional:       General: He is not in acute distress.     Appearance: Normal appearance. He is not toxic-appearing or diaphoretic.   HENT:      Head: Normocephalic and atraumatic.   Cardiovascular:      Rate and Rhythm: Normal rate and regular rhythm.      Heart sounds: No murmur heard.     No friction rub. No gallop.   Pulmonary:      Effort: Pulmonary effort is normal. No respiratory distress.      Breath sounds: Rales present. No wheezing.   Abdominal:      General: Abdomen is flat. There is no distension.      Palpations: Abdomen is soft.      Tenderness: There is no abdominal tenderness. There is no guarding or rebound.   Musculoskeletal:      Cervical back: Normal range of motion and neck supple.      Right lower leg: No edema.      Left lower leg: No edema.      Comments: 's hands   Skin:     General: Skin is warm and dry.   Neurological:      Mental Status: He is alert.         Computed MELD 3.0  "unavailable. One or more values for this score either were not found within the given timeframe or did not fit some other criterion.  Computed MELD-Na unavailable. One or more values for this score either were not found within the given timeframe or did not fit some other criterion.      Significant Labs:  CBC:  Recent Labs   Lab 09/08/24  0318 09/09/24  1043   WBC 13.74* 9.66   HGB 14.7 15.4   HCT 44.4 47.5    185     CMP:  Recent Labs   Lab 09/08/24  0318 09/09/24  1043    132*   K 3.9 3.8    100   CO2 22* 26   * 138*   BUN 16 15   CREATININE 0.8 0.8   CALCIUM 8.8 8.9   PROT 7.1 7.6   ALBUMIN 2.6* 2.7*   BILITOT 0.3 0.4   ALKPHOS 55 53*   AST 16 21   ALT 21 26   ANIONGAP 9 6*     PTINR:  No results for input(s): "INR" in the last 48 hours.    Significant Procedures:   Dobutamine Stress Test with Color Flow: No results found for this or any previous visit.        Assessment/Plan:      * ILD (interstitial lung disease)  64 y.o. male with past medical history of HTN, HLD, Reynaud's, who presents from rheumatology clinic for concern for progressive pulmonary fibrosis. Worsening shortness of breath over the course of several months. Was finishing prednisone taper without improvement.   - positive PRESTON 1:640 speckled with negative RF and anti CCP  - CT chest showed stable changes of interstitial lung disease with possible UIP pattern. Areas of persistent coalescent opacity may reflect organizing pneumonia.   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 Completed 5 day course  - Protonix and PJP PPx  - Rheumatology consulted  - Started on IVIG    Acute hypoxemic respiratory failure  Patient with Hypoxic Respiratory failure which is Acute on chronic.  he is not on home oxygen. Supplemental oxygen was provided and noted-      .   Signs/symptoms of respiratory failure include- tachypnea. Contributing diagnoses includes - Interstitial lung disease Labs " and images were reviewed. Patient Has not had a recent ABG. Will treat underlying causes and adjust management of respiratory failure as follows- As above    Hyperlipidemia LDL goal <70  Continue statin        VTE Risk Mitigation (From admission, onward)           Ordered     enoxaparin injection 40 mg  Daily         09/05/24 1658     IP VTE HIGH RISK PATIENT  Once         09/05/24 1658     Place sequential compression device  Until discontinued         09/05/24 1658                    Discharge Planning   BENJI: 9/10/2024     Code Status: Full Code   Is the patient medically ready for discharge?:     Reason for patient still in hospital (select all that apply): Patient trending condition and Treatment  Discharge Plan A: Home with family   Discharge Delays: None known at this time      Mulugeta Francisco MD  Department of Hospital Medicine   [facility] - Stepdown Flex ([place])

## 2024-09-09 NOTE — SUBJECTIVE & OBJECTIVE
Interval History: No acute events. Pt reports feels like breathing is improving  Per rheumatology may start cytoxan    Review of Systems  Objective:     Vital Signs (Most Recent):  Temp: 97.6 °F (36.4 °C) (09/09/24 1143)  Pulse: 61 (09/09/24 1143)  Resp: 18 (09/09/24 1143)  BP: (!) 131/59 (09/09/24 1143)  SpO2: 99 % (09/09/24 1143) Vital Signs (24h Range):  Temp:  [97.6 °F (36.4 °C)-98.7 °F (37.1 °C)] 97.6 °F (36.4 °C)  Pulse:  [50-89] 61  Resp:  [16-18] 18  SpO2:  [95 %-100 %] 99 %  BP: (131-170)/(59-82) 131/59     Weight: 75.3 kg (166 lb)  Body mass index is 26 kg/m².    Intake/Output Summary (Last 24 hours) at 9/9/2024 1236  Last data filed at 9/8/2024 2025  Gross per 24 hour   Intake 240 ml   Output 1000 ml   Net -760 ml      Physical Exam  Constitutional:       General: He is not in acute distress.     Appearance: Normal appearance. He is not toxic-appearing or diaphoretic.   HENT:      Head: Normocephalic and atraumatic.   Cardiovascular:      Rate and Rhythm: Normal rate and regular rhythm.      Heart sounds: No murmur heard.     No friction rub. No gallop.   Pulmonary:      Effort: Pulmonary effort is normal. No respiratory distress.      Breath sounds: Rales present. No wheezing.   Abdominal:      General: Abdomen is flat. There is no distension.      Palpations: Abdomen is soft.      Tenderness: There is no abdominal tenderness. There is no guarding or rebound.   Musculoskeletal:      Cervical back: Normal range of motion and neck supple.      Right lower leg: No edema.      Left lower leg: No edema.      Comments: 's hands   Skin:     General: Skin is warm and dry.   Neurological:      Mental Status: He is alert.         Computed MELD 3.0 unavailable. One or more values for this score either were not found within the given timeframe or did not fit some other criterion.  Computed MELD-Na unavailable. One or more values for this score either were not found within the given timeframe or did not fit  "some other criterion.      Significant Labs:  CBC:  Recent Labs   Lab 09/08/24  0318 09/09/24  1043   WBC 13.74* 9.66   HGB 14.7 15.4   HCT 44.4 47.5    185     CMP:  Recent Labs   Lab 09/08/24  0318 09/09/24  1043    132*   K 3.9 3.8    100   CO2 22* 26   * 138*   BUN 16 15   CREATININE 0.8 0.8   CALCIUM 8.8 8.9   PROT 7.1 7.6   ALBUMIN 2.6* 2.7*   BILITOT 0.3 0.4   ALKPHOS 55 53*   AST 16 21   ALT 21 26   ANIONGAP 9 6*     PTINR:  No results for input(s): "INR" in the last 48 hours.    Significant Procedures:   Dobutamine Stress Test with Color Flow: No results found for this or any previous visit.      "

## 2024-09-09 NOTE — PLAN OF CARE
CHW met with patient/family at bedside. Patient experience rounding completed and reviewed the following.     Do you know your discharge plan? Yes, Home with family     Have you discussed your needs and preferences with your SW/CM? Yes     If you are discharging home, do you have help at home? Yes, Family    Do you think you will need help additional at home at discharge? No     Do you currently have difficulty keeping up with bills, affording medicine or buying food? No    Assigned SW/CM notified of any patient/family needs or concerns. Appropriate resources provided to address patient's needs.

## 2024-09-09 NOTE — TREATMENT PLAN
Hepatology Treatment Plan    Darrell Corona is a 64 y.o. male admitted to hospital 9/5/2024 (Hospital Day: 5) due to ILD (interstitial lung disease).     Interval History  HBV DNA PCR not detected. NAEON.     Objective  Temp:  [97.6 °F (36.4 °C)-98.7 °F (37.1 °C)] 97.6 °F (36.4 °C) (09/09 1143)  Pulse:  [50-73] 61 (09/09 1143)  BP: (131-170)/(59-82) 131/59 (09/09 1143)  Resp:  [16-18] 18 (09/09 1143)  SpO2:  [95 %-100 %] 99 % (09/09 1143)      Laboratory    Lab Results   Component Value Date    WBC 9.66 09/09/2024    HGB 15.4 09/09/2024    HCT 47.5 09/09/2024    MCV 92 09/09/2024     09/09/2024       Lab Results   Component Value Date     (L) 09/09/2024    K 3.8 09/09/2024     09/09/2024    CO2 26 09/09/2024    BUN 15 09/09/2024    CREATININE 0.8 09/09/2024    CALCIUM 8.9 09/09/2024       Lab Results   Component Value Date    ALBUMIN 2.7 (L) 09/09/2024    ALT 26 09/09/2024    AST 21 09/09/2024    ALKPHOS 53 (L) 09/09/2024    BILITOT 0.4 09/09/2024           Assessment  Darrell Corona is a 64 y.o. male with history of ILD, dermatomyositis, Raynaud's, HTN, and HLD who presented from rheumatology clinic with progressive pulm fibrosis. He has been started on steroids for further treatment per rheumatology. Hepatology was consulted given positive Hep B core total antibody on routine lab tests. Hep B surface Ab and Hep B surface Ag are negative. HBV DNA PCR negative. Patient denies any prior history of hepatitis or liver disease.     Problem List:  Positive Hep B total core Ab  ILD on steroids  Plan  - Start Vemlidy 25 mg today & continue for 6 months after the cessation of biologic therapy.   - Will arrange follow up in Hepatology clinic  - please obtain daily CBC, BMP, LFTs  - Plan of care was discussed with primary team    Thank you for involving us in the care of Darrell Corona. Please call with any additional concerns or questions.    Discussed with Dr. Blue. Signing off.     Archie Han MD,  PGY-VI  Gastroenterology Fellow  [facility]

## 2024-09-09 NOTE — ASSESSMENT & PLAN NOTE
64 y.o. male with past medical history of HTN, HLD, Reynaud's, who presents from rheumatology clinic for concern for progressive pulmonary fibrosis. Worsening shortness of breath over the course of several months. Was finishing prednisone taper without improvement.   - positive PRESTON 1:640 speckled with negative RF and anti CCP  - CT chest showed stable changes of interstitial lung disease with possible UIP pattern. Areas of persistent coalescent opacity may reflect organizing pneumonia.   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 Completed 5 day course  - Protonix and PJP PPx  - Rheumatology consulted  - Started on IVIG

## 2024-09-09 NOTE — PROGRESS NOTES
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    Rheumatologic History  - Newly diagnosed ILD after having dyspnea with exertion on a trip to [place] 6/2024. Was referred to pulm by PCP and noted to have pulmonary fibrosis on HRCT chest 8/30/2024 in UIP pattern. Had also been evaluated by cardiology without abnormality.   - Other symptoms noted: Raynaud's phenomenon, skin thickening of fingers, 's hands, nl CK, negative JASEN, negative Scl-70 high complements   Myomarker panel in process  - Serologies: PRESTON 1:640 speckled, negative RF/CCP  - Current treatment (prior to hospitalization): Prednisone taper  - Seen by [facility] [place] [place] [facility] 9/5 where he endorsed worsening SOB, worsening raynaud's, thickening of fingers, unintentional weight loss, fatigue, proximal muscle weakness. Show to have purple digits and digital ulcers at fingertips as well as sequalae of 's hands as well as upper extremity proximal muscle weakness.  - At that time was recommended to get 1g/day of solumedrol x3-5 days and to consider IVIG, cylophosphamide and tacro while IP. Also ordered lab work including ANCA labs, SPEP, GBM, cryo, C3/C4 (elevated), UA, APLS, pre-DMARD labs    Current Hospitalization  - Sent in for IV treatments directly from the rheumatology clinic 9/5. Received first dose of pulse steroids 9/5.  - Started IVIG 9/6 with plan for 5 day course    Initial evaluation - Pt stated he felt much better. Was off oxygen and speaking without difficulty first  visit, in afternoon was back on 2L and having some slight conversational dyspnea.    Pt confirmed that he has been feeling short of breath since a trip to [place] 6/2024. At the time he also started having some full body fatigue as well as he noticed some cracks and skin thickening of his fingers. He has a hx of raynaud's that he began to notice 10 years ago, but had never had an appearance like this before.    He denied any GERD symptoms or dysphagia. He has a remote smoking hx. No occupational exposures that he knows of. Has had some weight loss (~7 lbs) in the last month. He denies sinus issues, chest pain. Has a light cough with some sputum production, but moreso had noticed dyspnea.     Interval hx:  Pt continues to be off of oxygen and continues to feel better. Still gets dyspnea with activity.     History reviewed. No pertinent past medical history.    History reviewed. No pertinent surgical history.      There is no immunization history on file for this patient.    Review of patient's allergies indicates:  No Known Allergies  Current Facility-Administered Medications   Medication Frequency    acetaminophen tablet 650 mg Q4H PRN    [START ON 9/7/2024] amLODIPine tablet 5 mg Daily    aspirin EC tablet 81 mg Daily    atorvastatin tablet 80 mg Daily    dextrose 10% bolus 125 mL 125 mL PRN    dextrose 10% bolus 250 mL 250 mL PRN    enoxaparin injection 40 mg Daily    glucagon (human recombinant) injection 1 mg PRN    glucose chewable tablet 16 g PRN    glucose chewable tablet 24 g PRN    Immune Globulin G (IGG)-PRO-IGA 10 % injection (Privigen) 10 % injection 30 g Q24H    methylPREDNISolone sodium succinate (SOLU-MEDROL) 1,000 mg in D5W 100 mL IVPB Q24H    naloxone 0.4 mg/mL injection 0.02 mg PRN    ondansetron injection 4 mg Q8H PRN    pantoprazole EC tablet 40 mg Daily    sodium chloride 0.9% flush 10 mL Q12H PRN    sulfamethoxazole-trimethoprim 400-80mg per tablet 1 tablet Every Mon, Wed, Fri     Family  History    None       Tobacco Use    Smoking status: Former     Current packs/day: 0.00     Types: Cigarettes     Quit date: 1991     Years since quittin.7    Smokeless tobacco: Never    Tobacco comments:     quit 30yrs ago   Substance and Sexual Activity    Alcohol use: Yes     Alcohol/week: 2.0 standard drinks of alcohol     Types: 1 Glasses of wine, 1 Cans of beer per week     Comment: 1 beer and wine every day    Drug use: Never    Sexual activity: Yes     Review of Systems   Constitutional:  Positive for fatigue. Negative for fever and unexpected weight change.   HENT:  Negative for facial swelling.    Eyes:  Negative for visual disturbance.   Respiratory:  Positive for cough and shortness of breath.    Cardiovascular:  Negative for chest pain.   Gastrointestinal:  Negative for constipation and diarrhea.   Genitourinary:  Negative for dysuria.   Musculoskeletal:  Negative for arthralgias.   Skin:  Negative for rash.   Neurological:  Positive for weakness. Negative for headaches.   Hematological:  Negative for adenopathy.   Psychiatric/Behavioral:  Negative for behavioral problems.      Objective:     Vital Signs (Most Recent):  Temp: 98.2 °F (36.8 °C) (24 1645)  Pulse: 67 (24 1730)  Resp: 18 (24 1554)  BP: 135/62 (24 1730)  SpO2: 100 % (24 1730) Vital Signs (24h Range):  Temp:  [97.7 °F (36.5 °C)-98.6 °F (37 °C)] 98.2 °F (36.8 °C)  Pulse:  [66-86] 67  Resp:  [17-20] 18  SpO2:  [95 %-100 %] 100 %  BP: (111-135)/(53-68) 135/62     Weight: 75.6 kg (166 lb 10.7 oz) (24 2300)  Body mass index is 26.1 kg/m².  Body surface area is 1.89 meters squared.      Intake/Output Summary (Last 24 hours) at 2024 1741  Last data filed at 2024 1735  Gross per 24 hour   Intake 1310 ml   Output 1170 ml   Net 140 ml         Physical Exam   Constitutional: He is oriented to person, place, and time. No distress.   HENT:   Head: Normocephalic and atraumatic.   Cardiovascular: Normal  rate, regular rhythm and normal heart sounds.   Pulmonary/Chest: Effort normal.   Pulmonary Comments: Fine crackles present at bilateral bases. Conversational dyspnea resolved.  Abdominal: Soft. There is no abdominal tenderness.   Musculoskeletal:         General: No tenderness. Normal range of motion.   Neurological: He is alert and oriented to person, place, and time. He displays weakness (Does have diminished tricep strength (4/5) bilaterally - all other muscle groups 5/5).   Skin: Skin is warm and dry.   Fissures present on hands - color of hands much improved from pictures taken at 9/5 visit.   Psychiatric: His behavior is normal. Judgment and thought content normal.        Significant Labs:  All pertinent lab results from the last 24 hours have been reviewed.    Significant Imaging:  Imaging results within the past 24 hours have been reviewed.  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    - Pt began having symptoms of dyspnea on exertion 6/2023 - he had also at that time noted generalized fatigue and fissures on the tips of his fingers. Has a hx of raynaud's from 10 years ago that had gotten progressively worse as well as a remote cardiac hx, otherwise no other symptoms.   - Pt was sent here after being evaluated in the rheumatology clinic. PFTs from his last visit with Dr. Reina were much worse after only a month. This prompted admission for IV treatment.  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 However, presentation could be consistent with a mixed connective tissue disease (although SmRNP negative) or scleroderma w/ hx of raynauds (which is also present with anti-synthetase). Only + serology was PRESTON of 1:640 w/ negative JASEN  (including Scl-70). HRCT w/ UIP pattern ILD.   - MRI bilateral humerus mildly positive on right side, particularly in area of triceps  - CT A/P negative for malignancy   - Negative labwork: ANCA, lupus anticoagulant, anti-cardiolipin, RF, GBM, Scl-70  - Did have mild positive IgA for B-2 glycoprotein which is the least specific and unlikely to mean anything clinically    Recommendations:  - Will follow ACR rapidly progressive ILD protocol with pulse dose steroids (plan for 5 days of 1g/day, 9/5-9/9) as well as 5 days of IVIG (9/6-9/10)  - Hep B quant negative, however unfortunately CYC not covered as an inpatient - will work with case management and pharmacy team to get infusion scheduled as soon as possible.   - Will hold off on tacrolimus until pt follows with rheumatology outpatient  - Appreciate hepatology input, pt started on hepatitis prophylaxis while IP  - F/u myomarker panel  - F/u RNP-III  - Likely will be able to DC (from a rheumatologic standpoint) 9/10          Eulalia Martell MD  Rheumatology  [facility] - Stepdown Flex ([place])    I have personally taken the history and examined the patient and concur with the resident's note as above.  Clinically he is doing much better.  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  I do not think he needs a muscle biopsy.  We await the results of his MyoMarker panel.  He can be discharged after he finishes his IV infusions.  We will need to make arrangements for the Cytoxan as an outpatient.

## 2024-09-09 NOTE — PLAN OF CARE
[facility] ([place]  Discharge Reassessment    Primary Care Provider: Nikhil Palma MD    Expected Discharge Date: 9/10/2024    Reassessment (most recent)       Discharge Reassessment - 09/09/24 1727          Discharge Reassessment    Assessment Type Discharge Planning Reassessment     Did the patient's condition or plan change since previous assessment? No     Communicated BENJI with patient/caregiver Date not available/Unable to determine     Discharge Plan A Home;Home with family     Discharge Plan B Home;Home with family;Home Health   TBD    DME Needed Upon Discharge  oxygen   TBD    Why the patient remains in the hospital Requires continued medical care        Post-Acute Status    Discharge Delays None known at this time                 SW assigned to pt today.  Pt not medically ready for discharge. CM dept will continue following along w/ pt treatment team for recommendation/needs; possible home o2.     Discharge Plan A and Plan B have been determined by review of patient's clinical status, future medical and therapeutic needs, and coverage/benefits for post-acute care in coordination with multidisciplinary team members.    JULIA Sevilla   [facility]    Case Management Dept  [phone]

## 2024-09-10 ENCOUNTER — TELEPHONE (OUTPATIENT)
Dept: RHEUMATOLOGY | Facility: CLINIC | Age: 64
End: 2024-09-10
Payer: COMMERCIAL

## 2024-09-10 VITALS
OXYGEN SATURATION: 99 % | RESPIRATION RATE: 18 BRPM | HEIGHT: 67 IN | TEMPERATURE: 98 F | WEIGHT: 166 LBS | DIASTOLIC BLOOD PRESSURE: 73 MMHG | HEART RATE: 60 BPM | BODY MASS INDEX: 26.06 KG/M2 | SYSTOLIC BLOOD PRESSURE: 145 MMHG

## 2024-09-10 LAB
ALBUMIN SERPL BCP-MCNC: 2.5 G/DL (ref 3.5–5.2)
ALP SERPL-CCNC: 51 U/L (ref 55–135)
ALT SERPL W/O P-5'-P-CCNC: 30 U/L (ref 10–44)
ANION GAP SERPL CALC-SCNC: 9 MMOL/L (ref 8–16)
AST SERPL-CCNC: 24 U/L (ref 10–40)
BASOPHILS # BLD AUTO: 0.01 K/UL (ref 0–0.2)
BASOPHILS NFR BLD: 0.2 % (ref 0–1.9)
BILIRUB SERPL-MCNC: 0.3 MG/DL (ref 0.1–1)
BUN SERPL-MCNC: 17 MG/DL (ref 8–23)
CALCIUM SERPL-MCNC: 8.5 MG/DL (ref 8.7–10.5)
CHLORIDE SERPL-SCNC: 102 MMOL/L (ref 95–110)
CO2 SERPL-SCNC: 24 MMOL/L (ref 23–29)
CREAT SERPL-MCNC: 0.8 MG/DL (ref 0.5–1.4)
DIFFERENTIAL METHOD BLD: ABNORMAL
EOSINOPHIL # BLD AUTO: 0 K/UL (ref 0–0.5)
EOSINOPHIL NFR BLD: 0 % (ref 0–8)
ERYTHROCYTE [DISTWIDTH] IN BLOOD BY AUTOMATED COUNT: 13.7 % (ref 11.5–14.5)
EST. GFR  (NO RACE VARIABLE): >60 ML/MIN/1.73 M^2
GLUCOSE SERPL-MCNC: 167 MG/DL (ref 70–110)
HCT VFR BLD AUTO: 45.2 % (ref 40–54)
HGB BLD-MCNC: 14.8 G/DL (ref 14–18)
IMM GRANULOCYTES # BLD AUTO: 0.14 K/UL (ref 0–0.04)
IMM GRANULOCYTES NFR BLD AUTO: 2.2 % (ref 0–0.5)
LYMPHOCYTES # BLD AUTO: 0.5 K/UL (ref 1–4.8)
LYMPHOCYTES NFR BLD: 8.4 % (ref 18–48)
MCH RBC QN AUTO: 29.1 PG (ref 27–31)
MCHC RBC AUTO-ENTMCNC: 32.7 G/DL (ref 32–36)
MCV RBC AUTO: 89 FL (ref 82–98)
MONOCYTES # BLD AUTO: 0.3 K/UL (ref 0.3–1)
MONOCYTES NFR BLD: 4 % (ref 4–15)
NEUTROPHILS # BLD AUTO: 5.5 K/UL (ref 1.8–7.7)
NEUTROPHILS NFR BLD: 85.2 % (ref 38–73)
NRBC BLD-RTO: 0 /100 WBC
PLATELET # BLD AUTO: 170 K/UL (ref 150–450)
PMV BLD AUTO: 10.1 FL (ref 9.2–12.9)
POTASSIUM SERPL-SCNC: 3.6 MMOL/L (ref 3.5–5.1)
PROT SERPL-MCNC: 7.9 G/DL (ref 6–8.4)
PROTEINASE3 IGG SER-ACNC: <0.2 U
RBC # BLD AUTO: 5.09 M/UL (ref 4.6–6.2)
SODIUM SERPL-SCNC: 135 MMOL/L (ref 136–145)
WBC # BLD AUTO: 6.44 K/UL (ref 3.9–12.7)

## 2024-09-10 PROCEDURE — 63600175 PHARM REV CODE 636 W HCPCS: Mod: NTX | Performed by: STUDENT IN AN ORGANIZED HEALTH CARE EDUCATION/TRAINING PROGRAM

## 2024-09-10 PROCEDURE — 99231 SBSQ HOSP IP/OBS SF/LOW 25: CPT | Mod: NTX,,, | Performed by: INTERNAL MEDICINE

## 2024-09-10 PROCEDURE — 85025 COMPLETE CBC W/AUTO DIFF WBC: CPT | Mod: NTX | Performed by: STUDENT IN AN ORGANIZED HEALTH CARE EDUCATION/TRAINING PROGRAM

## 2024-09-10 PROCEDURE — 25000003 PHARM REV CODE 250: Mod: NTX | Performed by: STUDENT IN AN ORGANIZED HEALTH CARE EDUCATION/TRAINING PROGRAM

## 2024-09-10 PROCEDURE — 80053 COMPREHEN METABOLIC PANEL: CPT | Mod: NTX | Performed by: STUDENT IN AN ORGANIZED HEALTH CARE EDUCATION/TRAINING PROGRAM

## 2024-09-10 PROCEDURE — 99233 SBSQ HOSP IP/OBS HIGH 50: CPT | Mod: NTX,,, | Performed by: PHYSICIAN ASSISTANT

## 2024-09-10 PROCEDURE — 36415 COLL VENOUS BLD VENIPUNCTURE: CPT | Mod: NTX | Performed by: STUDENT IN AN ORGANIZED HEALTH CARE EDUCATION/TRAINING PROGRAM

## 2024-09-10 RX ORDER — AMLODIPINE BESYLATE 5 MG/1
5 TABLET ORAL DAILY
Qty: 90 TABLET | Refills: 3 | Status: SHIPPED | OUTPATIENT
Start: 2024-09-10 | End: 2025-09-10

## 2024-09-10 RX ORDER — OLMESARTAN MEDOXOMIL AND HYDROCHLOROTHIAZIDE 40/12.5 40; 12.5 MG/1; MG/1
1 TABLET ORAL DAILY
Start: 2024-09-10 | End: 2024-09-13

## 2024-09-10 RX ORDER — TAMSULOSIN HYDROCHLORIDE 0.4 MG/1
0.4 CAPSULE ORAL DAILY
Qty: 30 CAPSULE | Refills: 11 | Status: SHIPPED | OUTPATIENT
Start: 2024-09-10 | End: 2025-09-10

## 2024-09-10 RX ORDER — PANTOPRAZOLE SODIUM 40 MG/1
40 TABLET, DELAYED RELEASE ORAL DAILY
Qty: 90 TABLET | Refills: 3 | Status: SHIPPED | OUTPATIENT
Start: 2024-09-10 | End: 2025-09-10

## 2024-09-10 RX ORDER — MONTELUKAST SODIUM 10 MG/1
10 TABLET ORAL NIGHTLY
COMMUNITY
Start: 2024-08-06

## 2024-09-10 RX ORDER — PREDNISONE 20 MG/1
60 TABLET ORAL DAILY
Qty: 90 TABLET | Refills: 0 | Status: SHIPPED | OUTPATIENT
Start: 2024-09-10 | End: 2024-10-10

## 2024-09-10 RX ORDER — ALBUTEROL SULFATE 90 UG/1
2 INHALANT RESPIRATORY (INHALATION)
COMMUNITY
Start: 2024-08-06

## 2024-09-10 RX ORDER — SULFAMETHOXAZOLE AND TRIMETHOPRIM 400; 80 MG/1; MG/1
1 TABLET ORAL
Qty: 12 TABLET | Refills: 0 | Status: SHIPPED | OUTPATIENT
Start: 2024-09-11 | End: 2024-10-11

## 2024-09-10 RX ADMIN — ATORVASTATIN CALCIUM 80 MG: 40 TABLET, FILM COATED ORAL at 09:09

## 2024-09-10 RX ADMIN — PANTOPRAZOLE SODIUM 40 MG: 40 TABLET, DELAYED RELEASE ORAL at 09:09

## 2024-09-10 RX ADMIN — ASPIRIN 81 MG: 81 TABLET, COATED ORAL at 09:09

## 2024-09-10 RX ADMIN — TENOFOVIR ALAFENAMIDE 25 MG: 25 TABLET ORAL at 11:09

## 2024-09-10 RX ADMIN — HUMAN IMMUNOGLOBULIN G 30 G: 20 LIQUID INTRAVENOUS at 02:09

## 2024-09-10 RX ADMIN — AMLODIPINE BESYLATE 5 MG: 5 TABLET ORAL at 09:09

## 2024-09-10 RX ADMIN — PREDNISONE 60 MG: 20 TABLET ORAL at 09:09

## 2024-09-10 NOTE — ASSESSMENT & PLAN NOTE
Patient send to advanced lung disease clinic 8/29 for evaluation of rapidly progressive ILD. CT chest shows bibasilar interstitial fibrotic changes. Serologies have shown a positive PRESTON 1:640 speckled with negative RF and anti CCP. Sjogren's and scleroderma serologies from [facility] [place] pending. Has Raynaud's and evidence of mechanics hands on exam. He has had a significant drop in his FVC (24% drop) and DLCO (29%) in a 1 month period. 6MWT as outpatient one week ago with desat to 88% on RA when last month he only desaturated to 93%. I am concerned for a rapidly progressive ILD, ??MDA5; myositis panel ordered and pending. CRP elevated, CK normal, myomarker panel and aldolase pending. Is on steroids currently.     Received pulse steroids and IVIG x 5 days. Check ambulatory O2 prior to discharge. Follow up in [facility] clinic in two weeks (9/25). Awaiting cytoxan as outpatient. Continue prednisone 60 mg daily with bactrim ppx.

## 2024-09-10 NOTE — SUBJECTIVE & OBJECTIVE
Subjective:     Interval History: No acute events overnight. Completed inpatient pulse steroids and IVIG and reports significant improvement in dyspnea. Off oxygen at rest. Awaiting ambulatory oxygen testing and discharge home.     Continuous Infusions:  Scheduled Meds:   amLODIPine  5 mg Oral Daily    aspirin  81 mg Oral Daily    atorvastatin  80 mg Oral Daily    enoxparin  40 mg Subcutaneous Daily    Immune Globulin G (IGG)-PRO-IGA 10 % injection (Privigen)  0.4 g/kg Intravenous Q24H    pantoprazole  40 mg Oral Daily    predniSONE  60 mg Oral Daily    sulfamethoxazole-trimethoprim 400-80mg  1 tablet Oral Every Mon, Wed, Fri    tenofovir alafenamide  25 mg Oral Daily     PRN Meds:  Current Facility-Administered Medications:     acetaminophen, 650 mg, Oral, Q4H PRN    dextrose 10%, 12.5 g, Intravenous, PRN    dextrose 10%, 25 g, Intravenous, PRN    glucagon (human recombinant), 1 mg, Intramuscular, PRN    glucose, 16 g, Oral, PRN    glucose, 24 g, Oral, PRN    naloxone, 0.02 mg, Intravenous, PRN    ondansetron, 4 mg, Intravenous, Q8H PRN    sodium chloride 0.9%, 10 mL, Intravenous, Q12H PRN    Review of patient's allergies indicates:  No Known Allergies    Review of Systems   Constitutional:  Positive for activity change and fatigue. Negative for appetite change, chills and fever.   HENT:  Negative for congestion, postnasal drip, rhinorrhea, sinus pressure and sinus pain.    Respiratory:  Positive for cough and shortness of breath. Negative for wheezing.    Cardiovascular:  Negative for chest pain, palpitations and leg swelling.   Gastrointestinal:  Negative for abdominal distention, abdominal pain, constipation, diarrhea, nausea and vomiting.   Genitourinary:  Negative for decreased urine volume, difficulty urinating and hematuria.   Skin:  Positive for rash.        Mechanics hands   Allergic/Immunologic: Negative for immunocompromised state.   Neurological:  Negative for dizziness, syncope, light-headedness and  headaches.   Psychiatric/Behavioral:  Negative for agitation and behavioral problems.      Objective:        Physical Exam  Vitals and nursing note reviewed.   Constitutional:       General: He is not in acute distress.     Appearance: He is normal weight. He is not ill-appearing.      Interventions: Nasal cannula in place.   HENT:      Head: Normocephalic and atraumatic.      Nose: Nose normal. No congestion or rhinorrhea.   Eyes:      General: No scleral icterus.     Extraocular Movements: Extraocular movements intact.      Conjunctiva/sclera: Conjunctivae normal.   Cardiovascular:      Rate and Rhythm: Normal rate.   Pulmonary:      Effort: Pulmonary effort is normal. No respiratory distress.      Breath sounds: No stridor. Rales (bibasilar) present. No wheezing or rhonchi.   Abdominal:      General: Abdomen is flat. Bowel sounds are normal. There is no distension.      Palpations: Abdomen is soft.      Tenderness: There is no abdominal tenderness.   Musculoskeletal:      Right lower leg: No edema.      Left lower leg: No edema.   Skin:     General: Skin is warm and dry.      Comments: Mechanics hands   Neurological:      General: No focal deficit present.      Mental Status: He is oriented to person, place, and time.   Psychiatric:         Mood and Affect: Mood normal.         Behavior: Behavior normal.                Vital Signs (Most Recent):  Temp: 97.8 °F (36.6 °C) (09/10/24 0726)  Pulse: (!) 55 (09/10/24 0726)  Resp: 16 (09/10/24 0726)  BP: 137/79 (09/10/24 0726)  SpO2: 96 % (09/10/24 0726) Vital Signs (24h Range):  Temp:  [97.7 °F (36.5 °C)-98.4 °F (36.9 °C)] 97.8 °F (36.6 °C)  Pulse:  [50-65] 55  Resp:  [16-20] 16  SpO2:  [96 %-99 %] 96 %  BP: (133-160)/(68-81) 137/79     Weight: 75.3 kg (166 lb)  Body mass index is 26 kg/m².      Intake/Output Summary (Last 24 hours) at 9/10/2024 1204  Last data filed at 9/10/2024 0920  Gross per 24 hour   Intake 100 ml   Output --   Net 100 ml       Significant  "Labs:  CBC:  Recent Labs   Lab 09/10/24  0354   WBC 6.44   RBC 5.09   HGB 14.8   HCT 45.2      MCV 89   MCH 29.1   MCHC 32.7     BMP:  Recent Labs   Lab 09/10/24  0354   *   K 3.6      CO2 24   BUN 17   CREATININE 0.8   CALCIUM 8.5*      Tacrolimus Levels:  No results for input(s): "TACROLIMUS" in the last 72 hours.  Microbiology:  Microbiology Results (last 7 days)       ** No results found for the last 168 hours. **            I have reviewed all pertinent labs within the past 24 hours.    Diagnostic Results:  Labs: Reviewed      "

## 2024-09-10 NOTE — NURSING
Patient alert and oriented. VSS. Room air. IVIG completed. Voices no complaints of pain or discomfort. Patient discharge ordered. IV removed. Pharmacy delivered medications bedside. Discharge paperwork provided and reviewed. Patient discharging home with daughter.

## 2024-09-10 NOTE — PLAN OF CARE
Patient requires outpatient cyclophosphamide infusion as soon as possible post discharge.     Patient established with [facility] on 9/12/24 @9am.    Patient also requiring oxygen be ordered and delivered before discharge. Patient insurance OON with [facility] [employer]. THO faxed referral to [facility]. [facility]O following.    Brittany Knight, MSW  [facility]   Ext: [phone]

## 2024-09-10 NOTE — NURSING
Home Oxygen Evaluation    Date Performed: 9/10/2024    1) Patient's Home O2 Sat on room air, while at rest: 95        If O2 sats on room air at rest are 88% or below, patient qualifies. No additional testing needed. Document N/A in steps 2 and 3. If 89% or above, complete steps 2.      2) Patient's O2 Sat on room air while exercisin        If O2 sats on room air while exercising remain 89% or above patient does not qualify, no further testing needed Document N/A in step 3. If O2 sats on room air while exercising are 88% or below, continue to step 3.      3) Patient's O2 Sat while exercising on O2: 98 at 2 LPM         (Must show improvement from #2 for patients to qualify)    If O2 sats improve on oxygen, patient qualifies for portable oxygen. If not, the patient does not qualify.

## 2024-09-10 NOTE — PROGRESS NOTES
[facility] ([place] [id number])  Lung Transplant  Progress Note - Floor    Patient Name: Darrell Corona  MRN: [id number]  Admission Date: 9/5/2024  Hospital Length of Stay: 5 days  Post-Operative Day:   Attending Physician: Mulugeta Francisco*  Primary Care Provider: Nikhil Palma MD     Subjective:     Interval History: No acute events overnight. Completed inpatient pulse steroids and IVIG and reports significant improvement in dyspnea. Off oxygen at rest. Awaiting ambulatory oxygen testing and discharge home.     Continuous Infusions:  Scheduled Meds:   amLODIPine  5 mg Oral Daily    aspirin  81 mg Oral Daily    atorvastatin  80 mg Oral Daily    enoxparin  40 mg Subcutaneous Daily    Immune Globulin G (IGG)-PRO-IGA 10 % injection (Privigen)  0.4 g/kg Intravenous Q24H    pantoprazole  40 mg Oral Daily    predniSONE  60 mg Oral Daily    sulfamethoxazole-trimethoprim 400-80mg  1 tablet Oral Every Mon, Wed, Fri    tenofovir alafenamide  25 mg Oral Daily     PRN Meds:  Current Facility-Administered Medications:     acetaminophen, 650 mg, Oral, Q4H PRN    dextrose 10%, 12.5 g, Intravenous, PRN    dextrose 10%, 25 g, Intravenous, PRN    glucagon (human recombinant), 1 mg, Intramuscular, PRN    glucose, 16 g, Oral, PRN    glucose, 24 g, Oral, PRN    naloxone, 0.02 mg, Intravenous, PRN    ondansetron, 4 mg, Intravenous, Q8H PRN    sodium chloride 0.9%, 10 mL, Intravenous, Q12H PRN    Review of patient's allergies indicates:  No Known Allergies    Review of Systems   Constitutional:  Positive for activity change and fatigue. Negative for appetite change, chills and fever.   HENT:  Negative for congestion, postnasal drip, rhinorrhea, sinus pressure and sinus pain.    Respiratory:  Positive for cough and shortness of breath. Negative for wheezing.    Cardiovascular:  Negative for chest pain, palpitations and leg swelling.   Gastrointestinal:  Negative for abdominal distention, abdominal pain, constipation, diarrhea,  nausea and vomiting.   Genitourinary:  Negative for decreased urine volume, difficulty urinating and hematuria.   Skin:  Positive for rash.        Mechanics hands   Allergic/Immunologic: Negative for immunocompromised state.   Neurological:  Negative for dizziness, syncope, light-headedness and headaches.   Psychiatric/Behavioral:  Negative for agitation and behavioral problems.      Objective:        Physical Exam  Vitals and nursing note reviewed.   Constitutional:       General: He is not in acute distress.     Appearance: He is normal weight. He is not ill-appearing.      Interventions: Nasal cannula in place.   HENT:      Head: Normocephalic and atraumatic.      Nose: Nose normal. No congestion or rhinorrhea.   Eyes:      General: No scleral icterus.     Extraocular Movements: Extraocular movements intact.      Conjunctiva/sclera: Conjunctivae normal.   Cardiovascular:      Rate and Rhythm: Normal rate.   Pulmonary:      Effort: Pulmonary effort is normal. No respiratory distress.      Breath sounds: No stridor. Rales (bibasilar) present. No wheezing or rhonchi.   Abdominal:      General: Abdomen is flat. Bowel sounds are normal. There is no distension.      Palpations: Abdomen is soft.      Tenderness: There is no abdominal tenderness.   Musculoskeletal:      Right lower leg: No edema.      Left lower leg: No edema.   Skin:     General: Skin is warm and dry.      Comments: Mechanics hands   Neurological:      General: No focal deficit present.      Mental Status: He is oriented to person, place, and time.   Psychiatric:         Mood and Affect: Mood normal.         Behavior: Behavior normal.                Vital Signs (Most Recent):  Temp: 97.8 °F (36.6 °C) (09/10/24 0726)  Pulse: (!) 55 (09/10/24 0726)  Resp: 16 (09/10/24 0726)  BP: 137/79 (09/10/24 0726)  SpO2: 96 % (09/10/24 0726) Vital Signs (24h Range):  Temp:  [97.7 °F (36.5 °C)-98.4 °F (36.9 °C)] 97.8 °F (36.6 °C)  Pulse:  [50-65] 55  Resp:  [16-20]  "16  SpO2:  [96 %-99 %] 96 %  BP: (133-160)/(68-81) 137/79     Weight: 75.3 kg (166 lb)  Body mass index is 26 kg/m².      Intake/Output Summary (Last 24 hours) at 9/10/2024 1204  Last data filed at 9/10/2024 0920  Gross per 24 hour   Intake 100 ml   Output --   Net 100 ml       Significant Labs:  CBC:  Recent Labs   Lab 09/10/24  0354   WBC 6.44   RBC 5.09   HGB 14.8   HCT 45.2      MCV 89   MCH 29.1   MCHC 32.7     BMP:  Recent Labs   Lab 09/10/24  0354   *   K 3.6      CO2 24   BUN 17   CREATININE 0.8   CALCIUM 8.5*      Tacrolimus Levels:  No results for input(s): "TACROLIMUS" in the last 72 hours.  Microbiology:  Microbiology Results (last 7 days)       ** No results found for the last 168 hours. **            I have reviewed all pertinent labs within the past 24 hours.    Diagnostic Results:  Labs: Reviewed      Assessment/Plan:     * ILD (interstitial lung disease)  Patient send to advanced lung disease clinic 8/29 for evaluation of rapidly progressive ILD. CT chest shows bibasilar interstitial fibrotic changes. Serologies have shown a positive PRESTON 1:640 speckled with negative RF and anti CCP. Sjogren's and scleroderma serologies from [facility] pending. Has Raynaud's and evidence of mechanics hands on exam. He has had a significant drop in his FVC (24% drop) and DLCO (29%) in a 1 month period. 6MWT as outpatient one week ago with desat to 88% on RA when last month he only desaturated to 93%. I am concerned for a rapidly progressive ILD, ??MDA5; myositis panel ordered and pending. CRP elevated, CK normal, myomarker panel and aldolase pending. Is on steroids currently.     Received pulse steroids and IVIG x 5 days. Check ambulatory O2 prior to discharge. Follow up in LUT clinic in two weeks (9/25). Awaiting cytoxan as outpatient. Continue prednisone 60 mg daily with bactrim ppx.     Acute hypoxemic respiratory failure  Patient with Hypoxic Respiratory failure which is Acute on chronic.  he is on " home oxygen at 2 LPM. Supplemental oxygen was provided and noted-      .   Signs/symptoms of respiratory failure include- tachypnea, increased work of breathing, and lethargy. Contributing diagnoses includes - Interstitial lung disease Labs and images were reviewed. Patient Has recent ABG, which has been reviewed. Will treat underlying causes and adjust management of respiratory failure as follows-     Maintain oxygen sats >88%        Rosemarie White PA-C  Lung Transplant  [facility] ([place]-14)

## 2024-09-10 NOTE — ASSESSMENT & PLAN NOTE
Patient with Hypoxic Respiratory failure which is Acute on chronic.  he is on home oxygen at 2 LPM. Supplemental oxygen was provided and noted-      .   Signs/symptoms of respiratory failure include- tachypnea, increased work of breathing, and lethargy. Contributing diagnoses includes - Interstitial lung disease Labs and images were reviewed. Patient Has recent ABG, which has been reviewed. Will treat underlying causes and adjust management of respiratory failure as follows-     Maintain oxygen sats >88%

## 2024-09-10 NOTE — PLAN OF CARE
Goals met adequate for discharge.     Problem: Adult Inpatient Plan of Care  Goal: Plan of Care Review  Outcome: Met  Goal: Patient-Specific Goal (Individualized)  Outcome: Met  Goal: Absence of Hospital-Acquired Illness or Injury  Outcome: Met  Goal: Optimal Comfort and Wellbeing  Outcome: Met  Goal: Readiness for Transition of Care  Outcome: Met     Problem: Pulmonary Impairment  Goal: Improved Activity Tolerance  Outcome: Met  Goal: Effective Airway Clearance  Outcome: Met  Goal: Optimal Gas Exchange  Outcome: Met     Problem: Gas Exchange Impaired  Goal: Optimal Gas Exchange  Outcome: Met     Problem: Infection  Goal: Absence of Infection Signs and Symptoms  Outcome: Met

## 2024-09-10 NOTE — PROGRESS NOTES
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    Rheumatologic History  - Newly diagnosed ILD after having dyspnea with exertion on a trip to [place] 6/2024. Was referred to pulm by PCP and noted to have pulmonary fibrosis on HRCT chest 8/30/2024 in UIP pattern. Had also been evaluated by cardiology without abnormality.   - Other symptoms noted: Raynaud's phenomenon, skin thickening of fingers, 's hands, nl CK, negative JASEN, negative Scl-70 high complements   Myomarker panel in process  - Serologies: PRESTON 1:640 speckled, negative RF/CCP  - Current treatment (prior to hospitalization): Prednisone taper  - Seen by [facility] 9/5 where he endorsed worsening SOB, worsening raynaud's, thickening of fingers, unintentional weight loss, fatigue, proximal muscle weakness. Show to have purple digits and digital ulcers at fingertips as well as sequalae of 's hands as well as upper extremity proximal muscle weakness.  - At that time was recommended to get 1g/day of solumedrol x3-5 days and to consider IVIG, cylophosphamide and tacro while IP. Also ordered lab work including ANCA labs, SPEP, GBM, cryo, C3/C4 (elevated), UA, APLS, pre-DMARD labs    Current Hospitalization  - Sent in for IV treatments directly from the rheumatology clinic 9/5. Received first dose of pulse steroids 9/5.  - Started IVIG 9/6 with plan for 5 day course    Initial evaluation - Pt stated he felt much better. Was off oxygen and speaking without difficulty first  visit, in afternoon was back on 2L and having some slight conversational dyspnea.    Pt confirmed that he has been feeling short of breath since a trip to [place] 6/2024. At the time he also started having some full body fatigue as well as he noticed some cracks and skin thickening of his fingers. He has a hx of raynaud's that he began to notice 10 years ago, but had never had an appearance like this before.    He denied any GERD symptoms or dysphagia. He has a remote smoking hx. No occupational exposures that he knows of. Has had some weight loss (~7 lbs) in the last month. He denies sinus issues, chest pain. Has a light cough with some sputum production, but moreso had noticed dyspnea.     Interval hx:  Pt feels ready to discharge. Breathing is still difficult with activity, but he has been off of oxygen at rest for many days.    Explained issues with receiving cyclophosphamide inpatient, pt expressed understanding and is scheduled for 9/12 currently.     History reviewed. No pertinent past medical history.    History reviewed. No pertinent surgical history.      There is no immunization history on file for this patient.    Review of patient's allergies indicates:  No Known Allergies  Current Facility-Administered Medications   Medication Frequency    acetaminophen tablet 650 mg Q4H PRN    [START ON 9/7/2024] amLODIPine tablet 5 mg Daily    aspirin EC tablet 81 mg Daily    atorvastatin tablet 80 mg Daily    dextrose 10% bolus 125 mL 125 mL PRN    dextrose 10% bolus 250 mL 250 mL PRN    enoxaparin injection 40 mg Daily    glucagon (human recombinant) injection 1 mg PRN    glucose chewable tablet 16 g PRN    glucose chewable tablet 24 g PRN    Immune Globulin G (IGG)-PRO-IGA 10 % injection (Privigen) 10 % injection 30 g Q24H    methylPREDNISolone sodium succinate (SOLU-MEDROL) 1,000 mg in D5W 100 mL IVPB Q24H    naloxone 0.4 mg/mL injection 0.02 mg PRN    ondansetron injection 4 mg Q8H PRN    pantoprazole EC tablet  40 mg Daily    sodium chloride 0.9% flush 10 mL Q12H PRN    sulfamethoxazole-trimethoprim 400-80mg per tablet 1 tablet Every Mon, Wed, Fri     Family History    None       Tobacco Use    Smoking status: Former     Current packs/day: 0.00     Types: Cigarettes     Quit date: 1991     Years since quittin.7    Smokeless tobacco: Never    Tobacco comments:     quit 30yrs ago   Substance and Sexual Activity    Alcohol use: Yes     Alcohol/week: 2.0 standard drinks of alcohol     Types: 1 Glasses of wine, 1 Cans of beer per week     Comment: 1 beer and wine every day    Drug use: Never    Sexual activity: Yes     Review of Systems   Constitutional:  Positive for fatigue. Negative for fever and unexpected weight change.   HENT:  Negative for facial swelling.    Eyes:  Negative for visual disturbance.   Respiratory:  Positive for cough and shortness of breath.    Cardiovascular:  Negative for chest pain.   Gastrointestinal:  Negative for constipation and diarrhea.   Genitourinary:  Negative for dysuria.   Musculoskeletal:  Negative for arthralgias.   Skin:  Negative for rash.   Neurological:  Positive for weakness. Negative for headaches.   Hematological:  Negative for adenopathy.   Psychiatric/Behavioral:  Negative for behavioral problems.      Objective:     Vital Signs (Most Recent):  Temp: 98.2 °F (36.8 °C) (24 1645)  Pulse: 67 (24 1730)  Resp: 18 (24 1554)  BP: 135/62 (24 1730)  SpO2: 100 % (24 1730) Vital Signs (24h Range):  Temp:  [97.7 °F (36.5 °C)-98.6 °F (37 °C)] 98.2 °F (36.8 °C)  Pulse:  [66-86] 67  Resp:  [17-20] 18  SpO2:  [95 %-100 %] 100 %  BP: (111-135)/(53-68) 135/62     Weight: 75.6 kg (166 lb 10.7 oz) (24 2300)  Body mass index is 26.1 kg/m².  Body surface area is 1.89 meters squared.      Intake/Output Summary (Last 24 hours) at 2024 1741  Last data filed at 2024 1735  Gross per 24 hour   Intake 1310 ml   Output 1170 ml   Net 140 ml         Physical  Exam   Constitutional: He is oriented to person, place, and time. No distress.   HENT:   Head: Normocephalic and atraumatic.   Cardiovascular: Normal rate, regular rhythm and normal heart sounds.   Pulmonary/Chest: Effort normal.   Pulmonary Comments: Fine crackles present at bilateral bases. Conversational dyspnea resolved.  Abdominal: Soft. There is no abdominal tenderness.   Musculoskeletal:         General: No tenderness. Normal range of motion.   Neurological: He is alert and oriented to person, place, and time. He displays weakness (Does have diminished tricep strength (4/5) bilaterally - all other muscle groups 5/5).   Skin: Skin is warm and dry.   Fissures present on hands - color of hands much improved from pictures taken at 9/5 visit.   Psychiatric: His behavior is normal. Judgment and thought content normal.        Significant Labs:  All pertinent lab results from the last 24 hours have been reviewed.    Significant Imaging:  Imaging results within the past 24 hours have been reviewed.  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    - Pt began having symptoms of dyspnea on exertion 6/2023 - he had also at that time noted generalized fatigue and fissures on the tips of his fingers. Has a hx of raynaud's from 10 years ago that had gotten progressively worse as well as a remote cardiac hx, otherwise no other symptoms.   - Pt was sent here after being evaluated in the rheumatology clinic. PFTs from his last visit with Dr. Reina were much worse after only a month. This prompted admission for IV treatment.  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 However, presentation could be consistent with a mixed connective tissue disease  (although SmRNP negative) or scleroderma w/ hx of raynauds (which is also present with anti-synthetase). Only + serology was PRESTON of 1:640 w/ negative JASEN (including Scl-70). HRCT w/ UIP pattern ILD.   - MRI bilateral humerus mildly positive on right side, particularly in area of triceps  - CT A/P negative for malignancy   - Negative labwork: ANCA, lupus anticoagulant, anti-cardiolipin, RF, GBM, Scl-70  - Did have mild positive IgA for B-2 glycoprotein which is the least specific and unlikely to mean anything clinically    Recommendations:  - Will follow ACR rapidly progressive ILD protocol with pulse dose steroids (plan for 5 days of 1g/day, 9/5-9/9) as well as 5 days of IVIG (9/6-9/10)  - Hep B quant negative, however unfortunately CYC not covered as an inpatient - will work with case management and pharmacy team to get infusion scheduled as soon as possible.    Thanks to multi-disciplinary team, able to schedule for 9/12  - Will hold off on tacrolimus until pt follows with rheumatology outpatient  - Appreciate hepatology input, pt started on hepatitis prophylaxis while IP  - F/u myomarker panel  - F/u RNP-III  - Pt planning to DC today          Eulalia Martell MD  Rheumatology  [facility] - Stepdown Flex ([place])    I have personally taken the history and examined the patient and concur with the resident's note as above.  Clinically he is doing much better.  He is breathing better.  He has finished his IV therapy.  He will have his infusion of Cytoxan on Thursday.  He will follow-up the following week and possibly be started on tacrolimus.

## 2024-09-11 LAB — MYELOPEROXIDASE AB SER-ACNC: 0.3 U/ML

## 2024-09-12 ENCOUNTER — INFUSION (OUTPATIENT)
Dept: INFUSION THERAPY | Facility: HOSPITAL | Age: 64
End: 2024-09-12
Attending: INTERNAL MEDICINE
Payer: COMMERCIAL

## 2024-09-12 ENCOUNTER — TELEPHONE (OUTPATIENT)
Dept: RHEUMATOLOGY | Facility: CLINIC | Age: 64
End: 2024-09-12
Payer: COMMERCIAL

## 2024-09-12 ENCOUNTER — PATIENT MESSAGE (OUTPATIENT)
Dept: CARDIOLOGY | Facility: CLINIC | Age: 64
End: 2024-09-12
Payer: COMMERCIAL

## 2024-09-12 ENCOUNTER — PATIENT MESSAGE (OUTPATIENT)
Dept: RHEUMATOLOGY | Facility: CLINIC | Age: 64
End: 2024-09-12
Payer: COMMERCIAL

## 2024-09-12 VITALS
SYSTOLIC BLOOD PRESSURE: 114 MMHG | WEIGHT: 165.81 LBS | DIASTOLIC BLOOD PRESSURE: 65 MMHG | HEART RATE: 60 BPM | OXYGEN SATURATION: 96 % | RESPIRATION RATE: 16 BRPM | TEMPERATURE: 98 F | BODY MASS INDEX: 25.97 KG/M2

## 2024-09-12 DIAGNOSIS — J84.9 ILD (INTERSTITIAL LUNG DISEASE): Primary | ICD-10-CM

## 2024-09-12 PROBLEM — R76.8 HEPATITIS B CORE ANTIBODY POSITIVE: Status: ACTIVE | Noted: 2024-09-12

## 2024-09-12 PROCEDURE — 96376 TX/PRO/DX INJ SAME DRUG ADON: CPT

## 2024-09-12 PROCEDURE — 96367 TX/PROPH/DG ADDL SEQ IV INF: CPT

## 2024-09-12 PROCEDURE — 96413 CHEMO IV INFUSION 1 HR: CPT

## 2024-09-12 PROCEDURE — 25000003 PHARM REV CODE 250: Performed by: INTERNAL MEDICINE

## 2024-09-12 PROCEDURE — 96375 TX/PRO/DX INJ NEW DRUG ADDON: CPT

## 2024-09-12 PROCEDURE — 63600175 PHARM REV CODE 636 W HCPCS: Performed by: INTERNAL MEDICINE

## 2024-09-12 RX ORDER — ACETAMINOPHEN 325 MG/1
650 TABLET ORAL
OUTPATIENT
Start: 2024-10-07

## 2024-09-12 RX ORDER — ACETAMINOPHEN 325 MG/1
650 TABLET ORAL EVERY 4 HOURS PRN
Status: DISCONTINUED | OUTPATIENT
Start: 2024-09-12 | End: 2024-09-12 | Stop reason: HOSPADM

## 2024-09-12 RX ORDER — ACETAMINOPHEN 325 MG/1
650 TABLET ORAL
Status: COMPLETED | OUTPATIENT
Start: 2024-09-12 | End: 2024-09-12

## 2024-09-12 RX ORDER — SODIUM CHLORIDE 0.9 % (FLUSH) 0.9 %
10 SYRINGE (ML) INJECTION
OUTPATIENT
Start: 2024-10-07

## 2024-09-12 RX ORDER — HEPARIN 100 UNIT/ML
500 SYRINGE INTRAVENOUS
OUTPATIENT
Start: 2024-10-07

## 2024-09-12 RX ORDER — ACETAMINOPHEN 325 MG/1
650 TABLET ORAL EVERY 4 HOURS PRN
OUTPATIENT
Start: 2024-10-07

## 2024-09-12 RX ADMIN — MESNA 614 MG: 100 INJECTION, SOLUTION INTRAVENOUS at 01:09

## 2024-09-12 RX ADMIN — CYCLOPHOSPHAMIDE 1420 MG: 200 INJECTION, SOLUTION INTRAVENOUS at 12:09

## 2024-09-12 RX ADMIN — ONDANSETRON 4 MG: 2 INJECTION INTRAMUSCULAR; INTRAVENOUS at 11:09

## 2024-09-12 RX ADMIN — SODIUM CHLORIDE 500 ML: 9 INJECTION, SOLUTION INTRAVENOUS at 01:09

## 2024-09-12 RX ADMIN — ACETAMINOPHEN 650 MG: 325 TABLET ORAL at 11:09

## 2024-09-12 RX ADMIN — MESNA 614 MG: 100 INJECTION, SOLUTION INTRAVENOUS at 12:09

## 2024-09-12 RX ADMIN — SODIUM CHLORIDE 500 ML: 9 INJECTION, SOLUTION INTRAVENOUS at 09:09

## 2024-09-12 RX ADMIN — DIPHENHYDRAMINE HYDROCHLORIDE 25 MG: 50 INJECTION INTRAMUSCULAR; INTRAVENOUS at 11:09

## 2024-09-12 NOTE — TELEPHONE ENCOUNTER
Spoke with pt daughter to schedule an appointment for Sept. 27th at 9:30 am with Dr. Trevino / Swapna

## 2024-09-12 NOTE — PLAN OF CARE
THO sent over rest of required paperwork for patient to receive oxygen equipment.     [employer] to deliver oxygen equipment to patient home on today per Ayanna*[phone].    JULIA Guerra  [facility]   Ext: [phone]

## 2024-09-12 NOTE — PLAN OF CARE
Patient arrived to unit for C1 Cytoxan  infusion for ILD. Oriented pt and son to unit. Pt  recently discharged from hospital after completing days 1-5 of IVIG. Pt with recent Acute hypoxemic respiratory failure. Pt denies SOB at this time, VS WNL. Pt also taking home oral steroids. Pt with Ranauds Syndrome effecting all fingers. Numbness/tingling/discoloration/ multiple fissures noted. Reviewed signs and symptoms of allergic reaction and potential side effects of Cytoxan/Mesna. Printed, highlighted, reviewed, and gave pt and daughter drug info sheets. Confirmed pt has thermometer. Plan of care reviewed, patient agreeable to plan. Pre-hydration, premeds of Tylenol, Benadryl, Zofran and Mesna administered prior to Cytoxan. Patient tolerated Cytoxan infusion well. Post Mesna infusion and post fluids infused following Cytoxan. Pt made aware of importance of hydration and not delaying urination. Pt verbalized understanding. Pt visited RR multiple times during visit today. Msg sent to MD regarding need for home antiemetics. VSS. Discharge instructions reviewed, patient instructed to return 10/10. Patient ambulated off unit accompanied by daughter. Patient in NAD at time of discharge.

## 2024-09-12 NOTE — PLAN OF CARE
[facility] - Stepdown Flex ([place]-14)  Discharge Final Note    Primary Care Provider: Nikhil Palma MD    Expected Discharge Date: 9/10/2024    Final Discharge Note (most recent)       Final Note - 09/12/24 1328          Final Note    Assessment Type Final Discharge Note     Anticipated Discharge Disposition Home or Self Care     What phone number can be called within the next 1-3 days to see how you are doing after discharge? [phone]     Hospital Resources/Appts/Education Provided Provided patient/caregiver with written discharge plan information;Appointments scheduled and added to AVS        Post-Acute Status    Post-Acute Authorization HME     HME Status Set-up Complete/Auth obtained     Coverage Payor: [employer]IT [employer] - ALLIED BENEFIT SYSTEM PPO -     Patient choice form signed by patient/caregiver List with quality metrics by geographic area provided     Discharge Delays None known at this time                     Important Message from Medicare  Important Message from Medicare regarding Discharge Appeal Rights: Explained to patient/caregiver, Signed/date by patient/caregiver     Date IMM was signed: 09/09/24  Time IMM was signed: 0907      Future Appointments   Date Time Provider Department Center   9/25/2024 12:40 PM SIX, MINUTE WALK [facility] PUL WLK [facility]   9/25/2024  1:15 PM PULMONARY FUNCTION [facility] PULMLAB [facility]   9/25/2024  1:30 PM PULMONARY FUNCTION [facility] PULMLAB [facility]   9/25/2024  1:45 PM PULMONARY FUNCTION [facility] PULMLAB [facility]   9/25/2024  2:00 PM Carleen Reina,  [facility] LUNGTX [facility]   11/12/2024 11:30 AM Linda Mario MD [facility] RHEUM [facility]   11/15/2024 10:00 AM Avery Boyd MD [facility] [place] [id number] [facility]     Patient discharged to home on 9/10/24. SW pending oxygen DME-MD approved pt to discharge to home pending oxygen equipment delivery.    Oxygen equipment supplied through [employer]. No other case management needs at this time.     Hannah Juneau, MSW [facility]   Ext: [phone]

## 2024-09-13 ENCOUNTER — OFFICE VISIT (OUTPATIENT)
Dept: CARDIOLOGY | Facility: CLINIC | Age: 64
End: 2024-09-13
Payer: COMMERCIAL

## 2024-09-13 ENCOUNTER — PATIENT MESSAGE (OUTPATIENT)
Dept: RHEUMATOLOGY | Facility: CLINIC | Age: 64
End: 2024-09-13
Payer: COMMERCIAL

## 2024-09-13 VITALS
BODY MASS INDEX: 26.81 KG/M2 | SYSTOLIC BLOOD PRESSURE: 116 MMHG | WEIGHT: 171.19 LBS | OXYGEN SATURATION: 97 % | HEART RATE: 57 BPM | DIASTOLIC BLOOD PRESSURE: 68 MMHG

## 2024-09-13 DIAGNOSIS — Z51.81 MEDICATION MONITORING ENCOUNTER: Primary | ICD-10-CM

## 2024-09-13 DIAGNOSIS — E78.5 HYPERLIPIDEMIA LDL GOAL <70: ICD-10-CM

## 2024-09-13 DIAGNOSIS — I25.10 CORONARY ARTERY DISEASE INVOLVING NATIVE CORONARY ARTERY OF NATIVE HEART WITHOUT ANGINA PECTORIS: ICD-10-CM

## 2024-09-13 DIAGNOSIS — J84.9 ILD (INTERSTITIAL LUNG DISEASE): Primary | ICD-10-CM

## 2024-09-13 DIAGNOSIS — J84.9 ILD (INTERSTITIAL LUNG DISEASE): ICD-10-CM

## 2024-09-13 PROCEDURE — 99999 PR PBB SHADOW E&M-EST. PATIENT-LVL III: CPT | Mod: PBBFAC,TXP,, | Performed by: INTERNAL MEDICINE
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
## 2024-09-16 DIAGNOSIS — R76.8 HEPATITIS B CORE ANTIBODY POSITIVE: ICD-10-CM

## 2024-09-16 LAB — CRYOGLOB SER QL: NORMAL

## 2024-09-17 LAB — RNAP III AB SER-ACNC: <20 UNITS

## 2024-09-19 ENCOUNTER — LAB VISIT (OUTPATIENT)
Dept: LAB | Facility: HOSPITAL | Age: 64
End: 2024-09-19
Attending: STUDENT IN AN ORGANIZED HEALTH CARE EDUCATION/TRAINING PROGRAM
Payer: COMMERCIAL

## 2024-09-19 ENCOUNTER — PATIENT MESSAGE (OUTPATIENT)
Dept: TRANSPLANT | Facility: CLINIC | Age: 64
End: 2024-09-19
Payer: MEDICARE

## 2024-09-19 DIAGNOSIS — Z51.81 MEDICATION MONITORING ENCOUNTER: ICD-10-CM

## 2024-09-19 DIAGNOSIS — J84.9 ILD (INTERSTITIAL LUNG DISEASE): ICD-10-CM

## 2024-09-19 LAB
BASOPHILS # BLD AUTO: 0.02 K/UL (ref 0–0.2)
BASOPHILS NFR BLD: 0.4 % (ref 0–1.9)
DIFFERENTIAL METHOD BLD: ABNORMAL
EOSINOPHIL # BLD AUTO: 0.1 K/UL (ref 0–0.5)
EOSINOPHIL NFR BLD: 1.1 % (ref 0–8)
ERYTHROCYTE [DISTWIDTH] IN BLOOD BY AUTOMATED COUNT: 14.4 % (ref 11.5–14.5)
HCT VFR BLD AUTO: 44.9 % (ref 40–54)
HGB BLD-MCNC: 14.6 G/DL (ref 14–18)
IMM GRANULOCYTES # BLD AUTO: 0.06 K/UL (ref 0–0.04)
IMM GRANULOCYTES NFR BLD AUTO: 1.1 % (ref 0–0.5)
LYMPHOCYTES # BLD AUTO: 0.6 K/UL (ref 1–4.8)
LYMPHOCYTES NFR BLD: 10.9 % (ref 18–48)
MCH RBC QN AUTO: 29.2 PG (ref 27–31)
MCHC RBC AUTO-ENTMCNC: 32.5 G/DL (ref 32–36)
MCV RBC AUTO: 90 FL (ref 82–98)
MONOCYTES # BLD AUTO: 0.4 K/UL (ref 0.3–1)
MONOCYTES NFR BLD: 6.5 % (ref 4–15)
NEUTROPHILS # BLD AUTO: 4.4 K/UL (ref 1.8–7.7)
NEUTROPHILS NFR BLD: 80 % (ref 38–73)
NRBC BLD-RTO: 0 /100 WBC
PLATELET # BLD AUTO: 108 K/UL (ref 150–450)
PMV BLD AUTO: 10.4 FL (ref 9.2–12.9)
RBC # BLD AUTO: 5 M/UL (ref 4.6–6.2)
WBC # BLD AUTO: 5.52 K/UL (ref 3.9–12.7)

## 2024-09-19 PROCEDURE — 36415 COLL VENOUS BLD VENIPUNCTURE: CPT | Mod: NTX | Performed by: STUDENT IN AN ORGANIZED HEALTH CARE EDUCATION/TRAINING PROGRAM

## 2024-09-19 PROCEDURE — 85025 COMPLETE CBC W/AUTO DIFF WBC: CPT | Mod: NTX | Performed by: STUDENT IN AN ORGANIZED HEALTH CARE EDUCATION/TRAINING PROGRAM

## 2024-09-20 ENCOUNTER — TELEPHONE (OUTPATIENT)
Dept: RHEUMATOLOGY | Facility: CLINIC | Age: 64
End: 2024-09-20
Payer: COMMERCIAL

## 2024-09-20 DIAGNOSIS — D69.6 THROMBOCYTOPENIA: Primary | ICD-10-CM

## 2024-09-20 NOTE — PROGRESS NOTES
Your platelet count is low. Will need to recheck next week. Your lymphocyte count is moderately reduced as it has been for 2 wks

## 2024-09-24 ENCOUNTER — PATIENT MESSAGE (OUTPATIENT)
Dept: TRANSPLANT | Facility: CLINIC | Age: 64
End: 2024-09-24
Payer: MEDICARE

## 2024-09-24 ENCOUNTER — TELEPHONE (OUTPATIENT)
Dept: TRANSPLANT | Facility: CLINIC | Age: 64
End: 2024-09-24
Payer: MEDICARE

## 2024-09-27 ENCOUNTER — PATIENT MESSAGE (OUTPATIENT)
Dept: RHEUMATOLOGY | Facility: CLINIC | Age: 64
End: 2024-09-27

## 2024-09-27 ENCOUNTER — LAB VISIT (OUTPATIENT)
Dept: LAB | Facility: HOSPITAL | Age: 64
End: 2024-09-27
Payer: COMMERCIAL

## 2024-09-27 ENCOUNTER — OFFICE VISIT (OUTPATIENT)
Dept: RHEUMATOLOGY | Facility: CLINIC | Age: 64
End: 2024-09-27
Payer: COMMERCIAL

## 2024-09-27 VITALS
SYSTOLIC BLOOD PRESSURE: 135 MMHG | WEIGHT: 166.44 LBS | HEART RATE: 76 BPM | BODY MASS INDEX: 26.12 KG/M2 | HEIGHT: 67 IN | DIASTOLIC BLOOD PRESSURE: 79 MMHG

## 2024-09-27 DIAGNOSIS — M62.81 MUSCLE WEAKNESS OF PROXIMAL EXTREMITY: ICD-10-CM

## 2024-09-27 DIAGNOSIS — J96.01 ACUTE HYPOXEMIC RESPIRATORY FAILURE: ICD-10-CM

## 2024-09-27 DIAGNOSIS — I73.01 RAYNAUD'S DISEASE WITH GANGRENE: ICD-10-CM

## 2024-09-27 DIAGNOSIS — D89.89 ANTISYNTHETASE SYNDROME: Primary | ICD-10-CM

## 2024-09-27 DIAGNOSIS — R76.8 HEPATITIS B CORE ANTIBODY POSITIVE: ICD-10-CM

## 2024-09-27 DIAGNOSIS — J84.9 ILD (INTERSTITIAL LUNG DISEASE): ICD-10-CM

## 2024-09-27 DIAGNOSIS — E87.6 HYPOKALEMIA: ICD-10-CM

## 2024-09-27 DIAGNOSIS — R76.8 POSITIVE ANA (ANTINUCLEAR ANTIBODY): ICD-10-CM

## 2024-09-27 DIAGNOSIS — D69.6 THROMBOCYTOPENIA: ICD-10-CM

## 2024-09-27 LAB
ALBUMIN SERPL BCP-MCNC: 3.1 G/DL (ref 3.5–5.2)
ALP SERPL-CCNC: 49 U/L (ref 55–135)
ALT SERPL W/O P-5'-P-CCNC: 37 U/L (ref 10–44)
ANION GAP SERPL CALC-SCNC: 9 MMOL/L (ref 8–16)
AST SERPL-CCNC: 22 U/L (ref 10–40)
BASOPHILS # BLD AUTO: 0.03 K/UL (ref 0–0.2)
BASOPHILS NFR BLD: 0.5 % (ref 0–1.9)
BILIRUB SERPL-MCNC: 0.4 MG/DL (ref 0.1–1)
BUN SERPL-MCNC: 10 MG/DL (ref 8–23)
CALCIUM SERPL-MCNC: 9.2 MG/DL (ref 8.7–10.5)
CHLORIDE SERPL-SCNC: 105 MMOL/L (ref 95–110)
CO2 SERPL-SCNC: 28 MMOL/L (ref 23–29)
CREAT SERPL-MCNC: 0.8 MG/DL (ref 0.5–1.4)
DIFFERENTIAL METHOD BLD: ABNORMAL
EOSINOPHIL # BLD AUTO: 0 K/UL (ref 0–0.5)
EOSINOPHIL NFR BLD: 0.5 % (ref 0–8)
ERYTHROCYTE [DISTWIDTH] IN BLOOD BY AUTOMATED COUNT: 15.6 % (ref 11.5–14.5)
EST. GFR  (NO RACE VARIABLE): >60 ML/MIN/1.73 M^2
GLUCOSE SERPL-MCNC: 94 MG/DL (ref 70–110)
HCT VFR BLD AUTO: 47.4 % (ref 40–54)
HGB BLD-MCNC: 15.2 G/DL (ref 14–18)
IMM GRANULOCYTES # BLD AUTO: 0.03 K/UL (ref 0–0.04)
IMM GRANULOCYTES NFR BLD AUTO: 0.5 % (ref 0–0.5)
LYMPHOCYTES # BLD AUTO: 1.7 K/UL (ref 1–4.8)
LYMPHOCYTES NFR BLD: 29.5 % (ref 18–48)
MCH RBC QN AUTO: 29.1 PG (ref 27–31)
MCHC RBC AUTO-ENTMCNC: 32.1 G/DL (ref 32–36)
MCV RBC AUTO: 91 FL (ref 82–98)
MISCELLANEOUS TEST NAME: NORMAL
MONOCYTES # BLD AUTO: 0.6 K/UL (ref 0.3–1)
MONOCYTES NFR BLD: 10.7 % (ref 4–15)
NEUTROPHILS # BLD AUTO: 3.3 K/UL (ref 1.8–7.7)
NEUTROPHILS NFR BLD: 58.3 % (ref 38–73)
NRBC BLD-RTO: 0 /100 WBC
PLATELET # BLD AUTO: 173 K/UL (ref 150–450)
PMV BLD AUTO: 9.6 FL (ref 9.2–12.9)
POTASSIUM SERPL-SCNC: 3.2 MMOL/L (ref 3.5–5.1)
PROT SERPL-MCNC: 7 G/DL (ref 6–8.4)
RBC # BLD AUTO: 5.23 M/UL (ref 4.6–6.2)
SODIUM SERPL-SCNC: 142 MMOL/L (ref 136–145)
WBC # BLD AUTO: 5.6 K/UL (ref 3.9–12.7)

## 2024-09-27 PROCEDURE — 85025 COMPLETE CBC W/AUTO DIFF WBC: CPT | Mod: TXP | Performed by: STUDENT IN AN ORGANIZED HEALTH CARE EDUCATION/TRAINING PROGRAM

## 2024-09-27 PROCEDURE — 80053 COMPREHEN METABOLIC PANEL: CPT | Mod: TXP | Performed by: STUDENT IN AN ORGANIZED HEALTH CARE EDUCATION/TRAINING PROGRAM

## 2024-09-27 PROCEDURE — 36415 COLL VENOUS BLD VENIPUNCTURE: CPT | Mod: TXP | Performed by: STUDENT IN AN ORGANIZED HEALTH CARE EDUCATION/TRAINING PROGRAM

## 2024-09-27 PROCEDURE — 99999 PR PBB SHADOW E&M-EST. PATIENT-LVL IV: CPT | Mod: PBBFAC,TXP,, | Performed by: STUDENT IN AN ORGANIZED HEALTH CARE EDUCATION/TRAINING PROGRAM

## 2024-09-27 RX ORDER — SULFAMETHOXAZOLE AND TRIMETHOPRIM 400; 80 MG/1; MG/1
1 TABLET ORAL
Qty: 12 TABLET | Refills: 2 | Status: SHIPPED | OUTPATIENT
Start: 2024-09-27

## 2024-09-27 RX ORDER — TACROLIMUS 1 MG/1
1 CAPSULE ORAL EVERY 12 HOURS
Qty: 60 CAPSULE | Refills: 3 | Status: SHIPPED | OUTPATIENT
Start: 2024-09-27

## 2024-09-27 NOTE — PROGRESS NOTES
I have personally reviewed the history, confirmed exam findings, and discussed assessment and plan with fellow.         Latest Reference Range & Units 08/05/24 14:30 08/29/24 11:16   Anti-[place]-1 Antibody <20 Units  <20   Anti-PM/Scl Ab <20 Units  <20   Anti-SS-A 52 kD Ab, IgG <20 Units  31 (H)   Anti-U1-RNP  Ab <20 Units  <20   EJ Negative   Negative   Fibrillarin (U3 RNP) Negative   Negative   Ku Negative   Negative   MDA-5 (P140) (CADM-140) <20 Units  <20   MI-2 Negative   Negative   NXP-2 (P140) <20 Units  <20   OJ Negative   Negative   PL-12 Negative   Negative   PL-7 Negative   Negative   Rheumatoid Factor 0.0 - 15.0 IU/mL <13.0    SRP Negative   Negative   TIF1 GAMMA (P155/140) <20 Units  <20   U2 snRNP Negative   Negative   (H): Data is abnormally high      PFTs        FVC          TLC          RV          DLco     8/29/24    53.1           54.1        67.8         34.9  7/30/24    70.3           60           58.5         49.5        EXAMINATION:  CT CHEST WITHOUT CONTRAST     CLINICAL HISTORY:  Interstitial lung disease; Interstitial pulmonary disease, unspecified     TECHNIQUE:  Noncontrast images were obtained through the chest per protocol.  Coronal and sagittal images were reviewed.     COMPARISON:  None.     FINDINGS:  Structures at the base of the neck are unremarkable.  No axillary adenopathy.  Few shotty nonenlarged mediastinal nodes, nonspecific and possibly reactive.  No anterior pericardial effusion.  Aortic arch and coronary calcific atherosclerosis.  The trachea is clear.     The lungs are symmetrically expanded.  Patchy peripheral and lower lung predominant interstitial thickening and opacities.  More mild degree of ground-glass and reticulation peripherally at the upper lungs and apices.  A few subpleural subcentimeter nodular foci, for reference at the upper lobe measuring 0.5 cm (series 2, image 39).  A few punctate right upper lobe nodules (for reference series 3, image 87).  No evident  honeycombing, bronchiectasis, or air trapping.  No pleural effusion or pneumothorax.     Limited images through the unenhanced upper abdomen demonstrate no acute abnormality.  No aggressive osseous lesion.  Suspected degree of ossification at the posterior longitudinal ligament at C6-C7.  Multilevel spine DJD.     Impression:     Patchy peripheral and lower lung interstitial thickening and opacities.  Overall appearance is nonspecific and could relate to sequela of fibrotic change versus other infectious or non-infectious inflammatory sequela.  No evident honeycombing, bronchiectasis, or significant air trapping by this exam.     Few scattered subcentimeter pulmonary nodules, technically indeterminate.  Attention at follow-up.     Additional findings as above.        Electronically signed by:Osvaldo Daniel  Date:                                            07/31/2024  Time:                                           11:21                 08/29/2024---------Distance: 396.24 meters (1300 feet)       O2 Sat % Supplemental Oxygen Heart Rate Blood Pressure Alexa Scale   Pre-exercise  (Resting) 95 % Room Air 84 bpm 108/67  mmHg 4   During Exercise 88 % Room Air 98 bpm 130/71  mmHg 7-8   Post-exercise    96 % Room Air  84 bpm 117/74  mmHg        CLINICAL INTERPRETATION:  Six minute walk distance is 396.24 meters (1300 feet) with very heavy dyspnea.  During exercise, there was significant desaturation while breathing room air.  Both blood pressure and heart rate remained stable with walking.  The patient did not report non-pulmonary symptoms during exercise.  The patient may benefit from using supplemental oxygen during exertion.  Since the previous study in July 2024, exercise capacity is unchanged.  Based upon age and body mass index, exercise capacity is normal.   Oxygen saturation did improve while breathing supplemental oxygen.     EXAMINATION:  CT CHEST HIGH RESOLUTION WITHOUT CONTRAST     CLINICAL HISTORY:  ILD;  Interstitial pulmonary disease, unspecified     TECHNIQUE:  Low dose axial images, sagittal and coronal reformations were obtained from the thoracic inlet to the lung bases. Contrast was not administered.     COMPARISON:  07/30/2024     FINDINGS:  The heart is borderline enlarged.  There is aortic and coronary atherosclerosis and increased numbers of shoddy subcentimeter mediastinal lymph nodes.     Images through the lung bases are degraded by motion artifact.  In the upper and mid lung zones there is multifocal subpleural reticulonodular change with areas of architectural distortion.  In the mid and lower lung zones there are patchy dependent and subpleural areas of coalescent airspace opacity with some superimposed minimal peripheral bronchiectasis that is similar to the prior exam.  No honeycombing.  Expiratory images demonstrate no significant air trapping.  Overall, findings are unchanged from the prior exam.     There are calcifications along the dependent aspect of the gallbladder which may reflect layering stones versus less likely incomplete gallbladder wall calcification.     Impression:     Stable changes of interstitial lung disease with possible UIP pattern.  Areas of persistent coalescent opacity may reflect organizing pneumonia.        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) hospitalized 9/5/24  pt reports 80% improvement in SOB, decreased dry cough, improved muscle strength but unchanged chest exam and muscle strength today  S/p pulse Solu-Medrol 1 g IV x 4 days in [facility] now on prednisone 60mg daily   S/p IVIg x 5 days  S/p cyclophosphamide 750mg/m2 9/12/24  Bactrim prophylaxis and PPI    Raynaud's progressive since 2014  's Hands last visit improving but still present today  Subtle   nailfold capillary dropout and minimal capillary dilatation left ring and little finger  PRESTON+ 1:640 speckled neg profile   Proximal muscle weakness  4.6/5 deltoids bilateral,  4.8/5 biceps and triceps bilat, 4.6/5 psoas bilateral, rest 5/5      PLAN:    Decrease prednisone 50mg daily x 1 wk, 40mg daily x 1 wk, 30mg daily x 1 wk, 25 mg daily x 1wk then 20mg daily x 1 wk, then 15mg daily and continue  Start tacrolimus 1 mg twice daily. Check creat, Mg, trough tacrolimus in 1 wk  Increase cyclophosphamide to 1 g/m2 IV 10/10/24 with cbc, cmp wk prior  Schedule IVIg 1g/kg daily x 2 days q 4 wks.   Cont Bactrim DS M-W-F  Pantoprazole 40mg daily  Send Myositis autoantibody panel to [facility]  RTC  6 wks       Answers submitted by the patient for this visit:  Rheumatology Questionnaire (Submitted on 9/27/2024)  fever: No  eye redness: No  mouth sores: Yes  headaches: No  shortness of breath: Yes  chest pain: No  trouble swallowing: No  diarrhea: No  constipation: Yes  unexpected weight change: Yes  genital sore: No  During the last 3 days, have you had a skin rash?: No  Bruises or bleeds easily: No  cough: Yes

## 2024-09-27 NOTE — PATIENT INSTRUCTIONS
Plan:  - Decrease prednisone to 50 mg x 1 week, then 40 mg x 1 week, then 30 mg x 1 week, then 25 mg x 1 week, then 20 mg, then 15 x 1 week  - Today will check CBC and CMP  - Start Tacrolimus 1 mg twice a day  - Check blood pressure while on the Tacrolimus   - Will need Tacrolimus level and CMP 1 week after starting Tacrolimus   - Take your Tacrolimus dose after getting Tacrolimus blood (taking it before the levels are collected can affect the levels)  - Next cyclophosphamide infusion is 10/10/24  - Blood [place]: CBC and CMP 7-10 days after infusion  - IVIG x 2 days every 4 weeks  - Return to clinic in 6 weeks

## 2024-09-27 NOTE — PROGRESS NOTES
9/27/2024     9:01 AM   Rapid3 Question Responses and Scores   MDHAQ Score 0.4   Psychologic Score 3.3   Pain Score 0   When you awakened in the morning OVER THE LAST WEEK, did you feel stiff? No   Fatigue Score 3   Global Health Score 6   RAPID3 Score 2.44    Answers submitted by the patient for this visit:  Rheumatology Questionnaire (Submitted on 9/27/2024)  fever: No  eye redness: No  mouth sores: Yes  headaches: No  shortness of breath: Yes  chest pain: No  trouble swallowing: No  diarrhea: No  constipation: Yes  unexpected weight change: Yes  genital sore: No  During the last 3 days, have you had a skin rash?: No  Bruises or bleeds easily: No  cough: Yes

## 2024-09-27 NOTE — PROGRESS NOTES
Subjective:      Patient ID: Darrell Corona is a 64 y.o. male.    Chief Complaint: Myositis/ILD management    HPI: 64-year-old man with HTN and newly diagnosed ILD presents to clinic for follow up. He was referred by Dr. Watts (Pulmonary) for positive PRESTON and restrictive lung disease and seen in our office on 9/5. During that visit, there was concern for rapidly progressive ILD. He was sent to the ED for IV steroids and immunotherapy. During the admission, he received IV solumedrol 1gm x4 doses, 5 days of IVIG 0.4g/kg (9/6-9/10) and received CYC received on 9/12. Currently on prednisone 60mg QD. Today, he reports he is feeling 80% better. States cough and muscle strength has improved. Uses oxygen prn 2 L w/ exercising. Otherwise, no other complaints.      Initial Presentation:   Patient states he felt well until June, when he went on a trip to [place]. On that trip, he noticed shortness of breath on exertion. He felt winded after walking up a few flights of stairs. Prior to that trip, he could walk up to 5 miles a day and climb 10-15 flights of stairs without difficulty. On his return to [place], he saw his PCP who noted abnormal breath sounds and referred him to pulmonary. He was seen by Dr. Watts and was noted to have pulmonary fibrosis on his CT chest. He was started on high dose PO steroids, but patient notes no improvement in his breathing. He then was seen by [place]. Goyal [place] [facility] [place]. Patient's daughter wanted to consider trials for investigational meds, so patient was referred to Dr. Scott (appt pending).     He was then seen by Dr. Reina with [facility] on 8/29. She noted a significant drop in his FVC (24% drop) and DLCO (29%) in a 1-month period. She was concerned for a rapidly progressive ILD. Plan at that time was repeat CT chest, continue steroids, start Ovef given progression, and follow up in 2 weeks with PFTs and 6MWT.      9/5 visit: patient reports that his breathing continues to worsen. He  "can only walk a block before he starts to feel short of breath. Denies history of lung disease or lung issues in the past. He notes worsening discoloration and thickening of both hands in past couple months. History of Raynaud's since 2014, at that time his left index finger would turn white. Four years later he noticed fingers of both hands would turn white. Four years after that the fingers turned purple. Then two to three months ago, he started to notice thickening, cracking, and fissures of his fingers. Additionally, he endorses unintentional weight loss, fatigue, and proximal upper extremity weakness. Otherwise, no fever, headaches, visual disturbance, vision loss, red/pain eyes, mucosal ulcers, dry eyes, dry mouth, CP, palpitations, GI/ complaints, muscle pain, joint pain or swelling.        Review of Systems   Constitutional:  Positive for unexpected weight change. Negative for fever.   HENT:  Negative for mouth sores and trouble swallowing.    Eyes:  Negative for redness.   Respiratory:  Positive for cough and shortness of breath.    Cardiovascular:  Negative for chest pain.   Gastrointestinal:  Positive for constipation. Negative for diarrhea.   Genitourinary:  Negative for genital sores.   Skin:  Negative for rash.   Neurological:  Negative for headaches.   Hematological:  Does not bruise/bleed easily.        Objective:   /79   Pulse 76   Ht 5' 7" (1.702 m)   Wt 75.5 kg (166 lb 7.2 oz)   BMI 26.07 kg/m²   Physical Exam   Constitutional: He is oriented to person, place, and time. normal appearance. No distress.   HENT:   Head: Normocephalic and atraumatic.   Right Ear: External ear normal.   Left Ear: External ear normal.   Nose: Nose normal.   Mouth/Throat: Mucous membranes are moist. No oropharyngeal exudate or posterior oropharyngeal erythema.   Eyes: Pupils are equal, round, and reactive to light. Conjunctivae are normal. Right eye exhibits no discharge. Left eye exhibits no discharge. No " scleral icterus.   Cardiovascular: Normal rate, regular rhythm and normal pulses.   No murmur heard.  Pulmonary/Chest: Effort normal. He has no wheezes.   Pulmonary Comments: Crackles throughout bilateral lung fields  Abdominal: Bowel sounds are normal. He exhibits no distension. There is no abdominal tenderness.   Musculoskeletal:         General: No swelling.      Cervical back: Normal range of motion.      Right lower leg: No edema.      Left lower leg: No edema.   Neurological: He is oriented to person, place, and time.   Skin: Lesion and rash noted.   Raynauds, skin thickening and fissuring of his fingers tips (improved from prior visit) - images attached       Right Side Rheumatological Exam     Muscle Strength (0-5 scale):  Deltoid:  4.6  Biceps: 4.8/5   Triceps:  4.8  : 5/5   Iliopsoas: 4.6  Quadriceps:  5   Distal Lower Extremity: 5    Left Side Rheumatological Exam     Muscle Strength (0-5 scale):  Deltoid:  4.6  Biceps: 4.8/5   Triceps:  4.8  :  5/5   Iliopsoas: 4.6  Quadriceps:  5   Distal Lower Extremity: 5          No data to display                  Imaging:  - MRI bilateral humerus 9/6/24: Question mild patchy activity identified level of the triceps, while nonspecific potentially related to myositis.  - CT A/P negative for malignancy        Assessment:     64-year-old man with PMHx of HTN, Raynaud's (since 2014), newly diagnosed seronegative anti-synthetase syndrome and ILD presents to clinic for follow up. He was hospitalized 9/5 for rapidly progressive ILD. During the admission, he received IV solumedrol 1gm x4 doses, 5 days of IVIG 0.4g/kg (9/6-9/10) and received CYC received on 9/12. Currently on prednisone 60mg QD. Today, he reports he is feeling 80% better today.   - PRESTON 1:640 speckled. Negative labwork: JASEN, ANCA, lupus anticoagulant, anti-cardiolipin, CCP/RF, GBM, Scl-70. Myomarker panel: + SSA  S/p pulse Solu-Medrol 1 g IV x 4 days   S/p IVIg x 5 days  S/p cyclophosphamide 750mg/m2  9/12/24  Bactrim prophylaxis and PPI    Rheumatology Health Maintenance  Vaccinations: Received Pneumonia 20 and Flu Vaccine on  9/20 w/ PCP. Tdap 1/2024  Due for COVID booster, RSV and shingles      1. Antisynthetase syndrome    2. ILD (interstitial lung disease)    3. Positive PRSETON (antinuclear antibody)    4. Raynaud's disease with gangrene    5. Muscle weakness of proximal extremity    6. Hepatitis B core antibody positive    7. Thrombocytopenia    8. Acute hypoxemic respiratory failure        Plan:     - Decrease prednisone to 50 mg x 1 week, then 40 mg x 1 week, then 30 mg x 1 week, then 25 mg x 1 week, then 20 mg x 1 week, then 15 mg x 1 week  - Labs today: CBC, CMP today and then 7-10 days after infusion  - Will send for comprehensive myositis autoantibodies to [facility] [place]  - Next dose of CYC 1gm/m2 scheduled for 10/10  - Start Tacrolimus 1 mg twice a day  - Check blood pressure while on the Tacrolimus   - Will need Tacrolimus level, CMP, and magnesium level 1 week after starting Tacrolimus. Then weekly troughs levels until level is stable  - IVIG 1 gm/kg x 2 days every 4 weeks  - Continue Bactrim 3x/week and PPI  - Follow up with Pulmonary  - Follow up with Hepatology for Hep B ppx     This patient was examined with Dr. Barcenas. Plan discussed with the patient. Return to clinic in 6 weeks       Problem List Items Addressed This Visit          Pulmonary    ILD (interstitial lung disease)    Relevant Medications    sulfamethoxazole-trimethoprim 400-80mg (BACTRIM,SEPTRA) 400-80 mg per tablet    tacrolimus (PROGRAF) 1 MG Cap    Other Relevant Orders    CBC W/ AUTO DIFFERENTIAL    COMPREHENSIVE METABOLIC PANEL    Misc Sendout Test, Blood comprehensive myositis autoantibodies - [facility] [place] (Completed)    COMPREHENSIVE METABOLIC PANEL    Magnesium    TACROLIMUS LEVEL    Acute hypoxemic respiratory failure       Immunology/Multi System    Antisynthetase syndrome -  Primary    Relevant Medications    tacrolimus (PROGRAF) 1 MG Cap    Other Relevant Orders    Magnesium    TACROLIMUS LEVEL       GI    Hepatitis B core antibody positive    Relevant Medications    tenofovir alafenamide (VEMLIDY) 25 mg Tab     Other Visit Diagnoses       Positive PRESTON (antinuclear antibody)        Relevant Medications    sulfamethoxazole-trimethoprim 400-80mg (BACTRIM,SEPTRA) 400-80 mg per tablet    tacrolimus (PROGRAF) 1 MG Cap    Other Relevant Orders    COMPREHENSIVE METABOLIC PANEL    Magnesium    TACROLIMUS LEVEL    Raynaud's disease with gangrene        Relevant Medications    sulfamethoxazole-trimethoprim 400-80mg (BACTRIM,SEPTRA) 400-80 mg per tablet    tacrolimus (PROGRAF) 1 MG Cap    Other Relevant Orders    CBC W/ AUTO DIFFERENTIAL    COMPREHENSIVE METABOLIC PANEL    Misc Sendout Test, Blood [place]antibodies [place] [facility] [place]    COMPREHENSIVE METABOLIC PANEL    Magnesium    TACROLIMUS LEVEL    Muscle weakness of proximal extremity        Relevant Orders    CBC W/ AUTO DIFFERENTIAL    COMPREHENSIVE METABOLIC PANEL    Misc Sendout Test, Blood comprehensive myositis autoantibodies [place] [facility] [place]    COMPREHENSIVE METABOLIC PANEL    Thrombocytopenia              Mary Trevino MD  PGY-4, Rheumatology Fellow

## 2024-09-28 RX ORDER — POTASSIUM CHLORIDE 750 MG/1
10 TABLET, EXTENDED RELEASE ORAL DAILY
Qty: 5 TABLET | Refills: 0 | Status: SHIPPED | OUTPATIENT
Start: 2024-09-28 | End: 2024-10-03

## 2024-09-29 RX ORDER — ACETAMINOPHEN 325 MG/1
650 TABLET ORAL
Status: CANCELLED | OUTPATIENT
Start: 2024-10-04

## 2024-09-29 RX ORDER — FAMOTIDINE 10 MG/ML
20 INJECTION INTRAVENOUS
Status: CANCELLED | OUTPATIENT
Start: 2024-10-04

## 2024-09-29 RX ORDER — SODIUM CHLORIDE 0.9 % (FLUSH) 0.9 %
10 SYRINGE (ML) INJECTION
Status: CANCELLED | OUTPATIENT
Start: 2024-10-04

## 2024-09-29 RX ORDER — HEPARIN 100 UNIT/ML
500 SYRINGE INTRAVENOUS
Status: CANCELLED | OUTPATIENT
Start: 2024-10-04

## 2024-09-29 RX ORDER — DIPHENHYDRAMINE HYDROCHLORIDE 50 MG/ML
25 INJECTION INTRAMUSCULAR; INTRAVENOUS
Status: CANCELLED | OUTPATIENT
Start: 2024-10-04

## 2024-10-01 ENCOUNTER — TELEPHONE (OUTPATIENT)
Dept: INFUSION THERAPY | Facility: HOSPITAL | Age: 64
End: 2024-10-01
Payer: MEDICARE

## 2024-10-01 RX ORDER — ACETAMINOPHEN 325 MG/1
650 TABLET ORAL
Status: CANCELLED | OUTPATIENT
Start: 2024-10-04

## 2024-10-01 RX ORDER — FAMOTIDINE 10 MG/ML
20 INJECTION INTRAVENOUS
Status: CANCELLED | OUTPATIENT
Start: 2024-10-04

## 2024-10-01 RX ORDER — HEPARIN 100 UNIT/ML
500 SYRINGE INTRAVENOUS
Status: CANCELLED | OUTPATIENT
Start: 2024-10-04

## 2024-10-01 RX ORDER — SODIUM CHLORIDE 0.9 % (FLUSH) 0.9 %
10 SYRINGE (ML) INJECTION
Status: CANCELLED | OUTPATIENT
Start: 2024-10-04

## 2024-10-02 ENCOUNTER — INFUSION (OUTPATIENT)
Dept: INFUSION THERAPY | Facility: HOSPITAL | Age: 64
End: 2024-10-02
Attending: INTERNAL MEDICINE
Payer: COMMERCIAL

## 2024-10-02 VITALS
HEART RATE: 72 BPM | DIASTOLIC BLOOD PRESSURE: 75 MMHG | OXYGEN SATURATION: 98 % | RESPIRATION RATE: 18 BRPM | TEMPERATURE: 98 F | SYSTOLIC BLOOD PRESSURE: 148 MMHG | WEIGHT: 163.56 LBS | BODY MASS INDEX: 25.62 KG/M2

## 2024-10-02 DIAGNOSIS — D89.89 ANTISYNTHETASE SYNDROME: Primary | ICD-10-CM

## 2024-10-02 DIAGNOSIS — J84.9 ILD (INTERSTITIAL LUNG DISEASE): ICD-10-CM

## 2024-10-02 PROCEDURE — 96375 TX/PRO/DX INJ NEW DRUG ADDON: CPT

## 2024-10-02 PROCEDURE — 96365 THER/PROPH/DIAG IV INF INIT: CPT

## 2024-10-02 PROCEDURE — 63600175 PHARM REV CODE 636 W HCPCS: Mod: JZ,JG | Performed by: INTERNAL MEDICINE

## 2024-10-02 PROCEDURE — 25000003 PHARM REV CODE 250: Performed by: INTERNAL MEDICINE

## 2024-10-02 PROCEDURE — 96366 THER/PROPH/DIAG IV INF ADDON: CPT

## 2024-10-02 RX ORDER — FAMOTIDINE 10 MG/ML
20 INJECTION INTRAVENOUS
Status: COMPLETED | OUTPATIENT
Start: 2024-10-02 | End: 2024-10-02

## 2024-10-02 RX ORDER — ACETAMINOPHEN 325 MG/1
650 TABLET ORAL
Status: CANCELLED | OUTPATIENT
Start: 2024-10-02

## 2024-10-02 RX ORDER — ACETAMINOPHEN 325 MG/1
650 TABLET ORAL
Status: COMPLETED | OUTPATIENT
Start: 2024-10-02 | End: 2024-10-02

## 2024-10-02 RX ORDER — SODIUM CHLORIDE 0.9 % (FLUSH) 0.9 %
10 SYRINGE (ML) INJECTION
OUTPATIENT
Start: 2024-10-02

## 2024-10-02 RX ORDER — HEPARIN 100 UNIT/ML
500 SYRINGE INTRAVENOUS
OUTPATIENT
Start: 2024-10-02

## 2024-10-02 RX ORDER — HEPARIN 100 UNIT/ML
500 SYRINGE INTRAVENOUS
Status: DISCONTINUED | OUTPATIENT
Start: 2024-10-02 | End: 2024-10-02 | Stop reason: HOSPADM

## 2024-10-02 RX ORDER — DIPHENHYDRAMINE HYDROCHLORIDE 50 MG/ML
25 INJECTION INTRAMUSCULAR; INTRAVENOUS
Status: CANCELLED | OUTPATIENT
Start: 2024-10-02

## 2024-10-02 RX ORDER — DIPHENHYDRAMINE HYDROCHLORIDE 50 MG/ML
25 INJECTION INTRAMUSCULAR; INTRAVENOUS
Status: COMPLETED | OUTPATIENT
Start: 2024-10-02 | End: 2024-10-02

## 2024-10-02 RX ORDER — SODIUM CHLORIDE 0.9 % (FLUSH) 0.9 %
10 SYRINGE (ML) INJECTION
Status: DISCONTINUED | OUTPATIENT
Start: 2024-10-02 | End: 2024-10-02 | Stop reason: HOSPADM

## 2024-10-02 RX ORDER — FAMOTIDINE 10 MG/ML
20 INJECTION INTRAVENOUS
Status: CANCELLED | OUTPATIENT
Start: 2024-10-02

## 2024-10-02 RX ADMIN — SODIUM CHLORIDE: 9 INJECTION, SOLUTION INTRAVENOUS at 09:10

## 2024-10-02 RX ADMIN — FAMOTIDINE 20 MG: 10 INJECTION, SOLUTION INTRAVENOUS at 09:10

## 2024-10-02 RX ADMIN — ACETAMINOPHEN 650 MG: 325 TABLET ORAL at 09:10

## 2024-10-02 RX ADMIN — DIPHENHYDRAMINE HYDROCHLORIDE 25 MG: 50 INJECTION INTRAMUSCULAR; INTRAVENOUS at 09:10

## 2024-10-02 RX ADMIN — HUMAN IMMUNOGLOBULIN G 75 G: 40 LIQUID INTRAVENOUS at 09:10

## 2024-10-03 ENCOUNTER — TELEPHONE (OUTPATIENT)
Dept: RHEUMATOLOGY | Facility: CLINIC | Age: 64
End: 2024-10-03
Payer: MEDICARE

## 2024-10-03 DIAGNOSIS — J84.9 INTERSTITIAL PULMONARY DISEASE, UNSPECIFIED: ICD-10-CM

## 2024-10-03 DIAGNOSIS — D89.89 ANTISYNTHETASE SYNDROME: Primary | ICD-10-CM

## 2024-10-03 RX ORDER — PREDNISONE 5 MG/1
TABLET ORAL
Qty: 252 TABLET | Refills: 0 | Status: SHIPPED | OUTPATIENT
Start: 2024-10-03 | End: 2024-11-14

## 2024-10-07 DIAGNOSIS — R76.8 HEPATITIS B CORE ANTIBODY POSITIVE: Primary | ICD-10-CM

## 2024-10-07 DIAGNOSIS — J84.9 ILD (INTERSTITIAL LUNG DISEASE): ICD-10-CM

## 2024-10-07 DIAGNOSIS — D89.89 ANTISYNTHETASE SYNDROME: Primary | ICD-10-CM

## 2024-10-07 DIAGNOSIS — R76.8 HEPATITIS B CORE ANTIBODY POSITIVE: ICD-10-CM

## 2024-10-07 RX ORDER — PREDNISONE 20 MG/1
TABLET ORAL
Qty: 42 TABLET | Refills: 0 | Status: SHIPPED | OUTPATIENT
Start: 2024-10-07 | End: 2024-10-28

## 2024-10-08 ENCOUNTER — HOSPITAL ENCOUNTER (OUTPATIENT)
Dept: PULMONOLOGY | Facility: CLINIC | Age: 64
Discharge: HOME | End: 2024-10-08
Payer: MEDICARE

## 2024-10-08 ENCOUNTER — OFFICE VISIT (OUTPATIENT)
Dept: TRANSPLANT | Facility: CLINIC | Age: 64
End: 2024-10-08
Payer: MEDICARE

## 2024-10-08 ENCOUNTER — TELEPHONE (OUTPATIENT)
Dept: RHEUMATOLOGY | Facility: CLINIC | Age: 64
End: 2024-10-08
Payer: COMMERCIAL

## 2024-10-08 VITALS
WEIGHT: 163 LBS | RESPIRATION RATE: 18 BRPM | SYSTOLIC BLOOD PRESSURE: 146 MMHG | WEIGHT: 163 LBS | OXYGEN SATURATION: 96 % | DIASTOLIC BLOOD PRESSURE: 82 MMHG | HEART RATE: 69 BPM | HEIGHT: 67 IN | HEIGHT: 67 IN | BODY MASS INDEX: 25.58 KG/M2 | TEMPERATURE: 98 F | BODY MASS INDEX: 25.58 KG/M2

## 2024-10-08 DIAGNOSIS — J84.9 ILD (INTERSTITIAL LUNG DISEASE): ICD-10-CM

## 2024-10-08 DIAGNOSIS — D89.89 ANTISYNTHETASE SYNDROME: Primary | ICD-10-CM

## 2024-10-08 DIAGNOSIS — Z51.81 MEDICATION MONITORING ENCOUNTER: ICD-10-CM

## 2024-10-08 DIAGNOSIS — J84.9 ILD (INTERSTITIAL LUNG DISEASE): Primary | ICD-10-CM

## 2024-10-08 LAB
DLCO ADJ PRE: 12.4 ML/(MIN*MMHG) (ref 18.71–32.57)
DLCO SINGLE BREATH LLN: 18.71
DLCO SINGLE BREATH PRE REF: 49.1 %
DLCO SINGLE BREATH REF: 25.64
DLCOC SBVA LLN: 2.66
DLCOC SBVA PRE REF: 85.9 %
DLCOC SBVA REF: 3.93
DLCOC SINGLE BREATH LLN: 18.71
DLCOC SINGLE BREATH PRE REF: 48.3 %
DLCOC SINGLE BREATH REF: 25.64
DLCOCSBVAULN: 5.21
DLCOCSINGLEBREATHULN: 32.57
DLCOCSINGLEBREATHZSCORE: -3.15
DLCOSINGLEBREATHULN: 32.57
DLCOSINGLEBREATHZSCORE: -3.1
DLCOVA LLN: 2.66
DLCOVA PRE REF: 87.3 %
DLCOVA PRE: 3.44 ML/(MIN*MMHG*L) (ref 2.66–5.21)
DLCOVA REF: 3.93
DLCOVAULN: 5.21
DLVAADJ PRE: 3.38 ML/(MIN*MMHG*L) (ref 2.66–5.21)
ERV LLN: -16448.95
ERV PRE REF: 20.9 %
ERV REF: 1.05
ERVULN: ABNORMAL
FEF 25 75 LLN: 1.5
FEF 25 75 PRE REF: 36.9 %
FEF 25 75 REF: 3.21
FEV05 LLN: 1.31
FEV05 REF: 2.45
FEV1 FVC LLN: 68
FEV1 FVC PRE REF: 91.4 %
FEV1 FVC REF: 79
FEV1 LLN: 2.04
FEV1 PRE REF: 60 %
FEV1 REF: 2.81
FEV1FVCZSCORE: -1.02
FEV1ZSCORE: -2.37
FRCPLETH LLN: 2.49
FRCPLETH PREREF: 59.3 %
FRCPLETH REF: 3.48
FRCPLETHULN: 4.46
FVC LLN: 2.63
FVC PRE REF: 65.7 %
FVC REF: 3.54
FVCZSCORE: -2.21
IVC PRE: 2.39 L (ref 2.63–4.46)
IVC SINGLE BREATH LLN: 2.63
IVC SINGLE BREATH PRE REF: 67.7 %
IVC SINGLE BREATH REF: 3.54
IVCSINGLEBREATHULN: 4.46
LLN IC: -9999997.14
PEF LLN: 5.81
PEF PRE REF: 69.7 %
PEF REF: 7.8
PHYSICIAN COMMENT: ABNORMAL
PRE DLCO: 12.6 ML/(MIN*MMHG) (ref 18.71–32.57)
PRE ERV: 0.22 L (ref -16448.95–16451.05)
PRE FEF 25 75: 1.18 L/S (ref 1.5–4.92)
PRE FET 100: 7.11 SEC
PRE FEV05 REF: 51.8 %
PRE FEV1 FVC: 72.55 % (ref 68.04–89.53)
PRE FEV1: 1.69 L (ref 2.04–3.52)
PRE FEV5: 1.27 L (ref 1.31–3.58)
PRE FRC PL: 2.06 L (ref 2.49–4.46)
PRE FVC: 2.32 L (ref 2.63–4.46)
PRE IC: 2.18 L (ref -9999997.14–#######.####)
PRE PEF: 5.44 L/S (ref 5.81–9.79)
PRE REF IC: 76 %
PRE RV: 1.84 L (ref 1.75–3.1)
PRE TLC: 4.24 L (ref 5.37–7.67)
RAW PRE REF: 159 %
RAW PRE: 4.86 CMH2O*S/L (ref 3.06–3.06)
RAW REF: 3.06
REF IC: 2.86
RV LLN: 1.75
RV PRE REF: 75.9 %
RV REF: 2.43
RVTLC LLN: 30
RVTLC PRE REF: 110.7 %
RVTLC PRE: 43.5 % (ref 30.33–48.29)
RVTLC REF: 39
RVTLCULN: 48
RVULN: 3.1
SGAW PRE REF: 88.8 %
SGAW PRE: 0.07 1/(CMH2O*S) (ref 0.08–0.08)
SGAW REF: 0.08
TLC LLN: 5.37
TLC PRE REF: 65 %
TLC REF: 6.52
TLC ULN: 7.67
TLCZSCORE: -3.26
ULN IC: ABNORMAL
VA PRE: 3.67 L (ref 6.37–6.37)
VA SINGLE BREATH LLN: 6.37
VA SINGLE BREATH PRE REF: 57.6 %
VA SINGLE BREATH REF: 6.37
VASINGLEBREATHULN: 6.37
VC LLN: 2.63
VC PRE REF: 67.7 %
VC PRE: 2.39 L (ref 2.63–4.46)
VC REF: 3.54
VC ULN: 4.46

## 2024-10-08 PROCEDURE — 94618 PULMONARY STRESS TESTING: CPT | Mod: PBBFAC,NTX | Performed by: INTERNAL MEDICINE

## 2024-10-08 PROCEDURE — 94010 BREATHING CAPACITY TEST: CPT | Mod: PBBFAC,NTX | Performed by: INTERNAL MEDICINE

## 2024-10-08 PROCEDURE — 94618 PULMONARY STRESS TESTING: CPT | Mod: 26,S$PBB,NTX, | Performed by: INTERNAL MEDICINE

## 2024-10-08 PROCEDURE — 99214 OFFICE O/P EST MOD 30 MIN: CPT | Mod: 25,S$PBB,NTX, | Performed by: INTERNAL MEDICINE

## 2024-10-08 PROCEDURE — 94726 PLETHYSMOGRAPHY LUNG VOLUMES: CPT | Mod: PBBFAC,NTX | Performed by: INTERNAL MEDICINE

## 2024-10-08 PROCEDURE — 94729 DIFFUSING CAPACITY: CPT | Mod: PBBFAC,NTX | Performed by: INTERNAL MEDICINE

## 2024-10-08 PROCEDURE — 99213 OFFICE O/P EST LOW 20 MIN: CPT | Mod: PBBFAC,TXP,25 | Performed by: INTERNAL MEDICINE

## 2024-10-08 PROCEDURE — 99999 PR PBB SHADOW E&M-EST. PATIENT-LVL III: CPT | Mod: PBBFAC,TXP,, | Performed by: INTERNAL MEDICINE

## 2024-10-08 NOTE — PROCEDURES
Darrell Corona is a 64 y.o.   male patient, who presents for a 6 minute walk test ordered by DO Latosha.  The diagnosis is Interstitial Lung Disease.  The patient's BMI is 25.5 kg/m2.  Predicted distance (lower limit of normal) is 392.85 meters.      Test Results:    The test was completed without stopping.  The total time walked was 360 seconds.  During walking, the patient reported:  Dyspnea.  The patient used no assistive devices during testing.  Oxygen saturation was measured with forehead pulse oximetry probe.     10/08/2024---------Distance: 426.72 meters (1400 feet)     O2 Sat % Supplemental Oxygen Heart Rate Blood Pressure Alexa Scale   Pre-exercise  (Resting) 96 % Room Air 76 bpm 163/78 mmHg 3   During Exercise 93 % Room Air 91 bpm 160/83 mmHg 4   Post-exercise  (Recovery) 96 % Room Air  82 bpm       Recovery Time: 53 seconds    Performing nurse/tech: Tito DANIELS      PREVIOUS STUDY:   08/29/2024---------Distance: 396.24 meters (1300 feet)       O2 Sat % Supplemental Oxygen Heart Rate Blood Pressure Alexa Scale   Pre-exercise  (Resting) 95 % Room Air 84 bpm 108/67  mmHg 4   During Exercise 88 % Room Air 98 bpm 130/71  mmHg 7-8   Post-exercise    96 % Room Air  84 bpm 117/74  mmHg         CLINICAL INTERPRETATION:  Six minute walk distance is 426.72 meters (1400 feet) with somewhat heavy dyspnea.  During exercise, there was desaturation while breathing room air.  Both blood pressure and heart rate remained stable with walking.  Hypertension was present prior to exercise.  The patient did not report non-pulmonary symptoms during exercise.  Since the previous study in August 2024, exercise capacity is unchanged.  Based upon age and body mass index, exercise capacity is normal.

## 2024-10-08 NOTE — PROGRESS NOTES
ADVANCED LUNG DISEASE FOLLOW UP VISIT                                                                                                                                            Reason for Visit:  ILD    Referring Physician: Carleen Reina, *    History of Present Illness: Darrell Corona is a 64 y.o. male who is on 0L of oxygen.  He is on no assisted ventilation.  His New York Heart Association Class is III and a Karnofsky score of 70% - Cares for self: Unable to carry on normal activity or active work. He is not diabetic.    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 He was hospitalized 9/5-9/10 for acute hypoxemic respiratory failure and received solumedrol and IVIG. ILD protocol with pulse dose steroids (plan for 5 days of 1g/day, 9/5-9/9) as well as 5 days of IVIG (9/6-9/10). Hep B core ab positive and hepatology consulted and started on prophylaxis.     Patient was started on prednisone taper upon discharge. He is still awaiting tacrolimus due to insurance, but states it should be delivered within the week. He received cyclophosphamide as outpatient. IVIG monthly. Myositis panel negative.     Patient presents to the clinic today with his daughter. States he is at about 80% of his respiratory baseline. No longer qualifies for oxygen therapy. Feels much improved since his initial visit. He plans on purchasing an inogen oxygen concentrator to be used as needed in the future.       PER INITIAL HPI:    Pt presents today for initial evaluation of pulmonary fibrosis.  He states that he was in his usual state of health until June, when he went on a trip to [place].  On that trip, he noticed that when he walked up 5 flights of stairs he became very winded.  He usually walks up to 5  miles a day and is able to walk 10-15 flights of stairs without difficulty.  When he returned to [place], he was seen by his PCP who noted abnormal breath sounds and referred him to pulmonary.  He was seen by Dr. Watts and was noted to have pulmonary fibrosis on his CT chest.  He desaturated to 93% on 6MWT.  He had an PRESTON that was positive and a negative RF.  He has continued to have ongoing dyspnea that has been progressive since June.  He denies any illnesses around the onset of symptoms or since onset of symptoms.  Denies any lower extremity swelling.  No palpitations but does monitor his pulse ox and notices tachycardia after he exerts himself.  He states that he monitors his oxygen and will see dips into the low 80's after he exerts himself.  He recently had an episode while at [employer] loading groceries where he became lightheaded and felt like he was going to pass out.  He sat in the car and returned to baseline.  He then was seen by Dr. Goyal at [facility] and started on a steroid course.  He has not noticed any difference with steroid use.  Labs for SSc and Sjogren's were checked and are pending.  He was referred to Dr. Scott for clinical trial consideration.  He has never had bronchoscopy or chest biopsy.      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  No pulmonary toxic medications.  Does have dogs at home.  No history of dog allergies.  No exposures to birds, chickens, down bedding, or zheng/chicken coops.  No environmental exposures.  No mold in the home.  He previously smoked but quit 30 years ago.  No history of lung disease up to this point.  No family history of lung disease.  No family history of autoimmune disease.  He does endorse Raynaud's.  He does have skin thickening and fissuring of his fingers with thickness on his PIPs.  No rashes.  No muscle symptoms.   Does have fatigue.  No joint pains.  No sinus disease or reflux.       No past medical history on file.    No past surgical history on file.    Allergies: Patient has no known allergies.    Current Outpatient Medications   Medication Sig    amLODIPine (NORVASC) 5 MG tablet Take 1 tablet (5 mg total) by mouth once daily.    pantoprazole (PROTONIX) 40 MG tablet Take 1 tablet (40 mg total) by mouth once daily.    predniSONE (DELTASONE) 20 MG tablet Take 2.5 tablets (50 mg total) by mouth once daily for 7 days, THEN 2 tablets (40 mg total) once daily for 7 days, THEN 1.5 tablets (30 mg total) once daily for 7 days. Contact provider for further taper and refill.    sulfamethoxazole-trimethoprim 400-80mg (BACTRIM,SEPTRA) 400-80 mg per tablet Take 1 tablet by mouth every Mon, Wed, Fri.    tacrolimus (PROGRAF) 1 MG Cap Take 1 capsule (1 mg total) by mouth every 12 (twelve) hours.    tamsulosin (FLOMAX) 0.4 mg Cap Take 1 capsule (0.4 mg total) by mouth once daily.    tenofovir alafenamide (VEMLIDY) 25 mg Tab Take 1 tablet (25 mg total) by mouth once daily.     No current facility-administered medications for this visit.         There is no immunization history on file for this patient.  Family History:  No family history on file.  Social History     Substance and Sexual Activity   Alcohol Use Yes    Alcohol/week: 2.0 standard drinks of alcohol    Types: 1 Glasses of wine, 1 Cans of beer per week    Comment: 1 beer and wine every day      Social History     Substance and Sexual Activity   Drug Use Never      Social History     Socioeconomic History    Marital status:    Tobacco Use    Smoking status: Former     Current packs/day: 0.00     Types: Cigarettes     Quit date: 1991     Years since quittin.7    Smokeless tobacco: Never    Tobacco comments:     quit 30yrs ago   Substance and Sexual Activity    Alcohol use: Yes     Alcohol/week: 2.0 standard drinks of alcohol     Types: 1 Glasses of wine, 1 Cans of  beer per week     Comment: 1 beer and wine every day    Drug use: Never    Sexual activity: Yes   Social History Narrative    ** Merged History Encounter **          Social Drivers of Health     Financial Resource Strain: Patient Declined (9/19/2024)    Received from [facility]    Overall Financial Resource Strain (CARDIA)     Difficulty of Paying Living Expenses: Patient declined   Food Insecurity: Patient Declined (9/19/2024)    Received from [facility]    Hunger Vital Sign     Worried About Running Out of Food in the Last Year: Patient declined     Ran Out of Food in the Last Year: Patient declined   Transportation Needs: No Transportation Needs (9/19/2024)    Received from [facility]    PRAPARE - Transportation     Lack of Transportation (Medical): No     Lack of Transportation (Non-Medical): No   Physical Activity: Inactive (9/19/2024)    Received from [facility]    Exercise Vital Sign     Days of Exercise per Week: 0 days     Minutes of Exercise per Session: 0 min   Stress: Stress Concern Present (9/19/2024)    Received from [facility]    [employer] - Occupational Stress Questionnaire     Feeling of Stress : To some extent   Housing Stability: Unknown (9/19/2024)    Received from [facility]    Housing Stability Vital Sign     Unable to Pay for Housing in the Last Year: No     Review of Systems   Constitutional:  Positive for malaise/fatigue. Negative for chills, diaphoresis, fever and weight loss.   HENT:  Negative for congestion, ear discharge, ear pain, hearing loss, nosebleeds, sinus pain, sore throat and tinnitus.    Eyes:  Negative for blurred vision, double vision, photophobia, pain, discharge and redness.   Respiratory:  Positive for cough and shortness of breath. Negative for hemoptysis, sputum production, wheezing and stridor.    Cardiovascular:  Negative for chest pain, palpitations, orthopnea, claudication, leg swelling and PND.   Gastrointestinal:  Negative for  "abdominal pain, blood in stool, constipation, diarrhea, heartburn, melena, nausea and vomiting.   Genitourinary:  Negative for dysuria, flank pain, frequency, hematuria and urgency.   Musculoskeletal:  Negative for back pain, falls, joint pain, myalgias and neck pain.   Skin:  Negative for itching and rash.   Neurological:  Negative for dizziness, tingling, tremors, sensory change, speech change, focal weakness, seizures, loss of consciousness, weakness and headaches.   Endo/Heme/Allergies:  Negative for environmental allergies and polydipsia. Does not bruise/bleed easily.   Psychiatric/Behavioral:  Negative for depression, hallucinations, memory loss, substance abuse and suicidal ideas. The patient is not nervous/anxious and does not have insomnia.      Vitals  BP (!) 146/82 (BP Location: Right arm, Patient Position: Sitting)   Pulse 69   Temp 98.1 °F (36.7 °C) (Oral)   Resp 18   Ht 5' 7" (1.702 m)   Wt 73.9 kg (163 lb)   SpO2 96%   BMI 25.53 kg/m²   Physical Exam  Vitals and nursing note reviewed.   Constitutional:       General: He is not in acute distress.     Appearance: He is well-developed. He is not diaphoretic.   HENT:      Head: Normocephalic and atraumatic.      Nose: Nose normal.      Mouth/Throat:      Pharynx: No oropharyngeal exudate.   Eyes:      General: No scleral icterus.     Conjunctiva/sclera: Conjunctivae normal.      Pupils: Pupils are equal, round, and reactive to light.   Neck:      Thyroid: No thyromegaly.      Vascular: No JVD.   Cardiovascular:      Rate and Rhythm: Normal rate and regular rhythm.      Heart sounds: Normal heart sounds. No murmur heard.     No friction rub. No gallop.   Pulmonary:      Effort: Pulmonary effort is normal. No respiratory distress.      Breath sounds: No stridor. Rales (bibasilar) present. No wheezing.   Abdominal:      General: Bowel sounds are normal. There is no distension.      Palpations: Abdomen is soft.      Tenderness: There is no abdominal " tenderness.   Musculoskeletal:         General: Normal range of motion.      Cervical back: Normal range of motion and neck supple.   Skin:     General: Skin is warm and dry.      Findings: No erythema.      Comments: Skin thickening and fissuring of fingers and hands bilaterally.  Consistent with mechanics hands   Neurological:      Mental Status: He is alert and oriented to person, place, and time.      Cranial Nerves: No cranial nerve deficit.   Psychiatric:         Judgment: Judgment normal.         Labs:  Hospital Outpatient Visit on 10/08/2024   Component Date Value    Pre FVC 10/08/2024 2.32 (L)     PRE FEV5 10/08/2024 1.27 (L)     Pre FEV1 10/08/2024 1.69 (L)     Pre FEV1 FVC 10/08/2024 72.55     Pre FEF 25 75 10/08/2024 1.18 (L)     Pre PEF 10/08/2024 5.44 (L)     Pre  10/08/2024 7.11     Pre DLCO 10/08/2024 12.60 (L)     DLCO ADJ PRE 10/08/2024 12.40 (L)     DLCOVA PRE 10/08/2024 3.44     DLVAAdj PRE 10/08/2024 3.38     VA PRE 10/08/2024 3.67 (L)     IVC PRE 10/08/2024 2.39 (L)     Pre TLC 10/08/2024 4.24 (L)     VC PRE 10/08/2024 2.39 (L)     PRE IC 10/08/2024 2.18     Pre FRC PL 10/08/2024 2.06 (L)     Pre ERV 10/08/2024 0.22     Pre RV 10/08/2024 1.84     RVTLC PRE 10/08/2024 43.50     Raw PRE 10/08/2024 4.86 (H)     sGaw PRE 10/08/2024 0.07 (L)     FVC Ref 10/08/2024 3.54     FVC LLN 10/08/2024 2.63     FVC Pre Ref 10/08/2024 65.7     FEV05 REF 10/08/2024 2.45     FEV05 LLN 10/08/2024 1.31     PRE FEV05 REF 10/08/2024 51.8     FEV1 Ref 10/08/2024 2.81     FEV1 LLN 10/08/2024 2.04     FEV1 Pre Ref 10/08/2024 60.0     FEV1 FVC Ref 10/08/2024 79     FEV1 FVC LLN 10/08/2024 68     FEV1 FVC Pre Ref 10/08/2024 91.4     FEF 25 75 Ref 10/08/2024 3.21     FEF 25 75 LLN 10/08/2024 1.50     FEF 25 75 Pre Ref 10/08/2024 36.9     PEF Ref 10/08/2024 7.80     PEF LLN 10/08/2024 5.81     PEF Pre Ref 10/08/2024 69.7     TLC Ref 10/08/2024 6.52     TLC LLN 10/08/2024 5.37     TLC ULN 10/08/2024 7.67     TLC Pre  Ref 10/08/2024 65.0     VC Ref 10/08/2024 3.54     VC LLN 10/08/2024 2.63     VC ULN 10/08/2024 4.46     VC Pre Ref 10/08/2024 67.7     REF IC 10/08/2024 2.86     LLN IC 10/08/2024 -7449444.14     ULN IC 10/08/2024 65053076.86     PRE REF IC 10/08/2024 76.0     FRCpleth Ref 10/08/2024 3.48     FRCpleth LLN 10/08/2024 2.49     FRC PLETH ULN 10/08/2024 4.46     FRCpleth PreRef 10/08/2024 59.3     ERV Ref 10/08/2024 1.05     ERV LLN 10/08/2024 -15033.95     ERV ULN 10/08/2024 81515.05     ERV Pre Ref 10/08/2024 20.9     RV Ref 10/08/2024 2.43     RV LLN 10/08/2024 1.75     RV ULN 10/08/2024 3.10     RV Pre Ref 10/08/2024 75.9     RVTLC Ref 10/08/2024 39     RVTLC LLN 10/08/2024 30     RV TLC ULN 10/08/2024 48     RVTLC Pre Ref 10/08/2024 110.7     Raw Ref 10/08/2024 3.06     Raw Pre Ref 10/08/2024 159.0     sGaw Ref 10/08/2024 0.08     sGaw Pre Ref 10/08/2024 88.8     DLCO Single Breath Ref 10/08/2024 25.64     DLCO Single Breath LLN 10/08/2024 18.71     DLCO SINGLEBREATH ULN 10/08/2024 32.57     DLCO Single Breath Pre R* 10/08/2024 49.1     DLCOc Single Breath Ref 10/08/2024 25.64     DLCOc Single Breath LLN 10/08/2024 18.71     DLCOC SINGLEBREATH ULN 10/08/2024 32.57     DLCOc Single Breath Pre * 10/08/2024 48.3     DLCOVA Ref 10/08/2024 3.93     DLCOVA LLN 10/08/2024 2.66     DLCOVA ULN 10/08/2024 5.21     DLCOVA Pre Ref 10/08/2024 87.3     DLCOc SBVA Ref 10/08/2024 3.93     DLCOc SBVA LLN 10/08/2024 2.66     DLCOCSBVA ULN 10/08/2024 5.21     DLCOc SBVA Pre Ref 10/08/2024 85.9     VA Single Breath Ref 10/08/2024 6.37     VA Single Breath LLN 10/08/2024 6.37     VA SINGLEBREATH ULN 10/08/2024 6.37     VA Single Breath Pre Ref 10/08/2024 57.6     IVC Single Breath Ref 10/08/2024 3.54     IVC Single Breath LLN 10/08/2024 2.63     IVC SINGLEBREATH ULN 10/08/2024 4.46     IVC Single Breath Pre Ref 10/08/2024 67.7     FVCZSCORE 10/08/2024 -2.21     [id number] 10/08/2024 -2.37     LHI0FVCRKRMWQ 10/08/2024 -1.02      TLCZSCORE 10/08/2024 -3.26     DLCOSINGLEBREATHZSCORE 10/08/2024 -3.10     DLCOcSingleBreathZSCORE 10/08/2024 -3.15            8/29/2024     4:19 PM 7/30/2024     4:20 PM   Pulmonary Function Tests   FVC 1.88 liters 2.49 liters   FEV1 1.39 liters 1.85 liters   TLC (liters) 3.53 liters 3.91 liters   DLCO (ml/mmHg sec) 8.95 ml/mmHg sec 12.7 ml/mmHg sec   FVC% 53 70   FEV1% 49 65   FEF 25-75 1.06 1.39   FEF 25-75% 33 43   TLC% 54 60   RV 1.65 1.42   RV% 67.8 58   DLCO% 34 49         10/8/2024     9:37 AM 8/29/2024     3:35 PM 7/30/2024     1:06 PM   6MW   6MWT Status completed without stopping completed without stopping completed without stopping   Patient Reported Dyspnea Dyspnea Dyspnea   Was O2 used? No No No   6MW Distance walked (feet) 1400 feet 1300 feet 1400 feet   Distance walked (meters) 426.72 meters 396.24 meters 426.72 meters   Did patient stop? No No No   Oxygen Saturation 96 % 95 % 99 %   Supplemental Oxygen Room Air Room Air Room Air   Heart Rate 76 bpm 84 bpm 66 bpm   Blood Pressure 163/78 108/67 118/69   Alexa Dyspnea Rating  moderate somewhat heavy moderate   Oxygen Saturation 93 % 88 % 93 %   Supplemental Oxygen Room Air Room Air Room Air   Heart Rate 91 bpm 98 bpm 85 bpm   Blood Pressure 160/83 130/71 157/79   Alexa Dyspnea Rating  somewhat heavy very heavy heavy   Recovery Time (seconds) 53 seconds 95 seconds 85 seconds   Oxygen Saturation 96 % 96 % 97 %   Supplemental Oxygen Room Air Room Air Room Air   Heart Rate 82 bpm 84 bpm 73 bpm       Imaging:  Results for orders placed during the hospital encounter of 07/30/24    CT Chest Without Contrast    Narrative  EXAMINATION:  CT CHEST WITHOUT CONTRAST    CLINICAL HISTORY:  Interstitial lung disease; Interstitial pulmonary disease, unspecified    TECHNIQUE:  Noncontrast images were obtained through the chest per protocol.  Coronal and sagittal images were reviewed.    COMPARISON:  None.    FINDINGS:  Structures at the base of the neck are unremarkable.   No axillary adenopathy.  Few shotty nonenlarged mediastinal nodes, nonspecific and possibly reactive.  No anterior pericardial effusion.  Aortic arch and coronary calcific atherosclerosis.  The trachea is clear.    The lungs are symmetrically expanded.  Patchy peripheral and lower lung predominant interstitial thickening and opacities.  More mild degree of ground-glass and reticulation peripherally at the upper lungs and apices.  A few subpleural subcentimeter nodular foci, for reference at the upper lobe measuring 0.5 cm (series 2, image 39).  A few punctate right upper lobe nodules (for reference series 3, image 87).  No evident honeycombing, bronchiectasis, or air trapping.  No pleural effusion or pneumothorax.    Limited images through the unenhanced upper abdomen demonstrate no acute abnormality.  No aggressive osseous lesion.  Suspected degree of ossification at the posterior longitudinal ligament at C6-C7.  Multilevel spine DJD.    Impression  Patchy peripheral and lower lung interstitial thickening and opacities.  Overall appearance is nonspecific and could relate to sequela of fibrotic change versus other infectious or non-infectious inflammatory sequela.  No evident honeycombing, bronchiectasis, or significant air trapping by this exam.    Few scattered subcentimeter pulmonary nodules, technically indeterminate.  Attention at follow-up.    Additional findings as above.      Electronically signed by: Osvaldo Daniel  Date:    07/31/2024  Time:    11:21        Cardiodiagnostics:  Results for orders placed during the hospital encounter of 07/23/24    Echo    Interpretation Summary    Left Ventricle: The left ventricle is normal in size. Normal wall thickness. There is normal systolic function with a visually estimated ejection fraction of 55 - 60%. Global longitudinal strain is normal; -17.0%. There is normal diastolic function.    Right Ventricle: Normal right ventricular cavity size. Wall thickness is  normal. Systolic function is normal.    Tricuspid Valve: There is mild regurgitation.    IVC/SVC: Normal venous pressure at 3 mmHg.        Assessment:  1. ILD (interstitial lung disease)        Plan:     Patient followed for MCTD-ILD. Initially presented to the clinic in early September for rapidly progressive ILD. Admitted 9/5-9/10 for acute hypoxemic respiratory failure and is now s/p pulse steroid, IVIG, and cytoxan infusions. Plan to continue steroid taper and transition to tacrolimus per rheumatology. Continue outpatient IVIG and cytoxan infusions. Symptoms significantly improved and FVC improved from 4 months previous (~500cc improvement from his initial evaluation) DLCO increased as well. Will repeat CT chest at his next clinic visit. No longer qualifies for oxygen therapy and 6MWT distance was excellent today.     RTC in 3 months or sooner if needed. Repeat CT chest at that time. 6MWT and PFTs at routine visits.       Rosemarie White PA-C  [facility]

## 2024-10-08 NOTE — LETTER
October 8, 2024                      [facility] 1st Fl  [address]  [facility] [address]  Phone: [phone]   Patient: Darrell Corona   MR Number: [id number]   YOB: 1960   Date of Visit: 10/8/2024       Dear       Thank you for referring Darrell Corona to me for evaluation. Attached you will find relevant portions of my assessment and plan of care.    If you have questions, please do not hesitate to call me. I look forward to following Darrell Corona along with you.    Sincerely,    Carleen Reina, DO    Enclosure    If you would like to receive this communication electronically, please contact [email or web address] or [phone] to request [employer] access.    [employer] is a tool which provides read-only access to select patient information with whom you have a relationship. Its easy to use and provides real time access to review your patients record including encounter summaries, notes, results, and demographic information.    If you feel you have received this communication in error or would no longer like to receive these types of communications, please e-mail [email or web address]

## 2024-10-10 ENCOUNTER — INFUSION (OUTPATIENT)
Dept: INFUSION THERAPY | Facility: HOSPITAL | Age: 64
End: 2024-10-10
Attending: INTERNAL MEDICINE
Payer: MEDICARE

## 2024-10-10 VITALS
OXYGEN SATURATION: 98 % | HEART RATE: 65 BPM | SYSTOLIC BLOOD PRESSURE: 129 MMHG | HEIGHT: 67 IN | RESPIRATION RATE: 16 BRPM | WEIGHT: 167.31 LBS | BODY MASS INDEX: 26.26 KG/M2 | TEMPERATURE: 98 F | DIASTOLIC BLOOD PRESSURE: 72 MMHG

## 2024-10-10 DIAGNOSIS — J84.9 ILD (INTERSTITIAL LUNG DISEASE): Primary | ICD-10-CM

## 2024-10-10 PROCEDURE — 96411 CHEMO IV PUSH ADDL DRUG: CPT

## 2024-10-10 PROCEDURE — 25000003 PHARM REV CODE 250: Performed by: INTERNAL MEDICINE

## 2024-10-10 PROCEDURE — 96413 CHEMO IV INFUSION 1 HR: CPT

## 2024-10-10 PROCEDURE — 96375 TX/PRO/DX INJ NEW DRUG ADDON: CPT

## 2024-10-10 PROCEDURE — 63600175 PHARM REV CODE 636 W HCPCS: Performed by: INTERNAL MEDICINE

## 2024-10-10 RX ORDER — ONDANSETRON HYDROCHLORIDE 2 MG/ML
4 INJECTION, SOLUTION INTRAVENOUS
Status: COMPLETED | OUTPATIENT
Start: 2024-10-10 | End: 2024-10-10

## 2024-10-10 RX ORDER — ACETAMINOPHEN 325 MG/1
650 TABLET ORAL EVERY 4 HOURS PRN
Status: CANCELLED | OUTPATIENT
Start: 2024-11-07

## 2024-10-10 RX ORDER — DIPHENHYDRAMINE HYDROCHLORIDE 50 MG/ML
25 INJECTION INTRAMUSCULAR; INTRAVENOUS
Status: COMPLETED | OUTPATIENT
Start: 2024-10-10 | End: 2024-10-10

## 2024-10-10 RX ORDER — SODIUM CHLORIDE 0.9 % (FLUSH) 0.9 %
10 SYRINGE (ML) INJECTION
Status: CANCELLED | OUTPATIENT
Start: 2024-11-07

## 2024-10-10 RX ORDER — HEPARIN 100 UNIT/ML
500 SYRINGE INTRAVENOUS
Status: CANCELLED | OUTPATIENT
Start: 2024-11-07

## 2024-10-10 RX ORDER — ACETAMINOPHEN 325 MG/1
650 TABLET ORAL
Status: COMPLETED | OUTPATIENT
Start: 2024-10-10 | End: 2024-10-10

## 2024-10-10 RX ORDER — ACETAMINOPHEN 325 MG/1
650 TABLET ORAL
Status: CANCELLED | OUTPATIENT
Start: 2024-11-07

## 2024-10-10 RX ADMIN — CYCLOPHOSPHAMIDE 1900 MG: 200 INJECTION, SOLUTION INTRAVENOUS at 11:10

## 2024-10-10 RX ADMIN — ACETAMINOPHEN 650 MG: 325 TABLET ORAL at 09:10

## 2024-10-10 RX ADMIN — SODIUM CHLORIDE 500 ML: 9 INJECTION, SOLUTION INTRAVENOUS at 11:10

## 2024-10-10 RX ADMIN — MESNA 945 MG: 100 INJECTION, SOLUTION INTRAVENOUS at 01:10

## 2024-10-10 RX ADMIN — SODIUM CHLORIDE 500 ML: 9 INJECTION, SOLUTION INTRAVENOUS at 09:10

## 2024-10-10 RX ADMIN — ONDANSETRON 4 MG: 2 INJECTION INTRAMUSCULAR; INTRAVENOUS at 10:10

## 2024-10-10 RX ADMIN — DIPHENHYDRAMINE HYDROCHLORIDE 25 MG: 50 INJECTION INTRAMUSCULAR; INTRAVENOUS at 10:10

## 2024-10-10 RX ADMIN — MESNA 945 MG: 100 INJECTION, SOLUTION INTRAVENOUS at 10:10

## 2024-10-17 ENCOUNTER — LAB VISIT (OUTPATIENT)
Dept: LAB | Facility: HOSPITAL | Age: 64
End: 2024-10-17
Attending: STUDENT IN AN ORGANIZED HEALTH CARE EDUCATION/TRAINING PROGRAM
Payer: MEDICARE

## 2024-10-17 DIAGNOSIS — M62.81 MUSCLE WEAKNESS OF PROXIMAL EXTREMITY: ICD-10-CM

## 2024-10-17 DIAGNOSIS — J84.9 ILD (INTERSTITIAL LUNG DISEASE): ICD-10-CM

## 2024-10-17 DIAGNOSIS — I73.01 RAYNAUD'S DISEASE WITH GANGRENE: ICD-10-CM

## 2024-10-17 LAB
ALBUMIN SERPL BCP-MCNC: 3.3 G/DL (ref 3.5–5.2)
ALP SERPL-CCNC: 45 U/L (ref 55–135)
ALT SERPL W/O P-5'-P-CCNC: 25 U/L (ref 10–44)
ANION GAP SERPL CALC-SCNC: 9 MMOL/L (ref 8–16)
AST SERPL-CCNC: 15 U/L (ref 10–40)
BASOPHILS # BLD AUTO: 0.02 K/UL (ref 0–0.2)
BASOPHILS NFR BLD: 0.3 % (ref 0–1.9)
BILIRUB SERPL-MCNC: 0.4 MG/DL (ref 0.1–1)
BUN SERPL-MCNC: 12 MG/DL (ref 8–23)
CALCIUM SERPL-MCNC: 9.1 MG/DL (ref 8.7–10.5)
CHLORIDE SERPL-SCNC: 105 MMOL/L (ref 95–110)
CO2 SERPL-SCNC: 26 MMOL/L (ref 23–29)
CREAT SERPL-MCNC: 1 MG/DL (ref 0.5–1.4)
DIFFERENTIAL METHOD BLD: ABNORMAL
EOSINOPHIL # BLD AUTO: 0 K/UL (ref 0–0.5)
EOSINOPHIL NFR BLD: 0.1 % (ref 0–8)
ERYTHROCYTE [DISTWIDTH] IN BLOOD BY AUTOMATED COUNT: 16.4 % (ref 11.5–14.5)
EST. GFR  (NO RACE VARIABLE): >60 ML/MIN/1.73 M^2
GLUCOSE SERPL-MCNC: 124 MG/DL (ref 70–110)
HCT VFR BLD AUTO: 45.4 % (ref 40–54)
HGB BLD-MCNC: 14.7 G/DL (ref 14–18)
IMM GRANULOCYTES # BLD AUTO: 0.08 K/UL (ref 0–0.04)
IMM GRANULOCYTES NFR BLD AUTO: 1.1 % (ref 0–0.5)
LYMPHOCYTES # BLD AUTO: 0.6 K/UL (ref 1–4.8)
LYMPHOCYTES NFR BLD: 8.2 % (ref 18–48)
MCH RBC QN AUTO: 30.6 PG (ref 27–31)
MCHC RBC AUTO-ENTMCNC: 32.4 G/DL (ref 32–36)
MCV RBC AUTO: 94 FL (ref 82–98)
MONOCYTES # BLD AUTO: 0.2 K/UL (ref 0.3–1)
MONOCYTES NFR BLD: 3 % (ref 4–15)
NEUTROPHILS # BLD AUTO: 6.2 K/UL (ref 1.8–7.7)
NEUTROPHILS NFR BLD: 87.3 % (ref 38–73)
NRBC BLD-RTO: 0 /100 WBC
PLATELET # BLD AUTO: 208 K/UL (ref 150–450)
PMV BLD AUTO: 9.6 FL (ref 9.2–12.9)
POTASSIUM SERPL-SCNC: 4.1 MMOL/L (ref 3.5–5.1)
PROT SERPL-MCNC: 7.4 G/DL (ref 6–8.4)
RBC # BLD AUTO: 4.81 M/UL (ref 4.6–6.2)
SODIUM SERPL-SCNC: 140 MMOL/L (ref 136–145)
WBC # BLD AUTO: 7.09 K/UL (ref 3.9–12.7)

## 2024-10-17 PROCEDURE — 36415 COLL VENOUS BLD VENIPUNCTURE: CPT | Mod: TXP | Performed by: STUDENT IN AN ORGANIZED HEALTH CARE EDUCATION/TRAINING PROGRAM

## 2024-10-17 PROCEDURE — 80053 COMPREHEN METABOLIC PANEL: CPT | Mod: TXP | Performed by: STUDENT IN AN ORGANIZED HEALTH CARE EDUCATION/TRAINING PROGRAM

## 2024-10-17 PROCEDURE — 85025 COMPLETE CBC W/AUTO DIFF WBC: CPT | Mod: TXP | Performed by: STUDENT IN AN ORGANIZED HEALTH CARE EDUCATION/TRAINING PROGRAM

## 2024-10-22 ENCOUNTER — LAB VISIT (OUTPATIENT)
Dept: LAB | Facility: HOSPITAL | Age: 64
End: 2024-10-22
Attending: STUDENT IN AN ORGANIZED HEALTH CARE EDUCATION/TRAINING PROGRAM
Payer: MEDICARE

## 2024-10-22 DIAGNOSIS — D89.89 ANTISYNTHETASE SYNDROME: ICD-10-CM

## 2024-10-22 DIAGNOSIS — Z51.81 MEDICATION MONITORING ENCOUNTER: ICD-10-CM

## 2024-10-22 DIAGNOSIS — J84.9 ILD (INTERSTITIAL LUNG DISEASE): ICD-10-CM

## 2024-10-22 LAB
ANION GAP SERPL CALC-SCNC: 10 MMOL/L (ref 8–16)
BUN SERPL-MCNC: 12 MG/DL (ref 8–23)
CALCIUM SERPL-MCNC: 9.2 MG/DL (ref 8.7–10.5)
CHLORIDE SERPL-SCNC: 104 MMOL/L (ref 95–110)
CO2 SERPL-SCNC: 27 MMOL/L (ref 23–29)
CREAT SERPL-MCNC: 0.9 MG/DL (ref 0.5–1.4)
EST. GFR  (NO RACE VARIABLE): >60 ML/MIN/1.73 M^2
GLUCOSE SERPL-MCNC: 98 MG/DL (ref 70–110)
MAGNESIUM SERPL-MCNC: 2 MG/DL (ref 1.6–2.6)
POTASSIUM SERPL-SCNC: 4.4 MMOL/L (ref 3.5–5.1)
SODIUM SERPL-SCNC: 141 MMOL/L (ref 136–145)

## 2024-10-22 PROCEDURE — 36415 COLL VENOUS BLD VENIPUNCTURE: CPT | Mod: TXP | Performed by: STUDENT IN AN ORGANIZED HEALTH CARE EDUCATION/TRAINING PROGRAM

## 2024-10-22 PROCEDURE — 80197 ASSAY OF TACROLIMUS: CPT | Mod: TXP | Performed by: STUDENT IN AN ORGANIZED HEALTH CARE EDUCATION/TRAINING PROGRAM

## 2024-10-22 PROCEDURE — 80048 BASIC METABOLIC PNL TOTAL CA: CPT | Mod: TXP | Performed by: STUDENT IN AN ORGANIZED HEALTH CARE EDUCATION/TRAINING PROGRAM

## 2024-10-22 PROCEDURE — 83735 ASSAY OF MAGNESIUM: CPT | Mod: NTX | Performed by: STUDENT IN AN ORGANIZED HEALTH CARE EDUCATION/TRAINING PROGRAM

## 2024-10-23 LAB — TACROLIMUS BLD-MCNC: 3.6 NG/ML (ref 5–15)

## 2024-10-24 ENCOUNTER — TELEPHONE (OUTPATIENT)
Dept: RHEUMATOLOGY | Facility: CLINIC | Age: 64
End: 2024-10-24
Payer: COMMERCIAL

## 2024-10-24 DIAGNOSIS — Z51.81 ENCOUNTER FOR MONITORING TACROLIMUS THERAPY: ICD-10-CM

## 2024-10-24 DIAGNOSIS — J84.9 ILD (INTERSTITIAL LUNG DISEASE): ICD-10-CM

## 2024-10-24 DIAGNOSIS — Z79.621 ENCOUNTER FOR MONITORING TACROLIMUS THERAPY: ICD-10-CM

## 2024-10-24 DIAGNOSIS — D89.89 ANTISYNTHETASE SYNDROME: Primary | ICD-10-CM

## 2024-10-27 ENCOUNTER — PATIENT MESSAGE (OUTPATIENT)
Dept: CARDIOLOGY | Facility: CLINIC | Age: 64
End: 2024-10-27
Payer: COMMERCIAL

## 2024-10-30 ENCOUNTER — INFUSION (OUTPATIENT)
Dept: INFUSION THERAPY | Facility: HOSPITAL | Age: 64
End: 2024-10-30
Attending: INTERNAL MEDICINE
Payer: MEDICARE

## 2024-10-30 VITALS
SYSTOLIC BLOOD PRESSURE: 136 MMHG | BODY MASS INDEX: 26.21 KG/M2 | RESPIRATION RATE: 16 BRPM | OXYGEN SATURATION: 99 % | WEIGHT: 167.31 LBS | DIASTOLIC BLOOD PRESSURE: 76 MMHG | HEART RATE: 60 BPM | TEMPERATURE: 98 F

## 2024-10-30 DIAGNOSIS — J84.9 ILD (INTERSTITIAL LUNG DISEASE): ICD-10-CM

## 2024-10-30 DIAGNOSIS — D89.89 ANTISYNTHETASE SYNDROME: Primary | ICD-10-CM

## 2024-10-30 PROCEDURE — 63600175 PHARM REV CODE 636 W HCPCS: Performed by: INTERNAL MEDICINE

## 2024-10-30 PROCEDURE — 96375 TX/PRO/DX INJ NEW DRUG ADDON: CPT

## 2024-10-30 PROCEDURE — 25000003 PHARM REV CODE 250: Performed by: INTERNAL MEDICINE

## 2024-10-30 PROCEDURE — 96365 THER/PROPH/DIAG IV INF INIT: CPT

## 2024-10-30 PROCEDURE — 96366 THER/PROPH/DIAG IV INF ADDON: CPT

## 2024-10-30 RX ORDER — SODIUM CHLORIDE 0.9 % (FLUSH) 0.9 %
10 SYRINGE (ML) INJECTION
Status: CANCELLED | OUTPATIENT
Start: 2024-10-30

## 2024-10-30 RX ORDER — ACETAMINOPHEN 325 MG/1
650 TABLET ORAL
Status: CANCELLED | OUTPATIENT
Start: 2024-10-30

## 2024-10-30 RX ORDER — FAMOTIDINE 10 MG/ML
20 INJECTION INTRAVENOUS
Status: CANCELLED | OUTPATIENT
Start: 2024-10-30

## 2024-10-30 RX ORDER — FAMOTIDINE 10 MG/ML
20 INJECTION INTRAVENOUS
Status: COMPLETED | OUTPATIENT
Start: 2024-10-30 | End: 2024-10-30

## 2024-10-30 RX ORDER — HEPARIN 100 UNIT/ML
500 SYRINGE INTRAVENOUS
Status: CANCELLED | OUTPATIENT
Start: 2024-10-30

## 2024-10-30 RX ORDER — DIPHENHYDRAMINE HYDROCHLORIDE 50 MG/ML
25 INJECTION INTRAMUSCULAR; INTRAVENOUS
Status: CANCELLED | OUTPATIENT
Start: 2024-10-30

## 2024-10-30 RX ORDER — DIPHENHYDRAMINE HYDROCHLORIDE 50 MG/ML
25 INJECTION INTRAMUSCULAR; INTRAVENOUS
Status: COMPLETED | OUTPATIENT
Start: 2024-10-30 | End: 2024-10-30

## 2024-10-30 RX ORDER — ACETAMINOPHEN 325 MG/1
650 TABLET ORAL
Status: COMPLETED | OUTPATIENT
Start: 2024-10-30 | End: 2024-10-30

## 2024-10-30 RX ADMIN — HUMAN IMMUNOGLOBULIN G 75 G: 40 LIQUID INTRAVENOUS at 10:10

## 2024-10-30 RX ADMIN — DIPHENHYDRAMINE HYDROCHLORIDE 25 MG: 50 INJECTION INTRAMUSCULAR; INTRAVENOUS at 10:10

## 2024-10-30 RX ADMIN — FAMOTIDINE 20 MG: 10 INJECTION INTRAVENOUS at 10:10

## 2024-10-30 RX ADMIN — ACETAMINOPHEN 650 MG: 325 TABLET ORAL at 10:10

## 2024-10-31 ENCOUNTER — INFUSION (OUTPATIENT)
Dept: INFUSION THERAPY | Facility: HOSPITAL | Age: 64
End: 2024-10-31
Attending: INTERNAL MEDICINE
Payer: MEDICARE

## 2024-10-31 ENCOUNTER — LAB VISIT (OUTPATIENT)
Dept: LAB | Facility: HOSPITAL | Age: 64
End: 2024-10-31
Attending: STUDENT IN AN ORGANIZED HEALTH CARE EDUCATION/TRAINING PROGRAM
Payer: COMMERCIAL

## 2024-10-31 VITALS
DIASTOLIC BLOOD PRESSURE: 58 MMHG | HEART RATE: 65 BPM | SYSTOLIC BLOOD PRESSURE: 120 MMHG | TEMPERATURE: 98 F | OXYGEN SATURATION: 100 % | RESPIRATION RATE: 16 BRPM

## 2024-10-31 DIAGNOSIS — Z79.621 ENCOUNTER FOR MONITORING TACROLIMUS THERAPY: ICD-10-CM

## 2024-10-31 DIAGNOSIS — D89.89 ANTISYNTHETASE SYNDROME: ICD-10-CM

## 2024-10-31 DIAGNOSIS — Z51.81 ENCOUNTER FOR MONITORING TACROLIMUS THERAPY: ICD-10-CM

## 2024-10-31 DIAGNOSIS — D89.89 ANTISYNTHETASE SYNDROME: Primary | ICD-10-CM

## 2024-10-31 DIAGNOSIS — J84.9 ILD (INTERSTITIAL LUNG DISEASE): ICD-10-CM

## 2024-10-31 DIAGNOSIS — R76.8 HEPATITIS B CORE ANTIBODY POSITIVE: ICD-10-CM

## 2024-10-31 LAB
ANION GAP SERPL CALC-SCNC: 8 MMOL/L (ref 8–16)
BUN SERPL-MCNC: 22 MG/DL (ref 8–23)
CALCIUM SERPL-MCNC: 9.1 MG/DL (ref 8.7–10.5)
CHLORIDE SERPL-SCNC: 109 MMOL/L (ref 95–110)
CO2 SERPL-SCNC: 23 MMOL/L (ref 23–29)
CREAT SERPL-MCNC: 1 MG/DL (ref 0.5–1.4)
EST. GFR  (NO RACE VARIABLE): >60 ML/MIN/1.73 M^2
GLUCOSE SERPL-MCNC: 92 MG/DL (ref 70–110)
MAGNESIUM SERPL-MCNC: 1.7 MG/DL (ref 1.6–2.6)
POTASSIUM SERPL-SCNC: 4.2 MMOL/L (ref 3.5–5.1)
SODIUM SERPL-SCNC: 140 MMOL/L (ref 136–145)

## 2024-10-31 PROCEDURE — 96366 THER/PROPH/DIAG IV INF ADDON: CPT

## 2024-10-31 PROCEDURE — 96375 TX/PRO/DX INJ NEW DRUG ADDON: CPT

## 2024-10-31 PROCEDURE — 80048 BASIC METABOLIC PNL TOTAL CA: CPT | Mod: NTX | Performed by: STUDENT IN AN ORGANIZED HEALTH CARE EDUCATION/TRAINING PROGRAM

## 2024-10-31 PROCEDURE — 83735 ASSAY OF MAGNESIUM: CPT | Mod: NTX | Performed by: STUDENT IN AN ORGANIZED HEALTH CARE EDUCATION/TRAINING PROGRAM

## 2024-10-31 PROCEDURE — 25000003 PHARM REV CODE 250: Performed by: INTERNAL MEDICINE

## 2024-10-31 PROCEDURE — 63600175 PHARM REV CODE 636 W HCPCS: Mod: JZ,JG | Performed by: INTERNAL MEDICINE

## 2024-10-31 PROCEDURE — 36415 COLL VENOUS BLD VENIPUNCTURE: CPT | Mod: NTX | Performed by: STUDENT IN AN ORGANIZED HEALTH CARE EDUCATION/TRAINING PROGRAM

## 2024-10-31 PROCEDURE — 96374 THER/PROPH/DIAG INJ IV PUSH: CPT

## 2024-10-31 PROCEDURE — 80197 ASSAY OF TACROLIMUS: CPT | Mod: NTX | Performed by: STUDENT IN AN ORGANIZED HEALTH CARE EDUCATION/TRAINING PROGRAM

## 2024-10-31 RX ORDER — HEPARIN 100 UNIT/ML
500 SYRINGE INTRAVENOUS
OUTPATIENT
Start: 2024-11-07

## 2024-10-31 RX ORDER — FAMOTIDINE 10 MG/ML
20 INJECTION INTRAVENOUS
OUTPATIENT
Start: 2024-10-31

## 2024-10-31 RX ORDER — ACETAMINOPHEN 325 MG/1
650 TABLET ORAL
OUTPATIENT
Start: 2024-11-07

## 2024-10-31 RX ORDER — ACETAMINOPHEN 325 MG/1
650 TABLET ORAL
Status: COMPLETED | OUTPATIENT
Start: 2024-10-31 | End: 2024-10-31

## 2024-10-31 RX ORDER — SODIUM CHLORIDE 0.9 % (FLUSH) 0.9 %
10 SYRINGE (ML) INJECTION
OUTPATIENT
Start: 2024-11-07

## 2024-10-31 RX ORDER — DIPHENHYDRAMINE HYDROCHLORIDE 50 MG/ML
25 INJECTION INTRAMUSCULAR; INTRAVENOUS
Status: COMPLETED | OUTPATIENT
Start: 2024-10-31 | End: 2024-10-31

## 2024-10-31 RX ORDER — DIPHENHYDRAMINE HYDROCHLORIDE 50 MG/ML
25 INJECTION INTRAMUSCULAR; INTRAVENOUS
OUTPATIENT
Start: 2024-10-31

## 2024-10-31 RX ORDER — HEPARIN 100 UNIT/ML
500 SYRINGE INTRAVENOUS
OUTPATIENT
Start: 2024-10-31

## 2024-10-31 RX ORDER — SODIUM CHLORIDE 0.9 % (FLUSH) 0.9 %
10 SYRINGE (ML) INJECTION
OUTPATIENT
Start: 2024-10-31

## 2024-10-31 RX ORDER — SODIUM CHLORIDE 0.9 % (FLUSH) 0.9 %
10 SYRINGE (ML) INJECTION
Status: DISCONTINUED | OUTPATIENT
Start: 2024-10-31 | End: 2024-10-31 | Stop reason: HOSPADM

## 2024-10-31 RX ORDER — ACETAMINOPHEN 325 MG/1
650 TABLET ORAL
OUTPATIENT
Start: 2024-10-31

## 2024-10-31 RX ORDER — HEPARIN 100 UNIT/ML
500 SYRINGE INTRAVENOUS
Status: DISCONTINUED | OUTPATIENT
Start: 2024-10-31 | End: 2024-10-31 | Stop reason: HOSPADM

## 2024-10-31 RX ORDER — FAMOTIDINE 10 MG/ML
20 INJECTION INTRAVENOUS
Status: COMPLETED | OUTPATIENT
Start: 2024-10-31 | End: 2024-10-31

## 2024-10-31 RX ORDER — ACETAMINOPHEN 325 MG/1
650 TABLET ORAL EVERY 4 HOURS PRN
OUTPATIENT
Start: 2024-11-07

## 2024-10-31 RX ADMIN — FAMOTIDINE 20 MG: 10 INJECTION INTRAVENOUS at 09:10

## 2024-10-31 RX ADMIN — DIPHENHYDRAMINE HYDROCHLORIDE 25 MG: 50 INJECTION INTRAMUSCULAR; INTRAVENOUS at 09:10

## 2024-10-31 RX ADMIN — HUMAN IMMUNOGLOBULIN G 75 G: 40 LIQUID INTRAVENOUS at 09:10

## 2024-10-31 RX ADMIN — ACETAMINOPHEN 650 MG: 325 TABLET ORAL at 09:10

## 2024-11-01 ENCOUNTER — TELEPHONE (OUTPATIENT)
Dept: RHEUMATOLOGY | Facility: CLINIC | Age: 64
End: 2024-11-01
Payer: COMMERCIAL

## 2024-11-01 DIAGNOSIS — Z79.621 ENCOUNTER FOR MONITORING TACROLIMUS THERAPY: ICD-10-CM

## 2024-11-01 DIAGNOSIS — J84.9 ILD (INTERSTITIAL LUNG DISEASE): ICD-10-CM

## 2024-11-01 DIAGNOSIS — D89.89 ANTISYNTHETASE SYNDROME: Primary | ICD-10-CM

## 2024-11-01 DIAGNOSIS — Z51.81 ENCOUNTER FOR MONITORING TACROLIMUS THERAPY: ICD-10-CM

## 2024-11-01 LAB — TACROLIMUS BLD-MCNC: 12.5 NG/ML (ref 5–15)

## 2024-11-06 ENCOUNTER — TELEPHONE (OUTPATIENT)
Dept: RHEUMATOLOGY | Facility: CLINIC | Age: 64
End: 2024-11-06
Payer: COMMERCIAL

## 2024-11-06 ENCOUNTER — LAB VISIT (OUTPATIENT)
Dept: LAB | Facility: HOSPITAL | Age: 64
End: 2024-11-06
Attending: STUDENT IN AN ORGANIZED HEALTH CARE EDUCATION/TRAINING PROGRAM
Payer: MEDICARE

## 2024-11-06 DIAGNOSIS — J84.9 ILD (INTERSTITIAL LUNG DISEASE): ICD-10-CM

## 2024-11-06 DIAGNOSIS — Z79.621 ENCOUNTER FOR MONITORING TACROLIMUS THERAPY: ICD-10-CM

## 2024-11-06 DIAGNOSIS — D89.89 ANTISYNTHETASE SYNDROME: ICD-10-CM

## 2024-11-06 DIAGNOSIS — Z51.81 MEDICATION MONITORING ENCOUNTER: ICD-10-CM

## 2024-11-06 DIAGNOSIS — D89.89 ANTISYNTHETASE SYNDROME: Primary | ICD-10-CM

## 2024-11-06 DIAGNOSIS — D50.0 IRON DEFICIENCY ANEMIA DUE TO CHRONIC BLOOD LOSS: Primary | ICD-10-CM

## 2024-11-06 DIAGNOSIS — J84.9 ILD (INTERSTITIAL LUNG DISEASE): Primary | ICD-10-CM

## 2024-11-06 DIAGNOSIS — Z51.81 ENCOUNTER FOR MONITORING TACROLIMUS THERAPY: ICD-10-CM

## 2024-11-06 DIAGNOSIS — D69.6 THROMBOCYTOPENIA: ICD-10-CM

## 2024-11-06 LAB
ALBUMIN SERPL BCP-MCNC: 3.3 G/DL (ref 3.5–5.2)
ALP SERPL-CCNC: 42 U/L (ref 40–150)
ALT SERPL W/O P-5'-P-CCNC: 25 U/L (ref 10–44)
ANION GAP SERPL CALC-SCNC: 6 MMOL/L (ref 8–16)
ANION GAP SERPL CALC-SCNC: 8 MMOL/L (ref 8–16)
AST SERPL-CCNC: 22 U/L (ref 10–40)
BASOPHILS # BLD AUTO: 0.02 K/UL (ref 0–0.2)
BASOPHILS NFR BLD: 0.4 % (ref 0–1.9)
BILIRUB SERPL-MCNC: 0.3 MG/DL (ref 0.1–1)
BUN SERPL-MCNC: 11 MG/DL (ref 8–23)
BUN SERPL-MCNC: 11 MG/DL (ref 8–23)
CALCIUM SERPL-MCNC: 9 MG/DL (ref 8.7–10.5)
CALCIUM SERPL-MCNC: 9.1 MG/DL (ref 8.7–10.5)
CHLORIDE SERPL-SCNC: 106 MMOL/L (ref 95–110)
CHLORIDE SERPL-SCNC: 107 MMOL/L (ref 95–110)
CO2 SERPL-SCNC: 25 MMOL/L (ref 23–29)
CO2 SERPL-SCNC: 25 MMOL/L (ref 23–29)
CREAT SERPL-MCNC: 0.9 MG/DL (ref 0.5–1.4)
CREAT SERPL-MCNC: 0.9 MG/DL (ref 0.5–1.4)
CRP SERPL-MCNC: 0.8 MG/L (ref 0–8.2)
DIFFERENTIAL METHOD BLD: ABNORMAL
EOSINOPHIL # BLD AUTO: 0 K/UL (ref 0–0.5)
EOSINOPHIL NFR BLD: 0.4 % (ref 0–8)
ERYTHROCYTE [DISTWIDTH] IN BLOOD BY AUTOMATED COUNT: 16.8 % (ref 11.5–14.5)
ERYTHROCYTE [SEDIMENTATION RATE] IN BLOOD BY PHOTOMETRIC METHOD: 41 MM/HR (ref 0–23)
EST. GFR  (NO RACE VARIABLE): >60 ML/MIN/1.73 M^2
EST. GFR  (NO RACE VARIABLE): >60 ML/MIN/1.73 M^2
GLUCOSE SERPL-MCNC: 133 MG/DL (ref 70–110)
GLUCOSE SERPL-MCNC: 134 MG/DL (ref 70–110)
HCT VFR BLD AUTO: 46.5 % (ref 40–54)
HGB BLD-MCNC: 14.7 G/DL (ref 14–18)
IMM GRANULOCYTES # BLD AUTO: 0.03 K/UL (ref 0–0.04)
IMM GRANULOCYTES NFR BLD AUTO: 0.5 % (ref 0–0.5)
LYMPHOCYTES # BLD AUTO: 1.1 K/UL (ref 1–4.8)
LYMPHOCYTES NFR BLD: 18.9 % (ref 18–48)
MAGNESIUM SERPL-MCNC: 1.6 MG/DL (ref 1.6–2.6)
MCH RBC QN AUTO: 29 PG (ref 27–31)
MCHC RBC AUTO-ENTMCNC: 31.6 G/DL (ref 32–36)
MCV RBC AUTO: 92 FL (ref 82–98)
MONOCYTES # BLD AUTO: 0.6 K/UL (ref 0.3–1)
MONOCYTES NFR BLD: 10.7 % (ref 4–15)
NEUTROPHILS # BLD AUTO: 3.9 K/UL (ref 1.8–7.7)
NEUTROPHILS NFR BLD: 69.1 % (ref 38–73)
NRBC BLD-RTO: 0 /100 WBC
PLATELET # BLD AUTO: 210 K/UL (ref 150–450)
PMV BLD AUTO: 9.5 FL (ref 9.2–12.9)
POTASSIUM SERPL-SCNC: 4 MMOL/L (ref 3.5–5.1)
POTASSIUM SERPL-SCNC: 4 MMOL/L (ref 3.5–5.1)
PROT SERPL-MCNC: 8.2 G/DL (ref 6–8.4)
RBC # BLD AUTO: 5.07 M/UL (ref 4.6–6.2)
SODIUM SERPL-SCNC: 138 MMOL/L (ref 136–145)
SODIUM SERPL-SCNC: 139 MMOL/L (ref 136–145)
WBC # BLD AUTO: 5.61 K/UL (ref 3.9–12.7)
WBC NRBC COR # BLD: 5.61 K/UL (ref 3.9–12.7)

## 2024-11-06 PROCEDURE — 85025 COMPLETE CBC W/AUTO DIFF WBC: CPT | Mod: TXP | Performed by: INTERNAL MEDICINE

## 2024-11-06 PROCEDURE — 80048 BASIC METABOLIC PNL TOTAL CA: CPT | Mod: TXP | Performed by: STUDENT IN AN ORGANIZED HEALTH CARE EDUCATION/TRAINING PROGRAM

## 2024-11-06 PROCEDURE — 80197 ASSAY OF TACROLIMUS: CPT | Mod: TXP | Performed by: STUDENT IN AN ORGANIZED HEALTH CARE EDUCATION/TRAINING PROGRAM

## 2024-11-06 PROCEDURE — 80053 COMPREHEN METABOLIC PANEL: CPT | Mod: TXP | Performed by: STUDENT IN AN ORGANIZED HEALTH CARE EDUCATION/TRAINING PROGRAM

## 2024-11-06 PROCEDURE — 85652 RBC SED RATE AUTOMATED: CPT | Mod: TXP | Performed by: STUDENT IN AN ORGANIZED HEALTH CARE EDUCATION/TRAINING PROGRAM

## 2024-11-06 PROCEDURE — 86140 C-REACTIVE PROTEIN: CPT | Mod: TXP | Performed by: STUDENT IN AN ORGANIZED HEALTH CARE EDUCATION/TRAINING PROGRAM

## 2024-11-06 PROCEDURE — 36415 COLL VENOUS BLD VENIPUNCTURE: CPT | Mod: TXP,XB | Performed by: INTERNAL MEDICINE

## 2024-11-06 PROCEDURE — 36415 COLL VENOUS BLD VENIPUNCTURE: CPT | Mod: TXP | Performed by: STUDENT IN AN ORGANIZED HEALTH CARE EDUCATION/TRAINING PROGRAM

## 2024-11-06 PROCEDURE — 83735 ASSAY OF MAGNESIUM: CPT | Mod: TXP | Performed by: STUDENT IN AN ORGANIZED HEALTH CARE EDUCATION/TRAINING PROGRAM

## 2024-11-07 ENCOUNTER — INFUSION (OUTPATIENT)
Dept: INFUSION THERAPY | Facility: HOSPITAL | Age: 64
End: 2024-11-07
Attending: INTERNAL MEDICINE
Payer: COMMERCIAL

## 2024-11-07 ENCOUNTER — TELEPHONE (OUTPATIENT)
Dept: RHEUMATOLOGY | Facility: CLINIC | Age: 64
End: 2024-11-07
Payer: COMMERCIAL

## 2024-11-07 VITALS
HEART RATE: 80 BPM | BODY MASS INDEX: 26.17 KG/M2 | OXYGEN SATURATION: 98 % | TEMPERATURE: 98 F | DIASTOLIC BLOOD PRESSURE: 66 MMHG | WEIGHT: 167.13 LBS | SYSTOLIC BLOOD PRESSURE: 137 MMHG | RESPIRATION RATE: 18 BRPM

## 2024-11-07 DIAGNOSIS — J84.9 ILD (INTERSTITIAL LUNG DISEASE): ICD-10-CM

## 2024-11-07 DIAGNOSIS — D89.89 ANTISYNTHETASE SYNDROME: ICD-10-CM

## 2024-11-07 DIAGNOSIS — J84.9 ILD (INTERSTITIAL LUNG DISEASE): Primary | ICD-10-CM

## 2024-11-07 DIAGNOSIS — Z51.81 MEDICATION MONITORING ENCOUNTER: Primary | ICD-10-CM

## 2024-11-07 LAB — TACROLIMUS BLD-MCNC: 8.4 NG/ML (ref 5–15)

## 2024-11-07 PROCEDURE — 96376 TX/PRO/DX INJ SAME DRUG ADON: CPT

## 2024-11-07 PROCEDURE — 96367 TX/PROPH/DG ADDL SEQ IV INF: CPT

## 2024-11-07 PROCEDURE — 63600175 PHARM REV CODE 636 W HCPCS: Mod: JG | Performed by: INTERNAL MEDICINE

## 2024-11-07 PROCEDURE — 96375 TX/PRO/DX INJ NEW DRUG ADDON: CPT

## 2024-11-07 PROCEDURE — 96413 CHEMO IV INFUSION 1 HR: CPT

## 2024-11-07 PROCEDURE — 25000003 PHARM REV CODE 250: Performed by: INTERNAL MEDICINE

## 2024-11-07 RX ORDER — HEPARIN 100 UNIT/ML
500 SYRINGE INTRAVENOUS
OUTPATIENT
Start: 2024-12-05

## 2024-11-07 RX ORDER — ACETAMINOPHEN 325 MG/1
650 TABLET ORAL
Status: COMPLETED | OUTPATIENT
Start: 2024-11-07 | End: 2024-11-07

## 2024-11-07 RX ORDER — ACETAMINOPHEN 325 MG/1
650 TABLET ORAL
OUTPATIENT
Start: 2024-12-05

## 2024-11-07 RX ORDER — SODIUM CHLORIDE 0.9 % (FLUSH) 0.9 %
10 SYRINGE (ML) INJECTION
OUTPATIENT
Start: 2024-12-05

## 2024-11-07 RX ORDER — ONDANSETRON HYDROCHLORIDE 2 MG/ML
4 INJECTION, SOLUTION INTRAVENOUS
Status: COMPLETED | OUTPATIENT
Start: 2024-11-07 | End: 2024-11-07

## 2024-11-07 RX ORDER — ACETAMINOPHEN 325 MG/1
650 TABLET ORAL EVERY 4 HOURS PRN
OUTPATIENT
Start: 2024-12-05

## 2024-11-07 RX ADMIN — ACETAMINOPHEN 650 MG: 325 TABLET ORAL at 09:11

## 2024-11-07 RX ADMIN — MESNA 945 MG: 100 INJECTION, SOLUTION INTRAVENOUS at 12:11

## 2024-11-07 RX ADMIN — MESNA 945 MG: 100 INJECTION, SOLUTION INTRAVENOUS at 10:11

## 2024-11-07 RX ADMIN — DIPHENHYDRAMINE HYDROCHLORIDE 25 MG: 50 INJECTION INTRAMUSCULAR; INTRAVENOUS at 09:11

## 2024-11-07 RX ADMIN — SODIUM CHLORIDE 500 ML: 9 INJECTION, SOLUTION INTRAVENOUS at 08:11

## 2024-11-07 RX ADMIN — CYCLOPHOSPHAMIDE 1900 MG: 200 INJECTION, SOLUTION INTRAVENOUS at 10:11

## 2024-11-07 RX ADMIN — SODIUM CHLORIDE 500 ML: 9 INJECTION, SOLUTION INTRAVENOUS at 11:11

## 2024-11-07 RX ADMIN — ONDANSETRON 4 MG: 2 INJECTION INTRAMUSCULAR; INTRAVENOUS at 09:11

## 2024-11-08 ENCOUNTER — PATIENT MESSAGE (OUTPATIENT)
Dept: RHEUMATOLOGY | Facility: CLINIC | Age: 64
End: 2024-11-08
Payer: MEDICARE

## 2024-11-11 DIAGNOSIS — R76.8 HEPATITIS B CORE ANTIBODY POSITIVE: ICD-10-CM

## 2024-11-11 DIAGNOSIS — I73.01 RAYNAUD'S DISEASE WITH GANGRENE: ICD-10-CM

## 2024-11-11 DIAGNOSIS — J84.9 ILD (INTERSTITIAL LUNG DISEASE): ICD-10-CM

## 2024-11-11 DIAGNOSIS — D89.89 ANTISYNTHETASE SYNDROME: ICD-10-CM

## 2024-11-11 DIAGNOSIS — R76.8 POSITIVE ANA (ANTINUCLEAR ANTIBODY): ICD-10-CM

## 2024-11-11 RX ORDER — TACROLIMUS 1 MG/1
2 CAPSULE ORAL EVERY 12 HOURS
Qty: 120 CAPSULE | Refills: 1 | Status: SHIPPED | OUTPATIENT
Start: 2024-11-11 | End: 2024-11-14 | Stop reason: SDUPTHER

## 2024-11-14 DIAGNOSIS — D89.89 ANTISYNTHETASE SYNDROME: ICD-10-CM

## 2024-11-14 DIAGNOSIS — I73.01 RAYNAUD'S DISEASE WITH GANGRENE: ICD-10-CM

## 2024-11-14 DIAGNOSIS — J84.9 ILD (INTERSTITIAL LUNG DISEASE): ICD-10-CM

## 2024-11-14 DIAGNOSIS — R76.8 POSITIVE ANA (ANTINUCLEAR ANTIBODY): ICD-10-CM

## 2024-11-14 RX ORDER — TACROLIMUS 1 MG/1
2 CAPSULE ORAL EVERY 12 HOURS
Qty: 120 CAPSULE | Refills: 1 | Status: SHIPPED | OUTPATIENT
Start: 2024-11-14

## 2024-11-15 ENCOUNTER — OFFICE VISIT (OUTPATIENT)
Dept: CARDIOLOGY | Facility: CLINIC | Age: 64
End: 2024-11-15
Payer: MEDICARE

## 2024-11-15 VITALS
HEART RATE: 78 BPM | WEIGHT: 169.69 LBS | DIASTOLIC BLOOD PRESSURE: 82 MMHG | SYSTOLIC BLOOD PRESSURE: 124 MMHG | BODY MASS INDEX: 26.58 KG/M2

## 2024-11-15 DIAGNOSIS — E78.5 HYPERLIPIDEMIA LDL GOAL <70: ICD-10-CM

## 2024-11-15 DIAGNOSIS — I25.10 CORONARY ARTERY DISEASE INVOLVING NATIVE CORONARY ARTERY OF NATIVE HEART WITHOUT ANGINA PECTORIS: Primary | ICD-10-CM

## 2024-11-15 DIAGNOSIS — D89.89 ANTISYNTHETASE SYNDROME: ICD-10-CM

## 2024-11-15 PROCEDURE — 99999 PR PBB SHADOW E&M-EST. PATIENT-LVL III: CPT | Mod: PBBFAC,TXP,, | Performed by: INTERNAL MEDICINE

## 2024-11-15 NOTE — PROGRESS NOTES
Cardiology    11/15/2024  9:47 AM    Problem list  Patient Active Problem List   Diagnosis    Coronary artery disease involving native coronary artery without angina pectoris    Hyperlipidemia LDL goal <70    Dyspnea on exertion    Restrictive lung disease    ILD (interstitial lung disease)    Acute hypoxemic respiratory failure    Hepatitis B core antibody positive    Antisynthetase syndrome       CC:  F/u    HPI:  Patient is here for follow-up.  He is doing better.  He is currently undergoing chemotherapy.  He is also on tacrolimus.  His shortness breath has improved.  He is not having to use oxygen as much.  He denies any chest pain.    Medications  Current Outpatient Medications   Medication Sig Dispense Refill    amLODIPine (NORVASC) 5 MG tablet Take 1 tablet (5 mg total) by mouth once daily. 90 tablet 3    pantoprazole (PROTONIX) 40 MG tablet Take 1 tablet (40 mg total) by mouth once daily. 90 tablet 3    sulfamethoxazole-trimethoprim 400-80mg (BACTRIM,SEPTRA) 400-80 mg per tablet Take 1 tablet by mouth every Mon, Wed, Fri. 12 tablet 2    tacrolimus (PROGRAF) 1 MG Cap Take 2 capsules (2 mg total) by mouth every 12 (twelve) hours. 120 capsule 1    tamsulosin (FLOMAX) 0.4 mg Cap Take 1 capsule (0.4 mg total) by mouth once daily. 30 capsule 11    tenofovir alafenamide (VEMLIDY) 25 mg Tab Take 1 tablet (25 mg total) by mouth once daily. 30 tablet 1     No current facility-administered medications for this visit.      Prior to Admission medications    Medication Sig Start Date End Date Taking? Authorizing Provider   amLODIPine (NORVASC) 5 MG tablet Take 1 tablet (5 mg total) by mouth once daily. 9/10/24 9/10/25 Yes Mulugeta Francisco MD   pantoprazole (PROTONIX) 40 MG tablet Take 1 tablet (40 mg total) by mouth once daily. 9/10/24 9/10/25 Yes Mulugeta Francisco MD   sulfamethoxazole-trimethoprim 400-80mg (BACTRIM,SEPTRA) 400-80 mg per tablet Take 1 tablet by mouth every Mon, Wed,  Fri. 24  Yes Mary Trevino MD   tacrolimus (PROGRAF) 1 MG Cap Take 2 capsules (2 mg total) by mouth every 12 (twelve) hours. 24  Yes Mary Trevino MD   tamsulosin (FLOMAX) 0.4 mg Cap Take 1 capsule (0.4 mg total) by mouth once daily. 9/10/24 9/10/25 Yes Mulugeta Francisco MD   tenofovir alafenamide (VEMLIDY) 25 mg Tab Take 1 tablet (25 mg total) by mouth once daily. 24  Yes Mary Trevino MD         History  No past medical history on file.  No past surgical history on file.  Social History     Socioeconomic History    Marital status:    Tobacco Use    Smoking status: Former     Current packs/day: 0.00     Types: Cigarettes     Quit date: 1991     Years since quittin.8    Smokeless tobacco: Never    Tobacco comments:     quit 30yrs ago   Substance and Sexual Activity    Alcohol use: Yes     Alcohol/week: 2.0 standard drinks of alcohol     Types: 1 Glasses of wine, 1 Cans of beer per week     Comment: 1 beer and wine every day    Drug use: Never    Sexual activity: Yes   Social History Narrative    ** Merged History Encounter **          Social Drivers of Health     Financial Resource Strain: Patient Declined (2024)    Received from [facility]    Overall Financial Resource Strain (CARDIA)     Difficulty of Paying Living Expenses: Patient declined   Food Insecurity: Patient Declined (2024)    Received from [facility]    Hunger Vital Sign     Worried About Running Out of Food in the Last Year: Patient declined     Ran Out of Food in the Last Year: Patient declined   Transportation Needs: No Transportation Needs (2024)    Received from [facility]    PRAPARE - Transportation     Lack of Transportation (Medical): No     Lack of Transportation (Non-Medical): No   Physical Activity: Inactive (2024)    Received from [facility]    Exercise Vital Sign     Days of Exercise per Week: 0 days     Minutes of Exercise per Session: 0 min   Stress: Stress Concern  Present (9/19/2024)    Received from [facility] - Occupational Stress Questionnaire     Feeling of Stress : To some extent   Housing Stability: Unknown (9/19/2024)    Received from [facility]    Housing Stability Vital Sign     Unable to Pay for Housing in the Last Year: No         Allergies  Review of patient's allergies indicates:  No Known Allergies      Review of Systems   Review of Systems   Constitutional: Negative for decreased appetite, fever and weight loss.   HENT:  Negative for congestion and nosebleeds.    Eyes:  Negative for double vision, vision loss in left eye, vision loss in right eye and visual disturbance.   Cardiovascular:  Negative for chest pain, claudication, cyanosis, dyspnea on exertion, irregular heartbeat, leg swelling, near-syncope, orthopnea, palpitations, paroxysmal nocturnal dyspnea and syncope.   Respiratory:  Negative for cough, hemoptysis, shortness of breath, sleep disturbances due to breathing, snoring, sputum production and wheezing.    Endocrine: Negative for cold intolerance and heat intolerance.   Skin:  Negative for nail changes and rash.   Musculoskeletal:  Negative for joint pain, muscle cramps, muscle weakness and myalgias.   Gastrointestinal:  Negative for change in bowel habit, heartburn, hematemesis, hematochezia, hemorrhoids and melena.   Neurological:  Negative for dizziness, focal weakness and headaches.         Physical Exam  Wt Readings from Last 1 Encounters:   11/15/24 77 kg (169 lb 11.2 oz)     BP Readings from Last 3 Encounters:   11/15/24 124/82   11/07/24 137/66   10/31/24 (!) 120/58     Pulse Readings from Last 1 Encounters:   11/15/24 78     Body mass index is 26.58 kg/m².    Physical Exam  Constitutional:       Appearance: He is well-developed.   HENT:      Head: Atraumatic.   Eyes:      General: No scleral icterus.  Neck:      Vascular: Normal carotid pulses. No carotid bruit, hepatojugular reflux or JVD.    Cardiovascular:      Rate and Rhythm: Normal rate and regular rhythm.      Chest Wall: PMI is not displaced.      Pulses: Intact distal pulses.           Carotid pulses are 2+ on the right side and 2+ on the left side.       Radial pulses are 2+ on the right side and 2+ on the left side.        Dorsalis pedis pulses are 2+ on the right side and 2+ on the left side.      Heart sounds: Normal heart sounds, S1 normal and S2 normal. No murmur heard.     No friction rub.   Pulmonary:      Effort: Pulmonary effort is normal. No respiratory distress.      Breath sounds: No stridor. No wheezing or rales (dry crackles).   Chest:      Chest wall: No tenderness.   Abdominal:      General: Bowel sounds are normal.      Palpations: Abdomen is soft.   Musculoskeletal:      Cervical back: Neck supple. No edema.   Skin:     General: Skin is warm and dry.      Nails: There is no clubbing.   Neurological:      Mental Status: He is alert and oriented to person, place, and time.   Psychiatric:         Behavior: Behavior normal.         Thought Content: Thought content normal.             Assessment  1. Coronary artery disease involving native coronary artery of native heart without angina pectoris (Primary)  Stable.  No angina.  Continue to monitor for anginal symptoms    2. Hyperlipidemia LDL goal <70  Off statin due to LFTs.    3. Antisynthetase syndrome  On chemo and tacrolimus as per rheumatologist and pulmonologist        Plan and Discussion  He is stable from a cardiac standpoint.  He has no angina.  Recommend to continue with current medications.    Follow Up  Six-month      Avery Boyd MD, F.A.C.C, F.S.C.A.I.      Total professional time spent for the encounter: 30 minutes  Time was spent preparing to see the patient, reviewing results of prior testing, obtaining and/or reviewing separately obtained history, performing a medically appropriate examination and interview, counseling and educating the patient/family,  ordering medications/tests/procedures, referring and communicating with other health care professionals, documenting clinical information in the electronic health record, and independently interpreting results.    Disclaimer: This document was created using voice recognition software ([employer]). Although it may be edited, this document may contain errors related to incorrect recognition of the spoken word. Please call the physician if clarification is needed.

## 2024-11-21 ENCOUNTER — LAB VISIT (OUTPATIENT)
Dept: LAB | Facility: HOSPITAL | Age: 64
End: 2024-11-21
Payer: MEDICARE

## 2024-11-21 ENCOUNTER — OFFICE VISIT (OUTPATIENT)
Dept: RHEUMATOLOGY | Facility: CLINIC | Age: 64
End: 2024-11-21
Payer: MEDICARE

## 2024-11-21 ENCOUNTER — PATIENT MESSAGE (OUTPATIENT)
Dept: RHEUMATOLOGY | Facility: CLINIC | Age: 64
End: 2024-11-21

## 2024-11-21 VITALS
SYSTOLIC BLOOD PRESSURE: 133 MMHG | DIASTOLIC BLOOD PRESSURE: 77 MMHG | HEART RATE: 66 BPM | HEIGHT: 67 IN | WEIGHT: 171.31 LBS | BODY MASS INDEX: 26.89 KG/M2

## 2024-11-21 DIAGNOSIS — J84.9 ILD (INTERSTITIAL LUNG DISEASE): ICD-10-CM

## 2024-11-21 DIAGNOSIS — Z51.81 ENCOUNTER FOR MONITORING TACROLIMUS THERAPY: ICD-10-CM

## 2024-11-21 DIAGNOSIS — R76.8 MELANOMA DIFFERENTIATION-ASSOCIATED PROTEIN 5 (MDA-5) ANTIBODY POSITIVE: ICD-10-CM

## 2024-11-21 DIAGNOSIS — R76.8 POSITIVE ANA (ANTINUCLEAR ANTIBODY): ICD-10-CM

## 2024-11-21 DIAGNOSIS — Z79.621 ENCOUNTER FOR MONITORING TACROLIMUS THERAPY: ICD-10-CM

## 2024-11-21 DIAGNOSIS — R76.8 MDA5 ANTIBODY POSITIVE: ICD-10-CM

## 2024-11-21 DIAGNOSIS — M33.91 DERMATOMYOSITIS WITH RESPIRATORY INVOLVEMENT: ICD-10-CM

## 2024-11-21 DIAGNOSIS — R76.8 MDA5 ANTIBODY POSITIVE: Primary | ICD-10-CM

## 2024-11-21 DIAGNOSIS — I73.01 RAYNAUD'S DISEASE WITH GANGRENE: ICD-10-CM

## 2024-11-21 LAB
ALBUMIN SERPL BCP-MCNC: 3.7 G/DL (ref 3.5–5.2)
ALP SERPL-CCNC: 48 U/L (ref 40–150)
ALT SERPL W/O P-5'-P-CCNC: 17 U/L (ref 10–44)
ANION GAP SERPL CALC-SCNC: 8 MMOL/L (ref 8–16)
AST SERPL-CCNC: 18 U/L (ref 10–40)
BASOPHILS # BLD AUTO: 0.03 K/UL (ref 0–0.2)
BASOPHILS NFR BLD: 0.6 % (ref 0–1.9)
BILIRUB SERPL-MCNC: 0.3 MG/DL (ref 0.1–1)
BUN SERPL-MCNC: 9 MG/DL (ref 8–23)
CALCIUM SERPL-MCNC: 9 MG/DL (ref 8.7–10.5)
CHLORIDE SERPL-SCNC: 108 MMOL/L (ref 95–110)
CO2 SERPL-SCNC: 26 MMOL/L (ref 23–29)
CREAT SERPL-MCNC: 1 MG/DL (ref 0.5–1.4)
CRP SERPL-MCNC: 0.7 MG/L (ref 0–8.2)
DIFFERENTIAL METHOD BLD: ABNORMAL
EOSINOPHIL # BLD AUTO: 0 K/UL (ref 0–0.5)
EOSINOPHIL NFR BLD: 0.2 % (ref 0–8)
ERYTHROCYTE [DISTWIDTH] IN BLOOD BY AUTOMATED COUNT: 16.8 % (ref 11.5–14.5)
ERYTHROCYTE [SEDIMENTATION RATE] IN BLOOD BY PHOTOMETRIC METHOD: 4 MM/HR (ref 0–23)
EST. GFR  (NO RACE VARIABLE): >60 ML/MIN/1.73 M^2
GLUCOSE SERPL-MCNC: 116 MG/DL (ref 70–110)
HCT VFR BLD AUTO: 47.2 % (ref 40–54)
HGB BLD-MCNC: 15.5 G/DL (ref 14–18)
IMM GRANULOCYTES # BLD AUTO: 0.01 K/UL (ref 0–0.04)
IMM GRANULOCYTES NFR BLD AUTO: 0.2 % (ref 0–0.5)
LYMPHOCYTES # BLD AUTO: 0.5 K/UL (ref 1–4.8)
LYMPHOCYTES NFR BLD: 10.1 % (ref 18–48)
MCH RBC QN AUTO: 30.6 PG (ref 27–31)
MCHC RBC AUTO-ENTMCNC: 32.8 G/DL (ref 32–36)
MCV RBC AUTO: 93 FL (ref 82–98)
MONOCYTES # BLD AUTO: 0.7 K/UL (ref 0.3–1)
MONOCYTES NFR BLD: 13.9 % (ref 4–15)
NEUTROPHILS # BLD AUTO: 3.6 K/UL (ref 1.8–7.7)
NEUTROPHILS NFR BLD: 75 % (ref 38–73)
NRBC BLD-RTO: 0 /100 WBC
PLATELET # BLD AUTO: 201 K/UL (ref 150–450)
PMV BLD AUTO: 9.8 FL (ref 9.2–12.9)
POTASSIUM SERPL-SCNC: 4 MMOL/L (ref 3.5–5.1)
PROT SERPL-MCNC: 7.2 G/DL (ref 6–8.4)
RBC # BLD AUTO: 5.07 M/UL (ref 4.6–6.2)
SODIUM SERPL-SCNC: 142 MMOL/L (ref 136–145)
WBC # BLD AUTO: 4.75 K/UL (ref 3.9–12.7)

## 2024-11-21 PROCEDURE — 86140 C-REACTIVE PROTEIN: CPT | Mod: TXP | Performed by: STUDENT IN AN ORGANIZED HEALTH CARE EDUCATION/TRAINING PROGRAM

## 2024-11-21 PROCEDURE — 80053 COMPREHEN METABOLIC PANEL: CPT | Mod: TXP | Performed by: STUDENT IN AN ORGANIZED HEALTH CARE EDUCATION/TRAINING PROGRAM

## 2024-11-21 PROCEDURE — 99214 OFFICE O/P EST MOD 30 MIN: CPT | Mod: PBBFAC,TXP | Performed by: STUDENT IN AN ORGANIZED HEALTH CARE EDUCATION/TRAINING PROGRAM

## 2024-11-21 PROCEDURE — 85025 COMPLETE CBC W/AUTO DIFF WBC: CPT | Mod: TXP | Performed by: STUDENT IN AN ORGANIZED HEALTH CARE EDUCATION/TRAINING PROGRAM

## 2024-11-21 PROCEDURE — 36415 COLL VENOUS BLD VENIPUNCTURE: CPT | Mod: TXP | Performed by: STUDENT IN AN ORGANIZED HEALTH CARE EDUCATION/TRAINING PROGRAM

## 2024-11-21 PROCEDURE — 99999 PR PBB SHADOW E&M-EST. PATIENT-LVL IV: CPT | Mod: PBBFAC,GC,TXP, | Performed by: STUDENT IN AN ORGANIZED HEALTH CARE EDUCATION/TRAINING PROGRAM

## 2024-11-21 PROCEDURE — 85652 RBC SED RATE AUTOMATED: CPT | Mod: TXP | Performed by: STUDENT IN AN ORGANIZED HEALTH CARE EDUCATION/TRAINING PROGRAM

## 2024-11-21 RX ORDER — ZOSTER VACCINE RECOMBINANT, ADJUVANTED 50 MCG/0.5
0.5 KIT INTRAMUSCULAR ONCE
Qty: 1 EACH | Refills: 0 | Status: SHIPPED | OUTPATIENT
Start: 2024-11-21 | End: 2024-11-21

## 2024-11-21 RX ORDER — MYCOPHENOLATE MOFETIL 500 MG/1
TABLET ORAL
Qty: 222 TABLET | Refills: 0 | Status: SHIPPED | OUTPATIENT
Start: 2024-11-21 | End: 2024-11-21

## 2024-11-21 RX ORDER — MYCOPHENOLATE MOFETIL 500 MG/1
TABLET ORAL
Qty: 222 TABLET | Refills: 0 | Status: SHIPPED | OUTPATIENT
Start: 2024-11-21 | End: 2025-01-04

## 2024-11-21 RX ORDER — PREDNISONE 5 MG/1
15 TABLET ORAL DAILY
Qty: 90 TABLET | Refills: 2 | Status: SHIPPED | OUTPATIENT
Start: 2024-11-21

## 2024-11-21 NOTE — PROGRESS NOTES
I have personally reviewed the history, confirmed exam findings, and discussed assessment and plan with fellow.      PFTs        FVC          TLC          RV          DLco       10/8/24    65.7           65           75.9         49.1  8/29/24    53.1           54.1        67.8         34.9  7/30/24    70.3           60           58.5         49.5        TTE 7/23/24:        Left Ventricle: The left ventricle is normal in size. Normal wall thickness. There is normal systolic function with a visually estimated ejection fraction of 55 - 60%. Global longitudinal strain is normal; -17.0%. There is normal diastolic function.    Right Ventricle: Normal right ventricular cavity size. Wall thickness is normal. Systolic function is normal.    Tricuspid Valve: There is mild regurgitation.    IVC/SVC: Normal venous pressure at 3 mmHg.                     08/29/2024---------Distance: 396.24 meters (1300 feet)       O2 Sat % Supplemental Oxygen Heart Rate Blood Pressure Alexa Scale   Pre-exercise  (Resting) 95 % Room Air 84 bpm 108/67  mmHg 4   During Exercise 88 % Room Air 98 bpm 130/71  mmHg 7-8   Post-exercise    96 % Room Air  84 bpm 117/74  mmHg        CLINICAL INTERPRETATION:  Six minute walk distance is 396.24 meters (1300 feet) with very heavy dyspnea.  During exercise, there was significant desaturation while breathing room air.  Both blood pressure and heart rate remained stable with walking.  The patient did not report non-pulmonary symptoms during exercise.  The patient may benefit from using supplemental oxygen during exertion.  Since the previous study in July 2024, exercise capacity is unchanged.  Based upon age and body mass index, exercise capacity is normal.   Oxygen saturation did improve while breathing supplemental oxygen.     EXAMINATION:  CT CHEST HIGH RESOLUTION WITHOUT CONTRAST     CLINICAL HISTORY:  ILD; Interstitial pulmonary disease, unspecified     TECHNIQUE:  Low dose axial images, sagittal and  coronal reformations were obtained from the thoracic inlet to the lung bases. Contrast was not administered.     COMPARISON:  07/30/2024     FINDINGS:  The heart is borderline enlarged.  There is aortic and coronary atherosclerosis and increased numbers of shoddy subcentimeter mediastinal lymph nodes.     Images through the lung bases are degraded by motion artifact.  In the upper and mid lung zones there is multifocal subpleural reticulonodular change with areas of architectural distortion.  In the mid and lower lung zones there are patchy dependent and subpleural areas of coalescent airspace opacity with some superimposed minimal peripheral bronchiectasis that is similar to the prior exam.  No honeycombing.  Expiratory images demonstrate no significant air trapping.  Overall, findings are unchanged from the prior exam.     There are calcifications along the dependent aspect of the gallbladder which may reflect layering stones versus less likely incomplete gallbladder wall calcification.     Impression:     Stable changes of interstitial lung disease with possible UIP pattern.  Areas of persistent coalescent opacity may reflect organizing pneumonia.        Electronically signed by:Yayo Michael Jr  Date:                                            08/30/2024  Time:                                           11:04        EXAMINATION:  CT CHEST HIGH RESOLUTION WITHOUT CONTRAST     CLINICAL HISTORY:  ILD; Interstitial pulmonary disease, unspecified     TECHNIQUE:  Low dose axial images, sagittal and coronal reformations were obtained from the thoracic inlet to the lung bases. Contrast was not administered.     COMPARISON:  07/30/2024     FINDINGS:  The heart is borderline enlarged.  There is aortic and coronary atherosclerosis and increased numbers of shoddy subcentimeter mediastinal lymph nodes.     Images through the lung bases are degraded by motion artifact.  In the upper and mid lung zones there is multifocal  subpleural reticulonodular change with areas of architectural distortion.  In the mid and lower lung zones there are patchy dependent and subpleural areas of coalescent airspace opacity with some superimposed minimal peripheral bronchiectasis that is similar to the prior exam.  No honeycombing.  Expiratory images demonstrate no significant air trapping.  Overall, findings are unchanged from the prior exam.     There are calcifications along the dependent aspect of the gallbladder which may reflect layering stones versus less likely incomplete gallbladder wall calcification.     Impression:     Stable changes of interstitial lung disease with possible UIP pattern.  Areas of persistent coalescent opacity may reflect organizing pneumonia.        Electronically signed by:Yayo Michael Jr  Date:                                            08/30/2024  Time:                                           11:04        ASSESSMENT:  Rapidly progressive MDA-5 + ILD  initial  Myomarker MDA-5 negative,  but OMRF positive, just returned  fine basilar crackles 1/2 way up bilateral,  PFTs significantly improved.   Hand tremor with tacrolimus started 9/27/24 even on just 2 mg twice daily so will not be able to continue long term   S/p pulse Solu-Medrol 1 g IV x 4 days in [facility] then  prednisone 60mg daily  now tapered to 15mg daily   S/p IVIg x 5 days in [facility]  S/p cyclophosphamide 750mg/m2 9/12/24, 1000mg/m2 10/10/24 and 11/7/24  Bactrim prophylaxis and PPI  As he still needs triple therapy, and  will need to stop tacrolimus b/o hand tremor, and not a candidate for Toya given CAD, stent, age, best option would be to stop cyclophosphamide after his 3 monthly infusions, and change to rituximab 1000mg IV  wk 0 and 2, and MMF starting with 500mg twice daily x 1 wk, then 1000mg twice daily x 1wk, then 1500mg twice daily all starting on or about 12/7/24     Raynaud's progressive since 2014  's Hands v. MDA 5 palmar lesions  resolved  Subtle  nailfold capillary dropout and minimal capillary dilatation left ring and little finger  PRESTON+ 1:640 speckled neg profile   Proximal muscle weakness  4.8/5 triceps bilat and 4.8/5 psoas bilat others 5/5        PLAN:     *new Covid vaccine  *Shingrix series  Stop tacrolimus given tremor  Has completed 3 monthly doses of cyclophosphamide IV   Not a candidate for Toya given CAD with stent, age   Start rituximab 1000mg IV  wk 0 and wk 2. Risks and benefits discussed, ACR handout provided. Consent obtained and signed. Starting 12/7/24  Start MMF 500mg po  twice daily  x 1 wk then 1000mg po twice daily x 1wk, the 1500mg twice daily starting 12//7/24  Keep prednisone 15mg po daily until established on above regimen  Continue  IVIg 1g/kg daily x 2 days q 4 wks. Next doses 11/27 and 11/29  Cont Bactrim DS M-W-F  Cont Pantoprazole 40mg daily  RTC  6-8 wks

## 2024-11-21 NOTE — PROGRESS NOTES
11/21/2024    11:00 AM   Rapid3 Question Responses and Scores   MDHAQ Score 0.3   Psychologic Score 1.1   Pain Score 0   When you awakened in the morning OVER THE LAST WEEK, did you feel stiff? No   Fatigue Score 2   Global Health Score 1.5   RAPID3 Score 0.83    Answers submitted by the patient for this visit:  Rheumatology Questionnaire (Submitted on 11/21/2024)  fever: No  eye redness: No  mouth sores: No  headaches: No  shortness of breath: No  chest pain: No  trouble swallowing: No  diarrhea: No  constipation: No  unexpected weight change: No  genital sore: No  During the last 3 days, have you had a skin rash?: No  Bruises or bleeds easily: No  cough: No

## 2024-11-21 NOTE — PROGRESS NOTES
Subjective:      Patient ID: Darrell Corona is a 64 y.o. male.    Chief Complaint: MDA5+ myositis + ILD, tremors    HPI: 64-year-old man with PMHx of CAD, HTN and rapidly progressive MDA-5+ myositis and ILD presents to clinic for follow up. Overall, doing well. He thinks his strength is almost at baseline. Cough has resolved. Respiratory standpoint stable. C/o difficult holding his urine, PCP started him on oxybutynin ~2 weeks ago. Additionally, he reports new 3-week history of hand tremors causing difficulty w/ fine motor movements.        Initial Presentation:   Patient states he felt well until June, when he went on a trip to [place]. On that trip, he noticed shortness of breath on exertion. He felt winded after walking up a few flights of stairs. Prior to that trip, he could walk up to 5 miles a day and climb 10-15 flights of stairs without difficulty. On his return to [place], he saw his PCP who noted abnormal breath sounds and referred him to pulmonary. He was seen by Dr. Watts and was noted to have pulmonary fibrosis on his CT chest. He was started on high dose PO steroids, but patient notes no improvement in his breathing. He then was seen by Dr. Goyal at [facility]. Patient's daughter wanted to consider trials for investigational meds, so patient was referred to Dr. Scott (appt pending).     He was then seen by Dr. Reina with [facility] on 8/29. She noted a significant drop in his FVC (24% drop) and DLCO (29%) in a 1-month period. She was concerned for a rapidly progressive ILD. Plan at that time was repeat CT chest, continue steroids, start Ovef given progression, and follow up in 2 weeks with PFTs and 6MWT.      He was referred by Dr. Watts (Pulmonary) for positive PRESTON and restrictive lung disease and seen in our office on 9/5. During that visit, he reported that his breathing continues to worsen. He can only walk a block before he started to feel short of breath. Denied history of lung disease or  "lung issues in the past. He noted worsening discoloration and thickening of both hands in past couple months. History of Raynaud's since 2014, at that time his left index finger would turn white. Four years later he noticed fingers of both hands would turn white. Four years after that the fingers turned purple. Then two to three months ago, he started to notice thickening, cracking, and fissures of his fingers.    There was concern for rapidly progressive ILD. He was sent to the ED for IV steroids and immunotherapy. During the admission, he received IV solumedrol 1gm x4 doses, 5 days of IVIG 0.4g/kg (9/6-9/10) and received CYC received on 9/1.       Review of Systems   Constitutional:  Negative for fever and unexpected weight change.   HENT:  Negative for mouth sores and trouble swallowing.    Eyes:  Negative for redness.   Respiratory:  Negative for cough and shortness of breath.    Cardiovascular:  Negative for chest pain.   Gastrointestinal:  Negative for abdominal pain, blood in stool, constipation and diarrhea.   Genitourinary:  Positive for urgency. Negative for genital sores.   Musculoskeletal:  Negative for arthralgias, joint swelling and myalgias.   Skin:  Negative for rash.   Neurological:  Positive for tremors. Negative for weakness and headaches.   Hematological:  Does not bruise/bleed easily.        Objective:   /77 (Patient Position: Sitting)   Pulse 66   Ht 5' 7" (1.702 m)   Wt 77.7 kg (171 lb 4.8 oz)   BMI 26.83 kg/m²   Physical Exam   Constitutional: He is oriented to person, place, and time. normal appearance. No distress.   HENT:   Head: Normocephalic and atraumatic.   Right Ear: External ear normal.   Left Ear: External ear normal.   Nose: Nose normal.   Mouth/Throat: Mucous membranes are moist. No oropharyngeal exudate or posterior oropharyngeal erythema.   Eyes: Pupils are equal, round, and reactive to light. Conjunctivae are normal. Right eye exhibits no discharge. Left eye exhibits " no discharge. No scleral icterus.   Cardiovascular: Normal rate, regular rhythm and normal pulses.   No murmur heard.  Pulmonary/Chest: Effort normal. No respiratory distress. He has no wheezes.   Abdominal: Bowel sounds are normal. He exhibits no distension. There is no abdominal tenderness.   Musculoskeletal:         General: No swelling.      Cervical back: Normal range of motion.      Right lower leg: No edema.      Left lower leg: No edema.   Neurological: He is oriented to person, place, and time.   Skin: Lesion and rash noted.   skin thickening (improved from prior visit) - images attached       Right Side Rheumatological Exam     Muscle Strength (0-5 scale):  Deltoid:  5  Biceps: 4.8/5   Triceps:  4.8  : 5/5   Iliopsoas: 4.8  Quadriceps:  5   Distal Lower Extremity: 5    Left Side Rheumatological Exam     Muscle Strength (0-5 scale):  Deltoid:  5  Biceps: 4.8/5   Triceps:  4.8  :  5/5   Iliopsoas: 4.8  Quadriceps:  5   Distal Lower Extremity: 5                   Assessment:   64-year-old man with PMHx of HTN, Raynaud's (since 2014), MDA-5 positive myositis w/ associated ILD presents to clinic for follow up. He was hospitalized 9/5 for rapidly progressive ILD. Today, breathing and strength improving. Now with tremors, likely due to tacrolimus.  - PRESTON 1:640 speckled. Negative labwork: JASEN, ANCA, lupus anticoagulant, anti-cardiolipin, CCP/RF, GBM, Scl-70. Myomarker panel: + SSA, +MDA-5  S/p pulse Solu-Medrol 1 g IV x 4 days   S/p IVIG 0.4g/kg x 5 days (9/6-9/10 - inpatient). Then IVIG 1 gm/kg x 2 days q 4 wks: 10/2-10/3, then 10/30-10/31  S/p cyclophosphamide 750mg/m2 9/12/24. Then 1gm/m2 on 10/10 and 11/7/24       Rheumatology Health Maintenance  Vaccinations: Received Pneumonia 20 and Flu Vaccine on  9/20 w/ PCP. Tdap 1/2024    1. MDA5 antibody positive    2. Dermatomyositis with respiratory involvement    3. ILD (interstitial lung disease)    4. Positive PRESTON (antinuclear antibody)    5. Raynaud's  disease with gangrene    6. Melanoma differentiation-associated protein 5 (MDA-5) antibody positive    7. Encounter for monitoring tacrolimus therapy          Plan:     - Continue prednisone 15 mg daily  - Stop CYC 1gm/m2, cancelled appt scheduled for 12/5  - Plan to start IV RTX 1000 mg day 0, then day 15 in December. Then q6m maintenance. Risk and benefits discussed. ACR handout provided. Consent obtained  - Start MMF on 12/7: 500 mg BID for 1 week, then 1000 mg BID for 1 week then 1500 mg BID.     - Not candidate for Toya given CAD/stent history  - Discontinue Tacrolimus d/t hand tremors  - Continue IVIG 1 gm/kg x 2 days every 4 weeks  - Continue Bactrim 3x/week and PPI  - Follow up with Pulmonary  - Due for COVID booster, RSV and shingles now, advised before RTX infusion  - Post CYC labs now then standing labs before RTX infusion     This patient was examined with Dr. Barcenas. Plan discussed with the patient. Return to clinic in 6 weeks    Problem List Items Addressed This Visit          Pulmonary    ILD (interstitial lung disease)    Relevant Medications    varicella-zoster gE-AS01B, PF, (SHINGRIX, PF,) 50 mcg/0.5 mL injection    predniSONE (DELTASONE) 5 MG tablet    mycophenolate (CELLCEPT) 500 mg Tab    Other Relevant Orders    CBC W/ AUTO DIFFERENTIAL    COMPREHENSIVE METABOLIC PANEL    Sedimentation rate    C-REACTIVE PROTEIN    CBC W/ AUTO DIFFERENTIAL (Completed)    COMPREHENSIVE METABOLIC PANEL (Completed)    Sedimentation rate (Completed)    C-REACTIVE PROTEIN (Completed)       Immunology/Multi System    Melanoma differentiation-associated protein 5 (MDA-5) antibody positive     Other Visit Diagnoses       MDA5 antibody positive    -  Primary    Relevant Medications    varicella-zoster gE-AS01B, PF, (SHINGRIX, PF,) 50 mcg/0.5 mL injection    predniSONE (DELTASONE) 5 MG tablet    mycophenolate (CELLCEPT) 500 mg Tab    Other Relevant Orders    CBC W/ AUTO DIFFERENTIAL    COMPREHENSIVE METABOLIC PANEL     Sedimentation rate    C-REACTIVE PROTEIN    CBC W/ AUTO DIFFERENTIAL (Completed)    COMPREHENSIVE METABOLIC PANEL (Completed)    Sedimentation rate (Completed)    C-REACTIVE PROTEIN (Completed)    Dermatomyositis with respiratory involvement        Relevant Medications    varicella-zoster gE-AS01B, PF, (SHINGRIX, PF,) 50 mcg/0.5 mL injection    predniSONE (DELTASONE) 5 MG tablet    mycophenolate (CELLCEPT) 500 mg Tab    Other Relevant Orders    CBC W/ AUTO DIFFERENTIAL    COMPREHENSIVE METABOLIC PANEL    Sedimentation rate    C-REACTIVE PROTEIN    CBC W/ AUTO DIFFERENTIAL (Completed)    COMPREHENSIVE METABOLIC PANEL (Completed)    Sedimentation rate (Completed)    C-REACTIVE PROTEIN (Completed)    Positive PRESTON (antinuclear antibody)        Raynaud's disease with gangrene        Encounter for monitoring tacrolimus therapy                Mary Trevino MD  PGY-4, Rheumatology Fellow

## 2024-11-22 ENCOUNTER — TELEPHONE (OUTPATIENT)
Dept: HEPATOLOGY | Facility: CLINIC | Age: 64
End: 2024-11-22
Payer: MEDICARE

## 2024-11-22 ENCOUNTER — OFFICE VISIT (OUTPATIENT)
Dept: HEPATOLOGY | Facility: CLINIC | Age: 64
End: 2024-11-22
Payer: COMMERCIAL

## 2024-11-22 VITALS — WEIGHT: 168 LBS | BODY MASS INDEX: 26.37 KG/M2 | HEIGHT: 67 IN

## 2024-11-22 DIAGNOSIS — D84.821 IMMUNOSUPPRESSION DUE TO DRUG THERAPY: ICD-10-CM

## 2024-11-22 DIAGNOSIS — Z79.899 IMMUNOSUPPRESSION DUE TO DRUG THERAPY: ICD-10-CM

## 2024-11-22 DIAGNOSIS — J84.9 ILD (INTERSTITIAL LUNG DISEASE): ICD-10-CM

## 2024-11-22 DIAGNOSIS — R76.8 HEPATITIS B CORE ANTIBODY POSITIVE: Primary | ICD-10-CM

## 2024-11-22 DIAGNOSIS — R76.8 MELANOMA DIFFERENTIATION-ASSOCIATED PROTEIN 5 (MDA-5) ANTIBODY POSITIVE: ICD-10-CM

## 2024-11-22 PROCEDURE — 99204 OFFICE O/P NEW MOD 45 MIN: CPT | Mod: 95,NTX,,

## 2024-11-22 RX ORDER — HEPARIN 100 UNIT/ML
500 SYRINGE INTRAVENOUS
OUTPATIENT
Start: 2024-12-06

## 2024-11-22 RX ORDER — METHYLPREDNISOLONE SOD SUCC 125 MG
100 VIAL (EA) INJECTION
OUTPATIENT
Start: 2024-12-06

## 2024-11-22 RX ORDER — FAMOTIDINE 10 MG/ML
20 INJECTION INTRAVENOUS
OUTPATIENT
Start: 2024-12-06

## 2024-11-22 RX ORDER — DIPHENHYDRAMINE HYDROCHLORIDE 50 MG/ML
50 INJECTION INTRAMUSCULAR; INTRAVENOUS ONCE AS NEEDED
OUTPATIENT
Start: 2024-12-06

## 2024-11-22 RX ORDER — MEPERIDINE HYDROCHLORIDE 50 MG/ML
25 INJECTION INTRAMUSCULAR; INTRAVENOUS; SUBCUTANEOUS
OUTPATIENT
Start: 2024-12-06

## 2024-11-22 RX ORDER — SODIUM CHLORIDE 0.9 % (FLUSH) 0.9 %
10 SYRINGE (ML) INJECTION
OUTPATIENT
Start: 2024-12-06

## 2024-11-22 RX ORDER — EPINEPHRINE 0.3 MG/.3ML
0.3 INJECTION SUBCUTANEOUS ONCE AS NEEDED
OUTPATIENT
Start: 2024-12-06

## 2024-11-22 RX ORDER — ACETAMINOPHEN 325 MG/1
650 TABLET ORAL
OUTPATIENT
Start: 2024-12-06

## 2024-11-22 NOTE — PATIENT INSTRUCTIONS
Labs every 6 months  Follow up in 1 year with fibroscan prior to follow up  Continue Vemlidy    HEP B CORE ANTIBODY +  Your labs show that you were likely exposed to Hepatitis B in the past and your body cleared the active Hep B virus. However, the Hep B never fully clears and is housed in the liver. This typically doesn't cause any chronic (long standing) Hepatitis B. However, the Hepatitis B can flare, especially if you are taking any medications that can affect your immune system, such as rituxian.    Therefore, it is recommended that you continue Hepatitis B medication to protect your liver from any Hepatitis B flare.     Labs in 6 months  Continue 25 mg daily Vemlidy for Hep B prophylaxis/protection. Do not stop this medication unless we instruct you to. It is a long term medication that is taken for a long as you are taking rituxian and for at least 12 months after (if it is ever stopped)  Labs every 6 months, follow up in 1 year with fibroscan prior to follow up

## 2024-11-22 NOTE — Clinical Note
Please schedule  1. Labs every 6 months 2. Fibroscan - sometime within next year 3. Follow up in 1 year either in person (WB) or VV

## 2024-11-22 NOTE — PROGRESS NOTES
The patient location is: [place], [place]  The chief complaint leading to consultation is: Hep B core antibody +    Visit type: audiovisual    Face to Face time with patient: 20 minutes  45 minutes of total time spent on the encounter, which includes face to face time and non-face to face time preparing to see the patient (eg, review of tests), Obtaining and/or reviewing separately obtained history, Documenting clinical information in the electronic or other health record, Independently interpreting results (not separately reported) and communicating results to the patient/family/caregiver, or Care coordination (not separately reported).         Each patient to whom he or she provides medical services by telemedicine is:  (1) informed of the relationship between the physician and patient and the respective role of any other health care provider with respect to management of the patient; and (2) notified that he or she may decline to receive medical services by telemedicine and may withdraw from such care at any time.    Notes:       [facility] - New Patient    REFERRING PROVIDER:  No ref. provider found  PCP: Nikhil Palma MD     Chief Complaint:  Hep B core antibody +      HISTORY       HPI: This is a 64 y.o. patient with PMH noted below, presenting for evaluation of Hep B core positive antibody, in the setting of MDA-5+ myositis and ILD followed by Rheumatology (Dr. Barcenas)      Previous serologic w/u negative for viral hepatitis.    Prior serologic workup:   Lab Results   Component Value Date    IGGSERUM 893 09/06/2024    ANASCREEN Positive (A) 08/05/2024    HEPBSAG Non-reactive 09/06/2024    HEPCAB Non-reactive 09/05/2024         Interval HPI: Presents today with son, Rey. States that all of this is relatively new. Was dx with ILD in September when he was admitted to the hospital. At that time was also told that he was Hep B core antibody + and was started on Vemlidy as prophylaxis treatment in  conjunction with immunosuppressants. Recent medication changes include he will be starting cellcept and Rituxan infusions soon. Denies previous known history of HBV. As far as he is aware no one in his family has it. Denies ever being treated or hospitalized for it in the past.        LABS & DIAGNOSTIC STUDIES        Labs done 11/2024 show normal transaminase levels   wnl Platelets  wnl synthetic liver functioning     Lab Results   Component Value Date    ALT 17 11/21/2024    AST 18 11/21/2024    ALKPHOS 48 11/21/2024    BILITOT 0.3 11/21/2024    ALBUMIN 3.7 11/21/2024     11/21/2024     Hep B labs:   + core Ab   - sAg   - sAb   Hep B DNA not detected   - Hep C  - HIV testing    Imaging:  Abd CT done 9/2024 noted  FINDINGS:  Lung base: Similar appearance of bilateral interstitial lung disease.  No pleural effusion.     Visualized portion of heart: Mild cardiomegaly.  Coronary artery calcification.  No pericardial effusion..     Liver: Normal in size.No focal hepatic abnormality.     Gallbladder: Calcified gallstones within the gallbladder and cystic duct with no additional evidence for acute cholecystitis.     Bile duct: No intra-or extrahepatic biliary ductal dilatation.     Spleen: No significant abnormality.     Pancreas: No significant abnormality.     Adrenal glands: No significant abnormality.     Genitourinary: The kidneys are normal in size and location. The ureters appear normal in course and caliber.  No evidence of nephrolithiasis or hydroureteronephrosis.  The urinary bladder is unremarkable.     Reproductive organs: Prostatomegaly.     GI tract: The stomach is unremarkable. The visualized loops of small and large bowel show no evidence of obstruction or inflammation.     Peritoneum: No evidence of ascites or intraperitoneal free air.     Lymph nodes: No significant adenopathy.     Vasculature: Normal caliber of abdominal aorta with mild to moderate atherosclerosis.     Abdominal wall: No  "significant abnormality.     Bones: Degenerative changes with mild to moderate lumbar spine vertebral body height loss.  No acute osseous abnormality.     Impression:     No definite evidence for abdominopelvic neoplasm.     Cholelithiasis     Prostatomegaly     Additional findings as above        Denies family history of liver disease.    Reports occasional alcohol consumption.    Immunity to Hep A.       Allergy and medication list reviewed and updated     PMHX:  has no past medical history on file.    PSHX:  has no past surgical history on file.    FAMILY HISTORY: Updated and reviewed in EPIC    ROS:   GENERAL: Denies fatigue  CARDIOVASCULAR: Denies edema  GI: Denies abdominal pain  SKIN: Denies rash, itching   NEURO: Denies confusion, memory loss, or mood changes    PHYSICAL EXAM:   In no acute distress; alert and oriented to person, place and time  VITALS: Ht 5' 7" (1.702 m)   Wt 76.2 kg (168 lb)   BMI 26.31 kg/m²   EYES: Sclerae anicteric  GI: Soft, non-tender, non-distended. No ascites.  EXTREMITIES:  No edema.  SKIN: Warm and dry. No jaundice. No telangectasias noted. No palmar erythema.  NEURO:  No asterixis.  PSYCH:  Thought and speech pattern appropriate. Behavior normal        ASSESSMENT & PLAN        EDUCATION:  See instructions discussed with patient in Instructions section of the After Visit Summary     ASSESSMENT & PLAN:  64 y.o. male with:   1. Hep B core positive Ab, no chronic Hep B  -- Hep B labs: see HPI  -- Vemlidy use since 9/2024 - continue use (refills sent)  -- transaminases wnl  -- synthetic liver function wnl  -- + immunity to Hep A  -- family members or partners need to be tested  -- Fibroscan prior to RTC  -- screening for Hep C and HIV - negative  -- labs every 6 months since already on prophylaxis  -- do not stop Vemlidy unless instructed by hepatology    2. MDA-5+ myositis and ILD  -- risk of reactivation of HBV with the use of Rituxan is high  -- prophylaxis should continue during " immunosuppressive therapy and for at least 12 months after completion of immunosuppressive therapy.    -- continue to follow with rheumatology regarding immunosuppression management        EDUCATION:    Discussed with patient presence of Hep B core positive antibody, negative antigen so likely suggests previous exposure, clearance, no active chronic Hep B. Discussed importance of using prophylactic medications to prevent reactivation of Hep B with use of particular immunosuppressive medications    Also, should notify if any change or addition of any steroids, immunologic medications for rheumatologic disease, chemotherapy, etc.     Agents with > 10% reactivation risk:   HIGH RISK GROUP > 10% - anti-viral prophylaxis is recommended:   - Anti CD 20 - B cell depleting agents: rituximab, ofatumumab, obinutuzumab   - Anthracycline derivatives: doxorubicin,epirubicin   - High dose steroids: > 20 mg prednisone or equivalent for > 4 weeks   - Undergoing stem cell transplant       Agents with 1-10% reactivation risk:   MODERATE RISK GROUP - monitoring versus Hep B prophylaxis is recommended and typically lasts for 6 months after d/c of immunologic medication:   - TNF-alpha inhibitors: etanercept, adalimumab, certolizumab, infliximab   - Cytokine or integrin inhibitors: abatacept, ustekinumab, natalizumab, vedolizumab   - Tyrosinekinase inhibitors: imatinib, nilotinib   - Low dose steroids: 10-20 mg prednisone or equivalent for > 4 weeks       Agents with <1% reactivation risk:   LOW RISK GROUP - can be carefully monitored with ALT, HBV DNA, and HBsAg with the intent for on-demand therapy if any of above classes of medications are added:   - 6-MP, AZA, MTX, intra-articular steroids, any dose steroid < 1 week, 10 mg prednisone < 4 weeks        Follow up in about 1 year (around 11/22/2025). with labs and fibroscan before  Orders Placed This Encounter   Procedures    FibroScan Transplant Hepatology(Vibration Controlled Transient  Elastography)    Comprehensive Metabolic Panel    Hepatitis B Surface Antigen    HEPATITIS B VIRAL DNA, QUANTITATIVE    Hepatitis B Core Antibody, Total        Thank you for allowing me to participate in the care of Darrell Corona    LISSET Hudson, FNP-C  Nurse Practitioner  [facility]  [facility]     I spent a total of 45 minutes on the day of the visit.This includes face to face time and non-face to face time preparing to see the patient (eg, review of tests), obtaining and/or reviewing separately obtained history, documenting clinical information in the electronic or other health record, independently interpreting results and communicating results to the patient/family/caregiver, and coordinating care.         CC'ed note to:   Nikhil Palma MD

## 2024-11-25 ENCOUNTER — TELEPHONE (OUTPATIENT)
Dept: HEPATOLOGY | Facility: CLINIC | Age: 64
End: 2024-11-25
Payer: MEDICARE

## 2024-11-25 ENCOUNTER — PATIENT MESSAGE (OUTPATIENT)
Dept: TRANSPLANT | Facility: CLINIC | Age: 64
End: 2024-11-25
Payer: MEDICARE

## 2024-11-25 NOTE — TELEPHONE ENCOUNTER
Contacted the pt scheduled follow up and fibroscan same day on 11/24/2025.Lab on 11/17/2025.Send appt by mail for further instruction and a reminder.

## 2024-11-25 NOTE — TELEPHONE ENCOUNTER
----- Message from Stephanie Dalton NP sent at 11/22/2024  2:51 PM CST -----  Please schedule   1. Labs every 6 months  2. Fibroscan - sometime within next year  3. Follow up in 1 year either in person (WB) or VV

## 2024-11-26 DIAGNOSIS — J84.9 ILD (INTERSTITIAL LUNG DISEASE): ICD-10-CM

## 2024-11-26 DIAGNOSIS — I73.01 RAYNAUD'S DISEASE WITH GANGRENE: ICD-10-CM

## 2024-11-26 DIAGNOSIS — R76.8 POSITIVE ANA (ANTINUCLEAR ANTIBODY): ICD-10-CM

## 2024-11-26 PROBLEM — D84.821 IMMUNOSUPPRESSION DUE TO DRUG THERAPY: Status: ACTIVE | Noted: 2024-11-26

## 2024-11-26 PROBLEM — Z79.899 IMMUNOSUPPRESSION DUE TO DRUG THERAPY: Status: ACTIVE | Noted: 2024-11-26

## 2024-11-27 ENCOUNTER — INFUSION (OUTPATIENT)
Dept: INFUSION THERAPY | Facility: HOSPITAL | Age: 64
End: 2024-11-27
Attending: INTERNAL MEDICINE
Payer: MEDICARE

## 2024-11-27 VITALS
HEART RATE: 60 BPM | DIASTOLIC BLOOD PRESSURE: 76 MMHG | WEIGHT: 171.5 LBS | RESPIRATION RATE: 18 BRPM | BODY MASS INDEX: 26.86 KG/M2 | SYSTOLIC BLOOD PRESSURE: 128 MMHG | TEMPERATURE: 98 F | OXYGEN SATURATION: 96 %

## 2024-11-27 DIAGNOSIS — R76.8 MELANOMA DIFFERENTIATION-ASSOCIATED PROTEIN 5 (MDA-5) ANTIBODY POSITIVE: ICD-10-CM

## 2024-11-27 DIAGNOSIS — J84.9 ILD (INTERSTITIAL LUNG DISEASE): Primary | ICD-10-CM

## 2024-11-27 PROCEDURE — 96365 THER/PROPH/DIAG IV INF INIT: CPT

## 2024-11-27 PROCEDURE — 25000003 PHARM REV CODE 250: Performed by: INTERNAL MEDICINE

## 2024-11-27 PROCEDURE — 96366 THER/PROPH/DIAG IV INF ADDON: CPT

## 2024-11-27 PROCEDURE — 63600175 PHARM REV CODE 636 W HCPCS: Mod: JZ,JG | Performed by: INTERNAL MEDICINE

## 2024-11-27 PROCEDURE — 96375 TX/PRO/DX INJ NEW DRUG ADDON: CPT

## 2024-11-27 RX ORDER — ACETAMINOPHEN 325 MG/1
650 TABLET ORAL
Status: CANCELLED | OUTPATIENT
Start: 2024-11-27

## 2024-11-27 RX ORDER — HEPARIN 100 UNIT/ML
500 SYRINGE INTRAVENOUS
Status: CANCELLED | OUTPATIENT
Start: 2024-11-27

## 2024-11-27 RX ORDER — DIPHENHYDRAMINE HYDROCHLORIDE 50 MG/ML
25 INJECTION INTRAMUSCULAR; INTRAVENOUS
Status: CANCELLED | OUTPATIENT
Start: 2024-11-27

## 2024-11-27 RX ORDER — DIPHENHYDRAMINE HYDROCHLORIDE 50 MG/ML
25 INJECTION INTRAMUSCULAR; INTRAVENOUS
Status: COMPLETED | OUTPATIENT
Start: 2024-11-27 | End: 2024-11-27

## 2024-11-27 RX ORDER — FAMOTIDINE 10 MG/ML
20 INJECTION INTRAVENOUS
Status: COMPLETED | OUTPATIENT
Start: 2024-11-27 | End: 2024-11-27

## 2024-11-27 RX ORDER — ACETAMINOPHEN 325 MG/1
650 TABLET ORAL
Status: COMPLETED | OUTPATIENT
Start: 2024-11-27 | End: 2024-11-27

## 2024-11-27 RX ORDER — FAMOTIDINE 10 MG/ML
20 INJECTION INTRAVENOUS
Status: CANCELLED | OUTPATIENT
Start: 2024-11-27

## 2024-11-27 RX ORDER — SULFAMETHOXAZOLE AND TRIMETHOPRIM 400; 80 MG/1; MG/1
1 TABLET ORAL
Qty: 12 TABLET | Refills: 2 | Status: SHIPPED | OUTPATIENT
Start: 2024-11-27

## 2024-11-27 RX ORDER — SODIUM CHLORIDE 0.9 % (FLUSH) 0.9 %
10 SYRINGE (ML) INJECTION
Status: CANCELLED | OUTPATIENT
Start: 2024-11-27

## 2024-11-27 RX ADMIN — ACETAMINOPHEN 650 MG: 325 TABLET ORAL at 08:11

## 2024-11-27 RX ADMIN — HUMAN IMMUNOGLOBULIN G 75 G: 40 LIQUID INTRAVENOUS at 09:11

## 2024-11-27 RX ADMIN — DIPHENHYDRAMINE HYDROCHLORIDE 25 MG: 50 INJECTION INTRAMUSCULAR; INTRAVENOUS at 08:11

## 2024-11-27 RX ADMIN — FAMOTIDINE 20 MG: 10 INJECTION INTRAVENOUS at 08:11

## 2024-11-27 NOTE — PLAN OF CARE
Pt ambulated to clinic w/o difficulty. Pt received Privigen infusion via 22g to R FA. Privigen infusion titrated per order parameters (refer to MAR). Pt tolerated infusion well. No s/s of a reaction. VSS, in NAD. Care plan discussed with pt. Pt aware of upcoming appointment.         Problem: Fall Injury Risk  Goal: Absence of Fall and Fall-Related Injury  Outcome: Met

## 2024-11-29 ENCOUNTER — INFUSION (OUTPATIENT)
Dept: INFUSION THERAPY | Facility: HOSPITAL | Age: 64
End: 2024-11-29
Attending: INTERNAL MEDICINE
Payer: MEDICARE

## 2024-11-29 ENCOUNTER — LAB VISIT (OUTPATIENT)
Dept: LAB | Facility: HOSPITAL | Age: 64
End: 2024-11-29
Attending: STUDENT IN AN ORGANIZED HEALTH CARE EDUCATION/TRAINING PROGRAM
Payer: COMMERCIAL

## 2024-11-29 VITALS
RESPIRATION RATE: 18 BRPM | DIASTOLIC BLOOD PRESSURE: 81 MMHG | BODY MASS INDEX: 26.79 KG/M2 | SYSTOLIC BLOOD PRESSURE: 143 MMHG | HEART RATE: 70 BPM | TEMPERATURE: 98 F | WEIGHT: 171.06 LBS

## 2024-11-29 DIAGNOSIS — R76.8 MDA5 ANTIBODY POSITIVE: ICD-10-CM

## 2024-11-29 DIAGNOSIS — R76.8 MELANOMA DIFFERENTIATION-ASSOCIATED PROTEIN 5 (MDA-5) ANTIBODY POSITIVE: ICD-10-CM

## 2024-11-29 DIAGNOSIS — J84.9 ILD (INTERSTITIAL LUNG DISEASE): Primary | ICD-10-CM

## 2024-11-29 DIAGNOSIS — J84.9 ILD (INTERSTITIAL LUNG DISEASE): ICD-10-CM

## 2024-11-29 DIAGNOSIS — M33.91 DERMATOMYOSITIS WITH RESPIRATORY INVOLVEMENT: ICD-10-CM

## 2024-11-29 LAB
ALBUMIN SERPL BCP-MCNC: 3.4 G/DL (ref 3.5–5.2)
ALP SERPL-CCNC: 45 U/L (ref 40–150)
ALT SERPL W/O P-5'-P-CCNC: 14 U/L (ref 10–44)
ANION GAP SERPL CALC-SCNC: 6 MMOL/L (ref 8–16)
AST SERPL-CCNC: 16 U/L (ref 10–40)
BASOPHILS # BLD AUTO: 0.02 K/UL (ref 0–0.2)
BASOPHILS NFR BLD: 0.3 % (ref 0–1.9)
BILIRUB SERPL-MCNC: 0.3 MG/DL (ref 0.1–1)
BUN SERPL-MCNC: 10 MG/DL (ref 8–23)
CALCIUM SERPL-MCNC: 9.6 MG/DL (ref 8.7–10.5)
CHLORIDE SERPL-SCNC: 106 MMOL/L (ref 95–110)
CO2 SERPL-SCNC: 26 MMOL/L (ref 23–29)
CREAT SERPL-MCNC: 1 MG/DL (ref 0.5–1.4)
CRP SERPL-MCNC: 1.4 MG/L (ref 0–8.2)
DIFFERENTIAL METHOD BLD: ABNORMAL
EOSINOPHIL # BLD AUTO: 0 K/UL (ref 0–0.5)
EOSINOPHIL NFR BLD: 0.1 % (ref 0–8)
ERYTHROCYTE [DISTWIDTH] IN BLOOD BY AUTOMATED COUNT: 16.3 % (ref 11.5–14.5)
ERYTHROCYTE [SEDIMENTATION RATE] IN BLOOD BY PHOTOMETRIC METHOD: 56 MM/HR (ref 0–23)
EST. GFR  (NO RACE VARIABLE): >60 ML/MIN/1.73 M^2
GLUCOSE SERPL-MCNC: 136 MG/DL (ref 70–110)
HCT VFR BLD AUTO: 50.2 % (ref 40–54)
HGB BLD-MCNC: 15.6 G/DL (ref 14–18)
IMM GRANULOCYTES # BLD AUTO: 0.1 K/UL (ref 0–0.04)
IMM GRANULOCYTES NFR BLD AUTO: 1.4 % (ref 0–0.5)
LYMPHOCYTES # BLD AUTO: 0.6 K/UL (ref 1–4.8)
LYMPHOCYTES NFR BLD: 8.9 % (ref 18–48)
MCH RBC QN AUTO: 29.9 PG (ref 27–31)
MCHC RBC AUTO-ENTMCNC: 31.1 G/DL (ref 32–36)
MCV RBC AUTO: 96 FL (ref 82–98)
MONOCYTES # BLD AUTO: 0.3 K/UL (ref 0.3–1)
MONOCYTES NFR BLD: 4.5 % (ref 4–15)
NEUTROPHILS # BLD AUTO: 6.1 K/UL (ref 1.8–7.7)
NEUTROPHILS NFR BLD: 84.8 % (ref 38–73)
NRBC BLD-RTO: 0 /100 WBC
PLATELET # BLD AUTO: 212 K/UL (ref 150–450)
PMV BLD AUTO: 9.4 FL (ref 9.2–12.9)
POTASSIUM SERPL-SCNC: 4.4 MMOL/L (ref 3.5–5.1)
PROT SERPL-MCNC: 11.1 G/DL (ref 6–8.4)
RBC # BLD AUTO: 5.22 M/UL (ref 4.6–6.2)
SODIUM SERPL-SCNC: 138 MMOL/L (ref 136–145)
WBC # BLD AUTO: 7.17 K/UL (ref 3.9–12.7)

## 2024-11-29 PROCEDURE — 85652 RBC SED RATE AUTOMATED: CPT | Mod: TXP | Performed by: STUDENT IN AN ORGANIZED HEALTH CARE EDUCATION/TRAINING PROGRAM

## 2024-11-29 PROCEDURE — 86140 C-REACTIVE PROTEIN: CPT | Mod: TXP | Performed by: STUDENT IN AN ORGANIZED HEALTH CARE EDUCATION/TRAINING PROGRAM

## 2024-11-29 PROCEDURE — 25000003 PHARM REV CODE 250: Performed by: INTERNAL MEDICINE

## 2024-11-29 PROCEDURE — 96366 THER/PROPH/DIAG IV INF ADDON: CPT

## 2024-11-29 PROCEDURE — 80053 COMPREHEN METABOLIC PANEL: CPT | Mod: TXP | Performed by: STUDENT IN AN ORGANIZED HEALTH CARE EDUCATION/TRAINING PROGRAM

## 2024-11-29 PROCEDURE — 63600175 PHARM REV CODE 636 W HCPCS: Mod: JZ,JG | Performed by: INTERNAL MEDICINE

## 2024-11-29 PROCEDURE — 85025 COMPLETE CBC W/AUTO DIFF WBC: CPT | Mod: NTX | Performed by: STUDENT IN AN ORGANIZED HEALTH CARE EDUCATION/TRAINING PROGRAM

## 2024-11-29 PROCEDURE — 96375 TX/PRO/DX INJ NEW DRUG ADDON: CPT

## 2024-11-29 PROCEDURE — 36415 COLL VENOUS BLD VENIPUNCTURE: CPT | Mod: TXP | Performed by: STUDENT IN AN ORGANIZED HEALTH CARE EDUCATION/TRAINING PROGRAM

## 2024-11-29 RX ORDER — DIPHENHYDRAMINE HYDROCHLORIDE 50 MG/ML
25 INJECTION INTRAMUSCULAR; INTRAVENOUS
OUTPATIENT
Start: 2024-11-29

## 2024-11-29 RX ORDER — SODIUM CHLORIDE 0.9 % (FLUSH) 0.9 %
10 SYRINGE (ML) INJECTION
OUTPATIENT
Start: 2024-11-29

## 2024-11-29 RX ORDER — HEPARIN 100 UNIT/ML
500 SYRINGE INTRAVENOUS
OUTPATIENT
Start: 2024-11-29

## 2024-11-29 RX ORDER — FAMOTIDINE 10 MG/ML
20 INJECTION INTRAVENOUS
Status: COMPLETED | OUTPATIENT
Start: 2024-11-29 | End: 2024-11-29

## 2024-11-29 RX ORDER — ACETAMINOPHEN 325 MG/1
650 TABLET ORAL
OUTPATIENT
Start: 2024-11-29

## 2024-11-29 RX ORDER — FAMOTIDINE 10 MG/ML
20 INJECTION INTRAVENOUS
OUTPATIENT
Start: 2024-11-29

## 2024-11-29 RX ORDER — DIPHENHYDRAMINE HYDROCHLORIDE 50 MG/ML
25 INJECTION INTRAMUSCULAR; INTRAVENOUS
Status: COMPLETED | OUTPATIENT
Start: 2024-11-29 | End: 2024-11-29

## 2024-11-29 RX ORDER — ACETAMINOPHEN 325 MG/1
650 TABLET ORAL
Status: COMPLETED | OUTPATIENT
Start: 2024-11-29 | End: 2024-11-29

## 2024-11-29 RX ADMIN — FAMOTIDINE 20 MG: 10 INJECTION INTRAVENOUS at 08:11

## 2024-11-29 RX ADMIN — HUMAN IMMUNOGLOBULIN G 80 G: 40 LIQUID INTRAVENOUS at 08:11

## 2024-11-29 RX ADMIN — ACETAMINOPHEN 650 MG: 325 TABLET ORAL at 08:11

## 2024-11-29 RX ADMIN — DIPHENHYDRAMINE HYDROCHLORIDE 25 MG: 50 INJECTION INTRAMUSCULAR; INTRAVENOUS at 08:11

## 2024-11-29 NOTE — PLAN OF CARE
Pt ambulated to clinic w/o difficulty. Pt received Privigen infusion via 22g to L FA. Privigen infusion titrated per order parameters (refer to MAR). Pt tolerated infusion well. No s/s of a reaction. VSS, in NAD. Care plan discussed with pt. Pt aware of upcoming appointment via MyChart.         Problem: Fall Injury Risk  Goal: Absence of Fall and Fall-Related Injury  Outcome: Met

## 2024-12-02 ENCOUNTER — PATIENT MESSAGE (OUTPATIENT)
Dept: TRANSPLANT | Facility: CLINIC | Age: 64
End: 2024-12-02
Payer: MEDICARE

## 2024-12-09 ENCOUNTER — PATIENT MESSAGE (OUTPATIENT)
Dept: RHEUMATOLOGY | Facility: CLINIC | Age: 64
End: 2024-12-09
Payer: COMMERCIAL

## 2024-12-09 DIAGNOSIS — M33.91 DERMATOMYOSITIS WITH RESPIRATORY INVOLVEMENT: ICD-10-CM

## 2024-12-09 DIAGNOSIS — R76.8 MDA5 ANTIBODY POSITIVE: ICD-10-CM

## 2024-12-09 DIAGNOSIS — J84.9 ILD (INTERSTITIAL LUNG DISEASE): ICD-10-CM

## 2024-12-09 RX ORDER — MYCOPHENOLATE MOFETIL 500 MG/1
TABLET ORAL
Qty: 126 TABLET | Refills: 0 | Status: CANCELLED | OUTPATIENT
Start: 2024-12-09 | End: 2025-01-06

## 2024-12-10 DIAGNOSIS — M33.91 DERMATOMYOSITIS WITH RESPIRATORY INVOLVEMENT: ICD-10-CM

## 2024-12-10 DIAGNOSIS — J84.9 ILD (INTERSTITIAL LUNG DISEASE): ICD-10-CM

## 2024-12-10 DIAGNOSIS — R76.8 MDA5 ANTIBODY POSITIVE: ICD-10-CM

## 2024-12-10 RX ORDER — MYCOPHENOLATE MOFETIL 500 MG/1
TABLET ORAL
Qty: 222 TABLET | Refills: 0 | Status: SHIPPED | OUTPATIENT
Start: 2024-12-10 | End: 2025-01-23

## 2024-12-17 ENCOUNTER — OFFICE VISIT (OUTPATIENT)
Dept: UROLOGY | Facility: CLINIC | Age: 64
End: 2024-12-17
Payer: MEDICARE

## 2024-12-17 VITALS — WEIGHT: 176.25 LBS | BODY MASS INDEX: 27.61 KG/M2

## 2024-12-17 DIAGNOSIS — R39.15 URINARY URGENCY: Primary | ICD-10-CM

## 2024-12-17 DIAGNOSIS — R35.0 URINARY FREQUENCY: ICD-10-CM

## 2024-12-17 DIAGNOSIS — N40.0 BPH WITHOUT URINARY OBSTRUCTION: ICD-10-CM

## 2024-12-17 PROCEDURE — 99999 PR PBB SHADOW E&M-EST. PATIENT-LVL III: CPT | Mod: PBBFAC,TXP,, | Performed by: UROLOGY

## 2024-12-17 PROCEDURE — 87086 URINE CULTURE/COLONY COUNT: CPT | Mod: TXP | Performed by: UROLOGY

## 2024-12-17 PROCEDURE — 99204 OFFICE O/P NEW MOD 45 MIN: CPT | Mod: S$PBB,NTX,, | Performed by: UROLOGY

## 2024-12-17 PROCEDURE — 99213 OFFICE O/P EST LOW 20 MIN: CPT | Mod: PBBFAC,TXP | Performed by: UROLOGY

## 2024-12-17 RX ORDER — OXYBUTYNIN CHLORIDE 5 MG/1
5 TABLET ORAL 3 TIMES DAILY
COMMUNITY
End: 2024-12-17 | Stop reason: ALTCHOICE

## 2024-12-17 RX ORDER — SOLIFENACIN SUCCINATE 10 MG/1
10 TABLET, FILM COATED ORAL DAILY
Qty: 30 TABLET | Refills: 11 | Status: SHIPPED | OUTPATIENT
Start: 2024-12-17 | End: 2025-12-17

## 2024-12-17 NOTE — PROGRESS NOTES
Subjective:       Patient ID: Darrell Corona is a 64 y.o. male who was referred by Radhika Ramirez MD    Chief Complaint:   Chief Complaint   Patient presents with    Urinary Incontinence    Initial Visit    Benign Prostatic Hypertrophy       Urinary Incontinence  Patient complains of urinary incontinence. This has been present for several months. He leaks urine with with urge. Patient describes the symptoms as urge to urinate with little or no warning. Factors associated with symptoms include none known. Evaluation to date includes none. Treatment to date includes oxybutinin, which was not very effective and alpha blocker, which was not very effective.    ACTIVE MEDICAL ISSUES:  Patient Active Problem List   Diagnosis    Coronary artery disease involving native coronary artery without angina pectoris    Hyperlipidemia LDL goal <70    Dyspnea on exertion    Restrictive lung disease    ILD (interstitial lung disease)    Acute hypoxemic respiratory failure    Melanoma differentiation-associated protein 5 (MDA-5) antibody positive    Antisynthetase syndrome    Hepatitis B core antibody positive    Immunosuppression due to drug therapy       PAST MEDICAL HISTORY  History reviewed. No pertinent past medical history.    PAST SURGICAL HISTORY:  History reviewed. No pertinent surgical history.    SOCIAL HISTORY:  Social History     Tobacco Use    Smoking status: Former     Current packs/day: 0.00     Types: Cigarettes     Quit date: 1991     Years since quittin.9    Smokeless tobacco: Never    Tobacco comments:     quit 30yrs ago   Substance Use Topics    Alcohol use: Yes     Alcohol/week: 2.0 standard drinks of alcohol     Types: 1 Glasses of wine, 1 Cans of beer per week     Comment: 1 beer and wine every day    Drug use: Never       FAMILY HISTORY:  No family history on file.    ALLERGIES AND MEDICATIONS: updated and reviewed.  Review of patient's allergies indicates:  No Known Allergies  Current Outpatient  Medications   Medication Sig    amLODIPine (NORVASC) 5 MG tablet Take 1 tablet (5 mg total) by mouth once daily.    pantoprazole (PROTONIX) 40 MG tablet Take 1 tablet (40 mg total) by mouth once daily.    predniSONE (DELTASONE) 5 MG tablet Take 3 tablets (15 mg total) by mouth once daily.    sulfamethoxazole-trimethoprim 400-80mg (BACTRIM,SEPTRA) 400-80 mg per tablet Take 1 tablet by mouth every Mon, Wed, Fri.    tamsulosin (FLOMAX) 0.4 mg Cap Take 1 capsule (0.4 mg total) by mouth once daily.    tenofovir alafenamide (VEMLIDY) 25 mg Tab Take 1 tablet (25 mg total) by mouth once daily.    mycophenolate (CELLCEPT) 500 mg Tab Take 1 tablet (500 mg total) by mouth 2 (two) times daily for 7 days, THEN 2 tablets (1,000 mg total) 2 (two) times daily for 7 days, THEN 3 tablets (1,500 mg total) 2 (two) times daily. (Patient not taking: Reported on 12/17/2024)    solifenacin (VESICARE) 10 MG tablet Take 1 tablet (10 mg total) by mouth once daily.     No current facility-administered medications for this visit.       Review of Systems   Constitutional:  Negative for chills and fever.   HENT:  Negative for congestion.    Respiratory:  Negative for chest tightness and shortness of breath.    Cardiovascular:  Negative for chest pain and palpitations.   Gastrointestinal:  Negative for abdominal pain, constipation, diarrhea, nausea and vomiting.   Genitourinary:  Positive for urgency. Negative for difficulty urinating, dysuria, flank pain and hematuria.   Musculoskeletal:  Negative for arthralgias.   Neurological:  Negative for dizziness.   Psychiatric/Behavioral:  Negative for confusion.        Objective:      Vitals:    12/17/24 1110   Weight: 79.9 kg (176 lb 4.1 oz)     Physical Exam  Vitals and nursing note reviewed.   Constitutional:       Appearance: He is well-developed.   HENT:      Head: Normocephalic.   Eyes:      Conjunctiva/sclera: Conjunctivae normal.   Neck:      Thyroid: No thyromegaly.      Trachea: No tracheal  deviation.   Cardiovascular:      Rate and Rhythm: Normal rate.      Heart sounds: Normal heart sounds.   Pulmonary:      Effort: Pulmonary effort is normal. No respiratory distress.      Breath sounds: Normal breath sounds. No wheezing.   Abdominal:      General: Bowel sounds are normal.      Palpations: Abdomen is soft.      Tenderness: There is no abdominal tenderness. There is no rebound.      Hernia: No hernia is present.   Genitourinary:     Comments: 40 gm smooth  Musculoskeletal:         General: No tenderness. Normal range of motion.      Cervical back: Normal range of motion and neck supple.   Lymphadenopathy:      Cervical: No cervical adenopathy.   Skin:     General: Skin is warm and dry.      Findings: No erythema or rash.   Neurological:      Mental Status: He is alert and oriented to person, place, and time.   Psychiatric:         Behavior: Behavior normal.         Thought Content: Thought content normal.         Judgment: Judgment normal.         Urine dipstick shows negative for all components.  Micro exam: negative for WBC's or RBC's.          Component Ref Range & Units 2 mo ago 1 yr ago   PSA, Screen 0.00 - 4.00 ng/mL 0.83 0.74 CM   Comment: The testing method is a chemiluminescent microparticle immunoassay  manufactured by [employer] and performed on the [employer]  or  [employer] system. Values obtained with different assay manufacturers  for  methods may be different and cannot be used interchangeably.  PSA Expected levels:  Hormonal Therapy: <0.05 ng/ml  Prostatectomy: <0.01 ng/ml  Radiation Therapy: <1.00 ng/ml   Resulting Agency  [facility]              Narrative  Performed by: [facility]  Send normal result to authorizing provider's In Basket if  patient is active on MyChart:->Yes   Specimen Collected: 10/17/24 11:03 CDT Last Resulted: 10/17/24 19:06 CDT     CT Abdomen Pelvis With IV Contrast Routine Oral Contrast  Order: [id number]  Status: Final result       Visible to patient: Yes  (seen)       Next appt: Today at 11:15 AM in Urology (Kobi Treadwell MD)    0 Result Notes  Details    Reading Physician Reading Date Result Priority   Gilberto Meade MD  [phone] 9/9/2024 Routine   Carmen Frank MD  [phone] 9/9/2024      Narrative & Impression  EXAMINATION:  CT ABDOMEN PELVIS WITH IV CONTRAST     CLINICAL HISTORY:  Malignancy rule out - weight loss, inflammation, progressive ILD;     TECHNIQUE:  Using 75 cc of  Omnipaque 350 IV contrast and 30 cc oral contrast, and multi-detector helical CT technique, axial CT images of the abdomen and pelvis were obtained. 2D post-processing coronal and sagittal reconstructions was performed.     COMPARISON:  Chest CT 08/30/2024     FINDINGS:  Lung base: Similar appearance of bilateral interstitial lung disease.  No pleural effusion.     Visualized portion of heart: Mild cardiomegaly.  Coronary artery calcification.  No pericardial effusion..     Liver: Normal in size.No focal hepatic abnormality.     Gallbladder: Calcified gallstones within the gallbladder and cystic duct with no additional evidence for acute cholecystitis.     Bile duct: No intra-or extrahepatic biliary ductal dilatation.     Spleen: No significant abnormality.     Pancreas: No significant abnormality.     Adrenal glands: No significant abnormality.     Genitourinary: The kidneys are normal in size and location. The ureters appear normal in course and caliber.  No evidence of nephrolithiasis or hydroureteronephrosis.  The urinary bladder is unremarkable.     Reproductive organs: Prostatomegaly.     GI tract: The stomach is unremarkable. The visualized loops of small and large bowel show no evidence of obstruction or inflammation.     Peritoneum: No evidence of ascites or intraperitoneal free air.     Lymph nodes: No significant adenopathy.     Vasculature: Normal caliber of abdominal aorta with mild to moderate atherosclerosis.     Abdominal wall: No significant abnormality.      Bones: Degenerative changes with mild to moderate lumbar spine vertebral body height loss.  No acute osseous abnormality.     Impression:     No definite evidence for abdominopelvic neoplasm.     Cholelithiasis     Prostatomegaly     Additional findings as above     Electronically signed by resident: Carmen Frank  Date:                                            09/09/2024  Time:                                           07:35     Electronically signed by:Gilberto Meade  Date:                                            09/09/2024  Time:                                           08:08        Exam Ended: 09/08/24 22:43 CDT       Assessment:       1. Urinary urgency    2. BPH without urinary obstruction    3. Urinary frequency          Plan:       1. Urinary urgency    - solifenacin (VESICARE) 10 MG tablet; Take 1 tablet (10 mg total) by mouth once daily.  Dispense: 30 tablet; Refill: 11    2. BPH without urinary obstruction  Stay on Flomax    - US Retroperitoneal Complete; Future    3. Urinary frequency              Follow up in about 6 weeks (around 1/28/2025) for Follow up Established, Review X-ray.

## 2024-12-19 ENCOUNTER — INFUSION (OUTPATIENT)
Dept: INFUSION THERAPY | Facility: HOSPITAL | Age: 64
End: 2024-12-19
Attending: INTERNAL MEDICINE
Payer: MEDICARE

## 2024-12-19 VITALS
BODY MASS INDEX: 27.21 KG/M2 | RESPIRATION RATE: 16 BRPM | SYSTOLIC BLOOD PRESSURE: 125 MMHG | HEART RATE: 81 BPM | TEMPERATURE: 98 F | WEIGHT: 173.75 LBS | OXYGEN SATURATION: 99 % | DIASTOLIC BLOOD PRESSURE: 76 MMHG

## 2024-12-19 DIAGNOSIS — J84.9 ILD (INTERSTITIAL LUNG DISEASE): Primary | ICD-10-CM

## 2024-12-19 LAB — BACTERIA UR CULT: NO GROWTH

## 2024-12-19 PROCEDURE — 96375 TX/PRO/DX INJ NEW DRUG ADDON: CPT

## 2024-12-19 PROCEDURE — 96415 CHEMO IV INFUSION ADDL HR: CPT

## 2024-12-19 PROCEDURE — 25000003 PHARM REV CODE 250: Performed by: INTERNAL MEDICINE

## 2024-12-19 PROCEDURE — 63600175 PHARM REV CODE 636 W HCPCS: Performed by: INTERNAL MEDICINE

## 2024-12-19 PROCEDURE — 96367 TX/PROPH/DG ADDL SEQ IV INF: CPT

## 2024-12-19 PROCEDURE — 63600175 PHARM REV CODE 636 W HCPCS

## 2024-12-19 PROCEDURE — 96413 CHEMO IV INFUSION 1 HR: CPT

## 2024-12-19 PROCEDURE — 96376 TX/PRO/DX INJ SAME DRUG ADON: CPT

## 2024-12-19 RX ORDER — ACETAMINOPHEN 325 MG/1
650 TABLET ORAL
Status: COMPLETED | OUTPATIENT
Start: 2024-12-19 | End: 2024-12-19

## 2024-12-19 RX ORDER — DIPHENHYDRAMINE HYDROCHLORIDE 50 MG/ML
50 INJECTION INTRAMUSCULAR; INTRAVENOUS ONCE AS NEEDED
OUTPATIENT
Start: 2025-01-02

## 2024-12-19 RX ORDER — EPINEPHRINE 0.3 MG/.3ML
0.3 INJECTION SUBCUTANEOUS ONCE AS NEEDED
OUTPATIENT
Start: 2025-01-02

## 2024-12-19 RX ORDER — MEPERIDINE HYDROCHLORIDE 25 MG/ML
25 INJECTION INTRAMUSCULAR; INTRAVENOUS; SUBCUTANEOUS
Status: DISCONTINUED | OUTPATIENT
Start: 2024-12-19 | End: 2024-12-19 | Stop reason: HOSPADM

## 2024-12-19 RX ORDER — FAMOTIDINE 10 MG/ML
20 INJECTION INTRAVENOUS
OUTPATIENT
Start: 2025-01-02

## 2024-12-19 RX ORDER — FAMOTIDINE 10 MG/ML
20 INJECTION INTRAVENOUS
Status: COMPLETED | OUTPATIENT
Start: 2024-12-19 | End: 2024-12-19

## 2024-12-19 RX ORDER — METHYLPREDNISOLONE SOD SUCC 125 MG
100 VIAL (EA) INJECTION
OUTPATIENT
Start: 2025-01-02

## 2024-12-19 RX ORDER — DIPHENHYDRAMINE HYDROCHLORIDE 50 MG/ML
50 INJECTION INTRAMUSCULAR; INTRAVENOUS ONCE AS NEEDED
Status: COMPLETED | OUTPATIENT
Start: 2024-12-19 | End: 2024-12-19

## 2024-12-19 RX ORDER — ACETAMINOPHEN 325 MG/1
650 TABLET ORAL
OUTPATIENT
Start: 2025-01-02

## 2024-12-19 RX ORDER — DIPHENHYDRAMINE HYDROCHLORIDE 50 MG/ML
INJECTION INTRAMUSCULAR; INTRAVENOUS
Status: COMPLETED
Start: 2024-12-19 | End: 2024-12-19

## 2024-12-19 RX ORDER — METHYLPREDNISOLONE SOD SUCC 125 MG
100 VIAL (EA) INJECTION
Status: COMPLETED | OUTPATIENT
Start: 2024-12-19 | End: 2024-12-19

## 2024-12-19 RX ORDER — HEPARIN 100 UNIT/ML
500 SYRINGE INTRAVENOUS
OUTPATIENT
Start: 2025-01-02

## 2024-12-19 RX ORDER — SODIUM CHLORIDE 0.9 % (FLUSH) 0.9 %
10 SYRINGE (ML) INJECTION
OUTPATIENT
Start: 2025-01-02

## 2024-12-19 RX ORDER — MEPERIDINE HYDROCHLORIDE 50 MG/ML
25 INJECTION INTRAMUSCULAR; INTRAVENOUS; SUBCUTANEOUS
OUTPATIENT
Start: 2025-01-02

## 2024-12-19 RX ADMIN — DIPHENHYDRAMINE HYDROCHLORIDE 50 MG: 50 INJECTION INTRAMUSCULAR; INTRAVENOUS at 09:12

## 2024-12-19 RX ADMIN — HYDROCORTISONE SODIUM SUCCINATE 100 MG: 100 INJECTION, POWDER, FOR SOLUTION INTRAMUSCULAR; INTRAVENOUS at 09:12

## 2024-12-19 RX ADMIN — PROMETHAZINE HYDROCHLORIDE 25 MG: 25 INJECTION INTRAMUSCULAR; INTRAVENOUS at 10:12

## 2024-12-19 RX ADMIN — DIPHENHYDRAMINE HYDROCHLORIDE 50 MG: 50 INJECTION INTRAMUSCULAR; INTRAVENOUS at 08:12

## 2024-12-19 RX ADMIN — FAMOTIDINE 20 MG: 10 INJECTION, SOLUTION INTRAVENOUS at 08:12

## 2024-12-19 RX ADMIN — SODIUM CHLORIDE 1000 MG: 9 INJECTION, SOLUTION INTRAVENOUS at 09:12

## 2024-12-19 RX ADMIN — METHYLPREDNISOLONE SODIUM SUCCINATE 100 MG: 125 INJECTION, POWDER, FOR SOLUTION INTRAMUSCULAR; INTRAVENOUS at 08:12

## 2024-12-19 RX ADMIN — ACETAMINOPHEN 650 MG: 325 TABLET ORAL at 08:12

## 2024-12-19 NOTE — NURSING
Nurse remained at chairside for 30 minutes following restart of Ruxience infusion. Vitals checked, remained stable, on RA. Pt still reporting resolution of symptoms. Ruxience increased to 100 mg/hr.

## 2024-12-19 NOTE — PLAN OF CARE
Patient presented to unit for initial Ruxience infusion. VSS. No complaints upon arrival. Premedications PO Tylenol, IVP Solu-Medrol and Pepcid, and IVPB Benadryl given. Ruxience initiated and titrating per orders. Patient ambulated to restroom, after return to infusion chair pt complained of SOB and generalized body aches. See previous nursing notes. Remainder of infusion administered without incidence. Vitals remained stable throughout visit and at the end of treatment. Next appointment confirmed. Patient ambulated off unit in NAD.      Problem: Fatigue  Goal: Improved Activity Tolerance  Outcome: Progressing

## 2024-12-19 NOTE — NURSING
Pt ambulated to restroom, after return to infusion chair pt complained of SOB and generalized body aches. Ruxience infusion stopped, 1 L NS opened to gravity. NC applied and vitals obtained, see flowsheets 1006. Pt then began with nausea/vomiting. Benadryl, Solu-Cortef, and Phenergan administered, see MAR. Nurse at chairside, monitoring ongoing.

## 2024-12-19 NOTE — NURSING
Vitals stable, on RA. Nausea/vomiting resolved. Pt reports resolution of symptoms. Ruxience infusion restarted at 50 mg/hr.

## 2024-12-26 ENCOUNTER — INFUSION (OUTPATIENT)
Dept: INFUSION THERAPY | Facility: HOSPITAL | Age: 64
End: 2024-12-26
Attending: INTERNAL MEDICINE
Payer: MEDICARE

## 2024-12-26 ENCOUNTER — PATIENT MESSAGE (OUTPATIENT)
Dept: RHEUMATOLOGY | Facility: CLINIC | Age: 64
End: 2024-12-26
Payer: COMMERCIAL

## 2024-12-26 VITALS
RESPIRATION RATE: 16 BRPM | SYSTOLIC BLOOD PRESSURE: 139 MMHG | HEART RATE: 66 BPM | TEMPERATURE: 98 F | WEIGHT: 174.19 LBS | OXYGEN SATURATION: 100 % | DIASTOLIC BLOOD PRESSURE: 73 MMHG | BODY MASS INDEX: 27.28 KG/M2

## 2024-12-26 DIAGNOSIS — J84.9 ILD (INTERSTITIAL LUNG DISEASE): Primary | ICD-10-CM

## 2024-12-26 DIAGNOSIS — R76.8 MELANOMA DIFFERENTIATION-ASSOCIATED PROTEIN 5 (MDA-5) ANTIBODY POSITIVE: ICD-10-CM

## 2024-12-26 PROCEDURE — 63600175 PHARM REV CODE 636 W HCPCS: Performed by: INTERNAL MEDICINE

## 2024-12-26 PROCEDURE — 25000003 PHARM REV CODE 250: Performed by: INTERNAL MEDICINE

## 2024-12-26 RX ORDER — SODIUM CHLORIDE 0.9 % (FLUSH) 0.9 %
10 SYRINGE (ML) INJECTION
Status: CANCELLED | OUTPATIENT
Start: 2024-12-26

## 2024-12-26 RX ORDER — FAMOTIDINE 10 MG/ML
20 INJECTION INTRAVENOUS
Status: CANCELLED | OUTPATIENT
Start: 2024-12-26

## 2024-12-26 RX ORDER — HEPARIN 100 UNIT/ML
500 SYRINGE INTRAVENOUS
Status: CANCELLED | OUTPATIENT
Start: 2024-12-26

## 2024-12-26 RX ORDER — ACETAMINOPHEN 325 MG/1
650 TABLET ORAL
Status: CANCELLED | OUTPATIENT
Start: 2024-12-26

## 2024-12-26 RX ORDER — DIPHENHYDRAMINE HYDROCHLORIDE 50 MG/ML
25 INJECTION INTRAMUSCULAR; INTRAVENOUS
Status: CANCELLED | OUTPATIENT
Start: 2024-12-26

## 2024-12-26 RX ORDER — FAMOTIDINE 10 MG/ML
20 INJECTION INTRAVENOUS
Status: COMPLETED | OUTPATIENT
Start: 2024-12-26 | End: 2024-12-26

## 2024-12-26 RX ORDER — DIPHENHYDRAMINE HYDROCHLORIDE 50 MG/ML
25 INJECTION INTRAMUSCULAR; INTRAVENOUS
Status: COMPLETED | OUTPATIENT
Start: 2024-12-26 | End: 2024-12-26

## 2024-12-26 RX ORDER — ACETAMINOPHEN 325 MG/1
650 TABLET ORAL
Status: COMPLETED | OUTPATIENT
Start: 2024-12-26 | End: 2024-12-26

## 2024-12-26 RX ADMIN — DIPHENHYDRAMINE HYDROCHLORIDE 25 MG: 50 INJECTION INTRAMUSCULAR; INTRAVENOUS at 09:12

## 2024-12-26 RX ADMIN — ACETAMINOPHEN 650 MG: 325 TABLET ORAL at 09:12

## 2024-12-26 RX ADMIN — FAMOTIDINE 20 MG: 10 INJECTION INTRAVENOUS at 09:12

## 2024-12-27 ENCOUNTER — INFUSION (OUTPATIENT)
Dept: INFUSION THERAPY | Facility: HOSPITAL | Age: 64
End: 2024-12-27
Attending: INTERNAL MEDICINE
Payer: MEDICARE

## 2024-12-27 VITALS
TEMPERATURE: 99 F | HEART RATE: 65 BPM | WEIGHT: 175.94 LBS | BODY MASS INDEX: 27.55 KG/M2 | SYSTOLIC BLOOD PRESSURE: 149 MMHG | RESPIRATION RATE: 16 BRPM | DIASTOLIC BLOOD PRESSURE: 77 MMHG | OXYGEN SATURATION: 99 %

## 2024-12-27 DIAGNOSIS — R76.8 MELANOMA DIFFERENTIATION-ASSOCIATED PROTEIN 5 (MDA-5) ANTIBODY POSITIVE: ICD-10-CM

## 2024-12-27 DIAGNOSIS — J84.9 ILD (INTERSTITIAL LUNG DISEASE): Primary | ICD-10-CM

## 2024-12-27 PROCEDURE — 96365 THER/PROPH/DIAG IV INF INIT: CPT

## 2024-12-27 PROCEDURE — 63600175 PHARM REV CODE 636 W HCPCS: Performed by: INTERNAL MEDICINE

## 2024-12-27 PROCEDURE — 96375 TX/PRO/DX INJ NEW DRUG ADDON: CPT

## 2024-12-27 PROCEDURE — 96366 THER/PROPH/DIAG IV INF ADDON: CPT

## 2024-12-27 PROCEDURE — 25000003 PHARM REV CODE 250: Performed by: INTERNAL MEDICINE

## 2024-12-27 RX ORDER — DIPHENHYDRAMINE HYDROCHLORIDE 50 MG/ML
25 INJECTION INTRAMUSCULAR; INTRAVENOUS
Status: COMPLETED | OUTPATIENT
Start: 2024-12-27 | End: 2024-12-27

## 2024-12-27 RX ORDER — ACETAMINOPHEN 325 MG/1
650 TABLET ORAL
OUTPATIENT
Start: 2024-12-27

## 2024-12-27 RX ORDER — ACETAMINOPHEN 325 MG/1
650 TABLET ORAL
Status: COMPLETED | OUTPATIENT
Start: 2024-12-27 | End: 2024-12-27

## 2024-12-27 RX ORDER — SODIUM CHLORIDE 0.9 % (FLUSH) 0.9 %
10 SYRINGE (ML) INJECTION
OUTPATIENT
Start: 2024-12-27

## 2024-12-27 RX ORDER — DIPHENHYDRAMINE HYDROCHLORIDE 50 MG/ML
25 INJECTION INTRAMUSCULAR; INTRAVENOUS
OUTPATIENT
Start: 2024-12-27

## 2024-12-27 RX ORDER — HEPARIN 100 UNIT/ML
500 SYRINGE INTRAVENOUS
OUTPATIENT
Start: 2024-12-27

## 2024-12-27 RX ORDER — FAMOTIDINE 10 MG/ML
20 INJECTION INTRAVENOUS
Status: COMPLETED | OUTPATIENT
Start: 2024-12-27 | End: 2024-12-27

## 2024-12-27 RX ORDER — FAMOTIDINE 10 MG/ML
20 INJECTION INTRAVENOUS
OUTPATIENT
Start: 2024-12-27

## 2024-12-27 RX ADMIN — ACETAMINOPHEN 650 MG: 325 TABLET ORAL at 09:12

## 2024-12-27 RX ADMIN — DIPHENHYDRAMINE HYDROCHLORIDE 25 MG: 50 INJECTION INTRAMUSCULAR; INTRAVENOUS at 09:12

## 2024-12-27 RX ADMIN — FAMOTIDINE 20 MG: 10 INJECTION, SOLUTION INTRAVENOUS at 09:12

## 2024-12-27 NOTE — PLAN OF CARE
Patient tolerated day 2 privigen well. VSS throughout visit. No adverse reactions noted. He denies any new or worsening symptoms at this time. Appointment schedule and AVS provided at discharge. Patient ambulated unassisted and unaccompanied off unit. In NAD at time of dc.

## 2024-12-27 NOTE — PLAN OF CARE
Ambulated to Infusion Unit independently with steady gait. AA&Ox4. Tolerated titration of Privigen well with no side effects or adverse rx's noted. Pt uses portal for appt schedule.

## 2025-01-02 ENCOUNTER — LAB VISIT (OUTPATIENT)
Dept: LAB | Facility: HOSPITAL | Age: 65
End: 2025-01-02
Payer: MEDICARE

## 2025-01-02 ENCOUNTER — TELEPHONE (OUTPATIENT)
Dept: TRANSPLANT | Facility: CLINIC | Age: 65
End: 2025-01-02
Payer: MEDICARE

## 2025-01-02 ENCOUNTER — OFFICE VISIT (OUTPATIENT)
Dept: RHEUMATOLOGY | Facility: CLINIC | Age: 65
End: 2025-01-02
Payer: MEDICARE

## 2025-01-02 VITALS
WEIGHT: 174.19 LBS | DIASTOLIC BLOOD PRESSURE: 76 MMHG | HEART RATE: 65 BPM | SYSTOLIC BLOOD PRESSURE: 142 MMHG | BODY MASS INDEX: 27.28 KG/M2

## 2025-01-02 DIAGNOSIS — J84.9 ILD (INTERSTITIAL LUNG DISEASE): ICD-10-CM

## 2025-01-02 DIAGNOSIS — I73.01 RAYNAUD'S DISEASE WITH GANGRENE: ICD-10-CM

## 2025-01-02 DIAGNOSIS — J84.9 INTERSTITIAL PULMONARY DISEASE, UNSPECIFIED: Primary | ICD-10-CM

## 2025-01-02 DIAGNOSIS — R76.8 HEPATITIS B CORE ANTIBODY POSITIVE: ICD-10-CM

## 2025-01-02 DIAGNOSIS — M33.91 DERMATOMYOSITIS WITH RESPIRATORY INVOLVEMENT: ICD-10-CM

## 2025-01-02 DIAGNOSIS — R76.8 MDA5 ANTIBODY POSITIVE: ICD-10-CM

## 2025-01-02 DIAGNOSIS — H53.8 BLURRY VISION, BILATERAL: ICD-10-CM

## 2025-01-02 DIAGNOSIS — R76.8 MDA5 ANTIBODY POSITIVE: Primary | ICD-10-CM

## 2025-01-02 LAB
ALBUMIN SERPL BCP-MCNC: 3.4 G/DL (ref 3.5–5.2)
ALP SERPL-CCNC: 50 U/L (ref 40–150)
ALT SERPL W/O P-5'-P-CCNC: 19 U/L (ref 10–44)
ANION GAP SERPL CALC-SCNC: 7 MMOL/L (ref 8–16)
AST SERPL-CCNC: 24 U/L (ref 10–40)
BASOPHILS # BLD AUTO: 0.01 K/UL (ref 0–0.2)
BASOPHILS NFR BLD: 0.2 % (ref 0–1.9)
BILIRUB SERPL-MCNC: 0.3 MG/DL (ref 0.1–1)
BUN SERPL-MCNC: 13 MG/DL (ref 8–23)
CALCIUM SERPL-MCNC: 9.6 MG/DL (ref 8.7–10.5)
CHLORIDE SERPL-SCNC: 105 MMOL/L (ref 95–110)
CO2 SERPL-SCNC: 27 MMOL/L (ref 23–29)
CREAT SERPL-MCNC: 1.2 MG/DL (ref 0.5–1.4)
CRP SERPL-MCNC: 1 MG/L (ref 0–8.2)
DIFFERENTIAL METHOD BLD: ABNORMAL
EOSINOPHIL # BLD AUTO: 0 K/UL (ref 0–0.5)
EOSINOPHIL NFR BLD: 0.4 % (ref 0–8)
ERYTHROCYTE [DISTWIDTH] IN BLOOD BY AUTOMATED COUNT: 14.7 % (ref 11.5–14.5)
ERYTHROCYTE [SEDIMENTATION RATE] IN BLOOD BY PHOTOMETRIC METHOD: 8 MM/HR (ref 0–23)
EST. GFR  (NO RACE VARIABLE): >60 ML/MIN/1.73 M^2
GLUCOSE SERPL-MCNC: 118 MG/DL (ref 70–110)
HCT VFR BLD AUTO: 48.8 % (ref 40–54)
HGB BLD-MCNC: 16 G/DL (ref 14–18)
IMM GRANULOCYTES # BLD AUTO: 0.02 K/UL (ref 0–0.04)
IMM GRANULOCYTES NFR BLD AUTO: 0.4 % (ref 0–0.5)
LYMPHOCYTES # BLD AUTO: 0.8 K/UL (ref 1–4.8)
LYMPHOCYTES NFR BLD: 15.8 % (ref 18–48)
MCH RBC QN AUTO: 31.9 PG (ref 27–31)
MCHC RBC AUTO-ENTMCNC: 32.8 G/DL (ref 32–36)
MCV RBC AUTO: 97 FL (ref 82–98)
MONOCYTES # BLD AUTO: 0.5 K/UL (ref 0.3–1)
MONOCYTES NFR BLD: 9.7 % (ref 4–15)
NEUTROPHILS # BLD AUTO: 3.6 K/UL (ref 1.8–7.7)
NEUTROPHILS NFR BLD: 73.5 % (ref 38–73)
NRBC BLD-RTO: 0 /100 WBC
PLATELET # BLD AUTO: 201 K/UL (ref 150–450)
PMV BLD AUTO: 9.5 FL (ref 9.2–12.9)
POTASSIUM SERPL-SCNC: 5.1 MMOL/L (ref 3.5–5.1)
PROT SERPL-MCNC: 9.2 G/DL (ref 6–8.4)
RBC # BLD AUTO: 5.02 M/UL (ref 4.6–6.2)
SODIUM SERPL-SCNC: 139 MMOL/L (ref 136–145)
WBC # BLD AUTO: 4.93 K/UL (ref 3.9–12.7)

## 2025-01-02 PROCEDURE — 85652 RBC SED RATE AUTOMATED: CPT | Mod: TXP | Performed by: STUDENT IN AN ORGANIZED HEALTH CARE EDUCATION/TRAINING PROGRAM

## 2025-01-02 PROCEDURE — 85025 COMPLETE CBC W/AUTO DIFF WBC: CPT | Mod: TXP | Performed by: STUDENT IN AN ORGANIZED HEALTH CARE EDUCATION/TRAINING PROGRAM

## 2025-01-02 PROCEDURE — 80053 COMPREHEN METABOLIC PANEL: CPT | Mod: TXP | Performed by: STUDENT IN AN ORGANIZED HEALTH CARE EDUCATION/TRAINING PROGRAM

## 2025-01-02 PROCEDURE — 36415 COLL VENOUS BLD VENIPUNCTURE: CPT | Mod: TXP | Performed by: STUDENT IN AN ORGANIZED HEALTH CARE EDUCATION/TRAINING PROGRAM

## 2025-01-02 PROCEDURE — 86140 C-REACTIVE PROTEIN: CPT | Mod: TXP | Performed by: STUDENT IN AN ORGANIZED HEALTH CARE EDUCATION/TRAINING PROGRAM

## 2025-01-02 PROCEDURE — 99999 PR PBB SHADOW E&M-EST. PATIENT-LVL IV: CPT | Mod: PBBFAC,GC,TXP, | Performed by: STUDENT IN AN ORGANIZED HEALTH CARE EDUCATION/TRAINING PROGRAM

## 2025-01-02 NOTE — PATIENT INSTRUCTIONS
Prednisone 12.5 mg daily x 2 weeks THEN Prednisone 10 mg daily x 2 weeks THEN remain on Prednisone 7.5 mg daily until follow up

## 2025-01-02 NOTE — TELEPHONE ENCOUNTER
Left message for patient requesting return call to schedule follow up with Dr. Reina. Note patient is having testing (PFTs, 6MNWT) on 1/3/25 per Dr. Trevino. Patient is to have CT Chest with ILD protocol when RTC to see Dr. Reina.  Request to .  ----- Message from Mary Trevino sent at 1/2/2025  4:17 PM CST -----  Critical access hospital. Patient is due for follow up visit with Dr. Reina this month, are you able to assist w/ scheduling please?  - Dr. Trevino

## 2025-01-02 NOTE — PROGRESS NOTES
I have personally reviewed the history, confirmed exam findings, and discussed assessment and plan with fellow.    Feels well, no cough, SOB. Walking 2.5 miles daily now as before  Feels muscle strength nearly back to normal      Muscle strength:  deltoids, biceps, triceps , psoas  4.8/5 bilateral  rest 5/5    Chest: bibasilar crackles 1/3 way up  Hands much improved      ASSESSMENT:  Rapidly progressive MDA-5 + ILD  initial  Myomarker MDA-5 negative,  but OMRF positive, just returned  fine basilar crackles 1/2 way up bilateral,  PFTs significantly improved.   Hand tremor with tacrolimus started 9/27/24 even on just 2 mg twice daily so will not be able to continue long term  stopped 11/21/24  S/p pulse Solu-Medrol 1 g IV x 4 days in H then  prednisone 60mg daily  now tapered to 15mg daily   S/p IVIg x 5 days in [facility]  S/p cyclophosphamide 750mg/m2 9/12/24, 1000mg/m2 10/10/24 and 11/7/24  Bactrim prophylaxis and PPI  As he still needs triple therapy, and  will need to stop tacrolimus b/o hand tremor, and not a candidate for Toya given CAD, stent, age, best option would be to stop cyclophosphamide after his 3 monthly infusions, and change to rituximab 1000mg IV  wk 0 and 2, and MMF starting with 500mg twice daily x 1 wk, then 1000mg twice daily x 1wk, then 1500mg twice daily all starting on or about 12/7/24  S/p rituximab 1000mg IV 12/19/24     Raynaud's progressive since 2014  's Hands v. MDA 5 palmar lesions resolved  Subtle  nailfold capillary dropout and minimal capillary dilatation left ring and little finger  PRESTON+ 1:640 speckled neg profile   Proximal muscle weakness  4.8/5 deltoids, biceps  triceps  psoas bilat others 5/5        PLAN:      CBC, CMP, ESR, CRP, CK,  aldolase, LD today   PFTs  F/u Dr. Reina in [facility] this month   Schedule rituximab 1000mg IV  wk 2 dose.   Increase  MMF 1500mg po  twice daily    CBC in 2 wks,  CBC, CMP in 4 wks and then q 4 wks x 3  Decrease  prednisone 12.5 mg po daily x  2 wks, then 10mg daily x  2wkd, then 7.5mg daily and continue   Continue  IVIg 1g/kg daily x 2 days q 4 wks.   Cont Ever CHARLES M-W-F

## 2025-01-02 NOTE — PROGRESS NOTES
Subjective:      Patient ID: Darrell Corona is a 64 y.o. male.    Chief Complaint: MDA-5+ myositis and ILD     HPI: 64-year-old man with PMHx of CAD, HTN and rapidly progressive MDA-5+ myositis and ILD presents to clinic for follow up. Overall, doing well. He thinks his strength is almost at baseline. Today, states everything feels normal. Back to exercising - walk 2.5 miles everyday. States tremors have improved since stopping the tacrolimus. Currently, on MMF 1000 mg BID. Had RTX on 12/19.  During infusion, pt ambulated to restroom, after return to infusion chair pt complained of SOB and generalized body aches. Ruxience infusion stopped, 1 L NS opened to gravity. NC applied + Benadryl, Solu-Cortef, and Phenergan administered.    Saw Dr. Reina in Oct. Due for visit this month with PFTs, 6MWT, and CT chest ILD protocol.        Initial Presentation:   Patient states he felt well until June, when he went on a trip to [place]. On that trip, he noticed shortness of breath on exertion. He felt winded after walking up a few flights of stairs. Prior to that trip, he could walk up to 5 miles a day and climb 10-15 flights of stairs without difficulty. On his return to [place], he saw his PCP who noted abnormal breath sounds and referred him to pulmonary. He was seen by Dr. Watts and was noted to have pulmonary fibrosis on his CT chest. He was started on high dose PO steroids, but patient notes no improvement in his breathing. He then was seen by Dr. Goyal at [facility]. Patient's daughter wanted to consider trials for investigational meds, so patient was referred to Dr. Scott (appt pending).     He was then seen by Dr. Reina with [facility] on 8/29. She noted a significant drop in his FVC (24% drop) and DLCO (29%) in a 1-month period. She was concerned for a rapidly progressive ILD. Plan at that time was repeat CT chest, continue steroids, start Ovef given progression, and follow up in 2 weeks with PFTs and 6MWT.       He was referred by Dr. Watts (Pulmonary) for positive PRESTON and restrictive lung disease and seen in our office on 9/5. During that visit, he reported that his breathing continues to worsen. He can only walk a block before he started to feel short of breath. Denied history of lung disease or lung issues in the past. He noted worsening discoloration and thickening of both hands in past couple months. History of Raynaud's since 2014, at that time his left index finger would turn white. Four years later he noticed fingers of both hands would turn white. Four years after that the fingers turned purple. Then two to three months ago, he started to notice thickening, cracking, and fissures of his fingers.     There was concern for rapidly progressive ILD. He was sent to the ED for IV steroids and immunotherapy. During the admission, he received IV solumedrol 1gm x4 doses, 5 days of IVIG 0.4g/kg (9/6-9/10) and received CYC received on 9/1.    Review of Systems   Constitutional:  Negative for fever and unexpected weight change.   HENT:  Negative for mouth sores and trouble swallowing.    Eyes:  Negative for redness.   Respiratory:  Negative for cough and shortness of breath.    Cardiovascular:  Negative for chest pain.   Gastrointestinal:  Negative for abdominal pain, blood in stool, constipation and diarrhea.   Genitourinary:  Positive for urgency. Negative for genital sores.   Musculoskeletal:  Negative for arthralgias, joint swelling and myalgias.   Skin:  Negative for rash.   Neurological:  Negative for tremors, weakness and headaches.   Hematological:  Does not bruise/bleed easily.        Objective:   There were no vitals taken for this visit.  Physical Exam   Constitutional: He is oriented to person, place, and time. normal appearance. No distress.   HENT:   Head: Normocephalic and atraumatic.   Right Ear: External ear normal.   Left Ear: External ear normal.   Nose: Nose normal.   Mouth/Throat: Mucous membranes are  moist. No oropharyngeal exudate or posterior oropharyngeal erythema.   Eyes: Pupils are equal, round, and reactive to light. Conjunctivae are normal. Right eye exhibits no discharge. Left eye exhibits no discharge. No scleral icterus.   Cardiovascular: Normal rate, regular rhythm and normal pulses.   No murmur heard.  Pulmonary/Chest: Effort normal. No respiratory distress. He has no wheezes.   Abdominal: Bowel sounds are normal. He exhibits no distension. There is no abdominal tenderness.   Musculoskeletal:         General: No swelling.      Cervical back: Normal range of motion.      Right lower leg: No edema.      Left lower leg: No edema.   Neurological: He is oriented to person, place, and time.   Skin:   skin thickening (improved from prior visit) - images attached       Right Side Rheumatological Exam     Muscle Strength (0-5 scale):  Deltoid:  5  Biceps: 4.8/5   Triceps:  4.8  : 5/5   Iliopsoas: 4.8  Quadriceps:  5   Distal Lower Extremity: 5    Left Side Rheumatological Exam     Muscle Strength (0-5 scale):  Deltoid:  5  Biceps: 4.8/5   Triceps:  4.8  :  5/5   Iliopsoas: 4.8  Quadriceps:  5   Distal Lower Extremity: 5        No data to display               Assessment:   64-year-old man with PMHx of HTN, Raynaud's (since 2014), MDA-5 positive myositis w/ associated ILD presents to clinic for follow up.   - PRESTON 1:640 speckled. Negative labwork: JASEN, ANCA, lupus anticoagulant, anti-cardiolipin, CCP/RF, GBM, Scl-70. Myomarker panel: + SSA, +MDA-5  S/p pulse Solu-Medrol 1 g IV x 4 days   S/p IVIG 0.4g/kg x 5 days (9/6-9/10 - inpatient). Then IVIG 1 gm/kg x 2 days q 4 wks: 10/2-10/3, then 10/30-10/31  S/p cyclophosphamide 750mg/m2 9/12/24. Then 1gm/m2 on 10/10 and 11/7/24  Tacrolimus d/c due to hand tremors     UTD on vaccines:  Got COVID 11/21/24  RSV 11/22/2024  Shingle 11/21/2024  Flu 9/2024    1. MDA5 antibody positive    2. Dermatomyositis with respiratory involvement    3. ILD (interstitial lung  disease)    4. Raynaud's disease with gangrene    5. Hepatitis B core antibody positive          Plan:   - Today: CBC, CMP, ESR, CRP. Then CBC in 2 weeks. Then monthly CBC + CMP monthly x3  - Received IV RTX 1000 mg on 12/19, 2nd dose dose. Pt cancelled appt scheduled today, 1/2. I contacted infusion center to rescheduled  - Prednisone 12.5 mg daily x 2 weeks THEN Prednisone 10 mg daily x 2 weeks THEN remain on Prednisone 7.5 mg daily until follow up  - MMF 1000 mg BID for 1 week then 1500 mg BID              - Not candidate for Toya given CAD/stent history  - Continue IVIG 1 gm/kg x 2 days every 4 weeks; last administered 12/26-27  - Continue Bactrim 3x/week and PPI  - Follow up with Pulmonary, Dr. Reina - sent message to Pulm office   - PFTs and 6MWT    This patient was examined with Dr. Barcenas. Plan discussed with the patient. Return to clinic in 2 months    Problem List Items Addressed This Visit          Pulmonary    ILD (interstitial lung disease)       GI    Hepatitis B core antibody positive     Other Visit Diagnoses       MDA5 antibody positive    -  Primary    Dermatomyositis with respiratory involvement        Raynaud's disease with gangrene              Mray Trevino MD  PGY-4, Rheumatology Fellow

## 2025-01-03 ENCOUNTER — HOSPITAL ENCOUNTER (OUTPATIENT)
Dept: PULMONOLOGY | Facility: CLINIC | Age: 65
Discharge: HOME | End: 2025-01-03
Payer: MEDICARE

## 2025-01-03 ENCOUNTER — PATIENT MESSAGE (OUTPATIENT)
Dept: HEPATOLOGY | Facility: CLINIC | Age: 65
End: 2025-01-03
Payer: MEDICARE

## 2025-01-03 ENCOUNTER — TELEPHONE (OUTPATIENT)
Dept: TRANSPLANT | Facility: CLINIC | Age: 65
End: 2025-01-03
Payer: MEDICARE

## 2025-01-03 ENCOUNTER — PATIENT MESSAGE (OUTPATIENT)
Dept: OPHTHALMOLOGY | Facility: CLINIC | Age: 65
End: 2025-01-03
Payer: MEDICARE

## 2025-01-03 VITALS — HEIGHT: 67 IN | BODY MASS INDEX: 27.62 KG/M2 | WEIGHT: 176 LBS

## 2025-01-03 DIAGNOSIS — R76.8 MDA5 ANTIBODY POSITIVE: ICD-10-CM

## 2025-01-03 DIAGNOSIS — J84.9 ILD (INTERSTITIAL LUNG DISEASE): ICD-10-CM

## 2025-01-03 DIAGNOSIS — R76.8 HEPATITIS B CORE ANTIBODY POSITIVE: ICD-10-CM

## 2025-01-03 LAB
DLCO ADJ PRE: 13.33 ML/(MIN*MMHG) (ref 18.71–32.57)
DLCO SINGLE BREATH LLN: 18.71
DLCO SINGLE BREATH PRE REF: 53.9 %
DLCO SINGLE BREATH REF: 25.64
DLCOC SBVA LLN: 2.66
DLCOC SBVA PRE REF: 83.6 %
DLCOC SBVA REF: 3.93
DLCOC SINGLE BREATH LLN: 18.71
DLCOC SINGLE BREATH PRE REF: 52 %
DLCOC SINGLE BREATH REF: 25.64
DLCOCSBVAULN: 5.21
DLCOCSINGLEBREATHULN: 32.57
DLCOCSINGLEBREATHZSCORE: -2.92
DLCOSINGLEBREATHULN: 32.57
DLCOSINGLEBREATHZSCORE: -2.81
DLCOVA LLN: 2.66
DLCOVA PRE REF: 86.8 %
DLCOVA PRE: 3.41 ML/(MIN*MMHG*L) (ref 2.66–5.21)
DLCOVA REF: 3.93
DLCOVAULN: 5.21
DLVAADJ PRE: 3.29 ML/(MIN*MMHG*L) (ref 2.66–5.21)
ERV LLN: -16448.95
ERV PRE REF: 62 %
ERV REF: 1.05
ERVULN: ABNORMAL
FEF 25 75 LLN: 1.5
FEF 25 75 PRE REF: 49.8 %
FEF 25 75 REF: 3.21
FET100 CHG: -7.2 %
FEV05 LLN: 1.31
FEV05 REF: 2.45
FEV1 CHG: 4.7 %
FEV1 FVC LLN: 68
FEV1 FVC PRE REF: 94.8 %
FEV1 FVC REF: 79
FEV1 LLN: 2.04
FEV1 PRE REF: 75.5 %
FEV1 REF: 2.8
FEV1 VOL CHG: 0.1
FEV1FVCZSCORE: -0.62
FEV1ZSCORE: -1.48
FRCPLETH LLN: 2.49
FRCPLETH PREREF: 63.9 %
FRCPLETH REF: 3.48
FRCPLETHULN: 4.46
FVC CHG: 0.9 %
FVC LLN: 2.62
FVC PRE REF: 79.6 %
FVC REF: 3.53
FVC VOL CHG: 0.03
FVCZSCORE: -1.3
IVC PRE: 2.9 L (ref 2.62–4.45)
IVC SINGLE BREATH LLN: 2.62
IVC SINGLE BREATH PRE REF: 82.1 %
IVC SINGLE BREATH REF: 3.53
IVCSINGLEBREATHULN: 4.45
LLN IC: -9999997.14
PEF LLN: 5.81
PEF PRE REF: 99 %
PEF REF: 7.8
PHYSICIAN COMMENT: ABNORMAL
POST FEF 25 75: 1.89 L/S (ref 1.5–4.92)
POST FET 100: 5.98 SEC
POST FEV1 FVC: 78.03 % (ref 67.97–89.53)
POST FEV1: 2.21 L (ref 2.04–3.52)
POST FEV5: 1.77 L (ref 1.31–3.58)
POST FVC: 2.84 L (ref 2.62–4.45)
POST PEF: 7.22 L/S (ref 5.81–9.79)
PRE DLCO: 13.83 ML/(MIN*MMHG) (ref 18.71–32.57)
PRE ERV: 0.65 L (ref -16448.95–16451.05)
PRE FEF 25 75: 1.6 L/S (ref 1.5–4.92)
PRE FET 100: 6.44 SEC
PRE FEV05 REF: 67.4 %
PRE FEV1 FVC: 75.24 % (ref 67.97–89.53)
PRE FEV1: 2.11 L (ref 2.04–3.52)
PRE FEV5: 1.65 L (ref 1.31–3.58)
PRE FRC PL: 2.22 L (ref 2.49–4.46)
PRE FVC: 2.81 L (ref 2.62–4.45)
PRE IC: 2.25 L (ref -9999997.14–#######.####)
PRE PEF: 7.73 L/S (ref 5.81–9.79)
PRE REF IC: 78.5 %
PRE RV: 1.57 L (ref 1.75–3.1)
PRE TLC: 4.47 L (ref 5.37–7.67)
RAW PRE REF: 128.4 %
RAW PRE: 3.93 CMH2O*S/L (ref 3.06–3.06)
RAW REF: 3.06
REF IC: 2.86
RV LLN: 1.75
RV PRE REF: 64.7 %
RV REF: 2.43
RVTLC LLN: 30
RVTLC PRE REF: 89.5 %
RVTLC PRE: 35.18 % (ref 30.33–48.29)
RVTLC REF: 39
RVTLCULN: 48
RVULN: 3.1
SGAW PRE REF: 101.8 %
SGAW PRE: 0.08 1/(CMH2O*S) (ref 0.08–0.08)
SGAW REF: 0.08
TLC LLN: 5.37
TLC PRE REF: 68.6 %
TLC REF: 6.52
TLC ULN: 7.67
TLCZSCORE: -2.92
ULN IC: ABNORMAL
VA PRE: 4.05 L (ref 6.37–6.37)
VA SINGLE BREATH LLN: 6.37
VA SINGLE BREATH PRE REF: 63.6 %
VA SINGLE BREATH REF: 6.37
VASINGLEBREATHULN: 6.37
VC LLN: 2.62
VC PRE REF: 82.1 %
VC PRE: 2.9 L (ref 2.62–4.45)
VC REF: 3.53
VC ULN: 4.45

## 2025-01-03 PROCEDURE — 94060 EVALUATION OF WHEEZING: CPT | Mod: 59,NTX,S$GLB, | Performed by: INTERNAL MEDICINE

## 2025-01-03 PROCEDURE — 94729 DIFFUSING CAPACITY: CPT | Mod: NTX,S$GLB,, | Performed by: INTERNAL MEDICINE

## 2025-01-03 PROCEDURE — 94726 PLETHYSMOGRAPHY LUNG VOLUMES: CPT | Mod: NTX,S$GLB,, | Performed by: INTERNAL MEDICINE

## 2025-01-03 PROCEDURE — 94618 PULMONARY STRESS TESTING: CPT | Mod: NTX,S$GLB,, | Performed by: INTERNAL MEDICINE

## 2025-01-06 NOTE — PROCEDURES
Darrell Corona is a 64 y.o.   male patient, who presents for a 6 minute walk test ordered by MD Uriel.  The diagnosis is Qualify for Oxygen.  The patient's BMI is 27.6 kg/m2.  Predicted distance (lower limit of normal) is 381.06 meters.      Test Results:    The test was completed without stopping.   The total time walked was 360 seconds.  During walking, the patient reported:  No complaints. The patient used no assistive devices  during testing.     01/06/2025---------Distance: 476.1 meters (1562 feet)     O2 Sat % Supplemental Oxygen Heart Rate Blood Pressure Alexa Scale   Pre-exercise  (Resting) 98 % Room Air 64 bpm 153/87 mmHg 2   During Exercise 98 % Room Air 81 bpm 176/89 mmHg 1   Post-exercise  (Recovery) 98 % Room Air  79 bpm   mmHg       Recovery Time: 53 seconds    Performing nurse/tech: Ismael DANIELS      PREVIOUS STUDY:   The patient had a previous study.  10/08/2024---------Distance: 426.72 meters (1400 feet)       O2 Sat % Supplemental Oxygen Heart Rate Blood Pressure Alexa Scale   Pre-exercise  (Resting) 96 % Room Air 76 bpm 163/78 mmHg 3   During Exercise 93 % Room Air 91 bpm 160/83 mmHg 4   Post-exercise  (Recovery) 96 % Room Air  82 bpm            CLINICAL INTERPRETATION:  Six minute walk distance is 476.1 meters (1562 feet) with very, very light dyspnea.  During exercise, there was no significant desaturation while breathing room air.  Both blood pressure and heart rate remained stable with walking.  The patient did not report non-pulmonary symptoms during exercise.  Since the previous study in 10/2024, exercise capacity is unchanged.  Based upon age and body mass index, exercise capacity is normal.

## 2025-01-07 ENCOUNTER — TELEPHONE (OUTPATIENT)
Dept: TRANSPLANT | Facility: CLINIC | Age: 65
End: 2025-01-07
Payer: MEDICARE

## 2025-01-08 ENCOUNTER — HOSPITAL ENCOUNTER (OUTPATIENT)
Dept: RADIOLOGY | Facility: HOSPITAL | Age: 65
Discharge: HOME | End: 2025-01-08
Attending: INTERNAL MEDICINE
Payer: MEDICARE

## 2025-01-08 ENCOUNTER — OFFICE VISIT (OUTPATIENT)
Dept: TRANSPLANT | Facility: CLINIC | Age: 65
End: 2025-01-08
Attending: INTERNAL MEDICINE
Payer: MEDICARE

## 2025-01-08 VITALS
HEIGHT: 67 IN | RESPIRATION RATE: 16 BRPM | HEART RATE: 66 BPM | DIASTOLIC BLOOD PRESSURE: 75 MMHG | WEIGHT: 170 LBS | BODY MASS INDEX: 26.68 KG/M2 | OXYGEN SATURATION: 98 % | TEMPERATURE: 98 F | SYSTOLIC BLOOD PRESSURE: 144 MMHG

## 2025-01-08 DIAGNOSIS — J84.9 ILD (INTERSTITIAL LUNG DISEASE): Primary | ICD-10-CM

## 2025-01-08 DIAGNOSIS — J84.9 INTERSTITIAL PULMONARY DISEASE, UNSPECIFIED: ICD-10-CM

## 2025-01-08 PROCEDURE — 99999 PR PBB SHADOW E&M-EST. PATIENT-LVL III: CPT | Mod: PBBFAC,TXP,, | Performed by: PHYSICIAN ASSISTANT

## 2025-01-08 PROCEDURE — 71250 CT THORAX DX C-: CPT | Mod: TC,TXP

## 2025-01-08 NOTE — LETTER
January 8, 2025                      [facility]  [address]  [facility] [address]  Phone: [phone]   Patient: Darrell Corona   MR Number: [id number]   YOB: 1960   Date of Visit: 1/8/2025       Dear       Thank you for referring Darrell Corona to me for evaluation. Attached you will find relevant portions of my assessment and plan of care.    If you have questions, please do not hesitate to call me. I look forward to following Darrell Almazano along with you.    Sincerely,    Rosemarie White PA-C    Enclosure    If you would like to receive this communication electronically, please contact [email or web address] or [phone] to request [employer] access.    [employer] is a tool which provides read-only access to select patient information with whom you have a relationship. Its easy to use and provides real time access to review your patients record including encounter summaries, notes, results, and demographic information.    If you feel you have received this communication in error or would no longer like to receive these types of communications, please e-mail [email or web address]

## 2025-01-08 NOTE — PROGRESS NOTES
ADVANCED LUNG DISEASE FOLLOW UP VISIT                                                                                                                                            Reason for Visit:  ILD    Referring Physician: Carleen Reina, *    History of Present Illness: Darrell Corona is a 64 y.o. male who is on 0L of oxygen.  He is on no assisted ventilation.  His New York Heart Association Class is III and a Karnofsky score of 70% - Cares for self: Unable to carry on normal activity or active work. He is not diabetic.    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 Continues to improve from a respiratory standpoint. He has been transitioned from cytoxan and tacrolimus to rituximab and MMF. Currently on MMF 1500 mg BID and tolerating well. Tapering prednisone without worsening of dyspnea. States he now feels as though he is at 90% of his respiratory baseline.     PER INITIAL HPI:    Pt presents today for initial evaluation of pulmonary fibrosis.  He states that he was in his usual state of health until June, when he went on a trip to [place].  On that trip, he noticed that when he walked up 5 flights of stairs he became very winded.  He usually walks up to 5 miles a day and is able to walk 10-15 flights of stairs without difficulty.  When he returned to [place], he was seen by his PCP who noted abnormal breath sounds and referred him to pulmonary.  He was seen by Dr. Watts and was noted to have pulmonary fibrosis on his CT chest.  He desaturated to 93% on 6MWT.  He had an PRESTON that was positive and a negative RF.  He has continued to have ongoing dyspnea that has been progressive since June.  He denies any illnesses around the onset of symptoms or since onset of symptoms.  Denies any lower extremity swelling.  No palpitations but does monitor his pulse ox and notices  tachycardia after he exerts himself.  He states that he monitors his oxygen and will see dips into the low 80's after he exerts himself.  He recently had an episode while at [employer] loading groceries where he became lightheaded and felt like he was going to pass out.  He sat in the car and returned to baseline.  He then was seen by Dr. Goyal at [facility] and started on a steroid course.  He has not noticed any difference with steroid use.  Labs for SSc and Sjogren's were checked and are pending.  He was referred to Dr. Scott for clinical trial consideration.  He has never had bronchoscopy or chest biopsy.      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  No pulmonary toxic medications.  Does have dogs at home.  No history of dog allergies.  No exposures to birds, chickens, down bedding, or zheng/chicken coops.  No environmental exposures.  No mold in the home.  He previously smoked but quit 30 years ago.  No history of lung disease up to this point.  No family history of lung disease.  No family history of autoimmune disease.  He does endorse Raynaud's.  He does have skin thickening and fissuring of his fingers with thickness on his PIPs.  No rashes.  No muscle symptoms.  Does have fatigue.  No joint pains.  No sinus disease or reflux.       No past medical history on file.    No past surgical history on file.    Allergies: Patient has no known allergies.    Current Outpatient Medications   Medication Sig    amLODIPine (NORVASC) 5 MG tablet Take 1 tablet (5 mg total) by mouth once daily.    mycophenolate (CELLCEPT) 500 mg Tab Take 1 tablet (500 mg total) by mouth 2 (two) times daily for 7 days, THEN 2 tablets (1,000 mg total) 2 (two) times daily for 7 days, THEN 3 tablets (1,500 mg total) 2 (two) times daily.    pantoprazole (PROTONIX) 40 MG tablet Take 1 tablet (40 mg total) by  mouth once daily.    predniSONE (DELTASONE) 5 MG tablet Take 3 tablets (15 mg total) by mouth once daily.    solifenacin (VESICARE) 10 MG tablet Take 1 tablet (10 mg total) by mouth once daily.    sulfamethoxazole-trimethoprim 400-80mg (BACTRIM,SEPTRA) 400-80 mg per tablet Take 1 tablet by mouth every Mon, Wed, Fri.    tamsulosin (FLOMAX) 0.4 mg Cap Take 1 capsule (0.4 mg total) by mouth once daily.    tenofovir alafenamide (VEMLIDY) 25 mg Tab Take 1 tablet (25 mg total) by mouth once daily.     No current facility-administered medications for this visit.       Immunization History   Administered Date(s) Administered    COVID-19, subunit, rS-nanoparticle, adjuvanted, PF, 5 mcg/0.5 mL(Novavax) 2024    Pneumococcal Conjugate - 20 Valent 2024     Family History:  No family history on file.  Social History     Substance and Sexual Activity   Alcohol Use Yes    Alcohol/week: 2.0 standard drinks of alcohol    Types: 1 Glasses of wine, 1 Cans of beer per week    Comment: 1 beer and wine every day      Social History     Substance and Sexual Activity   Drug Use Never      Social History     Socioeconomic History    Marital status:    Tobacco Use    Smoking status: Former     Current packs/day: 0.00     Types: Cigarettes     Quit date: 1991     Years since quittin.0    Smokeless tobacco: Never    Tobacco comments:     quit 30yrs ago   Substance and Sexual Activity    Alcohol use: Yes     Alcohol/week: 2.0 standard drinks of alcohol     Types: 1 Glasses of wine, 1 Cans of beer per week     Comment: 1 beer and wine every day    Drug use: Never    Sexual activity: Yes     Partners: Female   Social History Narrative    ** Merged History Encounter **          Social Drivers of Health     Financial Resource Strain: Patient Declined (2024)    Received from [facility]    Overall Financial Resource Strain (CARDIA)     Difficulty of Paying Living Expenses: Patient declined   Food Insecurity: Patient  Declined (9/19/2024)    Received from [facility]    Hunger Vital Sign     Worried About Running Out of Food in the Last Year: Patient declined     Ran Out of Food in the Last Year: Patient declined   Transportation Needs: No Transportation Needs (9/19/2024)    Received from [facility]    PRAPARE - Transportation     Lack of Transportation (Medical): No     Lack of Transportation (Non-Medical): No   Physical Activity: Inactive (9/19/2024)    Received from [facility]    Exercise Vital Sign     Days of Exercise per Week: 0 days     Minutes of Exercise per Session: 0 min   Stress: Stress Concern Present (9/19/2024)    Received from [facility]    [employer] - Occupational Stress Questionnaire     Feeling of Stress : To some extent   Housing Stability: Unknown (9/19/2024)    Received from [facility]    Housing Stability Vital Sign     Unable to Pay for Housing in the Last Year: No     Review of Systems   Constitutional:  Positive for malaise/fatigue. Negative for chills, diaphoresis, fever and weight loss.   HENT:  Negative for congestion, ear discharge, ear pain, hearing loss, nosebleeds, sinus pain, sore throat and tinnitus.    Eyes:  Negative for blurred vision, double vision, photophobia, pain, discharge and redness.   Respiratory:  Positive for cough and shortness of breath. Negative for hemoptysis, sputum production, wheezing and stridor.    Cardiovascular:  Negative for chest pain, palpitations, orthopnea, claudication, leg swelling and PND.   Gastrointestinal:  Negative for abdominal pain, blood in stool, constipation, diarrhea, heartburn, melena, nausea and vomiting.   Genitourinary:  Negative for dysuria, flank pain, frequency, hematuria and urgency.   Musculoskeletal:  Negative for back pain, falls, joint pain, myalgias and neck pain.   Skin:  Negative for itching and rash.   Neurological:  Negative for dizziness, tingling, tremors, sensory change, speech change, focal  weakness, seizures, loss of consciousness, weakness and headaches.   Endo/Heme/Allergies:  Negative for environmental allergies and polydipsia. Does not bruise/bleed easily.   Psychiatric/Behavioral:  Negative for depression, hallucinations, memory loss, substance abuse and suicidal ideas. The patient is not nervous/anxious and does not have insomnia.      Vitals  There were no vitals taken for this visit.  Physical Exam  Vitals and nursing note reviewed.   Constitutional:       General: He is not in acute distress.     Appearance: He is well-developed. He is not diaphoretic.   HENT:      Head: Normocephalic and atraumatic.      Nose: Nose normal.      Mouth/Throat:      Pharynx: No oropharyngeal exudate.   Eyes:      General: No scleral icterus.     Conjunctiva/sclera: Conjunctivae normal.      Pupils: Pupils are equal, round, and reactive to light.   Neck:      Thyroid: No thyromegaly.      Vascular: No JVD.   Cardiovascular:      Rate and Rhythm: Normal rate and regular rhythm.      Heart sounds: Normal heart sounds. No murmur heard.     No friction rub. No gallop.   Pulmonary:      Effort: Pulmonary effort is normal. No respiratory distress.      Breath sounds: No stridor. Rales (bibasilar) present. No wheezing.   Abdominal:      General: Bowel sounds are normal. There is no distension.      Palpations: Abdomen is soft.      Tenderness: There is no abdominal tenderness.   Musculoskeletal:         General: Normal range of motion.      Cervical back: Normal range of motion and neck supple.   Skin:     General: Skin is warm and dry.      Findings: No erythema.      Comments: Skin thickening and fissuring of fingers and hands bilaterally.  Consistent with mechanics hands   Neurological:      Mental Status: He is alert and oriented to person, place, and time.      Cranial Nerves: No cranial nerve deficit.   Psychiatric:         Judgment: Judgment normal.         Labs:  Hospital Outpatient Visit on 01/03/2025    Component Date Value    Physician Comment 01/03/2025                      Value:Spirometry is normal. Spirometry remains unimproved following bronchodilator. Lung volumes show mild restriction is present. Airway mechanics show airway resistance is increased. Specific conductance remains normal. DLCO is moderately decreased.   Â   Notes: The failure to demonstrate improvement in spirometry does not preclude a clinical response to a trial of bronchodilators.       Post FVC 01/03/2025 2.84     Post FEV5 01/03/2025 1.77     Post FEV1 01/03/2025 2.21     Post FEV1 FVC 01/03/2025 78.03     Post FEF 25 75 01/03/2025 1.89     Post PEF 01/03/2025 7.22     Post  01/03/2025 5.98     Pre FVC 01/03/2025 2.81     PRE FEV5 01/03/2025 1.65     Pre FEV1 01/03/2025 2.11     Pre FEV1 FVC 01/03/2025 75.24     Pre FEF 25 75 01/03/2025 1.60     Pre PEF 01/03/2025 7.73     Pre  01/03/2025 6.44     Pre DLCO 01/03/2025 13.83 (L)     DLCO ADJ PRE 01/03/2025 13.33 (L)     DLCOVA PRE 01/03/2025 3.41     DLVAAdj PRE 01/03/2025 3.29     VA PRE 01/03/2025 4.05 (L)     IVC PRE 01/03/2025 2.90     Pre TLC 01/03/2025 4.47 (L)     VC PRE 01/03/2025 2.90     PRE IC 01/03/2025 2.25     Pre FRC PL 01/03/2025 2.22 (L)     Pre ERV 01/03/2025 0.65     Pre RV 01/03/2025 1.57 (L)     RVTLC PRE 01/03/2025 35.18     Raw PRE 01/03/2025 3.93 (H)     sGaw PRE 01/03/2025 0.08 (H)     FVC Ref 01/03/2025 3.53     FVC LLN 01/03/2025 2.62     FVC Pre Ref 01/03/2025 79.6     FEV05 REF 01/03/2025 2.45     FEV05 LLN 01/03/2025 1.31     PRE FEV05 REF 01/03/2025 67.4     FEV1 Ref 01/03/2025 2.80     FEV1 LLN 01/03/2025 2.04     FEV1 Pre Ref 01/03/2025 75.5     FEV1 FVC Ref 01/03/2025 79     FEV1 FVC LLN 01/03/2025 68     FEV1 FVC Pre Ref 01/03/2025 94.8     FEF 25 75 Ref 01/03/2025 3.21     FEF 25 75 LLN 01/03/2025 1.50     FEF 25 75 Pre Ref 01/03/2025 49.8     PEF Ref 01/03/2025 7.80     PEF LLN 01/03/2025 5.81     PEF Pre Ref 01/03/2025 99.0     FVC Chg  01/03/2025 0.9     FEV1 Chg 01/03/2025 4.7     [id number] Chg 01/03/2025 -7.2     FVC VOL CHG 01/03/2025 0.03     FEV1 VOL CHG 01/03/2025 0.10     TLC Ref 01/03/2025 6.52     TLC LLN 01/03/2025 5.37     TLC ULN 01/03/2025 7.67     TLC Pre Ref 01/03/2025 68.6     VC Ref 01/03/2025 3.53     VC LLN 01/03/2025 2.62     VC ULN 01/03/2025 4.45     VC Pre Ref 01/03/2025 82.1     REF IC 01/03/2025 2.86     LLN IC 01/03/2025 -0868950.14     ULN IC 01/03/2025 29646915.86     PRE REF IC 01/03/2025 78.5     FRCpleth Ref 01/03/2025 3.48     FRCpleth LLN 01/03/2025 2.49     FRC PLETH ULN 01/03/2025 4.46     FRCpleth PreRef 01/03/2025 63.9     ERV Ref 01/03/2025 1.05     ERV LLN 01/03/2025 -58663.95     ERV ULN 01/03/2025 52999.05     ERV Pre Ref 01/03/2025 62.0     RV Ref 01/03/2025 2.43     RV LLN 01/03/2025 1.75     RV ULN 01/03/2025 3.10     RV Pre Ref 01/03/2025 64.7     RVTLC Ref 01/03/2025 39     RVTLC LLN 01/03/2025 30     RV TLC ULN 01/03/2025 48     RVTLC Pre Ref 01/03/2025 89.5     Raw Ref 01/03/2025 3.06     Raw Pre Ref 01/03/2025 128.4     sGaw Ref 01/03/2025 0.08     sGaw Pre Ref 01/03/2025 101.8     DLCO Single Breath Ref 01/03/2025 25.64     DLCO Single Breath LLN 01/03/2025 18.71     DLCO SINGLEBREATH ULN 01/03/2025 32.57     DLCO Single Breath Pre R* 01/03/2025 53.9     DLCOc Single Breath Ref 01/03/2025 25.64     DLCOc Single Breath LLN 01/03/2025 18.71     DLCOC SINGLEBREATH ULN 01/03/2025 32.57     DLCOc Single Breath Pre * 01/03/2025 52.0     DLCOVA Ref 01/03/2025 3.93     DLCOVA LLN 01/03/2025 2.66     DLCOVA ULN 01/03/2025 5.21     DLCOVA Pre Ref 01/03/2025 86.8     DLCOc SBVA Ref 01/03/2025 3.93     DLCOc SBVA LLN 01/03/2025 2.66     DLCOCSBVA ULN 01/03/2025 5.21     DLCOc SBVA Pre Ref 01/03/2025 83.6     VA Single Breath Ref 01/03/2025 6.37     VA Single Breath LLN 01/03/2025 6.37     VA SINGLEBREATH ULN 01/03/2025 6.37     VA Single Breath Pre Ref 01/03/2025 63.6     IVC Single Breath Ref 01/03/2025 3.53      IVC Single Breath LLN 01/03/2025 2.62     IVC SINGLEBREATH ULN 01/03/2025 4.45     IVC Single Breath Pre Ref 01/03/2025 82.1     FVCZSCORE 01/03/2025 -1.30     IGN0VNVUEX 01/03/2025 -1.48     [id number] 01/03/2025 -0.62     TLCZSCORE 01/03/2025 -2.92     DLCOSINGLEBREATHZSCORE 01/03/2025 -2.81     DLCOcSingleBreathZSCORE 01/03/2025 -2.92    Lab Visit on 01/02/2025   Component Date Value    Sed Rate 01/02/2025 8     CRP 01/02/2025 1.0     Sodium 01/02/2025 139     Potassium 01/02/2025 5.1     Chloride 01/02/2025 105     CO2 01/02/2025 27     Glucose 01/02/2025 118 (H)     BUN 01/02/2025 13     Creatinine 01/02/2025 1.2     Calcium 01/02/2025 9.6     Total Protein 01/02/2025 9.2 (H)     Albumin 01/02/2025 3.4 (L)     Total Bilirubin 01/02/2025 0.3     Alkaline Phosphatase 01/02/2025 50     AST 01/02/2025 24     ALT 01/02/2025 19     eGFR 01/02/2025 >60.0     Anion Gap 01/02/2025 7 (L)     WBC 01/02/2025 4.93     RBC 01/02/2025 5.02     Hemoglobin 01/02/2025 16.0     Hematocrit 01/02/2025 48.8     MCV 01/02/2025 97     MCH 01/02/2025 31.9 (H)     MCHC 01/02/2025 32.8     RDW 01/02/2025 14.7 (H)     Platelets 01/02/2025 201     MPV 01/02/2025 9.5     Immature Granulocytes 01/02/2025 0.4     Gran # (ANC) 01/02/2025 3.6     Immature Grans (Abs) 01/02/2025 0.02     Lymph # 01/02/2025 0.8 (L)     Mono # 01/02/2025 0.5     Eos # 01/02/2025 0.0     Baso # 01/02/2025 0.01     nRBC 01/02/2025 0     Gran % 01/02/2025 73.5 (H)     Lymph % 01/02/2025 15.8 (L)     Mono % 01/02/2025 9.7     Eosinophil % 01/02/2025 0.4     Basophil % 01/02/2025 0.2     Differential Method 01/02/2025 Automated            10/8/2024    11:30 AM 8/29/2024     4:19 PM 7/30/2024     4:20 PM   Pulmonary Function Tests   FVC 2.32 liters 1.88 liters 2.49 liters   FEV1 1.69 liters 1.39 liters 1.85 liters   TLC (liters) 4.24 liters 3.53 liters 3.91 liters   DLCO (ml/mmHg sec) 12.6 ml/mmHg sec 8.95 ml/mmHg sec 12.7 ml/mmHg sec   FVC% 65.7 53 70   FEV1% 60 49  65   FEF 25-75 1.18 1.06 1.39   FEF 25-75% 36.9 33 43   TLC% 65 54 60   RV 1.84 1.65 1.42   RV% 75.9 67.8 58   DLCO% 49.1 34 49         1/3/2025    10:32 AM 10/8/2024     9:37 AM 8/29/2024     3:35 PM 7/30/2024     1:06 PM   6MW   6MWT Status completed without stopping completed without stopping completed without stopping completed without stopping   Patient Reported No complaints Dyspnea Dyspnea Dyspnea   Was O2 used? No No No No   6MW Distance walked (feet) 1562 feet 1400 feet 1300 feet 1400 feet   Distance walked (meters) 476.1 meters 426.72 meters 396.24 meters 426.72 meters   Did patient stop? No No No No   Oxygen Saturation 98 % 96 % 95 % 99 %   Supplemental Oxygen Room Air Room Air Room Air Room Air   Heart Rate 64 bpm 76 bpm 84 bpm 66 bpm   Blood Pressure 153/87 163/78 108/67 118/69   Alexa Dyspnea Rating  light moderate somewhat heavy moderate   Oxygen Saturation 98 % 93 % 88 % 93 %   Supplemental Oxygen Room Air Room Air Room Air Room Air   Heart Rate 81 bpm 91 bpm 98 bpm 85 bpm   Blood Pressure 176/89 160/83 130/71 157/79   Alexa Dyspnea Rating  very light somewhat heavy very heavy heavy   Recovery Time (seconds) 53 seconds 53 seconds 95 seconds 85 seconds   Oxygen Saturation 98 % 96 % 96 % 97 %   Supplemental Oxygen Room Air Room Air Room Air Room Air   Heart Rate 79 bpm 82 bpm 84 bpm 73 bpm       Imaging:  Results for orders placed during the hospital encounter of 07/30/24    CT Chest Without Contrast    Narrative  EXAMINATION:  CT CHEST WITHOUT CONTRAST    CLINICAL HISTORY:  Interstitial lung disease; Interstitial pulmonary disease, unspecified    TECHNIQUE:  Noncontrast images were obtained through the chest per protocol.  Coronal and sagittal images were reviewed.    COMPARISON:  None.    FINDINGS:  Structures at the base of the neck are unremarkable.  No axillary adenopathy.  Few shotty nonenlarged mediastinal nodes, nonspecific and possibly reactive.  No anterior pericardial effusion.  Aortic arch  and coronary calcific atherosclerosis.  The trachea is clear.    The lungs are symmetrically expanded.  Patchy peripheral and lower lung predominant interstitial thickening and opacities.  More mild degree of ground-glass and reticulation peripherally at the upper lungs and apices.  A few subpleural subcentimeter nodular foci, for reference at the upper lobe measuring 0.5 cm (series 2, image 39).  A few punctate right upper lobe nodules (for reference series 3, image 87).  No evident honeycombing, bronchiectasis, or air trapping.  No pleural effusion or pneumothorax.    Limited images through the unenhanced upper abdomen demonstrate no acute abnormality.  No aggressive osseous lesion.  Suspected degree of ossification at the posterior longitudinal ligament at C6-C7.  Multilevel spine DJD.    Impression  Patchy peripheral and lower lung interstitial thickening and opacities.  Overall appearance is nonspecific and could relate to sequela of fibrotic change versus other infectious or non-infectious inflammatory sequela.  No evident honeycombing, bronchiectasis, or significant air trapping by this exam.    Few scattered subcentimeter pulmonary nodules, technically indeterminate.  Attention at follow-up.    Additional findings as above.      Electronically signed by: Osvaldo Daniel  Date:    07/31/2024  Time:    11:21        Cardiodiagnostics:  Results for orders placed during the hospital encounter of 07/23/24    Echo    Interpretation Summary    Left Ventricle: The left ventricle is normal in size. Normal wall thickness. There is normal systolic function with a visually estimated ejection fraction of 55 - 60%. Global longitudinal strain is normal; -17.0%. There is normal diastolic function.    Right Ventricle: Normal right ventricular cavity size. Wall thickness is normal. Systolic function is normal.    Tricuspid Valve: There is mild regurgitation.    IVC/SVC: Normal venous pressure at 3 mmHg.        Assessment:  1.  ILD (interstitial lung disease)          Plan:     Patient followed for MCTD-ILD. Initially presented to the clinic in early September for rapidly progressive ILD. Admitted 9/5-9/10 for acute hypoxemic respiratory failure and is now s/p pulse steroid, IVIG, and cytoxan infusions. He is tapering his steroids (currently on pred 15 mg daily) and will start rituxan for maintenance. Continue MMF. Repeat CT chest with interval resolution of GGOs. FVC and DLCO continue to improve. No longer qualifies for oxygen therapy and 6MWT distance was excellent.     RTC in 3 months or sooner if needed. 6MWT and PFTs at routine visits.       Rosemarie White PA-C  [facility]

## 2025-01-09 ENCOUNTER — INFUSION (OUTPATIENT)
Dept: INFUSION THERAPY | Facility: HOSPITAL | Age: 65
End: 2025-01-09
Attending: INTERNAL MEDICINE
Payer: MEDICARE

## 2025-01-09 VITALS
DIASTOLIC BLOOD PRESSURE: 76 MMHG | HEART RATE: 67 BPM | BODY MASS INDEX: 27.38 KG/M2 | SYSTOLIC BLOOD PRESSURE: 151 MMHG | WEIGHT: 174.81 LBS | RESPIRATION RATE: 16 BRPM | OXYGEN SATURATION: 99 % | TEMPERATURE: 98 F

## 2025-01-09 DIAGNOSIS — J84.9 ILD (INTERSTITIAL LUNG DISEASE): ICD-10-CM

## 2025-01-09 DIAGNOSIS — R76.8 MDA5 ANTIBODY POSITIVE: ICD-10-CM

## 2025-01-09 DIAGNOSIS — J84.9 ILD (INTERSTITIAL LUNG DISEASE): Primary | ICD-10-CM

## 2025-01-09 DIAGNOSIS — M33.91 DERMATOMYOSITIS WITH RESPIRATORY INVOLVEMENT: ICD-10-CM

## 2025-01-09 PROCEDURE — 96413 CHEMO IV INFUSION 1 HR: CPT

## 2025-01-09 PROCEDURE — 96375 TX/PRO/DX INJ NEW DRUG ADDON: CPT

## 2025-01-09 PROCEDURE — 96367 TX/PROPH/DG ADDL SEQ IV INF: CPT

## 2025-01-09 PROCEDURE — 96415 CHEMO IV INFUSION ADDL HR: CPT

## 2025-01-09 PROCEDURE — 63600175 PHARM REV CODE 636 W HCPCS: Performed by: INTERNAL MEDICINE

## 2025-01-09 PROCEDURE — 25000003 PHARM REV CODE 250: Performed by: INTERNAL MEDICINE

## 2025-01-09 RX ORDER — ACETAMINOPHEN 325 MG/1
650 TABLET ORAL
Status: COMPLETED | OUTPATIENT
Start: 2025-01-09 | End: 2025-01-09

## 2025-01-09 RX ORDER — MEPERIDINE HYDROCHLORIDE 50 MG/ML
25 INJECTION INTRAMUSCULAR; INTRAVENOUS; SUBCUTANEOUS
OUTPATIENT
Start: 2025-01-16

## 2025-01-09 RX ORDER — FAMOTIDINE 10 MG/ML
20 INJECTION INTRAVENOUS
Status: COMPLETED | OUTPATIENT
Start: 2025-01-09 | End: 2025-01-09

## 2025-01-09 RX ORDER — METHYLPREDNISOLONE SOD SUCC 125 MG
100 VIAL (EA) INJECTION
Status: COMPLETED | OUTPATIENT
Start: 2025-01-09 | End: 2025-01-09

## 2025-01-09 RX ORDER — HEPARIN 100 UNIT/ML
500 SYRINGE INTRAVENOUS
Status: CANCELLED | OUTPATIENT
Start: 2025-01-16

## 2025-01-09 RX ORDER — HEPARIN 100 UNIT/ML
500 SYRINGE INTRAVENOUS
OUTPATIENT
Start: 2025-01-16

## 2025-01-09 RX ORDER — FAMOTIDINE 10 MG/ML
20 INJECTION INTRAVENOUS
Status: CANCELLED | OUTPATIENT
Start: 2025-01-16

## 2025-01-09 RX ORDER — SODIUM CHLORIDE 0.9 % (FLUSH) 0.9 %
10 SYRINGE (ML) INJECTION
OUTPATIENT
Start: 2025-01-16

## 2025-01-09 RX ORDER — DIPHENHYDRAMINE HYDROCHLORIDE 50 MG/ML
50 INJECTION INTRAMUSCULAR; INTRAVENOUS ONCE AS NEEDED
OUTPATIENT
Start: 2025-01-16

## 2025-01-09 RX ORDER — DIPHENHYDRAMINE HYDROCHLORIDE 50 MG/ML
50 INJECTION INTRAMUSCULAR; INTRAVENOUS ONCE AS NEEDED
Status: CANCELLED | OUTPATIENT
Start: 2025-01-16

## 2025-01-09 RX ORDER — EPINEPHRINE 0.3 MG/.3ML
0.3 INJECTION SUBCUTANEOUS ONCE AS NEEDED
OUTPATIENT
Start: 2025-01-16

## 2025-01-09 RX ORDER — EPINEPHRINE 0.3 MG/.3ML
0.3 INJECTION SUBCUTANEOUS ONCE AS NEEDED
Status: CANCELLED | OUTPATIENT
Start: 2025-01-16

## 2025-01-09 RX ORDER — SODIUM CHLORIDE 0.9 % (FLUSH) 0.9 %
10 SYRINGE (ML) INJECTION
Status: CANCELLED | OUTPATIENT
Start: 2025-01-16

## 2025-01-09 RX ORDER — METHYLPREDNISOLONE SOD SUCC 125 MG
100 VIAL (EA) INJECTION
Status: CANCELLED | OUTPATIENT
Start: 2025-01-16

## 2025-01-09 RX ORDER — MEPERIDINE HYDROCHLORIDE 50 MG/ML
25 INJECTION INTRAMUSCULAR; INTRAVENOUS; SUBCUTANEOUS
Status: CANCELLED | OUTPATIENT
Start: 2025-01-16

## 2025-01-09 RX ORDER — ACETAMINOPHEN 325 MG/1
650 TABLET ORAL
Status: CANCELLED | OUTPATIENT
Start: 2025-01-16

## 2025-01-09 RX ORDER — MYCOPHENOLATE MOFETIL 500 MG/1
1500 TABLET ORAL 2 TIMES DAILY
Qty: 180 TABLET | Refills: 3 | Status: SHIPPED | OUTPATIENT
Start: 2025-01-09

## 2025-01-09 RX ADMIN — DIPHENHYDRAMINE HYDROCHLORIDE 50 MG: 50 INJECTION INTRAMUSCULAR; INTRAVENOUS at 08:01

## 2025-01-09 RX ADMIN — FAMOTIDINE 20 MG: 10 INJECTION, SOLUTION INTRAVENOUS at 08:01

## 2025-01-09 RX ADMIN — METHYLPREDNISOLONE SODIUM SUCCINATE 100 MG: 125 INJECTION, POWDER, FOR SOLUTION INTRAMUSCULAR; INTRAVENOUS at 08:01

## 2025-01-09 RX ADMIN — SODIUM CHLORIDE 1000 MG: 9 INJECTION, SOLUTION INTRAVENOUS at 09:01

## 2025-01-09 RX ADMIN — ACETAMINOPHEN 650 MG: 325 TABLET ORAL at 07:01

## 2025-01-09 NOTE — PLAN OF CARE
Patient tolerated 2nd dose of rituximab 1000mg well. No adverse reactions noted during visit. VSS throughout treatment. Patient received discharge instructions and follow up appointments. Verbalized understanding and ambulated unassisted off unit by self. In NAD at time of dc.

## 2025-01-15 ENCOUNTER — PATIENT MESSAGE (OUTPATIENT)
Dept: RHEUMATOLOGY | Facility: CLINIC | Age: 65
End: 2025-01-15
Payer: MEDICARE

## 2025-01-16 ENCOUNTER — LAB VISIT (OUTPATIENT)
Dept: LAB | Facility: HOSPITAL | Age: 65
End: 2025-01-16
Attending: STUDENT IN AN ORGANIZED HEALTH CARE EDUCATION/TRAINING PROGRAM
Payer: MEDICARE

## 2025-01-16 DIAGNOSIS — R76.8 MDA5 ANTIBODY POSITIVE: ICD-10-CM

## 2025-01-16 DIAGNOSIS — J84.9 ILD (INTERSTITIAL LUNG DISEASE): ICD-10-CM

## 2025-01-16 DIAGNOSIS — M33.91 DERMATOMYOSITIS WITH RESPIRATORY INVOLVEMENT: ICD-10-CM

## 2025-01-16 LAB
ALBUMIN SERPL BCP-MCNC: 4 G/DL (ref 3.5–5.2)
ALP SERPL-CCNC: 50 U/L (ref 40–150)
ALT SERPL W/O P-5'-P-CCNC: 15 U/L (ref 10–44)
ANION GAP SERPL CALC-SCNC: 8 MMOL/L (ref 8–16)
AST SERPL-CCNC: 17 U/L (ref 10–40)
BASOPHILS # BLD AUTO: 0.03 K/UL (ref 0–0.2)
BASOPHILS NFR BLD: 0.6 % (ref 0–1.9)
BILIRUB SERPL-MCNC: 0.5 MG/DL (ref 0.1–1)
BUN SERPL-MCNC: 13 MG/DL (ref 8–23)
CALCIUM SERPL-MCNC: 9.7 MG/DL (ref 8.7–10.5)
CHLORIDE SERPL-SCNC: 105 MMOL/L (ref 95–110)
CO2 SERPL-SCNC: 27 MMOL/L (ref 23–29)
CREAT SERPL-MCNC: 1 MG/DL (ref 0.5–1.4)
DIFFERENTIAL METHOD BLD: ABNORMAL
EOSINOPHIL # BLD AUTO: 0.1 K/UL (ref 0–0.5)
EOSINOPHIL NFR BLD: 1.1 % (ref 0–8)
ERYTHROCYTE [DISTWIDTH] IN BLOOD BY AUTOMATED COUNT: 13.9 % (ref 11.5–14.5)
EST. GFR  (NO RACE VARIABLE): >60 ML/MIN/1.73 M^2
GLUCOSE SERPL-MCNC: 92 MG/DL (ref 70–110)
HCT VFR BLD AUTO: 49.7 % (ref 40–54)
HGB BLD-MCNC: 16.4 G/DL (ref 14–18)
IMM GRANULOCYTES # BLD AUTO: 0.05 K/UL (ref 0–0.04)
IMM GRANULOCYTES NFR BLD AUTO: 0.9 % (ref 0–0.5)
LYMPHOCYTES # BLD AUTO: 1 K/UL (ref 1–4.8)
LYMPHOCYTES NFR BLD: 19.5 % (ref 18–48)
MCH RBC QN AUTO: 31.5 PG (ref 27–31)
MCHC RBC AUTO-ENTMCNC: 33 G/DL (ref 32–36)
MCV RBC AUTO: 96 FL (ref 82–98)
MONOCYTES # BLD AUTO: 0.6 K/UL (ref 0.3–1)
MONOCYTES NFR BLD: 11.4 % (ref 4–15)
NEUTROPHILS # BLD AUTO: 3.5 K/UL (ref 1.8–7.7)
NEUTROPHILS NFR BLD: 66.5 % (ref 38–73)
NRBC BLD-RTO: 0 /100 WBC
PLATELET # BLD AUTO: 218 K/UL (ref 150–450)
PMV BLD AUTO: 9.8 FL (ref 9.2–12.9)
POTASSIUM SERPL-SCNC: 3.8 MMOL/L (ref 3.5–5.1)
PROT SERPL-MCNC: 7.9 G/DL (ref 6–8.4)
RBC # BLD AUTO: 5.2 M/UL (ref 4.6–6.2)
SODIUM SERPL-SCNC: 140 MMOL/L (ref 136–145)
WBC # BLD AUTO: 5.33 K/UL (ref 3.9–12.7)

## 2025-01-16 PROCEDURE — 80053 COMPREHEN METABOLIC PANEL: CPT | Mod: TXP | Performed by: STUDENT IN AN ORGANIZED HEALTH CARE EDUCATION/TRAINING PROGRAM

## 2025-01-16 PROCEDURE — 36415 COLL VENOUS BLD VENIPUNCTURE: CPT | Mod: TXP | Performed by: STUDENT IN AN ORGANIZED HEALTH CARE EDUCATION/TRAINING PROGRAM

## 2025-01-16 PROCEDURE — 85025 COMPLETE CBC W/AUTO DIFF WBC: CPT | Mod: TXP | Performed by: STUDENT IN AN ORGANIZED HEALTH CARE EDUCATION/TRAINING PROGRAM

## 2025-01-16 RX ORDER — MYCOPHENOLATE MOFETIL 500 MG/1
1500 TABLET ORAL 2 TIMES DAILY
Qty: 180 TABLET | Refills: 3 | OUTPATIENT
Start: 2025-01-16

## 2025-01-28 ENCOUNTER — HOSPITAL ENCOUNTER (OUTPATIENT)
Dept: RADIOLOGY | Facility: HOSPITAL | Age: 65
Discharge: HOME | End: 2025-01-28
Attending: UROLOGY
Payer: MEDICARE

## 2025-01-28 DIAGNOSIS — N40.0 BPH WITHOUT URINARY OBSTRUCTION: ICD-10-CM

## 2025-01-28 PROCEDURE — 76770 US EXAM ABDO BACK WALL COMP: CPT | Mod: 26,NTX,, | Performed by: RADIOLOGY

## 2025-01-28 PROCEDURE — 76770 US EXAM ABDO BACK WALL COMP: CPT | Mod: TC,TXP

## 2025-01-31 DIAGNOSIS — J84.9 INTERSTITIAL PULMONARY DISEASE, UNSPECIFIED: Primary | ICD-10-CM

## 2025-01-31 DIAGNOSIS — J84.9 ILD (INTERSTITIAL LUNG DISEASE): ICD-10-CM

## 2025-01-31 NOTE — PROGRESS NOTES
Per provider note EDDY Mcadams , patient RTC January 2025 with PFTs, 6MWT, CT chest. Start on room air, modified. Request to .

## 2025-02-05 ENCOUNTER — INFUSION (OUTPATIENT)
Dept: INFUSION THERAPY | Facility: HOSPITAL | Age: 65
End: 2025-02-05
Attending: INTERNAL MEDICINE
Payer: MEDICARE

## 2025-02-05 VITALS
BODY MASS INDEX: 27.24 KG/M2 | TEMPERATURE: 99 F | RESPIRATION RATE: 18 BRPM | WEIGHT: 173.94 LBS | OXYGEN SATURATION: 100 % | HEART RATE: 67 BPM | SYSTOLIC BLOOD PRESSURE: 169 MMHG | DIASTOLIC BLOOD PRESSURE: 88 MMHG

## 2025-02-05 DIAGNOSIS — J84.9 ILD (INTERSTITIAL LUNG DISEASE): Primary | ICD-10-CM

## 2025-02-05 DIAGNOSIS — R76.8 MELANOMA DIFFERENTIATION-ASSOCIATED PROTEIN 5 (MDA-5) ANTIBODY POSITIVE: ICD-10-CM

## 2025-02-05 PROCEDURE — 25000003 PHARM REV CODE 250: Performed by: INTERNAL MEDICINE

## 2025-02-05 PROCEDURE — 96375 TX/PRO/DX INJ NEW DRUG ADDON: CPT

## 2025-02-05 PROCEDURE — 96365 THER/PROPH/DIAG IV INF INIT: CPT

## 2025-02-05 PROCEDURE — 96366 THER/PROPH/DIAG IV INF ADDON: CPT

## 2025-02-05 PROCEDURE — 63600175 PHARM REV CODE 636 W HCPCS: Mod: JZ,TB | Performed by: INTERNAL MEDICINE

## 2025-02-05 RX ORDER — SODIUM CHLORIDE 0.9 % (FLUSH) 0.9 %
10 SYRINGE (ML) INJECTION
Status: CANCELLED | OUTPATIENT
Start: 2025-02-05

## 2025-02-05 RX ORDER — DIPHENHYDRAMINE HYDROCHLORIDE 50 MG/ML
25 INJECTION INTRAMUSCULAR; INTRAVENOUS
Status: CANCELLED | OUTPATIENT
Start: 2025-02-05

## 2025-02-05 RX ORDER — FAMOTIDINE 10 MG/ML
20 INJECTION INTRAVENOUS
Status: COMPLETED | OUTPATIENT
Start: 2025-02-05 | End: 2025-02-05

## 2025-02-05 RX ORDER — ACETAMINOPHEN 325 MG/1
650 TABLET ORAL
Status: COMPLETED | OUTPATIENT
Start: 2025-02-05 | End: 2025-02-05

## 2025-02-05 RX ORDER — FAMOTIDINE 10 MG/ML
20 INJECTION INTRAVENOUS
Status: CANCELLED | OUTPATIENT
Start: 2025-02-05

## 2025-02-05 RX ORDER — ACETAMINOPHEN 325 MG/1
650 TABLET ORAL
Status: CANCELLED | OUTPATIENT
Start: 2025-02-05

## 2025-02-05 RX ORDER — HEPARIN 100 UNIT/ML
500 SYRINGE INTRAVENOUS
Status: CANCELLED | OUTPATIENT
Start: 2025-02-05

## 2025-02-05 RX ORDER — DIPHENHYDRAMINE HYDROCHLORIDE 50 MG/ML
25 INJECTION INTRAMUSCULAR; INTRAVENOUS
Status: COMPLETED | OUTPATIENT
Start: 2025-02-05 | End: 2025-02-05

## 2025-02-05 RX ADMIN — FAMOTIDINE 20 MG: 10 INJECTION, SOLUTION INTRAVENOUS at 08:02

## 2025-02-05 RX ADMIN — DIPHENHYDRAMINE HYDROCHLORIDE 25 MG: 50 INJECTION INTRAMUSCULAR; INTRAVENOUS at 08:02

## 2025-02-05 RX ADMIN — HUMAN IMMUNOGLOBULIN G 80 G: 40 LIQUID INTRAVENOUS at 08:02

## 2025-02-05 RX ADMIN — ACETAMINOPHEN 650 MG: 325 TABLET ORAL at 08:02

## 2025-02-05 NOTE — PLAN OF CARE
Pt arrived to clinic by foot without difficulty, AAOx4. Denies any new or worsening of symptoms since last visit. Pt received Privigen infusion via 24g R proximal FA, titrated per order parameters; refer to MAR for further details. Dsg c/d/i; no s/s of infection or infiltration noted. PIV flushed and patent with blood return. Pt tolerated medication well. No S&S of an adverse reaction, VSS. Denies pain or discomfort. Care plan discussed with pt. Pt reminded to BP meds as soon as he gets home (refer to flowsheet notation). Pt verbalized understanding. Pt aware of upcoming appointment via [email or web address]. Pt ambulatory upon discharge in [facility].        Problem: Fall Injury Risk  Goal: Absence of Fall and Fall-Related Injury  Outcome: Progressing     Problem: Fatigue  Goal: Improved Activity Tolerance  Outcome: Progressing

## 2025-02-06 ENCOUNTER — INFUSION (OUTPATIENT)
Dept: INFUSION THERAPY | Facility: HOSPITAL | Age: 65
End: 2025-02-06
Attending: INTERNAL MEDICINE
Payer: MEDICARE

## 2025-02-06 VITALS
TEMPERATURE: 98 F | OXYGEN SATURATION: 100 % | RESPIRATION RATE: 16 BRPM | SYSTOLIC BLOOD PRESSURE: 145 MMHG | HEART RATE: 62 BPM | DIASTOLIC BLOOD PRESSURE: 76 MMHG

## 2025-02-06 DIAGNOSIS — J84.9 ILD (INTERSTITIAL LUNG DISEASE): Primary | ICD-10-CM

## 2025-02-06 DIAGNOSIS — R76.8 MELANOMA DIFFERENTIATION-ASSOCIATED PROTEIN 5 (MDA-5) ANTIBODY POSITIVE: ICD-10-CM

## 2025-02-06 PROCEDURE — 63600175 PHARM REV CODE 636 W HCPCS: Mod: JZ,TB | Performed by: INTERNAL MEDICINE

## 2025-02-06 PROCEDURE — 96375 TX/PRO/DX INJ NEW DRUG ADDON: CPT

## 2025-02-06 PROCEDURE — 96365 THER/PROPH/DIAG IV INF INIT: CPT

## 2025-02-06 PROCEDURE — 96366 THER/PROPH/DIAG IV INF ADDON: CPT

## 2025-02-06 PROCEDURE — 25000003 PHARM REV CODE 250: Performed by: INTERNAL MEDICINE

## 2025-02-06 RX ORDER — DIPHENHYDRAMINE HYDROCHLORIDE 50 MG/ML
25 INJECTION INTRAMUSCULAR; INTRAVENOUS
OUTPATIENT
Start: 2025-02-06

## 2025-02-06 RX ORDER — DIPHENHYDRAMINE HYDROCHLORIDE 50 MG/ML
25 INJECTION INTRAMUSCULAR; INTRAVENOUS
Status: COMPLETED | OUTPATIENT
Start: 2025-02-06 | End: 2025-02-06

## 2025-02-06 RX ORDER — ACETAMINOPHEN 325 MG/1
650 TABLET ORAL
OUTPATIENT
Start: 2025-02-06

## 2025-02-06 RX ORDER — HEPARIN 100 UNIT/ML
500 SYRINGE INTRAVENOUS
OUTPATIENT
Start: 2025-02-06

## 2025-02-06 RX ORDER — SODIUM CHLORIDE 0.9 % (FLUSH) 0.9 %
10 SYRINGE (ML) INJECTION
OUTPATIENT
Start: 2025-02-06

## 2025-02-06 RX ORDER — FAMOTIDINE 10 MG/ML
20 INJECTION INTRAVENOUS
OUTPATIENT
Start: 2025-02-06

## 2025-02-06 RX ORDER — FAMOTIDINE 10 MG/ML
20 INJECTION INTRAVENOUS
Status: COMPLETED | OUTPATIENT
Start: 2025-02-06 | End: 2025-02-06

## 2025-02-06 RX ORDER — ACETAMINOPHEN 325 MG/1
650 TABLET ORAL
Status: COMPLETED | OUTPATIENT
Start: 2025-02-06 | End: 2025-02-06

## 2025-02-06 RX ADMIN — ACETAMINOPHEN 650 MG: 325 TABLET ORAL at 08:02

## 2025-02-06 RX ADMIN — HUMAN IMMUNOGLOBULIN G 80 G: 40 LIQUID INTRAVENOUS at 08:02

## 2025-02-06 RX ADMIN — DIPHENHYDRAMINE HYDROCHLORIDE 25 MG: 50 INJECTION INTRAMUSCULAR; INTRAVENOUS at 08:02

## 2025-02-06 RX ADMIN — FAMOTIDINE 20 MG: 10 INJECTION, SOLUTION INTRAVENOUS at 08:02

## 2025-02-06 NOTE — PLAN OF CARE
Pt arrived to clinic by foot without difficulty, AAOx4. Denies any new or worsening of symptoms since last visit. Pt received Privigen infusion via 24g R proximal FA, titrated per order parameters; refer to MAR for further details. Dsg c/d/i; no s/s of infection or infiltration noted. PIV flushed and patent with blood return. Pt tolerated medication well. No S&S of an adverse reaction, VSS. Denies pain or discomfort. Care plan discussed with pt. Pt verbalized understanding. Pt aware of upcoming appointment via MyChart. Pt ambulatory upon discharge in NAD.      Problem: Fall Injury Risk  Goal: Absence of Fall and Fall-Related Injury  Outcome: Progressing     Problem: Fatigue  Goal: Improved Activity Tolerance  Outcome: Progressing

## 2025-02-13 ENCOUNTER — LAB VISIT (OUTPATIENT)
Dept: LAB | Facility: HOSPITAL | Age: 65
End: 2025-02-13
Attending: STUDENT IN AN ORGANIZED HEALTH CARE EDUCATION/TRAINING PROGRAM
Payer: MEDICARE

## 2025-02-13 DIAGNOSIS — R76.8 MDA5 ANTIBODY POSITIVE: ICD-10-CM

## 2025-02-13 DIAGNOSIS — J84.9 ILD (INTERSTITIAL LUNG DISEASE): ICD-10-CM

## 2025-02-13 LAB
ALBUMIN SERPL BCP-MCNC: 3.6 G/DL (ref 3.5–5.2)
ALP SERPL-CCNC: 50 U/L (ref 40–150)
ALT SERPL W/O P-5'-P-CCNC: 29 U/L (ref 10–44)
ANION GAP SERPL CALC-SCNC: 7 MMOL/L (ref 8–16)
AST SERPL-CCNC: 30 U/L (ref 10–40)
BASOPHILS # BLD AUTO: 0.03 K/UL (ref 0–0.2)
BASOPHILS NFR BLD: 0.8 % (ref 0–1.9)
BILIRUB SERPL-MCNC: 0.4 MG/DL (ref 0.1–1)
BUN SERPL-MCNC: 11 MG/DL (ref 8–23)
CALCIUM SERPL-MCNC: 9 MG/DL (ref 8.7–10.5)
CHLORIDE SERPL-SCNC: 106 MMOL/L (ref 95–110)
CO2 SERPL-SCNC: 25 MMOL/L (ref 23–29)
CREAT SERPL-MCNC: 0.9 MG/DL (ref 0.5–1.4)
DIFFERENTIAL METHOD BLD: ABNORMAL
EOSINOPHIL # BLD AUTO: 0 K/UL (ref 0–0.5)
EOSINOPHIL NFR BLD: 0.5 % (ref 0–8)
ERYTHROCYTE [DISTWIDTH] IN BLOOD BY AUTOMATED COUNT: 13.7 % (ref 11.5–14.5)
EST. GFR  (NO RACE VARIABLE): >60 ML/MIN/1.73 M^2
GLUCOSE SERPL-MCNC: 105 MG/DL (ref 70–110)
HCT VFR BLD AUTO: 45.5 % (ref 40–54)
HGB BLD-MCNC: 14.4 G/DL (ref 14–18)
IMM GRANULOCYTES # BLD AUTO: 0.02 K/UL (ref 0–0.04)
IMM GRANULOCYTES NFR BLD AUTO: 0.5 % (ref 0–0.5)
LYMPHOCYTES # BLD AUTO: 1 K/UL (ref 1–4.8)
LYMPHOCYTES NFR BLD: 25.1 % (ref 18–48)
MCH RBC QN AUTO: 30.4 PG (ref 27–31)
MCHC RBC AUTO-ENTMCNC: 31.6 G/DL (ref 32–36)
MCV RBC AUTO: 96 FL (ref 82–98)
MONOCYTES # BLD AUTO: 0.5 K/UL (ref 0.3–1)
MONOCYTES NFR BLD: 14 % (ref 4–15)
NEUTROPHILS # BLD AUTO: 2.3 K/UL (ref 1.8–7.7)
NEUTROPHILS NFR BLD: 59.1 % (ref 38–73)
NRBC BLD-RTO: 0 /100 WBC
PLATELET # BLD AUTO: 192 K/UL (ref 150–450)
PMV BLD AUTO: 9.6 FL (ref 9.2–12.9)
POTASSIUM SERPL-SCNC: 4 MMOL/L (ref 3.5–5.1)
PROT SERPL-MCNC: 8.3 G/DL (ref 6–8.4)
RBC # BLD AUTO: 4.74 M/UL (ref 4.6–6.2)
SODIUM SERPL-SCNC: 138 MMOL/L (ref 136–145)
WBC # BLD AUTO: 3.87 K/UL (ref 3.9–12.7)

## 2025-02-13 PROCEDURE — 80053 COMPREHEN METABOLIC PANEL: CPT | Mod: TXP | Performed by: STUDENT IN AN ORGANIZED HEALTH CARE EDUCATION/TRAINING PROGRAM

## 2025-02-13 PROCEDURE — 85025 COMPLETE CBC W/AUTO DIFF WBC: CPT | Mod: TXP | Performed by: STUDENT IN AN ORGANIZED HEALTH CARE EDUCATION/TRAINING PROGRAM

## 2025-02-13 PROCEDURE — 36415 COLL VENOUS BLD VENIPUNCTURE: CPT | Mod: TXP | Performed by: STUDENT IN AN ORGANIZED HEALTH CARE EDUCATION/TRAINING PROGRAM

## 2025-02-14 ENCOUNTER — PATIENT MESSAGE (OUTPATIENT)
Dept: RHEUMATOLOGY | Facility: CLINIC | Age: 65
End: 2025-02-14
Payer: MEDICARE

## 2025-02-16 ENCOUNTER — RESULTS FOLLOW-UP (OUTPATIENT)
Dept: RHEUMATOLOGY | Facility: CLINIC | Age: 65
End: 2025-02-16

## 2025-02-18 DIAGNOSIS — J84.9 ILD (INTERSTITIAL LUNG DISEASE): Primary | ICD-10-CM

## 2025-02-21 ENCOUNTER — TELEPHONE (OUTPATIENT)
Dept: TRANSPLANT | Facility: CLINIC | Age: 65
End: 2025-02-21
Payer: MEDICARE

## 2025-02-24 ENCOUNTER — TELEPHONE (OUTPATIENT)
Dept: RHEUMATOLOGY | Facility: CLINIC | Age: 65
End: 2025-02-24
Payer: MEDICARE

## 2025-02-24 DIAGNOSIS — R21 FACIAL RASH: Primary | ICD-10-CM

## 2025-03-05 ENCOUNTER — INFUSION (OUTPATIENT)
Dept: INFUSION THERAPY | Facility: HOSPITAL | Age: 65
End: 2025-03-05
Payer: MEDICARE

## 2025-03-05 VITALS
RESPIRATION RATE: 18 BRPM | SYSTOLIC BLOOD PRESSURE: 142 MMHG | OXYGEN SATURATION: 100 % | BODY MASS INDEX: 26.66 KG/M2 | WEIGHT: 170.19 LBS | TEMPERATURE: 98 F | DIASTOLIC BLOOD PRESSURE: 79 MMHG | HEART RATE: 66 BPM

## 2025-03-05 DIAGNOSIS — J84.9 ILD (INTERSTITIAL LUNG DISEASE): Primary | ICD-10-CM

## 2025-03-05 DIAGNOSIS — R76.8 MELANOMA DIFFERENTIATION-ASSOCIATED PROTEIN 5 (MDA-5) ANTIBODY POSITIVE: ICD-10-CM

## 2025-03-05 PROCEDURE — 63600175 PHARM REV CODE 636 W HCPCS: Performed by: INTERNAL MEDICINE

## 2025-03-05 PROCEDURE — 96375 TX/PRO/DX INJ NEW DRUG ADDON: CPT

## 2025-03-05 PROCEDURE — 25000003 PHARM REV CODE 250: Performed by: INTERNAL MEDICINE

## 2025-03-05 PROCEDURE — 96365 THER/PROPH/DIAG IV INF INIT: CPT

## 2025-03-05 PROCEDURE — 96366 THER/PROPH/DIAG IV INF ADDON: CPT

## 2025-03-05 RX ORDER — FAMOTIDINE 10 MG/ML
20 INJECTION INTRAVENOUS
Status: CANCELLED | OUTPATIENT
Start: 2025-03-05

## 2025-03-05 RX ORDER — ACETAMINOPHEN 325 MG/1
650 TABLET ORAL
Status: COMPLETED | OUTPATIENT
Start: 2025-03-05 | End: 2025-03-05

## 2025-03-05 RX ORDER — HEPARIN 100 UNIT/ML
500 SYRINGE INTRAVENOUS
Status: CANCELLED | OUTPATIENT
Start: 2025-03-05

## 2025-03-05 RX ORDER — ACETAMINOPHEN 325 MG/1
650 TABLET ORAL
Status: CANCELLED | OUTPATIENT
Start: 2025-03-05

## 2025-03-05 RX ORDER — SODIUM CHLORIDE 0.9 % (FLUSH) 0.9 %
10 SYRINGE (ML) INJECTION
Status: CANCELLED | OUTPATIENT
Start: 2025-03-05

## 2025-03-05 RX ORDER — DIPHENHYDRAMINE HYDROCHLORIDE 50 MG/ML
25 INJECTION INTRAMUSCULAR; INTRAVENOUS
Status: CANCELLED | OUTPATIENT
Start: 2025-03-05

## 2025-03-05 RX ORDER — FAMOTIDINE 10 MG/ML
20 INJECTION INTRAVENOUS
Status: COMPLETED | OUTPATIENT
Start: 2025-03-05 | End: 2025-03-05

## 2025-03-05 RX ORDER — DIPHENHYDRAMINE HYDROCHLORIDE 50 MG/ML
25 INJECTION INTRAMUSCULAR; INTRAVENOUS
Status: COMPLETED | OUTPATIENT
Start: 2025-03-05 | End: 2025-03-05

## 2025-03-05 RX ADMIN — FAMOTIDINE 20 MG: 10 INJECTION, SOLUTION INTRAVENOUS at 08:03

## 2025-03-05 RX ADMIN — ACETAMINOPHEN 650 MG: 325 TABLET ORAL at 08:03

## 2025-03-05 RX ADMIN — DIPHENHYDRAMINE HYDROCHLORIDE 25 MG: 50 INJECTION INTRAMUSCULAR; INTRAVENOUS at 08:03

## 2025-03-05 RX ADMIN — HUMAN IMMUNOGLOBULIN G 80 G: 40 LIQUID INTRAVENOUS at 09:03

## 2025-03-05 NOTE — PLAN OF CARE
Pt arrived to clinic by foot without difficulty, AAOx4. Denies any new or worsening of symptoms since last visit. Pt received Privigen infusion via 24g L FA, titrated per order parameters (refer to MAR) Dsg c/d/i; no s/s of infection or infiltration noted. PIV flushed and patent with blood return. Pt tolerated medication well. No S&S of an adverse reaction, VSS. Denies pain or discomfort. Care plan discussed with pt. Pt verbalized understanding. Pt aware of upcoming appointment via MyChart. Pt ambulatory upon discharge in NAD.

## 2025-03-06 ENCOUNTER — INFUSION (OUTPATIENT)
Dept: INFUSION THERAPY | Facility: HOSPITAL | Age: 65
End: 2025-03-06
Attending: INTERNAL MEDICINE
Payer: MEDICARE

## 2025-03-06 VITALS
HEART RATE: 66 BPM | TEMPERATURE: 98 F | RESPIRATION RATE: 18 BRPM | WEIGHT: 176.56 LBS | SYSTOLIC BLOOD PRESSURE: 160 MMHG | BODY MASS INDEX: 27.66 KG/M2 | DIASTOLIC BLOOD PRESSURE: 76 MMHG | OXYGEN SATURATION: 96 %

## 2025-03-06 DIAGNOSIS — J84.9 ILD (INTERSTITIAL LUNG DISEASE): Primary | ICD-10-CM

## 2025-03-06 DIAGNOSIS — R76.8 MELANOMA DIFFERENTIATION-ASSOCIATED PROTEIN 5 (MDA-5) ANTIBODY POSITIVE: ICD-10-CM

## 2025-03-06 PROCEDURE — 96366 THER/PROPH/DIAG IV INF ADDON: CPT

## 2025-03-06 PROCEDURE — 96375 TX/PRO/DX INJ NEW DRUG ADDON: CPT

## 2025-03-06 PROCEDURE — 96365 THER/PROPH/DIAG IV INF INIT: CPT

## 2025-03-06 PROCEDURE — 25000003 PHARM REV CODE 250: Performed by: INTERNAL MEDICINE

## 2025-03-06 PROCEDURE — 63600175 PHARM REV CODE 636 W HCPCS: Mod: JZ,TB | Performed by: INTERNAL MEDICINE

## 2025-03-06 RX ORDER — SODIUM CHLORIDE 0.9 % (FLUSH) 0.9 %
10 SYRINGE (ML) INJECTION
OUTPATIENT
Start: 2025-03-06

## 2025-03-06 RX ORDER — FAMOTIDINE 10 MG/ML
20 INJECTION INTRAVENOUS
Status: COMPLETED | OUTPATIENT
Start: 2025-03-06 | End: 2025-03-06

## 2025-03-06 RX ORDER — ACETAMINOPHEN 325 MG/1
650 TABLET ORAL
OUTPATIENT
Start: 2025-03-06

## 2025-03-06 RX ORDER — ACETAMINOPHEN 325 MG/1
650 TABLET ORAL
Status: COMPLETED | OUTPATIENT
Start: 2025-03-06 | End: 2025-03-06

## 2025-03-06 RX ORDER — DIPHENHYDRAMINE HYDROCHLORIDE 50 MG/ML
25 INJECTION INTRAMUSCULAR; INTRAVENOUS
OUTPATIENT
Start: 2025-03-06

## 2025-03-06 RX ORDER — HEPARIN 100 UNIT/ML
500 SYRINGE INTRAVENOUS
OUTPATIENT
Start: 2025-03-06

## 2025-03-06 RX ORDER — FAMOTIDINE 10 MG/ML
20 INJECTION INTRAVENOUS
OUTPATIENT
Start: 2025-03-06

## 2025-03-06 RX ORDER — DIPHENHYDRAMINE HYDROCHLORIDE 50 MG/ML
25 INJECTION INTRAMUSCULAR; INTRAVENOUS
Status: COMPLETED | OUTPATIENT
Start: 2025-03-06 | End: 2025-03-06

## 2025-03-06 RX ADMIN — DIPHENHYDRAMINE HYDROCHLORIDE 25 MG: 50 INJECTION INTRAMUSCULAR; INTRAVENOUS at 08:03

## 2025-03-06 RX ADMIN — FAMOTIDINE 20 MG: 10 INJECTION, SOLUTION INTRAVENOUS at 08:03

## 2025-03-06 RX ADMIN — ACETAMINOPHEN 650 MG: 325 TABLET ORAL at 08:03

## 2025-03-06 RX ADMIN — HUMAN IMMUNOGLOBULIN G 80 G: 40 LIQUID INTRAVENOUS at 08:03

## 2025-03-06 NOTE — PLAN OF CARE
Pt arrived to clinic by foot unassisted without difficulty, AAOx4. Denies any new or worsening of symptoms since last visit. Pt received Privigen infusion via 24g L FA, titrated per order parameters (refer to MAR). Dsg c/d/i; no s/s of infection or infiltration noted. PIV flushed and patent with blood return. Pt tolerated medication well. No S&S of an adverse reaction, VSS. Denies pain or discomfort. Care plan discussed with pt. Pt verbalized understanding. Pt aware of upcoming appointment via MyChart. Pt ambulatory upon discharge in NAD.

## 2025-03-12 ENCOUNTER — PATIENT MESSAGE (OUTPATIENT)
Dept: RHEUMATOLOGY | Facility: CLINIC | Age: 65
End: 2025-03-12
Payer: MEDICARE

## 2025-03-13 ENCOUNTER — LAB VISIT (OUTPATIENT)
Dept: LAB | Facility: HOSPITAL | Age: 65
End: 2025-03-13
Attending: STUDENT IN AN ORGANIZED HEALTH CARE EDUCATION/TRAINING PROGRAM
Payer: MEDICARE

## 2025-03-13 DIAGNOSIS — R76.8 MDA5 ANTIBODY POSITIVE: ICD-10-CM

## 2025-03-13 DIAGNOSIS — J84.9 ILD (INTERSTITIAL LUNG DISEASE): ICD-10-CM

## 2025-03-13 LAB
ALBUMIN SERPL BCP-MCNC: 3.7 G/DL (ref 3.5–5.2)
ALP SERPL-CCNC: 48 U/L (ref 40–150)
ALT SERPL W/O P-5'-P-CCNC: 19 U/L (ref 10–44)
ANION GAP SERPL CALC-SCNC: 8 MMOL/L (ref 8–16)
AST SERPL-CCNC: 25 U/L (ref 10–40)
BASOPHILS # BLD AUTO: 0.03 K/UL (ref 0–0.2)
BASOPHILS NFR BLD: 0.8 % (ref 0–1.9)
BILIRUB SERPL-MCNC: 0.4 MG/DL (ref 0.1–1)
BUN SERPL-MCNC: 15 MG/DL (ref 8–23)
CALCIUM SERPL-MCNC: 8.8 MG/DL (ref 8.7–10.5)
CHLORIDE SERPL-SCNC: 105 MMOL/L (ref 95–110)
CO2 SERPL-SCNC: 24 MMOL/L (ref 23–29)
CREAT SERPL-MCNC: 0.9 MG/DL (ref 0.5–1.4)
DIFFERENTIAL METHOD BLD: ABNORMAL
EOSINOPHIL # BLD AUTO: 0.1 K/UL (ref 0–0.5)
EOSINOPHIL NFR BLD: 2.8 % (ref 0–8)
ERYTHROCYTE [DISTWIDTH] IN BLOOD BY AUTOMATED COUNT: 13.7 % (ref 11.5–14.5)
EST. GFR  (NO RACE VARIABLE): >60 ML/MIN/1.73 M^2
GLUCOSE SERPL-MCNC: 102 MG/DL (ref 70–110)
HCT VFR BLD AUTO: 45.8 % (ref 40–54)
HGB BLD-MCNC: 15 G/DL (ref 14–18)
IMM GRANULOCYTES # BLD AUTO: 0.02 K/UL (ref 0–0.04)
IMM GRANULOCYTES NFR BLD AUTO: 0.6 % (ref 0–0.5)
LYMPHOCYTES # BLD AUTO: 0.9 K/UL (ref 1–4.8)
LYMPHOCYTES NFR BLD: 24.4 % (ref 18–48)
MCH RBC QN AUTO: 31.5 PG (ref 27–31)
MCHC RBC AUTO-ENTMCNC: 32.8 G/DL (ref 32–36)
MCV RBC AUTO: 96 FL (ref 82–98)
MONOCYTES # BLD AUTO: 0.7 K/UL (ref 0.3–1)
MONOCYTES NFR BLD: 18.7 % (ref 4–15)
NEUTROPHILS # BLD AUTO: 1.9 K/UL (ref 1.8–7.7)
NEUTROPHILS NFR BLD: 52.7 % (ref 38–73)
NRBC BLD-RTO: 0 /100 WBC
PLATELET # BLD AUTO: 196 K/UL (ref 150–450)
PMV BLD AUTO: 9.5 FL (ref 9.2–12.9)
POTASSIUM SERPL-SCNC: 3.3 MMOL/L (ref 3.5–5.1)
PROT SERPL-MCNC: 8.9 G/DL (ref 6–8.4)
RBC # BLD AUTO: 4.76 M/UL (ref 4.6–6.2)
SODIUM SERPL-SCNC: 137 MMOL/L (ref 136–145)
WBC # BLD AUTO: 3.53 K/UL (ref 3.9–12.7)

## 2025-03-13 PROCEDURE — 36415 COLL VENOUS BLD VENIPUNCTURE: CPT | Mod: TXP | Performed by: STUDENT IN AN ORGANIZED HEALTH CARE EDUCATION/TRAINING PROGRAM

## 2025-03-13 PROCEDURE — 85025 COMPLETE CBC W/AUTO DIFF WBC: CPT | Mod: TXP | Performed by: STUDENT IN AN ORGANIZED HEALTH CARE EDUCATION/TRAINING PROGRAM

## 2025-03-13 PROCEDURE — 80053 COMPREHEN METABOLIC PANEL: CPT | Mod: TXP | Performed by: STUDENT IN AN ORGANIZED HEALTH CARE EDUCATION/TRAINING PROGRAM

## 2025-03-14 ENCOUNTER — TELEPHONE (OUTPATIENT)
Dept: TRANSPLANT | Facility: CLINIC | Age: 65
End: 2025-03-14
Payer: MEDICARE

## 2025-03-17 ENCOUNTER — HOSPITAL ENCOUNTER (OUTPATIENT)
Dept: PULMONOLOGY | Facility: CLINIC | Age: 65
Discharge: HOME | End: 2025-03-17
Attending: PHYSICIAN ASSISTANT
Payer: MEDICARE

## 2025-03-17 ENCOUNTER — PATIENT MESSAGE (OUTPATIENT)
Dept: CARDIOLOGY | Facility: CLINIC | Age: 65
End: 2025-03-17
Payer: MEDICARE

## 2025-03-17 ENCOUNTER — PATIENT MESSAGE (OUTPATIENT)
Dept: TRANSPLANT | Facility: CLINIC | Age: 65
End: 2025-03-17

## 2025-03-17 ENCOUNTER — OFFICE VISIT (OUTPATIENT)
Dept: TRANSPLANT | Facility: CLINIC | Age: 65
End: 2025-03-17
Attending: PHYSICIAN ASSISTANT
Payer: MEDICARE

## 2025-03-17 ENCOUNTER — HOSPITAL ENCOUNTER (OUTPATIENT)
Dept: RADIOLOGY | Facility: HOSPITAL | Age: 65
Discharge: HOME | End: 2025-03-17
Attending: PHYSICIAN ASSISTANT
Payer: MEDICARE

## 2025-03-17 VITALS
SYSTOLIC BLOOD PRESSURE: 141 MMHG | WEIGHT: 176 LBS | HEIGHT: 67 IN | DIASTOLIC BLOOD PRESSURE: 77 MMHG | HEART RATE: 67 BPM | TEMPERATURE: 98 F | OXYGEN SATURATION: 100 % | BODY MASS INDEX: 27.62 KG/M2 | RESPIRATION RATE: 16 BRPM

## 2025-03-17 VITALS — BODY MASS INDEX: 27.93 KG/M2 | WEIGHT: 177.94 LBS | HEIGHT: 67 IN

## 2025-03-17 DIAGNOSIS — J84.9 ILD (INTERSTITIAL LUNG DISEASE): ICD-10-CM

## 2025-03-17 DIAGNOSIS — J84.9 ILD (INTERSTITIAL LUNG DISEASE): Primary | ICD-10-CM

## 2025-03-17 DIAGNOSIS — J84.9 INTERSTITIAL PULMONARY DISEASE, UNSPECIFIED: ICD-10-CM

## 2025-03-17 LAB
DLCO ADJ PRE: 13.92 ML/(MIN*MMHG) (ref 18.71–32.57)
DLCO SINGLE BREATH LLN: 18.71
DLCO SINGLE BREATH PRE REF: 54.9 %
DLCO SINGLE BREATH REF: 25.64
DLCOC SBVA LLN: 2.66
DLCOC SBVA PRE REF: 86.5 %
DLCOC SBVA REF: 3.93
DLCOC SINGLE BREATH LLN: 18.71
DLCOC SINGLE BREATH PRE REF: 54.3 %
DLCOC SINGLE BREATH REF: 25.64
DLCOCSBVAULN: 5.21
DLCOCSINGLEBREATHULN: 32.57
DLCOCSINGLEBREATHZSCORE: -2.78
DLCOSINGLEBREATHULN: 32.57
DLCOSINGLEBREATHZSCORE: -2.75
DLCOVA LLN: 2.66
DLCOVA PRE REF: 87.5 %
DLCOVA PRE: 3.44 ML/(MIN*MMHG*L) (ref 2.66–5.21)
DLCOVA REF: 3.93
DLCOVAULN: 5.21
DLVAADJ PRE: 3.41 ML/(MIN*MMHG*L) (ref 2.66–5.21)
ERVN2 LLN: -16448.95
ERVN2 PRE REF: 41.3 %
ERVN2 PRE: 0.43 L (ref -16448.95–16451.05)
ERVN2 REF: 1.05
ERVN2ULN: ABNORMAL
FEF 25 75 LLN: 1.5
FEF 25 75 PRE REF: 51 %
FEF 25 75 REF: 3.21
FEV05 LLN: 1.31
FEV05 REF: 2.45
FEV1 FVC LLN: 68
FEV1 FVC PRE REF: 94.2 %
FEV1 FVC REF: 79
FEV1 LLN: 2.03
FEV1 PRE REF: 78.6 %
FEV1 REF: 2.79
FEV1FVCZSCORE: -0.69
FEV1ZSCORE: -1.3
FRCN2 LLN: 2.49
FRCN2 PRE REF: 50.9 %
FRCN2 REF: 3.48
FRCN2ULN: 4.46
FVC LLN: 2.62
FVC PRE REF: 83.5 %
FVC REF: 3.52
FVCZSCORE: -1.05
ICN2REF: 2.86
IVC PRE: 2.83 L (ref 2.62–4.44)
IVC SINGLE BREATH LLN: 2.62
IVC SINGLE BREATH PRE REF: 80.3 %
IVC SINGLE BREATH REF: 3.52
IVCSINGLEBREATHULN: 4.44
LLN IC N2: -9999997.14
PEF LLN: 5.81
PEF PRE REF: 96.2 %
PEF REF: 7.8
PHYSICIAN COMMENT: ABNORMAL
PRE DLCO: 14.08 ML/(MIN*MMHG) (ref 18.71–32.57)
PRE FEF 25 75: 1.63 L/S (ref 1.5–4.92)
PRE FET 100: 6.61 SEC
PRE FEV05 REF: 70.3 %
PRE FEV1 FVC: 74.71 % (ref 67.91–89.52)
PRE FEV1: 2.2 L (ref 2.03–3.51)
PRE FEV5: 1.72 L (ref 1.31–3.58)
PRE FRC N2: 1.77 L (ref 2.49–4.46)
PRE FVC: 2.94 L (ref 2.62–4.44)
PRE IC N2: 2.51 L (ref -9999997.14–#######.####)
PRE PEF: 7.51 L/S (ref 5.81–9.79)
PRE REF IC N2: 87.6 %
RVN2 LLN: 1.75
RVN2 PRE REF: 55 %
RVN2 PRE: 1.34 L (ref 1.75–3.1)
RVN2 REF: 2.43
RVN2TLCN2 LLN: 30.33
RVN2TLCN2 PRE REF: 79.5 %
RVN2TLCN2 PRE: 31.25 % (ref 30.33–48.29)
RVN2TLCN2 REF: 39.31
RVN2TLCN2ULN: 48.29
RVN2ULN: 3.1
TLCN2 LLN: 5.37
TLCN2 PRE REF: 65.6 %
TLCN2 PRE: 4.28 L (ref 5.37–7.67)
TLCN2 REF: 6.52
TLCN2ULN: 7.67
TLCN2ZSCORE: -3.2
ULN IC N2: ABNORMAL
VA PRE: 4.09 L (ref 6.37–6.37)
VA SINGLE BREATH LLN: 6.37
VA SINGLE BREATH PRE REF: 64.2 %
VA SINGLE BREATH REF: 6.37
VASINGLEBREATHULN: 6.37
VCMAXN2 LLN: 2.62
VCMAXN2 PRE REF: 83.5 %
VCMAXN2 PRE: 2.94 L (ref 2.62–4.44)
VCMAXN2 REF: 3.52
VCMAXN2ULN: 4.44

## 2025-03-17 PROCEDURE — 99999 PR PBB SHADOW E&M-EST. PATIENT-LVL III: CPT | Mod: PBBFAC,TXP,, | Performed by: PHYSICIAN ASSISTANT

## 2025-03-17 PROCEDURE — 1159F MED LIST DOCD IN RCRD: CPT | Mod: CPTII,NTX,S$GLB, | Performed by: PHYSICIAN ASSISTANT

## 2025-03-17 PROCEDURE — 1126F AMNT PAIN NOTED NONE PRSNT: CPT | Mod: CPTII,NTX,S$GLB, | Performed by: PHYSICIAN ASSISTANT

## 2025-03-17 PROCEDURE — 94010 BREATHING CAPACITY TEST: CPT | Mod: 59,NTX,S$GLB, | Performed by: INTERNAL MEDICINE

## 2025-03-17 PROCEDURE — 3077F SYST BP >= 140 MM HG: CPT | Mod: CPTII,NTX,S$GLB, | Performed by: PHYSICIAN ASSISTANT

## 2025-03-17 PROCEDURE — 94618 PULMONARY STRESS TESTING: CPT | Mod: NTX,S$GLB,, | Performed by: INTERNAL MEDICINE

## 2025-03-17 PROCEDURE — 99214 OFFICE O/P EST MOD 30 MIN: CPT | Mod: 25,NTX,S$GLB, | Performed by: PHYSICIAN ASSISTANT

## 2025-03-17 PROCEDURE — 71250 CT THORAX DX C-: CPT | Mod: 26,NTX,, | Performed by: RADIOLOGY

## 2025-03-17 PROCEDURE — 1160F RVW MEDS BY RX/DR IN RCRD: CPT | Mod: CPTII,NTX,S$GLB, | Performed by: PHYSICIAN ASSISTANT

## 2025-03-17 PROCEDURE — 3078F DIAST BP <80 MM HG: CPT | Mod: CPTII,NTX,S$GLB, | Performed by: PHYSICIAN ASSISTANT

## 2025-03-17 PROCEDURE — 1101F PT FALLS ASSESS-DOCD LE1/YR: CPT | Mod: CPTII,NTX,S$GLB, | Performed by: PHYSICIAN ASSISTANT

## 2025-03-17 PROCEDURE — 3288F FALL RISK ASSESSMENT DOCD: CPT | Mod: CPTII,NTX,S$GLB, | Performed by: PHYSICIAN ASSISTANT

## 2025-03-17 PROCEDURE — 71250 CT THORAX DX C-: CPT | Mod: TC,TXP

## 2025-03-17 PROCEDURE — 94729 DIFFUSING CAPACITY: CPT | Mod: NTX,S$GLB,, | Performed by: INTERNAL MEDICINE

## 2025-03-17 PROCEDURE — 3008F BODY MASS INDEX DOCD: CPT | Mod: CPTII,NTX,S$GLB, | Performed by: PHYSICIAN ASSISTANT

## 2025-03-17 PROCEDURE — 94727 GAS DIL/WSHOT DETER LNG VOL: CPT | Mod: NTX,S$GLB,, | Performed by: INTERNAL MEDICINE

## 2025-03-17 NOTE — PROGRESS NOTES
ADVANCED LUNG DISEASE FOLLOW UP VISIT                                                                                                                                            Reason for Visit:  ILD    Referring Physician: Rosemarie White P*    History of Present Illness: Darrell Corona is a 64 y.o. male who is on 0L of oxygen.  He is on no assisted ventilation.  His New York Heart Association Class is III and a Karnofsky score of 70% - Cares for self: Unable to carry on normal activity or active work. He is not diabetic.    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 Continues to improve from a respiratory standpoint. He states he is almost 100% back at his respiratory baseline. He has been transitioned from cytoxan and tacrolimus to rituximab and MMF. Currently on MMF 1500 mg BID and tolerating well. Tapering prednisone without worsening of dyspnea. Remains on 7.5 mg daily. Tolerating rituxan infusions. Remains on monthly IVIG.     Patient reports new rash of the bilateral arms, hands, and face. He is scheduled to follow up with dermatology and rheumatology. No exacerbations/hospitalizations since his last visit.     PER INITIAL HPI:    Pt presents today for initial evaluation of pulmonary fibrosis.  He states that he was in his usual state of health until June, when he went on a trip to [place].  On that trip, he noticed that when he walked up 5 flights of stairs he became very winded.  He usually walks up to 5 miles a day and is able to walk 10-15 flights of stairs without difficulty.  When he returned to [place], he was seen by his PCP who noted abnormal breath sounds and referred him to pulmonary.  He was seen by Dr. Watts and was noted to have pulmonary fibrosis on his CT chest.  He desaturated to 93% on 6MWT.  He had an PRESTON that was positive  and a negative RF.  He has continued to have ongoing dyspnea that has been progressive since June.  He denies any illnesses around the onset of symptoms or since onset of symptoms.  Denies any lower extremity swelling.  No palpitations but does monitor his pulse ox and notices tachycardia after he exerts himself.  He states that he monitors his oxygen and will see dips into the low 80's after he exerts himself.  He recently had an episode while at [employer] where he became lightheaded and felt like he was going to pass out.  He sat in the car and returned to baseline.  He then was seen by Dr. Goyal at [facility] [place] and started on a steroid course.  He has not noticed any difference with steroid use.  Labs for SSc and Sjogren's were checked and are pending.  He was referred to Dr. Scott for clinical trial consideration.  He has never had bronchoscopy or chest biopsy.      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  No pulmonary toxic medications.  Does have dogs at home.  No history of dog allergies.  No exposures to birds, chickens, down bedding, or zheng/chicken coops.  No environmental exposures.  No mold in the home.  He previously smoked but quit 30 years ago.  No history of lung disease up to this point.  No family history of lung disease.  No family history of autoimmune disease.  He does endorse Raynaud's.  He does have skin thickening and fissuring of his fingers with thickness on his PIPs.  No rashes.  No muscle symptoms.  Does have fatigue.  No joint pains.  No sinus disease or reflux.       No past medical history on file.    No past surgical history on file.    Allergies: Patient has no known allergies.    Current Outpatient Medications   Medication Sig    amLODIPine (NORVASC) 5 MG tablet Take 1 tablet (5 mg total) by mouth once daily.    mycophenolate  (CELLCEPT) 500 mg Tab Take 3 tablets (1,500 mg total) by mouth 2 (two) times daily.    pantoprazole (PROTONIX) 40 MG tablet Take 1 tablet (40 mg total) by mouth once daily.    predniSONE (DELTASONE) 5 MG tablet Take 3 tablets (15 mg total) by mouth once daily.    solifenacin (VESICARE) 10 MG tablet Take 1 tablet (10 mg total) by mouth once daily.    sulfamethoxazole-trimethoprim 400-80mg (BACTRIM,SEPTRA) 400-80 mg per tablet Take 1 tablet by mouth every Mon, Wed, Fri.    tamsulosin (FLOMAX) 0.4 mg Cap Take 1 capsule (0.4 mg total) by mouth once daily.    tenofovir alafenamide (VEMLIDY) 25 mg Tab Take 1 tablet (25 mg total) by mouth once daily.     No current facility-administered medications for this visit.       Immunization History   Administered Date(s) Administered    COVID-19, subunit, rS-nanoparticle, adjuvanted, PF, 5 mcg/0.5 mL(Novavax) 2024    Pneumococcal Conjugate - 20 Valent 2024     Family History:  No family history on file.  Social History     Substance and Sexual Activity   Alcohol Use Yes    Alcohol/week: 2.0 standard drinks of alcohol    Types: 1 Glasses of wine, 1 Cans of beer per week    Comment: 1 beer and wine every day      Social History     Substance and Sexual Activity   Drug Use Never      Social History     Socioeconomic History    Marital status:    Tobacco Use    Smoking status: Former     Current packs/day: 0.00     Types: Cigarettes     Quit date: 1991     Years since quittin.2    Smokeless tobacco: Never    Tobacco comments:     quit 30yrs ago   Substance and Sexual Activity    Alcohol use: Yes     Alcohol/week: 2.0 standard drinks of alcohol     Types: 1 Glasses of wine, 1 Cans of beer per week     Comment: 1 beer and wine every day    Drug use: Never    Sexual activity: Yes     Partners: Female   Social History Narrative    ** Merged History Encounter **          Social Drivers of Health     Financial Resource Strain: Patient Declined (2024)     Received from [facility]    Overall Financial Resource Strain (CARDIA)     Difficulty of Paying Living Expenses: Patient declined   Food Insecurity: Patient Declined (9/19/2024)    Received from [facility]    Hunger Vital Sign     Worried About Running Out of Food in the Last Year: Patient declined     Ran Out of Food in the Last Year: Patient declined   Transportation Needs: No Transportation Needs (9/19/2024)    Received from [facility]    PRAPARE - Transportation     Lack of Transportation (Medical): No     Lack of Transportation (Non-Medical): No   Physical Activity: Inactive (9/19/2024)    Received from [facility]    Exercise Vital Sign     Days of Exercise per Week: 0 days     Minutes of Exercise per Session: 0 min   Stress: Stress Concern Present (9/19/2024)    Received from [facility]    [employer] - Occupational Stress Questionnaire     Feeling of Stress : To some extent   Housing Stability: Unknown (9/19/2024)    Received from [facility]    Housing Stability Vital Sign     Unable to Pay for Housing in the Last Year: No     Review of Systems   Constitutional:  Positive for malaise/fatigue. Negative for chills, diaphoresis, fever and weight loss.   HENT:  Negative for congestion, ear discharge, ear pain, hearing loss, nosebleeds, sinus pain, sore throat and tinnitus.    Eyes:  Negative for blurred vision, double vision, photophobia, pain, discharge and redness.   Respiratory:  Positive for cough and shortness of breath. Negative for hemoptysis, sputum production, wheezing and stridor.    Cardiovascular:  Negative for chest pain, palpitations, orthopnea, claudication, leg swelling and PND.   Gastrointestinal:  Negative for abdominal pain, blood in stool, constipation, diarrhea, heartburn, melena, nausea and vomiting.   Genitourinary:  Negative for dysuria, flank pain, frequency, hematuria and urgency.   Musculoskeletal:  Negative for back pain, falls, joint pain, myalgias  and neck pain.   Skin:  Negative for itching and rash.   Neurological:  Negative for dizziness, tingling, tremors, sensory change, speech change, focal weakness, seizures, loss of consciousness, weakness and headaches.   Endo/Heme/Allergies:  Negative for environmental allergies and polydipsia. Does not bruise/bleed easily.   Psychiatric/Behavioral:  Negative for depression, hallucinations, memory loss, substance abuse and suicidal ideas. The patient is not nervous/anxious and does not have insomnia.      Vitals  There were no vitals taken for this visit.  Physical Exam  Vitals and nursing note reviewed.   Constitutional:       General: He is not in acute distress.     Appearance: He is well-developed. He is not diaphoretic.   HENT:      Head: Normocephalic and atraumatic.      Nose: Nose normal.      Mouth/Throat:      Pharynx: No oropharyngeal exudate.   Eyes:      General: No scleral icterus.     Conjunctiva/sclera: Conjunctivae normal.      Pupils: Pupils are equal, round, and reactive to light.   Neck:      Thyroid: No thyromegaly.      Vascular: No JVD.   Cardiovascular:      Rate and Rhythm: Normal rate and regular rhythm.      Heart sounds: Normal heart sounds. No murmur heard.     No friction rub. No gallop.   Pulmonary:      Effort: Pulmonary effort is normal. No respiratory distress.      Breath sounds: No stridor. Rales (bibasilar) present. No wheezing.   Abdominal:      General: Bowel sounds are normal. There is no distension.      Palpations: Abdomen is soft.      Tenderness: There is no abdominal tenderness.   Musculoskeletal:         General: Normal range of motion.      Cervical back: Normal range of motion and neck supple.   Skin:     General: Skin is warm and dry.      Findings: No erythema.      Comments: Skin thickening and fissuring of fingers and hands bilaterally.  Consistent with mechanics hands   Neurological:      Mental Status: He is alert and oriented to person, place, and time.       Cranial Nerves: No cranial nerve deficit.   Psychiatric:         Judgment: Judgment normal.         Labs:  Hospital Outpatient Visit on 03/17/2025   Component Date Value    Pre FVC 03/17/2025 2.94     PRE FEV5 03/17/2025 1.72     Pre FEV1 03/17/2025 2.20     Pre FEV1 FVC 03/17/2025 74.71     Pre FEF 25 75 03/17/2025 1.63     Pre PEF 03/17/2025 7.51     Pre  03/17/2025 6.61     Pre DLCO 03/17/2025 14.08 (L)     DLCO ADJ PRE 03/17/2025 13.92 (L)     DLCOVA PRE 03/17/2025 3.44     DLVAAdj PRE 03/17/2025 3.41     VA PRE 03/17/2025 4.09 (L)     IVC PRE 03/17/2025 2.83     TLCN2 PRE 03/17/2025 4.28 (L)     VCMAXN2 PRE 03/17/2025 2.94     PRE IC N2 03/17/2025 2.51     Pre FRC N2 03/17/2025 1.77 (L)     ERVN2 PRE 03/17/2025 0.43     RVN2 PRE 03/17/2025 1.34 (L)     [id number] PRE 03/17/2025 31.25     FVC Ref 03/17/2025 3.52     FVC LLN 03/17/2025 2.62     FVC Pre Ref 03/17/2025 83.5     FEV05 REF 03/17/2025 2.45     FEV05 LLN 03/17/2025 1.31     PRE FEV05 REF 03/17/2025 70.3     FEV1 Ref 03/17/2025 2.79     FEV1 LLN 03/17/2025 2.03     FEV1 Pre Ref 03/17/2025 78.6     FEV1 FVC Ref 03/17/2025 79     FEV1 FVC LLN 03/17/2025 68     FEV1 FVC Pre Ref 03/17/2025 94.2     FEF 25 75 Ref 03/17/2025 3.21     FEF 25 75 LLN 03/17/2025 1.50     FEF 25 75 Pre Ref 03/17/2025 51.0     PEF Ref 03/17/2025 7.80     PEF LLN 03/17/2025 5.81     PEF Pre Ref 03/17/2025 96.2     TLCN2 Ref 03/17/2025 6.52     TLCN2 LLN 03/17/2025 5.37     TLC N2 ULN 03/17/2025 7.67     TLCN2 Pre Ref 03/17/2025 65.6     VCMAXN2 Ref 03/17/2025 3.52     VCMAXN2 LLN 03/17/2025 2.62     VCMAX N2 ULN 03/17/2025 4.44     VCMAXN2 Pre Ref 03/17/2025 83.5     URU1PKZ 03/17/2025 2.86     LLN IC N2 03/17/2025 -2474781.14     ULN IC N2 03/17/2025 57823205.86     PRE REF IC N2 03/17/2025 87.6     FRCN2 Ref 03/17/2025 3.48     FRCN2 LLN 03/17/2025 2.49     FRC N2 ULN 03/17/2025 4.46     FRCN2 Pre Ref 03/17/2025 50.9     ERVN2 Ref 03/17/2025 1.05     ERVN2 LLN 03/17/2025  -13980.95     ERV N2 ULN 03/17/2025 93208.05     ERVN2 Pre Ref 03/17/2025 41.3     RVN2 Ref 03/17/2025 2.43     RVN2 LLN 03/17/2025 1.75     RV N2 ULN 03/17/2025 3.10     RVN2 Pre Ref 03/17/2025 55.0     EVT1PPIH1 Ref 03/17/2025 39.31     HOP9HHYM6 LLN 03/17/2025 30.33     RV N2 TLC N2 ULN 03/17/2025 48.29     RFR2WHKJ1 Pre Ref 03/17/2025 79.5     DLCO Single Breath Ref 03/17/2025 25.64     DLCO Single Breath LLN 03/17/2025 18.71     DLCO SINGLEBREATH ULN 03/17/2025 32.57     DLCO Single Breath Pre R* 03/17/2025 54.9     DLCOc Single Breath Ref 03/17/2025 25.64     DLCOc Single Breath LLN 03/17/2025 18.71     DLCOC SINGLEBREATH ULN 03/17/2025 32.57     DLCOc Single Breath Pre * 03/17/2025 54.3     DLCOVA Ref 03/17/2025 3.93     DLCOVA LLN 03/17/2025 2.66     DLCOVA ULN 03/17/2025 5.21     DLCOVA Pre Ref 03/17/2025 87.5     DLCOc SBVA Ref 03/17/2025 3.93     DLCOc SBVA LLN 03/17/2025 2.66     DLCOCSBVA ULN 03/17/2025 5.21     DLCOc SBVA Pre Ref 03/17/2025 86.5     VA Single Breath Ref 03/17/2025 6.37     VA Single Breath LLN 03/17/2025 6.37     VA SINGLEBREATH ULN 03/17/2025 6.37     VA Single Breath Pre Ref 03/17/2025 64.2     IVC Single Breath Ref 03/17/2025 3.52     IVC Single Breath LLN 03/17/2025 2.62     IVC SINGLEBREATH ULN 03/17/2025 4.44     IVC Single Breath Pre Ref 03/17/2025 80.3     FVCZSCORE 03/17/2025 -1.05     SXK7DHMNKG 03/17/2025 -1.30     [id number] 03/17/2025 -0.69     DZEG2MXISOD 03/17/2025 -3.20     DLCOSINGLEBREATHZSCORE 03/17/2025 -2.75     DLCOcSingleBreathZSCORE 03/17/2025 -2.78    Lab Visit on 03/13/2025   Component Date Value    WBC 03/13/2025 3.53 (L)     RBC 03/13/2025 4.76     Hemoglobin 03/13/2025 15.0     Hematocrit 03/13/2025 45.8     MCV 03/13/2025 96     MCH 03/13/2025 31.5 (H)     MCHC 03/13/2025 32.8     RDW 03/13/2025 13.7     Platelets 03/13/2025 196     MPV 03/13/2025 9.5     Immature Granulocytes 03/13/2025 0.6 (H)     Gran # (ANC) 03/13/2025 1.9     Immature Grans (Abs)  03/13/2025 0.02     Lymph # 03/13/2025 0.9 (L)     Mono # 03/13/2025 0.7     Eos # 03/13/2025 0.1     Baso # 03/13/2025 0.03     nRBC 03/13/2025 0     Gran % 03/13/2025 52.7     Lymph % 03/13/2025 24.4     Mono % 03/13/2025 18.7 (H)     Eosinophil % 03/13/2025 2.8     Basophil % 03/13/2025 0.8     Differential Method 03/13/2025 Automated     Sodium 03/13/2025 137     Potassium 03/13/2025 3.3 (L)     Chloride 03/13/2025 105     CO2 03/13/2025 24     Glucose 03/13/2025 102     BUN 03/13/2025 15     Creatinine 03/13/2025 0.9     Calcium 03/13/2025 8.8     Total Protein 03/13/2025 8.9 (H)     Albumin 03/13/2025 3.7     Total Bilirubin 03/13/2025 0.4     Alkaline Phosphatase 03/13/2025 48     AST 03/13/2025 25     ALT 03/13/2025 19     eGFR 03/13/2025 >60     Anion Gap 03/13/2025 8            10/8/2024    11:30 AM 8/29/2024     4:19 PM 7/30/2024     4:20 PM   Pulmonary Function Tests   FVC 2.32 liters 1.88 liters 2.49 liters   FEV1 1.69 liters 1.39 liters 1.85 liters   TLC (liters) 4.24 liters 3.53 liters 3.91 liters   DLCO (ml/mmHg sec) 12.6 ml/mmHg sec 8.95 ml/mmHg sec 12.7 ml/mmHg sec   FVC% 65.7 53 70   FEV1% 60 49 65   FEF 25-75 1.18 1.06 1.39   FEF 25-75% 36.9 33 43   TLC% 65 54 60   RV 1.84 1.65 1.42   RV% 75.9 67.8 58   DLCO% 49.1 34 49         3/17/2025    12:31 PM 1/3/2025    10:32 AM 10/8/2024     9:37 AM 8/29/2024     3:35 PM 7/30/2024     1:06 PM   6MW   6MWT Status completed without stopping completed without stopping completed without stopping completed without stopping completed without stopping   Patient Reported Dyspnea No complaints Dyspnea Dyspnea Dyspnea    Was O2 used? No No No No No   6MW Distance walked (feet) 1680 feet 1562 feet 1400 feet 1300 feet 1400 feet   Distance walked (meters) 512.06 meters 476.1 meters 426.72 meters 396.24 meters 426.72 meters   Did patient stop? No No No No No   Oxygen Saturation 100 % 98 % 96 % 95 % 99 %   Supplemental Oxygen Room Air Room Air Room Air Room Air Room  Air    Heart Rate 67 bpm 64 bpm 76 bpm 84 bpm 66 bpm   Blood Pressure 151/79 153/87 163/78 108/67 118/69   Alexa Dyspnea Rating  very light light moderate somewhat heavy moderate   Oxygen Saturation 100 % 98 % 93 % 88 % 93 %   Supplemental Oxygen Room Air Room Air Room Air Room Air Room Air    Heart Rate 88 bpm 81 bpm 91 bpm 98 bpm 85 bpm   Blood Pressure 166/83 176/89 160/83 130/71 157/79   Alexa Dyspnea Rating  light very light somewhat heavy very heavy heavy   Recovery Time (seconds) 53 seconds 53 seconds 53 seconds 95 seconds 85 seconds   Oxygen Saturation 100 % 98 % 96 % 96 % 97 %   Supplemental Oxygen Room Air Room Air Room Air Room Air Room Air    Heart Rate 76 bpm 79 bpm 82 bpm 84 bpm 73 bpm       Data saved with a previous flowsheet row definition       Imaging:  Results for orders placed during the hospital encounter of 07/30/24    CT Chest Without Contrast    Narrative  EXAMINATION:  CT CHEST WITHOUT CONTRAST    CLINICAL HISTORY:  Interstitial lung disease; Interstitial pulmonary disease, unspecified    TECHNIQUE:  Noncontrast images were obtained through the chest per protocol.  Coronal and sagittal images were reviewed.    COMPARISON:  None.    FINDINGS:  Structures at the base of the neck are unremarkable.  No axillary adenopathy.  Few shotty nonenlarged mediastinal nodes, nonspecific and possibly reactive.  No anterior pericardial effusion.  Aortic arch and coronary calcific atherosclerosis.  The trachea is clear.    The lungs are symmetrically expanded.  Patchy peripheral and lower lung predominant interstitial thickening and opacities.  More mild degree of ground-glass and reticulation peripherally at the upper lungs and apices.  A few subpleural subcentimeter nodular foci, for reference at the upper lobe measuring 0.5 cm (series 2, image 39).  A few punctate right upper lobe nodules (for reference series 3, image 87).  No evident honeycombing, bronchiectasis, or air trapping.  No pleural effusion or  pneumothorax.    Limited images through the unenhanced upper abdomen demonstrate no acute abnormality.  No aggressive osseous lesion.  Suspected degree of ossification at the posterior longitudinal ligament at C6-C7.  Multilevel spine DJD.    Impression  Patchy peripheral and lower lung interstitial thickening and opacities.  Overall appearance is nonspecific and could relate to sequela of fibrotic change versus other infectious or non-infectious inflammatory sequela.  No evident honeycombing, bronchiectasis, or significant air trapping by this exam.    Few scattered subcentimeter pulmonary nodules, technically indeterminate.  Attention at follow-up.    Additional findings as above.      Electronically signed by: Osvaldo Daniel  Date:    07/31/2024  Time:    11:21        Cardiodiagnostics:  Results for orders placed during the hospital encounter of 07/23/24    Echo    Interpretation Summary    Left Ventricle: The left ventricle is normal in size. Normal wall thickness. There is normal systolic function with a visually estimated ejection fraction of 55 - 60%. Global longitudinal strain is normal; -17.0%. There is normal diastolic function.    Right Ventricle: Normal right ventricular cavity size. Wall thickness is normal. Systolic function is normal.    Tricuspid Valve: There is mild regurgitation.    IVC/SVC: Normal venous pressure at 3 mmHg.        Assessment:  1. ILD (interstitial lung disease)            Plan:     Patient followed for MCTD-ILD. MDA5 antibody positive dermatomyositis. Initially presented to the clinic in early September for rapidly progressive ILD. Admitted 9/5-9/10 for acute hypoxemic respiratory failure and is now s/p pulse steroid, IVIG, and cytoxan infusions. He is tapering his steroids (currently on pred 7.5 mg daily) and remains on monthly IVIG and rituxan every 6 months. Continue MMF. CT chest with interval resolution of GGOs seen in August. Stable from previous. FVC and DLCO continue to  improve. No longer qualifies for oxygen therapy and 6MWT distance was >1600ft. Continue current management.      RTC in 3 months or sooner if needed. 6MWT and PFTs at routine visits.       Rosemarie White PA-C  [facility]

## 2025-03-17 NOTE — PROCEDURES
Darrell Corona is a 64 y.o.   male patient, who presents for a 6 minute walk test ordered by PAU White.  The diagnosis is Interstitial Lung Disease.  The patient's BMI is 27.9 kg/m2.  Predicted distance (lower limit of normal) is 379.38 meters.      Test Results:    The test was completed without stopping.  The total time walked was 360 seconds.  During walking, the patient reported:  Dyspnea.  The patient used no assistive devices during testing.  Oxygen saturation was measured with forehead pulse oximetry probe.      03/17/2025---------Distance: 512.06 meters (1680 feet)     O2 Sat % Supplemental Oxygen Heart Rate Blood Pressure Alexa Scale   Pre-exercise  (Resting) 100 % Room Air 67 bpm 151/79 mmHg 1   During Exercise 100 % Room Air 88 bpm 166/83 mmHg 2   Post-exercise  (Recovery) 100 % Room Air  76 bpm       Recovery Time: 53 seconds    Performing nurse/tech: Emerald MCKEON      PREVIOUS STUDY:   01/03/2025---------Distance: 476.1 meters (1562 feet)      O2 Sat % Supplemental Oxygen Heart Rate Blood Pressure Alexa Scale   Pre-exercise  (Resting) 98 % Room Air 64 bpm 153/87 mmHg 2   During Exercise 98 % Room Air 81 bpm 176/89 mmHg 1   Post-exercise  (Recovery) 98 % Room Air  79 bpm   mmHg        CLINICAL INTERPRETATION:  Six minute walk distance is 512.06 meters (1680 feet) with light dyspnea.  During exercise, there was no desaturation while breathing room air.  Both blood pressure and heart rate remained stable with walking.  Hypertension was present prior to exercise.  The patient did not report non-pulmonary symptoms during exercise.  Since the previous study in January 2025, exercise capacity is unchanged.  Based upon age and body mass index, exercise capacity is normal.

## 2025-03-17 NOTE — LETTER
March 17, 2025                      [facility]  [address]  [facility] [address]  Phone: [phone]   Patient: Darrell Corona   MR Number: [id number]   YOB: 1960   Date of Visit: 3/17/2025       Dear       Thank you for referring Darrell Corona to me for evaluation. Attached you will find relevant portions of my assessment and plan of care.    If you have questions, please do not hesitate to call me. I look forward to following Darrell Almazano along with you.    Sincerely,    Rosemarie White PA-C    Enclosure    If you would like to receive this communication electronically, please contact [email or web address] or [phone] to request [employer] access.    [employer] is a tool which provides read-only access to select patient information with whom you have a relationship. Its easy to use and provides real time access to review your patients record including encounter summaries, notes, results, and demographic information.    If you feel you have received this communication in error or would no longer like to receive these types of communications, please e-mail [email or web address]

## 2025-03-18 ENCOUNTER — PATIENT MESSAGE (OUTPATIENT)
Dept: RHEUMATOLOGY | Facility: CLINIC | Age: 65
End: 2025-03-18
Payer: MEDICARE

## 2025-03-18 RX ORDER — NIFEDIPINE 60 MG/1
60 TABLET, EXTENDED RELEASE ORAL DAILY
Qty: 90 TABLET | Refills: 3 | Status: SHIPPED | OUTPATIENT
Start: 2025-03-18 | End: 2026-03-18

## 2025-03-19 NOTE — PROGRESS NOTES
Subjective:      Patient ID: Darrell Corona is a 65 y.o. male.    Chief Complaint: MDA-5+ myositis and ILD     HPI: 65-year-old man with PMHx of CAD, HTN and rapidly progressive MDA-5+ myositis and ILD presents to clinic for follow up. Last OV in Jan - at that time he was doing well, tremors have improved since stopping the tacrolimus.    Interval History:  He presents with new rash of the face x 3 weeks and the chest, arms and legs x 10 days. He was seen by Derm ([facility]) and prescribed hydrocortisone cream was unable to tolerate so switched to non-steroidal cream (unsure of name). Per son, Derm thinks it's possibly an allergic dermatitis. He did allergy testing and skin biopsy this week. Otherwise, doing well. He thinks his strength and respiratory status is almost at baseline.     Initial Presentation:   Patient states he felt well until June, when he went on a trip to [place]. On that trip, he noticed shortness of breath on exertion. He felt winded after walking up a few flights of stairs. Prior to that trip, he could walk up to 5 miles a day and climb 10-15 flights of stairs without difficulty. On his return to [place], he saw his PCP who noted abnormal breath sounds and referred him to pulmonary. He was seen by Dr. Watts and was noted to have pulmonary fibrosis on his CT chest. He was started on high dose PO steroids, but patient notes no improvement in his breathing. He then was seen by Dr. Goyal at [facility]. Patient's daughter wanted to consider trials for investigational meds, so patient was referred to Dr. Scott (appt pending).     He was then seen by Dr. Reina with [facility] on 8/29. She noted a significant drop in his FVC (24% drop) and DLCO (29%) in a 1-month period. She was concerned for a rapidly progressive ILD. Plan at that time was repeat CT chest, continue steroids, start Ovef given progression, and follow up in 2 weeks with PFTs and 6MWT.      He was referred by  "Dr. Watts (Pulmonary) for positive PRESTON and restrictive lung disease and seen in our office on 9/5. During that visit, he reported that his breathing continues to worsen. He can only walk a block before he started to feel short of breath. Denied history of lung disease or lung issues in the past. He noted worsening discoloration and thickening of both hands in past couple months. History of Raynaud's since 2014, at that time his left index finger would turn white. Four years later he noticed fingers of both hands would turn white. Four years after that the fingers turned purple. Then two to three months ago, he started to notice thickening, cracking, and fissures of his fingers.     There was concern for rapidly progressive ILD. He was sent to the ED for IV steroids and immunotherapy. During the admission, he received IV solumedrol 1gm x4 doses, 5 days of IVIG 0.4g/kg (9/6-9/10) and received CYC received on 9/1.    Review of Systems   Constitutional:  Negative for fever and unexpected weight change.   HENT:  Negative for mouth sores and trouble swallowing.    Eyes:  Negative for redness.   Respiratory:  Negative for cough and shortness of breath.    Cardiovascular:  Negative for chest pain.   Gastrointestinal:  Negative for abdominal pain, blood in stool, constipation and diarrhea.   Genitourinary:  Negative for genital sores.   Musculoskeletal:  Negative for arthralgias, joint swelling and myalgias.   Skin:  Positive for rash.   Neurological:  Negative for tremors, weakness and headaches.   Hematological:  Does not bruise/bleed easily.      Objective:   /80   Pulse 61   Ht 5' 7" (1.702 m)   Wt 81 kg (178 lb 9.2 oz)   BMI 27.97 kg/m²   Physical Exam   Constitutional: He is oriented to person, place, and time. normal appearance. No distress.   HENT:   Head: Normocephalic and atraumatic.   Right Ear: External ear normal.   Left Ear: External ear normal.   Nose: Nose normal.   Mouth/Throat: Mucous membranes are " moist. No oropharyngeal exudate or posterior oropharyngeal erythema.   Eyes: Pupils are equal, round, and reactive to light. Conjunctivae are normal. Right eye exhibits no discharge. Left eye exhibits no discharge. No scleral icterus.   Cardiovascular: Normal rate, regular rhythm and normal pulses.   No murmur heard.  Pulmonary/Chest: Effort normal. No respiratory distress. He has no wheezes.   Pulmonary Comments: Crackles in bilateral lung bases  Abdominal: Bowel sounds are normal.   Musculoskeletal:         General: No swelling, tenderness or deformity.      Cervical back: Normal range of motion.      Right lower leg: No edema.      Left lower leg: No edema.   Neurological: He is oriented to person, place, and time.   Skin: Rash noted. No lesion noted.   skin thickening (improved from prior visit) - images attached       Right Side Rheumatological Exam     Muscle Strength (0-5 scale):  Deltoid:  5  Biceps: 5/5   Triceps:  4.8  : 5/5   Iliopsoas: 5  Quadriceps:  5   Distal Lower Extremity: 5    Left Side Rheumatological Exam     Muscle Strength (0-5 scale):  Deltoid:  5  Biceps: 5/5   Triceps:  4.8  :  5/5   Iliopsoas: 5  Quadriceps:  5   Distal Lower Extremity: 5                                          10/8/2024    11:30 AM 8/29/2024     4:19 PM 7/30/2024     4:20 PM   Pulmonary Function Tests   FVC 2.32 liters 1.88 liters 2.49 liters   FEV1 1.69 liters 1.39 liters 1.85 liters   TLC (liters) 4.24 liters 3.53 liters 3.91 liters   DLCO (ml/mmHg sec) 12.6 ml/mmHg sec 8.95 ml/mmHg sec 12.7 ml/mmHg sec   FVC% 65.7 53 70   FEV1% 60 49 65   FEF 25-75 1.18 1.06 1.39   FEF 25-75% 36.9 33 43   TLC% 65 54 60   RV 1.84 1.65 1.42   RV% 75.9 67.8 58   DLCO% 49.1 34 49       Assessment and plan:    65-year-old man with PMHx of HTN, Raynaud's (since 2014), MDA-5 positive myositis w/ associated ILD presents to clinic for follow up. Presents with new rash on face, chest, arms and leg. Otherwise, doing well. Strength and  respiratory status has improved.     Dermatomyositis MDA5 + with pulmonary involvement   PRESTON 1:640 speckled. Negative labwork: JASEN, ANCA, lupus anticoagulant, anti-cardiolipin, CCP/RF, GBM, Scl-70. Myomarker panel: + SSA, +MDA-5  S/p pulse Solu-Medrol 1 g IV x 4 days   S/p IVIG 0.4g/kg x 5 days (9/6/24 - 9/10/24 while inpatient). Then IVIG 1 gm/kg x 2 days q 4 wks starting 10/2-10/3/2024  S/p cyclophosphamide 750mg/m2 9/12/24. Then 1gm/m2 on 10/10 and 11/7/24  Tacrolimus d/c due to hand tremors     - Continue IV RTX 1000 mg q 6 months. Last dose 12/19 and 1/9. Next dose due June 19   - Today: CBC, CMP, ESR, CRP, CK, Aldolase, Hep B DNA  Then check CBC, CMP, ESR, CRP, CK, Aldolase, immunoglobulins, RTX sensitivity, Hep B panel prior to next RTX infusion  - Continue Prednisone 7.5 mg daily   - Continue MMF 1500 mg BID              - Not candidate for Toya given CAD/stent history  - Continue IVIG 1 gm/kg x 2 days every 4 weeks; last administered 3/5 and 3/6    2. Skin rash  Located on face, chest, arms and legs   Patient's son states likely allergic dermatitis  - Discontinue Bactrim   - Obtain records and skin biopsy (once back) from [facility]     3. ILD   CT chest 3/2025 - Mild reticular opacities predominately at the bases of the bilateral lower lobes. No new patchy ground-glass opacities. Stable interstitial lung disease   - Med as above  - Continue follow up with pulmonary, last seen 3/17    4. Drug-induced immunodeficiency   UTD on vaccines  - Safety labs as above  - Discontinue Bactrim as concern for drug skin reaction. Switch to Atovaquone     5. Hep B core positive Ab, no chronic Hep B  Transaminases wnl  Risk of reactivation of HBV with the use of Rituxan is high  - On Vemlidy  - Hep recommends ppx during immunosuppressive therapy and for at least 12 months after completion of immunosuppressive therapy  - Continue follow up w/ Hepatology     This patient was examined with Dr. Barcenas. Narda  discussed with the patient. Return to clinic before next RTX in June      Problem List Items Addressed This Visit          Pulmonary    ILD (interstitial lung disease)       GI    Hepatitis B core antibody positive     Other Visit Diagnoses         Drug-induced immunodeficiency    -  Primary      Dermatomyositis with respiratory involvement          MDA5 antibody positive                Mary Trevino MD  PGY-4, Rheumatology Fellow

## 2025-03-20 ENCOUNTER — OFFICE VISIT (OUTPATIENT)
Dept: RHEUMATOLOGY | Facility: CLINIC | Age: 65
End: 2025-03-20
Payer: MEDICARE

## 2025-03-20 ENCOUNTER — PATIENT MESSAGE (OUTPATIENT)
Dept: RHEUMATOLOGY | Facility: CLINIC | Age: 65
End: 2025-03-20

## 2025-03-20 VITALS
HEIGHT: 67 IN | SYSTOLIC BLOOD PRESSURE: 129 MMHG | HEART RATE: 61 BPM | BODY MASS INDEX: 28.02 KG/M2 | DIASTOLIC BLOOD PRESSURE: 80 MMHG | WEIGHT: 178.56 LBS

## 2025-03-20 DIAGNOSIS — Z79.899 DRUG-INDUCED IMMUNODEFICIENCY: Primary | ICD-10-CM

## 2025-03-20 DIAGNOSIS — R76.8 HEPATITIS B CORE ANTIBODY POSITIVE: ICD-10-CM

## 2025-03-20 DIAGNOSIS — J84.9 ILD (INTERSTITIAL LUNG DISEASE): ICD-10-CM

## 2025-03-20 DIAGNOSIS — M60.9 MYOSITIS, UNSPECIFIED MYOSITIS TYPE, UNSPECIFIED SITE: ICD-10-CM

## 2025-03-20 DIAGNOSIS — D84.821 DRUG-INDUCED IMMUNODEFICIENCY: Primary | ICD-10-CM

## 2025-03-20 DIAGNOSIS — R76.8 MDA5 ANTIBODY POSITIVE: ICD-10-CM

## 2025-03-20 DIAGNOSIS — M33.91 DERMATOMYOSITIS WITH RESPIRATORY INVOLVEMENT: ICD-10-CM

## 2025-03-20 PROBLEM — M33.13 DERMATOMYOSITIS: Status: ACTIVE | Noted: 2025-03-20

## 2025-03-20 PROCEDURE — 99999 PR PBB SHADOW E&M-EST. PATIENT-LVL IV: CPT | Mod: PBBFAC,GC,TXP, | Performed by: STUDENT IN AN ORGANIZED HEALTH CARE EDUCATION/TRAINING PROGRAM

## 2025-03-20 RX ORDER — MEPERIDINE HYDROCHLORIDE 50 MG/ML
25 INJECTION INTRAMUSCULAR; INTRAVENOUS; SUBCUTANEOUS
OUTPATIENT
Start: 2025-06-19

## 2025-03-20 RX ORDER — METHYLPREDNISOLONE SOD SUCC 125 MG
100 VIAL (EA) INJECTION
OUTPATIENT
Start: 2025-06-19

## 2025-03-20 RX ORDER — METHYLPREDNISOLONE SOD SUCC 125 MG
80 VIAL (EA) INJECTION
OUTPATIENT
Start: 2025-06-19

## 2025-03-20 RX ORDER — DIPHENHYDRAMINE HYDROCHLORIDE 50 MG/ML
50 INJECTION, SOLUTION INTRAMUSCULAR; INTRAVENOUS ONCE AS NEEDED
OUTPATIENT
Start: 2025-06-19

## 2025-03-20 RX ORDER — SODIUM CHLORIDE 0.9 % (FLUSH) 0.9 %
10 SYRINGE (ML) INJECTION
OUTPATIENT
Start: 2025-06-19

## 2025-03-20 RX ORDER — ATOVAQUONE 750 MG/5ML
1500 SUSPENSION ORAL DAILY
Qty: 210 ML | Refills: 3 | Status: SHIPPED | OUTPATIENT
Start: 2025-03-20

## 2025-03-20 RX ORDER — ACETAMINOPHEN 325 MG/1
650 TABLET ORAL
OUTPATIENT
Start: 2025-06-19

## 2025-03-20 RX ORDER — FAMOTIDINE 10 MG/ML
20 INJECTION, SOLUTION INTRAVENOUS
OUTPATIENT
Start: 2025-06-19

## 2025-03-20 RX ORDER — HEPARIN 100 UNIT/ML
500 SYRINGE INTRAVENOUS
OUTPATIENT
Start: 2025-06-19

## 2025-03-20 RX ORDER — EPINEPHRINE 0.3 MG/.3ML
0.3 INJECTION SUBCUTANEOUS ONCE AS NEEDED
OUTPATIENT
Start: 2025-06-19

## 2025-03-20 NOTE — PROGRESS NOTES
I have personally reviewed the history, confirmed exam findings, and discussed assessment and plan with fellow.      Latest Reference Range & Units 03/13/25 09:45   WBC 3.90 - 12.70 K/uL 3.53 (L)   RBC 4.60 - 6.20 M/uL 4.76   Hemoglobin 14.0 - 18.0 g/dL 15.0   Hematocrit 40.0 - 54.0 % 45.8   MCV 82 - 98 fL 96   MCH 27.0 - 31.0 pg 31.5 (H)   MCHC 32.0 - 36.0 g/dL 32.8   RDW 11.5 - 14.5 % 13.7   Platelet Count 150 - 450 K/uL 196   MPV 9.2 - 12.9 fL 9.5   Gran % 38.0 - 73.0 % 52.7   Lymph % 18.0 - 48.0 % 24.4   Mono % 4.0 - 15.0 % 18.7 (H)   Eos % 0.0 - 8.0 % 2.8   Basophil % 0.0 - 1.9 % 0.8   Immature Granulocytes 0.0 - 0.5 % 0.6 (H)   Gran # (ANC) 1.8 - 7.7 K/uL 1.9   Lymph # 1.0 - 4.8 K/uL 0.9 (L)   Mono # 0.3 - 1.0 K/uL 0.7   Eos # 0.0 - 0.5 K/uL 0.1   Baso # 0.00 - 0.20 K/uL 0.03   Immature Grans (Abs) 0.00 - 0.04 K/uL 0.02   nRBC 0 /100 WBC 0   Differential Method  Automated   Sodium 136 - 145 mmol/L 137   Potassium 3.5 - 5.1 mmol/L 3.3 (L)   Chloride 95 - 110 mmol/L 105   CO2 23 - 29 mmol/L 24   Anion Gap 8 - 16 mmol/L 8   BUN 8 - 23 mg/dL 15   Creatinine 0.5 - 1.4 mg/dL 0.9   eGFR >60 mL/min/1.73 m^2 >60   Glucose 70 - 110 mg/dL 102   Calcium 8.7 - 10.5 mg/dL 8.8   ALP 40 - 150 U/L 48   PROTEIN TOTAL 6.0 - 8.4 g/dL 8.9 (H)   Albumin 3.5 - 5.2 g/dL 3.7   BILIRUBIN TOTAL 0.1 - 1.0 mg/dL 0.4   AST 10 - 40 U/L 25   ALT 10 - 44 U/L 19   (L): Data is abnormally low  (H): Data is abnormally high  PFTs        FVC          TLC          RV          DLco      3/17/25  83.5            65.6        55            54.9  10/8/24    65.7           65           75.9         49.1  8/29/24    53.1           54.1        67.8         34.9  7/30/24    70.3           60           58.5         49.5     Darrell Corona is a 64 y.o.   male patient, who presents for a 6 minute walk test ordered by PAU White.  The diagnosis is Interstitial Lung Disease.  The patient's BMI is 27.9 kg/m2.  Predicted distance (lower limit of normal) is 379.38  meters.       Test Results:     The test was completed without stopping.  The total time walked was 360 seconds.  During walking, the patient reported:  Dyspnea.  The patient used no assistive devices during testing.  Oxygen saturation was measured with forehead pulse oximetry probe.       03/17/2025---------Distance: 512.06 meters (1680 feet)       O2 Sat % Supplemental Oxygen Heart Rate Blood Pressure Alexa Scale   Pre-exercise  (Resting) 100 % Room Air 67 bpm 151/79 mmHg 1   During Exercise 100 % Room Air 88 bpm 166/83 mmHg 2   Post-exercise  (Recovery) 100 % Room Air  76 bpm          Recovery Time: 53 seconds     Performing nurse/tech: Emerald MCKEON        PREVIOUS STUDY:   01/03/2025---------Distance: 476.1 meters (1562 feet)       O2 Sat % Supplemental Oxygen Heart Rate Blood Pressure Alexa Scale   Pre-exercise  (Resting) 98 % Room Air 64 bpm 153/87 mmHg 2   During Exercise 98 % Room Air 81 bpm 176/89 mmHg 1   Post-exercise  (Recovery) 98 % Room Air  79 bpm   mmHg           CLINICAL INTERPRETATION:  Six minute walk distance is 512.06 meters (1680 feet) with light dyspnea.  During exercise, there was no desaturation while breathing room air.  Both blood pressure and heart rate remained stable with walking.  Hypertension was present prior to exercise.  The patient did not report non-pulmonary symptoms during exercise.  Since the previous study in January 2025, exercise capacity is unchanged.  Based upon age and body mass index, exercise capacity is normal.       EXAMINATION:  CT CHEST WITHOUT CONTRAST     CLINICAL HISTORY:  Interstitial lung disease; Interstitial pulmonary disease, unspecified     TECHNIQUE:  Low dose axial images, sagittal and coronal reformations were obtained from the thoracic inlet to the lung bases. Contrast was not administered.     COMPARISON:  CT chest 2025, 08/30/2024     FINDINGS:  Support tubes and lines: None.     Aorta: Minimal calcific atherosclerosis.  Normal caliber.     Heart: Normal  size.     Coronary arteries: Probable left anterior descending stent.  Left-sided coronary artery calcifications.     Pericardium: Normal. No effusion, thickening, or calcification.     Central pulmonary arteries: Normal caliber.     Base of neck/thyroid: Unremarkable.     Lymph nodes: No supraclavicular, axillary, internal mammary, mediastinal, or hilar adenopathy.     Esophagus: Unremarkable.     Pleura: No effusion, thickening, or calcification.     Upper abdomen: Cholelithiasis.     Body wall: Unremarkable.     Airways: Unremarkable.     Lungs: Mild reticular opacities predominately at the bases of the bilateral lower lobes.  Degree of reticulation appears similar when compared to prior exam 01/08/2025.  No evidence of air trapping on expiratory imaging.  No new patchy ground-glass opacities.     Stable pulmonary micronodule in the right upper lobe (sequence 4 image 113).     Bones: Degenerative changes of the spine.  Degenerative disease noted in the shoulders.     Impression:     Stable interstitial lung disease as described in detail above.     Electronically signed by resident: Alvin Oleary  Date:                                            03/17/2025  Time:                                           14:19     Electronically signed by:Sekou Ingram  Date:                                            03/17/2025  Time:                                           18:14      TTE 7/23/24:            Left Ventricle: The left ventricle is normal in size. Normal wall thickness. There is normal systolic function with a visually estimated ejection fraction of 55 - 60%. Global longitudinal strain is normal; -17.0%. There is normal diastolic function.    Right Ventricle: Normal right ventricular cavity size. Wall thickness is normal. Systolic function is normal.    Tricuspid Valve: There is mild regurgitation.    IVC/SVC: Normal venous pressure at 3 mmHg.      ASSESSMENT:    Ritixumab 1000mg IV  12/19/24 and  1/9/25        Rash x 10 days see photos. Seeing outside Derm and and had skin biopsy right dorsal forearm  ? Bactrim  will stop  Rash not typical of MDA 5  Rapidly progressive MDA-5 + ILD  initial  Myomarker MDA-5 negative,  but OMRF positive, fine basilar crackles  now 1/4 way up previously 1/2 way up bilateral,  PFTs significantly improved.   Hand tremor with tacrolimus started 9/27/24 even on just 2 mg twice daily so will not be able to continue long term  stopped 11/21/24  S/p pulse Solu-Medrol 1 g IV x 4 days in [facility] then  prednisone 60mg daily  now tapered from  15mg daily last visit to 7.5mg daily now  S/p IVIg x 5 days in [facility] now 1g/kg IV daily x 2 q 4 wks  S/p cyclophosphamide 750mg/m2 9/12/24, 1000mg/m2 10/10/24 and 11/7/24  The rituximab 1000mg IV 12/19/24 and 1/9/25  Bactrim prophylaxis and PPI  As he still needs triple therapy, and  will need to stop tacrolimus b/o hand tremor, and not a candidate for Toya given CAD, stent, age, best option was to stop cyclophosphamide after his 3 monthly infusions, and changed to rituximab 1000mg IV  wk 0 and 2, and MMF starting with 500mg twice daily x 1 wk, then 1000mg twice daily x 1wk, then 1500mg twice daily    Raynaud's progressive since 2014  's Hands v. MDA 5 palmar lesions resolved  Subtle  nailfold capillary dropout and minimal capillary dilatation left ring and little finger  PRESTON+ 1:640 speckled neg profile   Proximal muscle weakness  4.9/5 deltoids, biceps 5/5  triceps 4.8/5  psoas 5/5 bilat others 5/5        PLAN:        CBC, CMP, ESR, CRP, CK,  aldolase, LD HBV DNA  today   Await Dermatology skin biopsy result  Stop Bactrim  F/[facility] Dr. Reina in [facility] this month   Schedule rituximab 1000mg IV 6/19/25 and 7/3/25  Continue MMF 1500mg po  twice daily    Keep prednisone 7.5mg daily for now  Continue  IVIg 1g/kg daily x 2 days q 4 wks.    Continue tenofovir 25mg daily   RTC early June prior to next rituximab with CBC, CMP, ESR, CRP CK aldolase, LD Rituxan  sensitivity, immunoglobulins HBsAb HBsAg HBV DNA and QG-TB

## 2025-03-21 ENCOUNTER — LAB VISIT (OUTPATIENT)
Dept: LAB | Facility: HOSPITAL | Age: 65
End: 2025-03-21
Attending: STUDENT IN AN ORGANIZED HEALTH CARE EDUCATION/TRAINING PROGRAM
Payer: MEDICARE

## 2025-03-21 ENCOUNTER — RESULTS FOLLOW-UP (OUTPATIENT)
Dept: RHEUMATOLOGY | Facility: CLINIC | Age: 65
End: 2025-03-21

## 2025-03-21 DIAGNOSIS — J84.9 ILD (INTERSTITIAL LUNG DISEASE): ICD-10-CM

## 2025-03-21 DIAGNOSIS — M33.91 DERMATOMYOSITIS WITH RESPIRATORY INVOLVEMENT: ICD-10-CM

## 2025-03-21 LAB
ALBUMIN SERPL BCP-MCNC: 4.1 G/DL (ref 3.5–5.2)
ALP SERPL-CCNC: 48 U/L (ref 40–150)
ALT SERPL W/O P-5'-P-CCNC: 23 U/L (ref 10–44)
ANION GAP SERPL CALC-SCNC: 9 MMOL/L (ref 8–16)
AST SERPL-CCNC: 28 U/L (ref 10–40)
BASOPHILS # BLD AUTO: 0.03 K/UL (ref 0–0.2)
BASOPHILS NFR BLD: 0.5 % (ref 0–1.9)
BILIRUB SERPL-MCNC: 0.4 MG/DL (ref 0.1–1)
BUN SERPL-MCNC: 14 MG/DL (ref 8–23)
CALCIUM SERPL-MCNC: 9.4 MG/DL (ref 8.7–10.5)
CHLORIDE SERPL-SCNC: 105 MMOL/L (ref 95–110)
CK SERPL-CCNC: 93 U/L (ref 20–200)
CO2 SERPL-SCNC: 25 MMOL/L (ref 23–29)
CREAT SERPL-MCNC: 0.9 MG/DL (ref 0.5–1.4)
CRP SERPL-MCNC: 0.6 MG/L (ref 0–8.2)
DIFFERENTIAL METHOD BLD: ABNORMAL
EOSINOPHIL # BLD AUTO: 0 K/UL (ref 0–0.5)
EOSINOPHIL NFR BLD: 0.7 % (ref 0–8)
ERYTHROCYTE [DISTWIDTH] IN BLOOD BY AUTOMATED COUNT: 13.6 % (ref 11.5–14.5)
ERYTHROCYTE [SEDIMENTATION RATE] IN BLOOD BY PHOTOMETRIC METHOD: 11 MM/HR (ref 0–23)
EST. GFR  (NO RACE VARIABLE): >60 ML/MIN/1.73 M^2
GLUCOSE SERPL-MCNC: 115 MG/DL (ref 70–110)
HCT VFR BLD AUTO: 48.7 % (ref 40–54)
HGB BLD-MCNC: 16 G/DL (ref 14–18)
IMM GRANULOCYTES # BLD AUTO: 0.05 K/UL (ref 0–0.04)
IMM GRANULOCYTES NFR BLD AUTO: 0.9 % (ref 0–0.5)
LYMPHOCYTES # BLD AUTO: 0.7 K/UL (ref 1–4.8)
LYMPHOCYTES NFR BLD: 13 % (ref 18–48)
MCH RBC QN AUTO: 31.6 PG (ref 27–31)
MCHC RBC AUTO-ENTMCNC: 32.9 G/DL (ref 32–36)
MCV RBC AUTO: 96 FL (ref 82–98)
MONOCYTES # BLD AUTO: 0.5 K/UL (ref 0.3–1)
MONOCYTES NFR BLD: 8.5 % (ref 4–15)
NEUTROPHILS # BLD AUTO: 4.3 K/UL (ref 1.8–7.7)
NEUTROPHILS NFR BLD: 76.4 % (ref 38–73)
NRBC BLD-RTO: 0 /100 WBC
PLATELET # BLD AUTO: 210 K/UL (ref 150–450)
PMV BLD AUTO: 9.5 FL (ref 9.2–12.9)
POTASSIUM SERPL-SCNC: 4.3 MMOL/L (ref 3.5–5.1)
PROT SERPL-MCNC: 8.8 G/DL (ref 6–8.4)
RBC # BLD AUTO: 5.07 M/UL (ref 4.6–6.2)
SODIUM SERPL-SCNC: 139 MMOL/L (ref 136–145)
WBC # BLD AUTO: 5.68 K/UL (ref 3.9–12.7)

## 2025-03-21 PROCEDURE — 85652 RBC SED RATE AUTOMATED: CPT | Mod: TXP | Performed by: STUDENT IN AN ORGANIZED HEALTH CARE EDUCATION/TRAINING PROGRAM

## 2025-03-21 PROCEDURE — 82550 ASSAY OF CK (CPK): CPT | Mod: TXP | Performed by: STUDENT IN AN ORGANIZED HEALTH CARE EDUCATION/TRAINING PROGRAM

## 2025-03-21 PROCEDURE — 80053 COMPREHEN METABOLIC PANEL: CPT | Mod: TXP | Performed by: STUDENT IN AN ORGANIZED HEALTH CARE EDUCATION/TRAINING PROGRAM

## 2025-03-21 PROCEDURE — 86140 C-REACTIVE PROTEIN: CPT | Mod: TXP | Performed by: STUDENT IN AN ORGANIZED HEALTH CARE EDUCATION/TRAINING PROGRAM

## 2025-03-21 PROCEDURE — 85025 COMPLETE CBC W/AUTO DIFF WBC: CPT | Mod: TXP | Performed by: STUDENT IN AN ORGANIZED HEALTH CARE EDUCATION/TRAINING PROGRAM

## 2025-03-21 PROCEDURE — 82085 ASSAY OF ALDOLASE: CPT | Mod: TXP | Performed by: STUDENT IN AN ORGANIZED HEALTH CARE EDUCATION/TRAINING PROGRAM

## 2025-03-21 PROCEDURE — 87517 HEPATITIS B DNA QUANT: CPT | Mod: TXP | Performed by: STUDENT IN AN ORGANIZED HEALTH CARE EDUCATION/TRAINING PROGRAM

## 2025-03-21 PROCEDURE — 36415 COLL VENOUS BLD VENIPUNCTURE: CPT | Mod: TXP | Performed by: STUDENT IN AN ORGANIZED HEALTH CARE EDUCATION/TRAINING PROGRAM

## 2025-03-22 LAB
ALDOLASE SERPL-CCNC: 7.9 U/L (ref 1.2–7.6)
ALDOLASE SERPL-CCNC: 7.9 U/L (ref 1.2–7.6)

## 2025-03-24 LAB
HBV DNA SERPL NAA+PROBE-ACNC: NOT DETECTED IU/ML
HEPATITIS B VIRUS DNA: NORMAL

## 2025-04-02 ENCOUNTER — INFUSION (OUTPATIENT)
Dept: INFUSION THERAPY | Facility: HOSPITAL | Age: 65
End: 2025-04-02
Attending: INTERNAL MEDICINE
Payer: MEDICARE

## 2025-04-02 ENCOUNTER — TELEPHONE (OUTPATIENT)
Dept: RHEUMATOLOGY | Facility: CLINIC | Age: 65
End: 2025-04-02
Payer: MEDICARE

## 2025-04-02 ENCOUNTER — TELEPHONE (OUTPATIENT)
Dept: TRANSPLANT | Facility: CLINIC | Age: 65
End: 2025-04-02
Payer: MEDICARE

## 2025-04-02 VITALS
TEMPERATURE: 98 F | SYSTOLIC BLOOD PRESSURE: 140 MMHG | WEIGHT: 178.38 LBS | OXYGEN SATURATION: 99 % | HEART RATE: 66 BPM | RESPIRATION RATE: 18 BRPM | BODY MASS INDEX: 28 KG/M2 | DIASTOLIC BLOOD PRESSURE: 77 MMHG | HEIGHT: 67 IN

## 2025-04-02 DIAGNOSIS — J84.9 ILD (INTERSTITIAL LUNG DISEASE): ICD-10-CM

## 2025-04-02 DIAGNOSIS — M33.13 DERMATOMYOSITIS: Primary | ICD-10-CM

## 2025-04-02 DIAGNOSIS — R76.8 MELANOMA DIFFERENTIATION-ASSOCIATED PROTEIN 5 (MDA-5) ANTIBODY POSITIVE: ICD-10-CM

## 2025-04-02 PROCEDURE — 96366 THER/PROPH/DIAG IV INF ADDON: CPT

## 2025-04-02 PROCEDURE — 63600175 PHARM REV CODE 636 W HCPCS: Performed by: INTERNAL MEDICINE

## 2025-04-02 PROCEDURE — 96365 THER/PROPH/DIAG IV INF INIT: CPT

## 2025-04-02 PROCEDURE — 25000003 PHARM REV CODE 250: Performed by: INTERNAL MEDICINE

## 2025-04-02 PROCEDURE — 96375 TX/PRO/DX INJ NEW DRUG ADDON: CPT

## 2025-04-02 RX ORDER — ACETAMINOPHEN 325 MG/1
650 TABLET ORAL
Status: CANCELLED | OUTPATIENT
Start: 2025-04-02

## 2025-04-02 RX ORDER — FAMOTIDINE 10 MG/ML
20 INJECTION, SOLUTION INTRAVENOUS
Status: CANCELLED | OUTPATIENT
Start: 2025-04-02

## 2025-04-02 RX ORDER — HYDRALAZINE HYDROCHLORIDE 10 MG/1
10 TABLET, FILM COATED ORAL 3 TIMES DAILY
Qty: 270 TABLET | Refills: 3 | OUTPATIENT
Start: 2025-04-02 | End: 2026-04-02

## 2025-04-02 RX ORDER — ACETAMINOPHEN 325 MG/1
650 TABLET ORAL
Status: COMPLETED | OUTPATIENT
Start: 2025-04-02 | End: 2025-04-02

## 2025-04-02 RX ORDER — DIPHENHYDRAMINE HYDROCHLORIDE 50 MG/ML
25 INJECTION, SOLUTION INTRAMUSCULAR; INTRAVENOUS
Status: CANCELLED | OUTPATIENT
Start: 2025-04-02

## 2025-04-02 RX ORDER — DIPHENHYDRAMINE HYDROCHLORIDE 50 MG/ML
25 INJECTION, SOLUTION INTRAMUSCULAR; INTRAVENOUS
Status: COMPLETED | OUTPATIENT
Start: 2025-04-02 | End: 2025-04-02

## 2025-04-02 RX ORDER — SODIUM CHLORIDE 0.9 % (FLUSH) 0.9 %
10 SYRINGE (ML) INJECTION
Status: CANCELLED | OUTPATIENT
Start: 2025-04-02

## 2025-04-02 RX ORDER — HEPARIN 100 UNIT/ML
500 SYRINGE INTRAVENOUS
Status: CANCELLED | OUTPATIENT
Start: 2025-04-02

## 2025-04-02 RX ORDER — FAMOTIDINE 10 MG/ML
20 INJECTION, SOLUTION INTRAVENOUS
Status: COMPLETED | OUTPATIENT
Start: 2025-04-02 | End: 2025-04-02

## 2025-04-02 RX ADMIN — DIPHENHYDRAMINE HYDROCHLORIDE 25 MG: 50 INJECTION INTRAMUSCULAR; INTRAVENOUS at 08:04

## 2025-04-02 RX ADMIN — HUMAN IMMUNOGLOBULIN G 80 G: 40 LIQUID INTRAVENOUS at 09:04

## 2025-04-02 RX ADMIN — ACETAMINOPHEN 650 MG: 325 TABLET ORAL at 08:04

## 2025-04-02 RX ADMIN — FAMOTIDINE 20 MG: 10 INJECTION, SOLUTION INTRAVENOUS at 08:04

## 2025-04-02 NOTE — TELEPHONE ENCOUNTER
Received from Brandon Sharma MD Derm     Derm Lab collected  3/19/25 reported 3/25/25:  Right forearm biopsy: spongiotic dermatosis  Diff dx: allergic or irritant contact dermatitis, atopic dermatitis, nummular dermatitis ID reaction and photoallergic reaction      Eulalia, please call him and  see if he rash is any better off Bactrim and ask him to be sure and wear sun block/sun protection outdoors even after rash resolves.

## 2025-04-02 NOTE — PLAN OF CARE
Patient arrived on unit for Privigen infusion. Patient is awake, alert, and oriented x4, denies any new complaints or worsening signs and symptoms since last visit. Placed 22g PIV in left forearm, administered pre-medication: Tylenol PO, Pepcid IVP, and Benadryl IVP, administered IVIG over ~5 hr, tolerated well with no signs or symptoms of adverse reaction, IV left in place for infusion tomorrow. Patient denies any questions or concerns at this time. Discharged home upon completion of treatments in NAD.    Please see MAR and flowsheets for any additional information.      Problem: Adult Inpatient Plan of Care  Goal: Optimal Comfort and Wellbeing  Outcome: Met

## 2025-04-03 ENCOUNTER — INFUSION (OUTPATIENT)
Dept: INFUSION THERAPY | Facility: HOSPITAL | Age: 65
End: 2025-04-03
Attending: INTERNAL MEDICINE
Payer: MEDICARE

## 2025-04-03 VITALS
DIASTOLIC BLOOD PRESSURE: 74 MMHG | HEART RATE: 66 BPM | WEIGHT: 177 LBS | TEMPERATURE: 98 F | SYSTOLIC BLOOD PRESSURE: 154 MMHG | RESPIRATION RATE: 18 BRPM | OXYGEN SATURATION: 100 % | BODY MASS INDEX: 27.78 KG/M2 | HEIGHT: 67 IN

## 2025-04-03 DIAGNOSIS — R76.8 MELANOMA DIFFERENTIATION-ASSOCIATED PROTEIN 5 (MDA-5) ANTIBODY POSITIVE: ICD-10-CM

## 2025-04-03 DIAGNOSIS — M33.13 DERMATOMYOSITIS: Primary | ICD-10-CM

## 2025-04-03 DIAGNOSIS — J84.9 ILD (INTERSTITIAL LUNG DISEASE): ICD-10-CM

## 2025-04-03 PROCEDURE — 96365 THER/PROPH/DIAG IV INF INIT: CPT

## 2025-04-03 PROCEDURE — 96366 THER/PROPH/DIAG IV INF ADDON: CPT

## 2025-04-03 PROCEDURE — 25000003 PHARM REV CODE 250: Performed by: INTERNAL MEDICINE

## 2025-04-03 PROCEDURE — 63600175 PHARM REV CODE 636 W HCPCS: Performed by: INTERNAL MEDICINE

## 2025-04-03 RX ORDER — ACETAMINOPHEN 325 MG/1
650 TABLET ORAL
Status: COMPLETED | OUTPATIENT
Start: 2025-04-03 | End: 2025-04-03

## 2025-04-03 RX ORDER — DIPHENHYDRAMINE HYDROCHLORIDE 50 MG/ML
25 INJECTION, SOLUTION INTRAMUSCULAR; INTRAVENOUS
OUTPATIENT
Start: 2025-04-03

## 2025-04-03 RX ORDER — ACETAMINOPHEN 325 MG/1
650 TABLET ORAL
OUTPATIENT
Start: 2025-04-03

## 2025-04-03 RX ORDER — SODIUM CHLORIDE 0.9 % (FLUSH) 0.9 %
10 SYRINGE (ML) INJECTION
OUTPATIENT
Start: 2025-04-03

## 2025-04-03 RX ORDER — FAMOTIDINE 10 MG/ML
20 INJECTION, SOLUTION INTRAVENOUS
Status: COMPLETED | OUTPATIENT
Start: 2025-04-03 | End: 2025-04-03

## 2025-04-03 RX ORDER — HEPARIN 100 UNIT/ML
500 SYRINGE INTRAVENOUS
OUTPATIENT
Start: 2025-04-03

## 2025-04-03 RX ORDER — FAMOTIDINE 10 MG/ML
20 INJECTION, SOLUTION INTRAVENOUS
OUTPATIENT
Start: 2025-04-03

## 2025-04-03 RX ORDER — DIPHENHYDRAMINE HYDROCHLORIDE 50 MG/ML
25 INJECTION, SOLUTION INTRAMUSCULAR; INTRAVENOUS
Status: COMPLETED | OUTPATIENT
Start: 2025-04-03 | End: 2025-04-03

## 2025-04-03 RX ADMIN — ACETAMINOPHEN 650 MG: 325 TABLET ORAL at 08:04

## 2025-04-03 RX ADMIN — HUMAN IMMUNOGLOBULIN G 80 G: 40 LIQUID INTRAVENOUS at 08:04

## 2025-04-03 RX ADMIN — DIPHENHYDRAMINE HYDROCHLORIDE 25 MG: 50 INJECTION INTRAMUSCULAR; INTRAVENOUS at 08:04

## 2025-04-03 RX ADMIN — FAMOTIDINE 20 MG: 10 INJECTION, SOLUTION INTRAVENOUS at 08:04

## 2025-04-03 NOTE — PLAN OF CARE
Ambulated to Infusion Clinic independently; AA&Ox4. Verbalized has been tolerating medication without ill effects. Privigen infusion titrated per protocol, started infusion at 24ml/hr x 30 min, increased to 48ml/hr x 30 min, increased to 96ml/hr x 30 min, with final increase to 192ml for remainder. Tolerated infusion well with no side effects or adverse rx's noted. Pt uses portal for appt schedule.

## 2025-04-17 ENCOUNTER — OFFICE VISIT (OUTPATIENT)
Dept: RHEUMATOLOGY | Facility: CLINIC | Age: 65
End: 2025-04-17
Payer: MEDICARE

## 2025-04-17 VITALS
HEART RATE: 67 BPM | WEIGHT: 177.69 LBS | BODY MASS INDEX: 27.89 KG/M2 | HEIGHT: 67 IN | DIASTOLIC BLOOD PRESSURE: 80 MMHG | SYSTOLIC BLOOD PRESSURE: 146 MMHG

## 2025-04-17 DIAGNOSIS — R76.8 HEPATITIS B CORE ANTIBODY POSITIVE: ICD-10-CM

## 2025-04-17 DIAGNOSIS — D84.821 DRUG-INDUCED IMMUNODEFICIENCY: ICD-10-CM

## 2025-04-17 DIAGNOSIS — Z79.52 LONG TERM (CURRENT) USE OF SYSTEMIC STEROIDS: ICD-10-CM

## 2025-04-17 DIAGNOSIS — Z79.899 DRUG-INDUCED IMMUNODEFICIENCY: ICD-10-CM

## 2025-04-17 DIAGNOSIS — M33.91 DERMATOMYOSITIS WITH RESPIRATORY INVOLVEMENT: Primary | ICD-10-CM

## 2025-04-17 DIAGNOSIS — R21 FACIAL RASH: ICD-10-CM

## 2025-04-17 PROCEDURE — 99999 PR PBB SHADOW E&M-EST. PATIENT-LVL III: CPT | Mod: PBBFAC,GC,TXP, | Performed by: STUDENT IN AN ORGANIZED HEALTH CARE EDUCATION/TRAINING PROGRAM

## 2025-04-17 RX ORDER — PREDNISONE 2.5 MG/1
TABLET ORAL
Qty: 90 TABLET | Refills: 0 | Status: SHIPPED | OUTPATIENT
Start: 2025-04-17 | End: 2025-06-16

## 2025-04-17 RX ORDER — HYDRALAZINE HYDROCHLORIDE 10 MG/1
10 TABLET, FILM COATED ORAL 3 TIMES DAILY
COMMUNITY
Start: 2025-04-05

## 2025-04-17 NOTE — PROGRESS NOTES
I have personally reviewed the history, confirmed exam findings, and discussed assessment and plan with fellow.              PFTs        FVC          TLC          RV          DLco     3/17/25    83.5           65.6        55            54.9   1/3/25     79.6           68.6        64.7         53.9  10/8/24    65.7           65           75.9         49.1  8/29/24    53.1           54.1        67.8         34.9  7/30/24    70.3           60           58.5         49.5       6MW 3/17/25:    CLINICAL INTERPRETATION:  Six minute walk distance is 512.06 meters (1680 feet) with light dyspnea.  During exercise, there was no desaturation while breathing room air.  Both blood pressure and heart rate remained stable with walking.  Hypertension was present prior to exercise.  The patient did not report non-pulmonary symptoms during exercise.  Since the previous study in January 2025, exercise capacity is unchanged.  Based upon age and body mass index, exercise capacity is normal.      Last Resulted: 03/17/25 14:00 CDT     EXAMINATION:  CT CHEST WITHOUT CONTRAST     CLINICAL HISTORY:  Interstitial lung disease; Interstitial pulmonary disease, unspecified     TECHNIQUE:  Low dose axial images, sagittal and coronal reformations were obtained from the thoracic inlet to the lung bases. Contrast was not administered.     COMPARISON:  CT chest 2025, 08/30/2024     FINDINGS:  Support tubes and lines: None.     Aorta: Minimal calcific atherosclerosis.  Normal caliber.     Heart: Normal size.     Coronary arteries: Probable left anterior descending stent.  Left-sided coronary artery calcifications.     Pericardium: Normal. No effusion, thickening, or calcification.     Central pulmonary arteries: Normal caliber.     Base of neck/thyroid: Unremarkable.     Lymph nodes: No supraclavicular, axillary, internal mammary, mediastinal, or hilar adenopathy.     Esophagus: Unremarkable.     Pleura: No effusion, thickening, or calcification.      Upper abdomen: Cholelithiasis.     Body wall: Unremarkable.     Airways: Unremarkable.     Lungs: Mild reticular opacities predominately at the bases of the bilateral lower lobes.  Degree of reticulation appears similar when compared to prior exam 01/08/2025.  No evidence of air trapping on expiratory imaging.  No new patchy ground-glass opacities.     Stable pulmonary micronodule in the right upper lobe (sequence 4 image 113).     Bones: Degenerative changes of the spine.  Degenerative disease noted in the shoulders.     Impression:     Stable interstitial lung disease as described in detail above.     Electronically signed by resident: Alvin Oleary  Date:                                            03/17/2025  Time:                                           14:19     TTE 7/23/24:         Left Ventricle: The left ventricle is normal in size. Normal wall thickness. There is normal systolic function with a visually estimated ejection fraction of 55 - 60%. Global longitudinal strain is normal; -17.0%. There is normal diastolic function.    Right Ventricle: Normal right ventricular cavity size. Wall thickness is normal. Systolic function is normal.    Tricuspid Valve: There is mild regurgitation.    IVC/SVC: Normal venous pressure at 3 mmHg.    Saw Rosemarie White in [facility] 3/17/25:    1. ILD (interstitial lung disease)                Plan:      Patient followed for MCTD-ILD. MDA5 antibody positive dermatomyositis. Initially presented to the clinic in early September for rapidly progressive ILD. Admitted 9/5-9/10 for acute hypoxemic respiratory failure and is now s/p pulse steroid, IVIG, and cytoxan infusions. He is tapering his steroids (currently on pred 7.5 mg daily) and remains on monthly IVIG and rituxan every 6 months. Continue MMF. CT chest with interval resolution of GGOs seen in August. Stable from previous. FVC and DLCO continue to improve. No longer qualifies for oxygen therapy and 6MWT distance was  >1600ft. Continue current management.       RTC in 3 months or sooner if needed. 6MWT and PFTs at routine visits.         Rosemarie White PA-C  [facility]     Electronically signed by Rosemarie White PA-C at 3/17/2025  1:55 PM        ASSESSMENT:     Ritixumab 1000mg IV  12/19/24 and 1/9/25           Rash x 10 days see photos last visit. Seeing outside Derm and and had skin biopsy right dorsal forearm  ? Bactrim  will stop  Rash not typical of MDA 5 skin biopsy above, now resolved off Bactrim and on atovaquone  Rapidly progressive MDA-5 + ILD  initial  Myomarker MDA-5 negative,  but OMRF positive, fine basilar crackles  now 1/4 way up previously 1/2 way up bilateral,  PFTs significantly improved. And HRCT chest stable   Hand tremor with tacrolimus started 9/27/24 even on just 2 mg twice daily so will not be able to continue long term  stopped 11/21/24  S/p pulse Solu-Medrol 1 g IV x 4 days in [facility] then  prednisone 60mg daily  now tapered from  15mg daily last visit to 7.5mg daily now  S/p IVIg x 5 days in [facility] now 1g/kg IV daily x 2 q 4 wks  S/p cyclophosphamide 750mg/m2 9/12/24, 1000mg/m2 10/10/24 and 11/7/24  The rituximab 1000mg IV 12/19/24 and 1/9/25   PPI  As he still needs triple therapy, and  will need to stop tacrolimus b/o hand tremor, and not a candidate for Toya given CAD, stent, age, best option was to stop cyclophosphamide after his 3 monthly infusions, and changed to rituximab 1000mg IV  wk 0 and 2, and MMF  1500mg twice daily    Raynaud's progressive since 2014  's Hands v. MDA 5 palmar lesions resolved see photos  Subtle  nailfold capillary dropout and minimal capillary dilatation left ring and little finger  PRESTON+ 1:640 speckled neg profile   Proximal muscle weakness  4.9/5 deltoids, biceps 5/5  triceps 4.8/5  psoas 5/5 bilat others 5/5   HBcAB+ HBV DNA - on tenofovir 25mg daily      PLAN:        F/u Dr. Latosha White in [facility] this month  6/23/25 with  PFTs  Schedule rituximab 1000mg IV 6/19/25 and 7/3/25  Continue MMF 1500mg po  twice daily    Decrease prednisone 5mg daily x 1 month, then decrease to 2.5mg daily and continue  Continue  IVIg 1g/kg daily x 2 days q 4 wks.    Continue tenofovir 25mg daily     RTC early June prior to next rituximab with 8 am cortisol(hold dose of prednisone that am) ACTH  CBC, CMP, ESR, CRP CK aldolase, LD Rituxan sensitivity, immunoglobulins HBsAb HBsAg HBV DNA and QG-TB

## 2025-04-17 NOTE — PROGRESS NOTES
Subjective:      Patient ID: Darrell Corona is a 65 y.o. male.    Chief Complaint: MDA-5+ myositis and ILD     HPI: 65-year-old man with PMHx of CAD, HTN and rapidly progressive MDA-5+ myositis and ILD presents to clinic for follow up. Last OV in March    Interval History:  Overall doing well, states strength is 95% improved. Diffuse rash has resolved w/ topical creams. Skin bx showed spongiotic dermatosis (Diff dx: allergic or irritant contact dermatitis, atopic dermatitis, nummular dermatitis ID reaction and photoallergic reaction). Patient attributes the rash to a facial cream. Currently on Prednisone 7.5 mg daily, Cellcept 1500 mg BID, RTX q 6m and monthly IVIG. Reports BP has been uncontrolled, which he attributes to steroids.         Initial Presentation:   Patient states he felt well until June, when he went on a trip to [place]. On that trip, he noticed shortness of breath on exertion. He felt winded after walking up a few flights of stairs. Prior to that trip, he could walk up to 5 miles a day and climb 10-15 flights of stairs without difficulty. On his return to [place], he saw his PCP who noted abnormal breath sounds and referred him to pulmonary. He was seen by Dr. Watts and was noted to have pulmonary fibrosis on his CT chest. He was started on high dose PO steroids, but patient notes no improvement in his breathing. He then was seen by Dr. Goyal at [facility]. Patient's daughter wanted to consider trials for investigational meds, so patient was referred to Dr. Scott (appt pending).     He was then seen by Dr. Reina with [facility] on 8/29. She noted a significant drop in his FVC (24% drop) and DLCO (29%) in a 1-month period. She was concerned for a rapidly progressive ILD. Plan at that time was repeat CT chest, continue steroids, start Ovef given progression, and follow up in 2 weeks with PFTs and 6MWT.      He was referred by Dr. Watts (Pulmonary) for positive PRESTNO and restrictive lung disease  "and seen in our office on 9/5. During that visit, he reported that his breathing continues to worsen. He can only walk a block before he started to feel short of breath. Denied history of lung disease or lung issues in the past. He noted worsening discoloration and thickening of both hands in past couple months. History of Raynaud's since 2014, at that time his left index finger would turn white. Four years later he noticed fingers of both hands would turn white. Four years after that the fingers turned purple. Then two to three months ago, he started to notice thickening, cracking, and fissures of his fingers.     There was concern for rapidly progressive ILD. He was sent to the ED for IV steroids and immunotherapy. During the admission, he received IV solumedrol 1gm x4 doses, 5 days of IVIG 0.4g/kg (9/6-9/10) and received CYC received on 9/1.    Review of Systems   Constitutional:  Negative for fever and unexpected weight change.   HENT:  Negative for mouth sores and trouble swallowing.    Eyes:  Negative for redness.   Respiratory:  Negative for cough and shortness of breath.    Cardiovascular:  Negative for chest pain.   Gastrointestinal:  Negative for abdominal pain, blood in stool, constipation and diarrhea.   Genitourinary:  Negative for genital sores.   Musculoskeletal:  Negative for arthralgias, joint swelling and myalgias.   Skin:  Negative for rash.   Neurological:  Negative for tremors, weakness and headaches.   Hematological:  Does not bruise/bleed easily.        Objective:   BP (!) 146/80   Pulse 67   Ht 5' 7" (1.702 m)   Wt 80.6 kg (177 lb 11.1 oz)   BMI 27.83 kg/m²   Physical Exam   Constitutional: He is oriented to person, place, and time. normal appearance. No distress.   HENT:   Head: Normocephalic and atraumatic.   Nose: Nose normal.   Mouth/Throat: Mucous membranes are moist. No oropharyngeal exudate or posterior oropharyngeal erythema.   Eyes: Pupils are equal, round, and reactive to light. " Conjunctivae are normal. Right eye exhibits no discharge. Left eye exhibits no discharge.   Cardiovascular: Normal rate, regular rhythm and normal pulses.   No murmur heard.  Pulmonary/Chest: Effort normal. No respiratory distress. He has no wheezes.   Pulmonary Comments: Crackles in bilateral lung bases  Abdominal: Bowel sounds are normal.   Musculoskeletal:         General: No swelling, tenderness or deformity.      Cervical back: Normal range of motion.      Right lower leg: No edema.      Left lower leg: No edema.   Neurological: He is oriented to person, place, and time.   Skin: No lesion noted.       Right Side Rheumatological Exam     Muscle Strength (0-5 scale):  Neck Flexion:  5  Neck Extension: 5  Deltoid:  5  Biceps: 5/5   Triceps:  4.8  : 5/5   Iliopsoas: 5  Quadriceps:  5   Distal Lower Extremity: 5    Left Side Rheumatological Exam     Muscle Strength (0-5 scale):  Neck Flexion:  5  Neck Extension: 5  Deltoid:  5  Biceps: 5/5   Triceps:  4.8  :  5/5   Iliopsoas: 5  Quadriceps:  5   Distal Lower Extremity: 5                 10/8/2024    11:30 AM 8/29/2024     4:19 PM 7/30/2024     4:20 PM   Pulmonary Function Tests   FVC 2.32 liters 1.88 liters 2.49 liters   FEV1 1.69 liters 1.39 liters 1.85 liters   TLC (liters) 4.24 liters 3.53 liters 3.91 liters   DLCO (ml/mmHg sec) 12.6 ml/mmHg sec 8.95 ml/mmHg sec 12.7 ml/mmHg sec   FVC% 65.7 53 70   FEV1% 60 49 65   FEF 25-75 1.18 1.06 1.39   FEF 25-75% 36.9 33 43   TLC% 65 54 60   RV 1.84 1.65 1.42   RV% 75.9 67.8 58   DLCO% 49.1 34 49       Assessment and plan:    65-year-old man with PMHx of HTN, Raynaud's (since 2014), MDA-5 positive myositis w/ associated ILD presents to clinic for follow up. Last OV in March. Overall doing well. Diffuse rash has resolved. Strength and respiratory status has improved.     Dermatomyositis MDA5 + with pulmonary involvement   PRESTON 1:640 speckled. Negative labwork: JASEN, ANCA, lupus anticoagulant, anti-cardiolipin, CCP/RF,  GBM, Scl-70. Myomarker panel: + SSA, +MDA-5  S/p pulse Solu-Medrol 1 g IV x 4 days   S/p IVIG 0.4g/kg x 5 days (9/6/24 - 9/10/24 while inpatient). Then IVIG 1 gm/kg x 2 days q 4 wks starting 10/2-10/3/2024  S/p cyclophosphamide 750mg/m2 9/12/24. Then 1gm/m2 on 10/10 and 11/7/24  Tacrolimus d/c due to hand tremors     - Continue IV RTX 1000 mg q 6 months. Last dose 12/19 and 1/9. Next dose due June 19 and July 3   - CBC, CMP, ESR, CRP, CK, Aldolase, immunoglobulins, RTX sensitivity, Hep B panel prior to next RTX infusion  - Decrease Prednisone to 5 mg daily x 1 month and then decrease to 2.5 mg daily  - Continue MMF 1500 mg BID              - Not candidate for Toya given CAD/stent history  - Continue IVIG 1 gm/kg x 2 days every 4 weeks; last administered 4/2 and 4/3    2. Skin rash - resolved  Located on face, chest, arms and legs   Possibly 2/2 bactrim   - Topical agents per Derm    3. ILD   CT chest 3/2025 - Mild reticular opacities predominately at the bases of the bilateral lower lobes. No new patchy ground-glass opacities. Stable interstitial lung disease   - Med as above  - Continue follow up with pulmonary, next appt w/ PFTs and 6MWT on June 23    4. Drug-induced immunodeficiency   UTD on vaccines  - Safety labs as above  - continue Atovaquone (Bactrim discontinued d/t concern for drug skin reaction)    5. Hep B core positive Ab, no chronic Hep B  Transaminases wnl  Risk of reactivation of HBV with the use of Rituxan is high  - On Vemlidy  - Hep recommends ppx during immunosuppressive therapy and for at least 12 months after completion of immunosuppressive therapy  - Continue follow up w/ Hepatology     6. Long term steroid use  - Steroid taper as above  - 8 AM cortisol in 2 month (advised to hold AM dose)    This patient was examined with Dr. Barcenas. Plan discussed with the patient. RTC in June prior to next RTX infusion     Problem List Items Addressed This Visit          GI    Hepatitis B core antibody  positive     Other Visit Diagnoses         Dermatomyositis with respiratory involvement    -  Primary    Relevant Medications    predniSONE (DELTASONE) 2.5 MG tablet      Long term (current) use of systemic steroids        Relevant Orders    Cortisol, 8AM    Aldolase      Drug-induced immunodeficiency          Facial rash                  Mary Trevino MD  PGY-4, Rheumatology Fellow

## 2025-04-21 ENCOUNTER — TELEPHONE (OUTPATIENT)
Dept: CARDIOLOGY | Facility: CLINIC | Age: 65
End: 2025-04-21
Payer: MEDICARE

## 2025-04-21 NOTE — TELEPHONE ENCOUNTER
----- Message from Avery Boyd MD sent at 4/21/2025  8:21 AM CDT -----  Regarding: FW: BP control    ----- Message -----  From: Mary Trevino MD  Sent: 4/17/2025   4:03 PM CDT  To: Avery Boyd MD  Subject: BP control                                       Hi Dr. Boyd, We saw Mr. Corona in clinic this morning. His daughter asked us to reach out about his high/uncontrolled BP. He discontinued Hydralazine as he felt it wasn't effective and resume amlodipine. In the meantime, we are tapering down his steroids. Thank you,Mary Trevino, MDPGY-4, Rheumatology Fellow

## 2025-04-22 ENCOUNTER — PATIENT MESSAGE (OUTPATIENT)
Dept: CARDIOLOGY | Facility: CLINIC | Age: 65
End: 2025-04-22
Payer: MEDICARE

## 2025-04-23 ENCOUNTER — PATIENT MESSAGE (OUTPATIENT)
Dept: RHEUMATOLOGY | Facility: CLINIC | Age: 65
End: 2025-04-23
Payer: MEDICARE

## 2025-04-30 ENCOUNTER — INFUSION (OUTPATIENT)
Dept: INFUSION THERAPY | Facility: HOSPITAL | Age: 65
End: 2025-04-30
Attending: INTERNAL MEDICINE
Payer: MEDICARE

## 2025-04-30 VITALS
SYSTOLIC BLOOD PRESSURE: 146 MMHG | DIASTOLIC BLOOD PRESSURE: 67 MMHG | BODY MASS INDEX: 27.78 KG/M2 | HEIGHT: 67 IN | TEMPERATURE: 98 F | RESPIRATION RATE: 18 BRPM | HEART RATE: 62 BPM | OXYGEN SATURATION: 100 % | WEIGHT: 177 LBS

## 2025-04-30 DIAGNOSIS — J84.9 ILD (INTERSTITIAL LUNG DISEASE): ICD-10-CM

## 2025-04-30 DIAGNOSIS — M33.13 DERMATOMYOSITIS: Primary | ICD-10-CM

## 2025-04-30 DIAGNOSIS — R76.8 MELANOMA DIFFERENTIATION-ASSOCIATED PROTEIN 5 (MDA-5) ANTIBODY POSITIVE: ICD-10-CM

## 2025-04-30 PROCEDURE — 25000003 PHARM REV CODE 250: Performed by: INTERNAL MEDICINE

## 2025-04-30 PROCEDURE — 96375 TX/PRO/DX INJ NEW DRUG ADDON: CPT

## 2025-04-30 PROCEDURE — 63600175 PHARM REV CODE 636 W HCPCS: Mod: JZ,TB | Performed by: INTERNAL MEDICINE

## 2025-04-30 PROCEDURE — 96366 THER/PROPH/DIAG IV INF ADDON: CPT

## 2025-04-30 PROCEDURE — 96365 THER/PROPH/DIAG IV INF INIT: CPT

## 2025-04-30 RX ORDER — DIPHENHYDRAMINE HYDROCHLORIDE 50 MG/ML
25 INJECTION, SOLUTION INTRAMUSCULAR; INTRAVENOUS
Status: CANCELLED | OUTPATIENT
Start: 2025-04-30

## 2025-04-30 RX ORDER — DIPHENHYDRAMINE HYDROCHLORIDE 50 MG/ML
25 INJECTION, SOLUTION INTRAMUSCULAR; INTRAVENOUS
Status: COMPLETED | OUTPATIENT
Start: 2025-04-30 | End: 2025-04-30

## 2025-04-30 RX ORDER — ACETAMINOPHEN 325 MG/1
650 TABLET ORAL
Status: CANCELLED | OUTPATIENT
Start: 2025-04-30

## 2025-04-30 RX ORDER — FAMOTIDINE 10 MG/ML
20 INJECTION, SOLUTION INTRAVENOUS
Status: COMPLETED | OUTPATIENT
Start: 2025-04-30 | End: 2025-04-30

## 2025-04-30 RX ORDER — ACETAMINOPHEN 325 MG/1
650 TABLET ORAL
Status: COMPLETED | OUTPATIENT
Start: 2025-04-30 | End: 2025-04-30

## 2025-04-30 RX ORDER — FAMOTIDINE 10 MG/ML
20 INJECTION, SOLUTION INTRAVENOUS
Status: CANCELLED | OUTPATIENT
Start: 2025-04-30

## 2025-04-30 RX ORDER — HEPARIN 100 UNIT/ML
500 SYRINGE INTRAVENOUS
Status: CANCELLED | OUTPATIENT
Start: 2025-04-30

## 2025-04-30 RX ORDER — SODIUM CHLORIDE 0.9 % (FLUSH) 0.9 %
10 SYRINGE (ML) INJECTION
Status: CANCELLED | OUTPATIENT
Start: 2025-04-30

## 2025-04-30 RX ADMIN — ACETAMINOPHEN 650 MG: 325 TABLET ORAL at 08:04

## 2025-04-30 RX ADMIN — FAMOTIDINE 20 MG: 10 INJECTION, SOLUTION INTRAVENOUS at 08:04

## 2025-04-30 RX ADMIN — HUMAN IMMUNOGLOBULIN G 80 G: 40 LIQUID INTRAVENOUS at 08:04

## 2025-04-30 RX ADMIN — DIPHENHYDRAMINE HYDROCHLORIDE 25 MG: 50 INJECTION INTRAMUSCULAR; INTRAVENOUS at 08:04

## 2025-04-30 NOTE — PLAN OF CARE
Pt arrived to the Infusion unit for maintenance iv medicine (q4w x2days). Pt is alert and oriented x4, ambulates independently with steady gait. Pt reports no new allergies, symptoms, or concerns at this time. Pt denies previous reactions to medication and consents to plan of care for today. PIV 24g placed in (L) hand. Pt given pre-medications: Tylenol 650mg PO, Pepcid 20mg IVP, and Benadryl 25mg IVP. Pt administered Privigen 10%, 80g (weight based) starting at rate of 24mL/hr. Infusion titrated per protocol to max rate of 192mL/hr. Pt tolerated infusion well with no adverse reactions. Pt asymptomatic, Vital signs stable during and post tx. Pt aware of follow-up appt tomorrow for day 2 of treatment. Verbalizes no additional needs at this time. Discharged in no acute distress.      Problem: Infection  Goal: Absence of Infection Signs and Symptoms  Outcome: Met     Problem: Adult Inpatient Plan of Care  Goal: Optimal Comfort and Wellbeing  Outcome: Met

## 2025-05-01 ENCOUNTER — INFUSION (OUTPATIENT)
Dept: INFUSION THERAPY | Facility: HOSPITAL | Age: 65
End: 2025-05-01
Attending: INTERNAL MEDICINE
Payer: MEDICARE

## 2025-05-01 VITALS
TEMPERATURE: 98 F | SYSTOLIC BLOOD PRESSURE: 132 MMHG | DIASTOLIC BLOOD PRESSURE: 69 MMHG | OXYGEN SATURATION: 98 % | RESPIRATION RATE: 18 BRPM | HEART RATE: 60 BPM

## 2025-05-01 DIAGNOSIS — M33.13 DERMATOMYOSITIS: Primary | ICD-10-CM

## 2025-05-01 DIAGNOSIS — J84.9 ILD (INTERSTITIAL LUNG DISEASE): ICD-10-CM

## 2025-05-01 DIAGNOSIS — R76.8 MELANOMA DIFFERENTIATION-ASSOCIATED PROTEIN 5 (MDA-5) ANTIBODY POSITIVE: ICD-10-CM

## 2025-05-01 PROCEDURE — 25000003 PHARM REV CODE 250: Performed by: INTERNAL MEDICINE

## 2025-05-01 PROCEDURE — 96366 THER/PROPH/DIAG IV INF ADDON: CPT

## 2025-05-01 PROCEDURE — 96375 TX/PRO/DX INJ NEW DRUG ADDON: CPT

## 2025-05-01 PROCEDURE — 63600175 PHARM REV CODE 636 W HCPCS: Performed by: INTERNAL MEDICINE

## 2025-05-01 PROCEDURE — 96365 THER/PROPH/DIAG IV INF INIT: CPT

## 2025-05-01 RX ORDER — ACETAMINOPHEN 325 MG/1
650 TABLET ORAL
Status: COMPLETED | OUTPATIENT
Start: 2025-05-01 | End: 2025-05-01

## 2025-05-01 RX ORDER — HEPARIN 100 UNIT/ML
500 SYRINGE INTRAVENOUS
OUTPATIENT
Start: 2025-05-01

## 2025-05-01 RX ORDER — DIPHENHYDRAMINE HYDROCHLORIDE 50 MG/ML
25 INJECTION, SOLUTION INTRAMUSCULAR; INTRAVENOUS
OUTPATIENT
Start: 2025-05-01

## 2025-05-01 RX ORDER — ACETAMINOPHEN 325 MG/1
650 TABLET ORAL
OUTPATIENT
Start: 2025-05-01

## 2025-05-01 RX ORDER — FAMOTIDINE 10 MG/ML
20 INJECTION, SOLUTION INTRAVENOUS
Status: COMPLETED | OUTPATIENT
Start: 2025-05-01 | End: 2025-05-01

## 2025-05-01 RX ORDER — SODIUM CHLORIDE 0.9 % (FLUSH) 0.9 %
10 SYRINGE (ML) INJECTION
OUTPATIENT
Start: 2025-05-01

## 2025-05-01 RX ORDER — FAMOTIDINE 10 MG/ML
20 INJECTION, SOLUTION INTRAVENOUS
OUTPATIENT
Start: 2025-05-01

## 2025-05-01 RX ORDER — DIPHENHYDRAMINE HYDROCHLORIDE 50 MG/ML
25 INJECTION, SOLUTION INTRAMUSCULAR; INTRAVENOUS
Status: COMPLETED | OUTPATIENT
Start: 2025-05-01 | End: 2025-05-01

## 2025-05-01 RX ADMIN — DIPHENHYDRAMINE HYDROCHLORIDE 25 MG: 50 INJECTION INTRAMUSCULAR; INTRAVENOUS at 08:05

## 2025-05-01 RX ADMIN — ACETAMINOPHEN 650 MG: 325 TABLET ORAL at 08:05

## 2025-05-01 RX ADMIN — FAMOTIDINE 20 MG: 10 INJECTION, SOLUTION INTRAVENOUS at 08:05

## 2025-05-01 RX ADMIN — HUMAN IMMUNOGLOBULIN G 80 G: 40 LIQUID INTRAVENOUS at 08:05

## 2025-05-01 NOTE — PLAN OF CARE
Pt ambulatory to unit, AAOx4. Denies any new or worsening of symptoms since last visit. Pt received Privigen infusion via 24g L wrist. Dsg c/d/i; no s/s of infection or infiltration noted. PIV flushed and patent with blood return. Pt tolerated medication well. No S&S of an adverse reaction, VSS. Denies pain or discomfort. Care plan discussed with pt. Pt verbalized understanding. Pt aware of upcoming appointment via MyChart. Pt ambulatory upon discharge in NAD.

## 2025-05-12 ENCOUNTER — LAB VISIT (OUTPATIENT)
Dept: LAB | Facility: HOSPITAL | Age: 65
End: 2025-05-12
Payer: MEDICARE

## 2025-05-12 DIAGNOSIS — R76.8 HEPATITIS B CORE ANTIBODY POSITIVE: ICD-10-CM

## 2025-05-12 LAB
ALBUMIN SERPL BCP-MCNC: 3.7 G/DL (ref 3.5–5.2)
ALP SERPL-CCNC: 58 UNIT/L (ref 40–150)
ALT SERPL W/O P-5'-P-CCNC: 17 UNIT/L (ref 10–44)
ANION GAP (OHS): 7 MMOL/L (ref 8–16)
AST SERPL-CCNC: 21 UNIT/L (ref 11–45)
BILIRUB SERPL-MCNC: 0.4 MG/DL (ref 0.1–1)
BUN SERPL-MCNC: 15 MG/DL (ref 8–23)
CALCIUM SERPL-MCNC: 9 MG/DL (ref 8.7–10.5)
CHLORIDE SERPL-SCNC: 105 MMOL/L (ref 95–110)
CO2 SERPL-SCNC: 26 MMOL/L (ref 23–29)
CREAT SERPL-MCNC: 0.8 MG/DL (ref 0.5–1.4)
GFR SERPLBLD CREATININE-BSD FMLA CKD-EPI: >60 ML/MIN/1.73/M2
GLUCOSE SERPL-MCNC: 116 MG/DL (ref 70–110)
HBV CORE AB SERPL QL IA: REACTIVE
HBV SURFACE AG SERPL QL IA: NORMAL
POTASSIUM SERPL-SCNC: 4.3 MMOL/L (ref 3.5–5.1)
PROT SERPL-MCNC: 8.6 GM/DL (ref 6–8.4)
SODIUM SERPL-SCNC: 138 MMOL/L (ref 136–145)

## 2025-05-12 PROCEDURE — 87340 HEPATITIS B SURFACE AG IA: CPT | Mod: TXP

## 2025-05-12 PROCEDURE — 87517 HEPATITIS B DNA QUANT: CPT | Mod: TXP

## 2025-05-12 PROCEDURE — 86704 HEP B CORE ANTIBODY TOTAL: CPT | Mod: TXP

## 2025-05-12 PROCEDURE — 36415 COLL VENOUS BLD VENIPUNCTURE: CPT | Mod: TXP

## 2025-05-12 PROCEDURE — 80053 COMPREHEN METABOLIC PANEL: CPT | Mod: TXP

## 2025-05-14 ENCOUNTER — RESULTS FOLLOW-UP (OUTPATIENT)
Dept: HEPATOLOGY | Facility: CLINIC | Age: 65
End: 2025-05-14

## 2025-05-14 DIAGNOSIS — R76.8 HEPATITIS B CORE ANTIBODY POSITIVE: Primary | ICD-10-CM

## 2025-05-14 LAB — HBV DNA SERPL NAA+PROBE-ACNC: NORMAL [IU]/ML

## 2025-05-28 ENCOUNTER — INFUSION (OUTPATIENT)
Dept: INFUSION THERAPY | Facility: HOSPITAL | Age: 65
End: 2025-05-28
Attending: INTERNAL MEDICINE
Payer: MEDICARE

## 2025-05-28 VITALS
DIASTOLIC BLOOD PRESSURE: 67 MMHG | SYSTOLIC BLOOD PRESSURE: 143 MMHG | WEIGHT: 178.38 LBS | HEART RATE: 62 BPM | TEMPERATURE: 98 F | BODY MASS INDEX: 27.93 KG/M2 | OXYGEN SATURATION: 97 % | RESPIRATION RATE: 19 BRPM

## 2025-05-28 DIAGNOSIS — R76.8 MELANOMA DIFFERENTIATION-ASSOCIATED PROTEIN 5 (MDA-5) ANTIBODY POSITIVE: ICD-10-CM

## 2025-05-28 DIAGNOSIS — J84.9 ILD (INTERSTITIAL LUNG DISEASE): ICD-10-CM

## 2025-05-28 DIAGNOSIS — M33.13 DERMATOMYOSITIS: Primary | ICD-10-CM

## 2025-05-28 PROCEDURE — 96366 THER/PROPH/DIAG IV INF ADDON: CPT

## 2025-05-28 PROCEDURE — 25000003 PHARM REV CODE 250: Performed by: INTERNAL MEDICINE

## 2025-05-28 PROCEDURE — 96365 THER/PROPH/DIAG IV INF INIT: CPT

## 2025-05-28 PROCEDURE — 96375 TX/PRO/DX INJ NEW DRUG ADDON: CPT

## 2025-05-28 PROCEDURE — 63600175 PHARM REV CODE 636 W HCPCS: Performed by: INTERNAL MEDICINE

## 2025-05-28 RX ORDER — FAMOTIDINE 10 MG/ML
20 INJECTION, SOLUTION INTRAVENOUS
Status: COMPLETED | OUTPATIENT
Start: 2025-05-28 | End: 2025-05-28

## 2025-05-28 RX ORDER — ACETAMINOPHEN 325 MG/1
650 TABLET ORAL
Status: COMPLETED | OUTPATIENT
Start: 2025-05-28 | End: 2025-05-28

## 2025-05-28 RX ORDER — DIPHENHYDRAMINE HYDROCHLORIDE 50 MG/ML
25 INJECTION, SOLUTION INTRAMUSCULAR; INTRAVENOUS
Status: CANCELLED | OUTPATIENT
Start: 2025-05-28

## 2025-05-28 RX ORDER — DIPHENHYDRAMINE HYDROCHLORIDE 50 MG/ML
25 INJECTION, SOLUTION INTRAMUSCULAR; INTRAVENOUS
Status: COMPLETED | OUTPATIENT
Start: 2025-05-28 | End: 2025-05-28

## 2025-05-28 RX ORDER — FAMOTIDINE 10 MG/ML
20 INJECTION, SOLUTION INTRAVENOUS
Status: CANCELLED | OUTPATIENT
Start: 2025-05-28

## 2025-05-28 RX ORDER — SODIUM CHLORIDE 0.9 % (FLUSH) 0.9 %
10 SYRINGE (ML) INJECTION
Status: CANCELLED | OUTPATIENT
Start: 2025-05-28

## 2025-05-28 RX ORDER — HEPARIN 100 UNIT/ML
500 SYRINGE INTRAVENOUS
Status: CANCELLED | OUTPATIENT
Start: 2025-05-28

## 2025-05-28 RX ORDER — ACETAMINOPHEN 325 MG/1
650 TABLET ORAL
Status: CANCELLED | OUTPATIENT
Start: 2025-05-28

## 2025-05-28 RX ADMIN — ACETAMINOPHEN 650 MG: 325 TABLET ORAL at 08:05

## 2025-05-28 RX ADMIN — HUMAN IMMUNOGLOBULIN G 80 G: 40 LIQUID INTRAVENOUS at 08:05

## 2025-05-28 RX ADMIN — FAMOTIDINE 20 MG: 10 INJECTION, SOLUTION INTRAVENOUS at 08:05

## 2025-05-28 RX ADMIN — DIPHENHYDRAMINE HYDROCHLORIDE 25 MG: 50 INJECTION INTRAMUSCULAR; INTRAVENOUS at 08:05

## 2025-05-28 NOTE — PLAN OF CARE
Pt arrived to the Infusion unit for maintenance iv medicine (q4w). Day 1 of 2 day treatment. Pt is awake, alert, and oriented x4. Ambulates independently with steady gait. Pt reports no new allergies, symptoms, or concerns at this time. Pt denies previous reactions to medication and consents to plan of care for today. PIV 24g placed in (R) upper/lateral forearm. Pt given pre-medications: Tylenol 650mg PO, Pepcid 20mg IVP, and Benadryl 25mg IVP. Pt administered Privigen 10%, 80g (weight based) starting at rate of 24.3mL/hr. Infusion titrated per protocol to max rate of 194mL/hr. Pt tolerated infusion well with no adverse reactions. Pt asymptomatic, Vital signs stable during and post tx. Pt aware of follow-up appt tomorrow for day 2 of treatment. Verbalizes no additional needs at this time. Discharged in no acute distress.       Problem: Adult Inpatient Plan of Care  Goal: Optimal Comfort and Wellbeing  Outcome: Met

## 2025-05-29 ENCOUNTER — INFUSION (OUTPATIENT)
Dept: INFUSION THERAPY | Facility: HOSPITAL | Age: 65
End: 2025-05-29
Attending: INTERNAL MEDICINE
Payer: MEDICARE

## 2025-05-29 VITALS
DIASTOLIC BLOOD PRESSURE: 76 MMHG | HEART RATE: 58 BPM | SYSTOLIC BLOOD PRESSURE: 160 MMHG | WEIGHT: 178.38 LBS | RESPIRATION RATE: 16 BRPM | TEMPERATURE: 99 F | BODY MASS INDEX: 27.93 KG/M2 | OXYGEN SATURATION: 99 %

## 2025-05-29 DIAGNOSIS — M33.13 DERMATOMYOSITIS: Primary | ICD-10-CM

## 2025-05-29 DIAGNOSIS — R76.8 MELANOMA DIFFERENTIATION-ASSOCIATED PROTEIN 5 (MDA-5) ANTIBODY POSITIVE: ICD-10-CM

## 2025-05-29 DIAGNOSIS — J84.9 ILD (INTERSTITIAL LUNG DISEASE): ICD-10-CM

## 2025-05-29 PROCEDURE — 96375 TX/PRO/DX INJ NEW DRUG ADDON: CPT

## 2025-05-29 PROCEDURE — 96365 THER/PROPH/DIAG IV INF INIT: CPT

## 2025-05-29 PROCEDURE — 96366 THER/PROPH/DIAG IV INF ADDON: CPT

## 2025-05-29 PROCEDURE — 63600175 PHARM REV CODE 636 W HCPCS: Performed by: INTERNAL MEDICINE

## 2025-05-29 PROCEDURE — 25000003 PHARM REV CODE 250: Performed by: INTERNAL MEDICINE

## 2025-05-29 RX ORDER — FAMOTIDINE 10 MG/ML
20 INJECTION, SOLUTION INTRAVENOUS
Status: COMPLETED | OUTPATIENT
Start: 2025-05-29 | End: 2025-05-29

## 2025-05-29 RX ORDER — ACETAMINOPHEN 325 MG/1
650 TABLET ORAL
OUTPATIENT
Start: 2025-05-29

## 2025-05-29 RX ORDER — DIPHENHYDRAMINE HYDROCHLORIDE 50 MG/ML
25 INJECTION, SOLUTION INTRAMUSCULAR; INTRAVENOUS
OUTPATIENT
Start: 2025-05-29

## 2025-05-29 RX ORDER — HEPARIN 100 UNIT/ML
500 SYRINGE INTRAVENOUS
OUTPATIENT
Start: 2025-05-29

## 2025-05-29 RX ORDER — FAMOTIDINE 10 MG/ML
20 INJECTION, SOLUTION INTRAVENOUS
OUTPATIENT
Start: 2025-05-29

## 2025-05-29 RX ORDER — ACETAMINOPHEN 325 MG/1
650 TABLET ORAL
Status: COMPLETED | OUTPATIENT
Start: 2025-05-29 | End: 2025-05-29

## 2025-05-29 RX ORDER — DIPHENHYDRAMINE HYDROCHLORIDE 50 MG/ML
25 INJECTION, SOLUTION INTRAMUSCULAR; INTRAVENOUS
Status: COMPLETED | OUTPATIENT
Start: 2025-05-29 | End: 2025-05-29

## 2025-05-29 RX ORDER — SODIUM CHLORIDE 0.9 % (FLUSH) 0.9 %
10 SYRINGE (ML) INJECTION
OUTPATIENT
Start: 2025-05-29

## 2025-05-29 RX ADMIN — HUMAN IMMUNOGLOBULIN G 80 G: 40 LIQUID INTRAVENOUS at 08:05

## 2025-05-29 RX ADMIN — ACETAMINOPHEN 650 MG: 325 TABLET ORAL at 08:05

## 2025-05-29 RX ADMIN — DIPHENHYDRAMINE HYDROCHLORIDE 25 MG: 50 INJECTION INTRAMUSCULAR; INTRAVENOUS at 08:05

## 2025-05-29 RX ADMIN — FAMOTIDINE 20 MG: 10 INJECTION, SOLUTION INTRAVENOUS at 08:05

## 2025-05-29 NOTE — PLAN OF CARE
Patient ambulated onto unit, no s/s of distress, VSS. Plan of care reviewed and agreed upon with patient. Premedications (pepcid, benadryl, tylenol) administered per MAR. Privigen titrated infusion administered. Vital signs monitored before, throughout, and after infusion, remained stable. Patient tolerated treatment well, next appt reminder given, ambulated off unit with no s/s of distress.

## 2025-06-05 ENCOUNTER — PATIENT MESSAGE (OUTPATIENT)
Dept: RHEUMATOLOGY | Facility: CLINIC | Age: 65
End: 2025-06-05
Payer: MEDICARE

## 2025-06-10 ENCOUNTER — LAB VISIT (OUTPATIENT)
Dept: LAB | Facility: HOSPITAL | Age: 65
End: 2025-06-10
Attending: STUDENT IN AN ORGANIZED HEALTH CARE EDUCATION/TRAINING PROGRAM
Payer: MEDICARE

## 2025-06-10 DIAGNOSIS — M33.91 DERMATOMYOSITIS WITH RESPIRATORY INVOLVEMENT: ICD-10-CM

## 2025-06-10 DIAGNOSIS — Z79.52 LONG TERM (CURRENT) USE OF SYSTEMIC STEROIDS: ICD-10-CM

## 2025-06-10 DIAGNOSIS — J84.9 ILD (INTERSTITIAL LUNG DISEASE): ICD-10-CM

## 2025-06-10 LAB
ABSOLUTE EOSINOPHIL (OHS): 0.04 K/UL
ABSOLUTE MONOCYTE (OHS): 0.39 K/UL (ref 0.3–1)
ABSOLUTE NEUTROPHIL COUNT (OHS): 1.73 K/UL (ref 1.8–7.7)
ALBUMIN SERPL BCP-MCNC: 3.9 G/DL (ref 3.5–5.2)
ALDOLASE (OHS): 3.2 U/L (ref 1.2–7.6)
ALP SERPL-CCNC: 63 UNIT/L (ref 40–150)
ALT SERPL W/O P-5'-P-CCNC: 18 UNIT/L (ref 10–44)
ANION GAP (OHS): 9 MMOL/L (ref 8–16)
AST SERPL-CCNC: 21 UNIT/L (ref 11–45)
BASOPHILS # BLD AUTO: 0.02 K/UL
BASOPHILS NFR BLD AUTO: 0.7 %
BILIRUB SERPL-MCNC: 0.3 MG/DL (ref 0.1–1)
BUN SERPL-MCNC: 12 MG/DL (ref 8–23)
CALCIUM SERPL-MCNC: 8.9 MG/DL (ref 8.7–10.5)
CHLORIDE SERPL-SCNC: 106 MMOL/L (ref 95–110)
CK SERPL-CCNC: 107 U/L (ref 20–200)
CO2 SERPL-SCNC: 24 MMOL/L (ref 23–29)
CORTIS SERPL-MCNC: 8.5 UG/DL (ref 4.3–22.4)
CREAT SERPL-MCNC: 0.8 MG/DL (ref 0.5–1.4)
CRP SERPL-MCNC: 0.9 MG/L
ERYTHROCYTE [DISTWIDTH] IN BLOOD BY AUTOMATED COUNT: 13.5 % (ref 11.5–14.5)
ERYTHROCYTE [SEDIMENTATION RATE] IN BLOOD BY PHOTOMETRIC METHOD: 32 MM/HR
GFR SERPLBLD CREATININE-BSD FMLA CKD-EPI: >60 ML/MIN/1.73/M2
GLUCOSE SERPL-MCNC: 102 MG/DL (ref 70–110)
HBV CORE AB SERPL QL IA: REACTIVE
HBV SURFACE AB SER-ACNC: 863.11 MIU/ML
HBV SURFACE AB SERPL IA-ACNC: REACTIVE M[IU]/ML
HBV SURFACE AG SERPL QL IA: NORMAL
HCT VFR BLD AUTO: 47 % (ref 40–54)
HGB BLD-MCNC: 14.9 GM/DL (ref 14–18)
IGA SERPL-MCNC: 97 MG/DL (ref 40–350)
IGG SERPL-MCNC: 2411 MG/DL (ref 650–1600)
IGM SERPL-MCNC: 36 MG/DL (ref 50–300)
IMM GRANULOCYTES # BLD AUTO: 0.01 K/UL (ref 0–0.04)
IMM GRANULOCYTES NFR BLD AUTO: 0.4 % (ref 0–0.5)
LYMPHOCYTES # BLD AUTO: 0.64 K/UL (ref 1–4.8)
MCH RBC QN AUTO: 29.6 PG (ref 27–31)
MCHC RBC AUTO-ENTMCNC: 31.7 G/DL (ref 32–36)
MCV RBC AUTO: 93 FL (ref 82–98)
NUCLEATED RBC (/100WBC) (OHS): 0 /100 WBC
PLATELET # BLD AUTO: 221 K/UL (ref 150–450)
PMV BLD AUTO: 9.9 FL (ref 9.2–12.9)
POTASSIUM SERPL-SCNC: 3.6 MMOL/L (ref 3.5–5.1)
PROT SERPL-MCNC: 8.7 GM/DL (ref 6–8.4)
RBC # BLD AUTO: 5.03 M/UL (ref 4.6–6.2)
RELATIVE EOSINOPHIL (OHS): 1.4 %
RELATIVE LYMPHOCYTE (OHS): 22.6 % (ref 18–48)
RELATIVE MONOCYTE (OHS): 13.8 % (ref 4–15)
RELATIVE NEUTROPHIL (OHS): 61.1 % (ref 38–73)
SODIUM SERPL-SCNC: 139 MMOL/L (ref 136–145)
WBC # BLD AUTO: 2.83 K/UL (ref 3.9–12.7)

## 2025-06-10 PROCEDURE — 85652 RBC SED RATE AUTOMATED: CPT | Mod: TXP

## 2025-06-10 PROCEDURE — 86140 C-REACTIVE PROTEIN: CPT | Mod: TXP

## 2025-06-10 PROCEDURE — 87340 HEPATITIS B SURFACE AG IA: CPT | Mod: TXP

## 2025-06-10 PROCEDURE — 82085 ASSAY OF ALDOLASE: CPT | Mod: TXP

## 2025-06-10 PROCEDURE — 87517 HEPATITIS B DNA QUANT: CPT | Mod: TXP

## 2025-06-10 PROCEDURE — 86480 TB TEST CELL IMMUN MEASURE: CPT | Mod: TXP

## 2025-06-10 PROCEDURE — 36415 COLL VENOUS BLD VENIPUNCTURE: CPT | Mod: TXP

## 2025-06-10 PROCEDURE — 86704 HEP B CORE ANTIBODY TOTAL: CPT | Mod: TXP

## 2025-06-10 PROCEDURE — 88185 FLOWCYTOMETRY/TC ADD-ON: CPT | Mod: TXP

## 2025-06-10 PROCEDURE — 86706 HEP B SURFACE ANTIBODY: CPT | Mod: 59,TXP

## 2025-06-10 PROCEDURE — 82040 ASSAY OF SERUM ALBUMIN: CPT | Mod: TXP

## 2025-06-10 PROCEDURE — 82550 ASSAY OF CK (CPK): CPT | Mod: TXP

## 2025-06-10 PROCEDURE — 85025 COMPLETE CBC W/AUTO DIFF WBC: CPT | Mod: TXP

## 2025-06-10 PROCEDURE — 82784 ASSAY IGA/IGD/IGG/IGM EACH: CPT | Mod: 59,TXP

## 2025-06-10 PROCEDURE — 82533 TOTAL CORTISOL: CPT | Mod: TXP

## 2025-06-11 ENCOUNTER — PATIENT MESSAGE (OUTPATIENT)
Dept: RHEUMATOLOGY | Facility: CLINIC | Age: 65
End: 2025-06-11
Payer: MEDICARE

## 2025-06-11 DIAGNOSIS — M33.91 DERMATOMYOSITIS WITH RESPIRATORY INVOLVEMENT: ICD-10-CM

## 2025-06-11 DIAGNOSIS — D72.810 LYMPHOPENIA: Primary | ICD-10-CM

## 2025-06-11 LAB
HBV DNA SERPL NAA+PROBE-ACNC: NORMAL [IU]/ML
M CEE20 RESULT: NORMAL
MITOGEN MINUS NIL (OHS): 9.16
NIL TB SYNCED (OHS): 0.05
PATH REPORT.FINAL DX SPEC: NORMAL
QUANTIFERON GOLD INTERP (OHS): NEGATIVE
TB1 AG MINUS NIL (OHS): 0.02
TB2 AG MINUS NIL (OHS): 0.03

## 2025-06-11 RX ORDER — PREDNISONE 2.5 MG/1
5 TABLET ORAL DAILY
Start: 2025-06-11 | End: 2025-07-11

## 2025-06-11 NOTE — TELEPHONE ENCOUNTER
Will repeat CBC in 2 weeks and increase predniosne to 5mg daily from 2.5mg. Messaged pt.    This patient encounter was staffed and the plan was formulated with rheumatology attending Dr. Barcenas.    Eulalia Martell MD  Rheumatology Fellow, PGY-4

## 2025-06-16 NOTE — PROGRESS NOTES
Subjective:      Patient ID: Darrell Corona is a 65 y.o. male.    Chief Complaint: MDA-5+ myositis and ILD     HPI: 65-year-old man with PMHx of CAD, HTN and rapidly progressive MDA-5+ myositis and ILD presents to clinic for follow up.     Interval History:  Overall doing well, states strength is 95% improved. About 2-3 weeks ago, saw his dentist for swelling of the gums. Was placed on 5 days of empiric antibiotics (doesn't recall name). Completed course and feeling better now. No fevers/chills, CP, palpitation, SOB, nor GI/ complaints. Denies recurrence of skin rash.     He had labs done last week, which was notable for low AM cortisol. He was advised to increase PDN from 2.5 mg/d to 5 mg/d. Currently on Prednisone 5 mg daily, Cellcept 1500 mg BID, RTX q 6m and monthly IVIG.    Initial Presentation:   Patient states he felt well until June, when he went on a trip to [place]. On that trip, he noticed shortness of breath on exertion. He felt winded after walking up a few flights of stairs. Prior to that trip, he could walk up to 5 miles a day and climb 10-15 flights of stairs without difficulty. On his return to [place], he saw his PCP who noted abnormal breath sounds and referred him to pulmonary. He was seen by Dr. Watts and was noted to have pulmonary fibrosis on his CT chest. He was started on high dose PO steroids, but patient notes no improvement in his breathing. He then was seen by Dr. Goyal at [facility]. Patient's daughter wanted to consider trials for investigational meds, so patient was referred to Dr. Scott (appt pending).     He was then seen by Dr. Reina with [facility] on 8/29. She noted a significant drop in his FVC (24% drop) and DLCO (29%) in a 1-month period. She was concerned for a rapidly progressive ILD. Plan at that time was repeat CT chest, continue steroids, start Ovef given progression, and follow up in 2 weeks with PFTs and 6MWT.      He was referred by Dr. Watts (Pulmonary) for  "positive PRESTON and restrictive lung disease and seen in our office on 9/5. During that visit, he reported that his breathing continues to worsen. He can only walk a block before he started to feel short of breath. Denied history of lung disease or lung issues in the past. He noted worsening discoloration and thickening of both hands in past couple months. History of Raynaud's since 2014, at that time his left index finger would turn white. Four years later he noticed fingers of both hands would turn white. Four years after that the fingers turned purple. Then two to three months ago, he started to notice thickening, cracking, and fissures of his fingers.     There was concern for rapidly progressive ILD. He was sent to the ED for IV steroids and immunotherapy. During the admission, he received IV solumedrol 1gm x4 doses, 5 days of IVIG 0.4g/kg (9/6-9/10) and received CYC received on 9/1.    Review of Systems   Constitutional:  Negative for fever and unexpected weight change.   HENT:  Negative for mouth sores and trouble swallowing.    Eyes:  Negative for redness.   Respiratory:  Negative for cough and shortness of breath.    Cardiovascular:  Negative for chest pain.   Gastrointestinal:  Negative for abdominal pain, blood in stool, constipation and diarrhea.   Genitourinary:  Negative for genital sores.   Musculoskeletal:  Negative for arthralgias, joint swelling and myalgias.   Skin:  Negative for rash.   Neurological:  Negative for tremors, weakness and headaches.   Hematological:  Does not bruise/bleed easily.        Objective:   /72   Pulse (!) 58   Ht 5' 7" (1.702 m)   Wt 80.3 kg (177 lb 0.5 oz)   BMI 27.73 kg/m²   Physical Exam   Constitutional: He is oriented to person, place, and time. normal appearance. No distress.   HENT:   Head: Normocephalic and atraumatic.   Nose: Nose normal.   Mouth/Throat: Mucous membranes are moist. No oropharyngeal exudate or posterior oropharyngeal erythema.   Eyes: Pupils " are equal, round, and reactive to light. Conjunctivae are normal. Right eye exhibits no discharge. Left eye exhibits no discharge.   Cardiovascular: Normal rate, regular rhythm and normal pulses.   No murmur heard.  Pulmonary/Chest: Effort normal. No respiratory distress. He has no wheezes.   Pulmonary Comments: Fine crackles in bilateral lung bases  Abdominal: Bowel sounds are normal.   Musculoskeletal:         General: No swelling, tenderness or deformity.      Cervical back: Normal range of motion.      Right lower leg: No edema.      Left lower leg: No edema.   Neurological: He is oriented to person, place, and time.   Skin: No lesion noted.       Right Side Rheumatological Exam     Muscle Strength (0-5 scale):  Neck Flexion:  5  Neck Extension: 5  Deltoid:  5  Biceps: 5/5   Triceps:  4.8  : 5/5   Iliopsoas: 5  Quadriceps:  5   Distal Lower Extremity: 5    Left Side Rheumatological Exam     Muscle Strength (0-5 scale):  Neck Flexion:  5  Neck Extension: 5  Deltoid:  5  Biceps: 5/5   Triceps:  4.8  :  5/5   Iliopsoas: 5  Quadriceps:  5   Distal Lower Extremity: 5               10/8/2024    11:30 AM 8/29/2024     4:19 PM 7/30/2024     4:20 PM   Pulmonary Function Tests   FVC 2.32 liters 1.88 liters 2.49 liters   FEV1 1.69 liters 1.39 liters 1.85 liters   TLC (liters) 4.24 liters 3.53 liters 3.91 liters   DLCO (ml/mmHg sec) 12.6 ml/mmHg sec 8.95 ml/mmHg sec 12.7 ml/mmHg sec   FVC% 65.7 53 70   FEV1% 60 49 65   FEF 25-75 1.18 1.06 1.39   FEF 25-75% 36.9 33 43   TLC% 65 54 60   RV 1.84 1.65 1.42   RV% 75.9 67.8 58   DLCO% 49.1 34 49       Skin bx showed spongiotic dermatosis (Diff dx: allergic or irritant contact dermatitis, atopic dermatitis, nummular dermatitis ID reaction and photoallergic reaction)      Assessment and plan:    65-year-old man with PMHx of HTN, Raynaud's (since 2014), MDA-5 positive myositis w/ associated ILD presents to clinic for follow up. Overall doing well. Strength and respiratory  status has improved.     Dermatomyositis MDA5 + with pulmonary involvement  PRESTON 1:640 speckled. Negative labwork: JASEN, ANCA, lupus anticoagulant, anti-cardiolipin, CCP/RF, GBM, Scl-70. Myomarker panel: + SSA, +MDA-5  S/p pulse Solu-Medrol 1 g IV x 4 days (9/2024 while inpatient)  S/p IVIG 0.4g/kg x 5 days (9/6 - 9/10/2024 while inpatient). Then IVIG 1 gm/kg x 2 days q 4 wks starting 10/2-10/3/2024  S/p cyclophosphamide 750mg/m2 9/12/2024. Then 1gm/m2 on 10/10 and 11/7/2024  Tacrolimus d/c due to hand tremors     - Continue IV RTX 1000 mg q 6 months. Last dose 12/19 and 1/9. Next dose due 6/19 and 7/3   - Recheck CBC today  - Discontinue PDN and start Hydrocortisone 15 mg in AM and 5 mg in PM for adrenal insufficiency   - Continue MMF 1500 mg BID              - Not candidate for Toya given CAD/stent history  - Continue IVIG 1 gm/kg x 2 days every 4 weeks; last administered 5/28 and 5/29     ILD  CT chest 3/2025 - Mild reticular opacities predominately at the bases of the bilateral lower lobes. No new patchy ground-glass opacities. Stable interstitial lung disease   Asymptomatic   - Med as above  - Continue follow up with pulmonary, next appt w/ PFTs and 6MWT on June 23    Drug-induced immunodeficiency   UTD on vaccines  - Safety labs q3 months  - Repeat CBC today  - continue Atovaquone (Bactrim discontinued d/t concern for drug skin reaction)    Long term steroid use  Steroid-induced adrenal suppression    AM cortisol 8.50 (< 10)  - Hydrocortisone 15 mg in AM and 5 mg in PM    Hep B core positive Ab, no chronic Hep B  Transaminases wnl and HBV DNA is not detected  Risk of reactivation of HBV with the use of Rituxan is high  - On Vemlidy  - Hep recommends ppx during immunosuppressive therapy and for at least 12 months after completion of immunosuppressive therapy  - Continue follow up w/ Hepatology     This patient was examined with Dr. Barcenas. Plan discussed with the patient. RTC in 2 months     Problem List Items  Addressed This Visit       ILD (interstitial lung disease)    Hepatitis B core antibody positive     Other Visit Diagnoses         Dermatomyositis with respiratory involvement    -  Primary    Relevant Orders    CBC Auto Differential    Sedimentation rate      MDA5 antibody positive        Relevant Orders    CBC Auto Differential    Sedimentation rate      Drug-induced immunodeficiency          Steroid-induced adrenal suppression          Long term (current) use of systemic steroids                    Mary Trevino MD  PGY-4, Rheumatology Fellow

## 2025-06-17 ENCOUNTER — LAB VISIT (OUTPATIENT)
Dept: LAB | Facility: HOSPITAL | Age: 65
End: 2025-06-17
Payer: MEDICARE

## 2025-06-17 ENCOUNTER — RESULTS FOLLOW-UP (OUTPATIENT)
Dept: RHEUMATOLOGY | Facility: HOSPITAL | Age: 65
End: 2025-06-17

## 2025-06-17 ENCOUNTER — OFFICE VISIT (OUTPATIENT)
Dept: RHEUMATOLOGY | Facility: CLINIC | Age: 65
End: 2025-06-17
Payer: MEDICARE

## 2025-06-17 VITALS
HEIGHT: 67 IN | HEART RATE: 58 BPM | SYSTOLIC BLOOD PRESSURE: 126 MMHG | BODY MASS INDEX: 27.78 KG/M2 | DIASTOLIC BLOOD PRESSURE: 72 MMHG | WEIGHT: 177 LBS

## 2025-06-17 DIAGNOSIS — R76.8 MDA5 ANTIBODY POSITIVE: ICD-10-CM

## 2025-06-17 DIAGNOSIS — Z79.52 LONG TERM (CURRENT) USE OF SYSTEMIC STEROIDS: ICD-10-CM

## 2025-06-17 DIAGNOSIS — M33.91 DERMATOMYOSITIS WITH RESPIRATORY INVOLVEMENT: Primary | ICD-10-CM

## 2025-06-17 DIAGNOSIS — J84.9 ILD (INTERSTITIAL LUNG DISEASE): ICD-10-CM

## 2025-06-17 DIAGNOSIS — R76.8 HEPATITIS B CORE ANTIBODY POSITIVE: ICD-10-CM

## 2025-06-17 DIAGNOSIS — D84.821 DRUG-INDUCED IMMUNODEFICIENCY: ICD-10-CM

## 2025-06-17 DIAGNOSIS — Z79.899 DRUG-INDUCED IMMUNODEFICIENCY: ICD-10-CM

## 2025-06-17 DIAGNOSIS — M33.91 DERMATOMYOSITIS WITH RESPIRATORY INVOLVEMENT: ICD-10-CM

## 2025-06-17 DIAGNOSIS — E27.40 STEROID-INDUCED ADRENAL SUPPRESSION: ICD-10-CM

## 2025-06-17 DIAGNOSIS — T38.0X5A STEROID-INDUCED ADRENAL SUPPRESSION: ICD-10-CM

## 2025-06-17 LAB
ABSOLUTE EOSINOPHIL (OHS): 0.02 K/UL
ABSOLUTE MONOCYTE (OHS): 0.53 K/UL (ref 0.3–1)
ABSOLUTE NEUTROPHIL COUNT (OHS): 2.96 K/UL (ref 1.8–7.7)
BASOPHILS # BLD AUTO: 0.02 K/UL
BASOPHILS NFR BLD AUTO: 0.5 %
ERYTHROCYTE [DISTWIDTH] IN BLOOD BY AUTOMATED COUNT: 13.5 % (ref 11.5–14.5)
ERYTHROCYTE [SEDIMENTATION RATE] IN BLOOD BY PHOTOMETRIC METHOD: 12 MM/HR
HCT VFR BLD AUTO: 45.1 % (ref 40–54)
HGB BLD-MCNC: 14.2 GM/DL (ref 14–18)
IMM GRANULOCYTES # BLD AUTO: 0.01 K/UL (ref 0–0.04)
IMM GRANULOCYTES NFR BLD AUTO: 0.2 % (ref 0–0.5)
LYMPHOCYTES # BLD AUTO: 0.65 K/UL (ref 1–4.8)
MCH RBC QN AUTO: 29.3 PG (ref 27–31)
MCHC RBC AUTO-ENTMCNC: 31.5 G/DL (ref 32–36)
MCV RBC AUTO: 93 FL (ref 82–98)
NUCLEATED RBC (/100WBC) (OHS): 0 /100 WBC
PLATELET # BLD AUTO: 228 K/UL (ref 150–450)
PMV BLD AUTO: 10.2 FL (ref 9.2–12.9)
RBC # BLD AUTO: 4.84 M/UL (ref 4.6–6.2)
RELATIVE EOSINOPHIL (OHS): 0.5 %
RELATIVE LYMPHOCYTE (OHS): 15.5 % (ref 18–48)
RELATIVE MONOCYTE (OHS): 12.6 % (ref 4–15)
RELATIVE NEUTROPHIL (OHS): 70.7 % (ref 38–73)
WBC # BLD AUTO: 4.19 K/UL (ref 3.9–12.7)

## 2025-06-17 PROCEDURE — 99214 OFFICE O/P EST MOD 30 MIN: CPT | Mod: NTX,S$GLB,, | Performed by: INTERNAL MEDICINE

## 2025-06-17 PROCEDURE — 36415 COLL VENOUS BLD VENIPUNCTURE: CPT | Mod: TXP

## 2025-06-17 PROCEDURE — 1160F RVW MEDS BY RX/DR IN RCRD: CPT | Mod: CPTII,NTX,S$GLB, | Performed by: INTERNAL MEDICINE

## 2025-06-17 PROCEDURE — 3288F FALL RISK ASSESSMENT DOCD: CPT | Mod: CPTII,NTX,S$GLB, | Performed by: INTERNAL MEDICINE

## 2025-06-17 PROCEDURE — 1126F AMNT PAIN NOTED NONE PRSNT: CPT | Mod: CPTII,NTX,S$GLB, | Performed by: INTERNAL MEDICINE

## 2025-06-17 PROCEDURE — 3074F SYST BP LT 130 MM HG: CPT | Mod: CPTII,NTX,S$GLB, | Performed by: INTERNAL MEDICINE

## 2025-06-17 PROCEDURE — 1101F PT FALLS ASSESS-DOCD LE1/YR: CPT | Mod: CPTII,NTX,S$GLB, | Performed by: INTERNAL MEDICINE

## 2025-06-17 PROCEDURE — 85652 RBC SED RATE AUTOMATED: CPT | Mod: TXP

## 2025-06-17 PROCEDURE — 3078F DIAST BP <80 MM HG: CPT | Mod: CPTII,NTX,S$GLB, | Performed by: INTERNAL MEDICINE

## 2025-06-17 PROCEDURE — 1159F MED LIST DOCD IN RCRD: CPT | Mod: CPTII,NTX,S$GLB, | Performed by: INTERNAL MEDICINE

## 2025-06-17 PROCEDURE — 3008F BODY MASS INDEX DOCD: CPT | Mod: CPTII,NTX,S$GLB, | Performed by: INTERNAL MEDICINE

## 2025-06-17 PROCEDURE — 99999 PR PBB SHADOW E&M-EST. PATIENT-LVL III: CPT | Mod: PBBFAC,GC,TXP, | Performed by: STUDENT IN AN ORGANIZED HEALTH CARE EDUCATION/TRAINING PROGRAM

## 2025-06-17 PROCEDURE — 85025 COMPLETE CBC W/AUTO DIFF WBC: CPT | Mod: TXP

## 2025-06-17 RX ORDER — HYDROCORTISONE 5 MG/1
TABLET ORAL
Qty: 120 TABLET | Refills: 2 | Status: SHIPPED | OUTPATIENT
Start: 2025-06-17

## 2025-06-17 NOTE — PROGRESS NOTES
I have personally reviewed the history, confirmed exam findings, and discussed assessment and plan with fellow.          Latest Reference Range & Units 06/10/25 08:44 06/17/25 11:51   WBC 3.90 - 12.70 K/uL 2.83 (L) 4.19   RBC 4.60 - 6.20 M/uL 5.03 4.84   Hemoglobin 14.0 - 18.0 gm/dL 14.9 14.2   Hematocrit 40.0 - 54.0 % 47.0 45.1   MCV 82 - 98 fL 93 93   MCH 27.0 - 31.0 pg 29.6 29.3   MCHC 32.0 - 36.0 g/dL 31.7 (L) 31.5 (L)   RDW 11.5 - 14.5 % 13.5 13.5   Platelet Count 150 - 450 K/uL 221 228   MPV 9.2 - 12.9 fL 9.9 10.2   Neut % 38 - 73 % 61.1 70.7   Lymph % 18 - 48 % 22.6 15.5 (L)   Mono % 4 - 15 % 13.8 12.6   Eos % <=8 % 1.4 0.5   Basophil % <=1.9 % 0.7 0.5   Immature Granulocytes 0.0 - 0.5 % 0.4 0.2   Gran # (ANC) 1.8 - 7.7 K/uL 1.73 (L) 2.96   Lymph # 1 - 4.8 K/uL 0.64 (L) 0.65 (L)   Mono # 0.3 - 1 K/uL 0.39 0.53   Eos # <=0.5 K/uL 0.04 0.02   Baso # <=0.2 K/uL 0.02 0.02   Immature Grans (Abs) 0.00 - 0.04 K/uL 0.01 0.01   nRBC <=0 /100 WBC 0 0   Sed Rate <=23 mm/hr 32 (H) 12   Final Diagnosis  SEE COMMENTS    Sodium 136 - 145 mmol/L 139    Potassium 3.5 - 5.1 mmol/L 3.6    Chloride 95 - 110 mmol/L 106    CO2 23 - 29 mmol/L 24    Anion Gap 8 - 16 mmol/L 9    BUN 8 - 23 mg/dL 12    Creatinine 0.5 - 1.4 mg/dL 0.8    eGFR >60 mL/min/1.73/m2 >60    Glucose 70 - 110 mg/dL 102    Calcium 8.7 - 10.5 mg/dL 8.9    ALP 40 - 150 unit/L 63    PROTEIN TOTAL 6.0 - 8.4 gm/dL 8.7 (H)    Albumin 3.5 - 5.2 g/dL 3.9    BILIRUBIN TOTAL 0.1 - 1.0 mg/dL 0.3    AST 11 - 45 unit/L 21    ALT 10 - 44 unit/L 18    CRP <=8.2 mg/L 0.9    CPK 20 - 200 U/L 107    Cortisol, 8 AM 4.30 - 22.40 ug/dL 8.50    IgG 650 - 1,600 mg/dL 2,411 (H)    IgM 50 - 300 mg/dL 36 (L)    IgA Level 40 - 350 mg/dL 97    HBV DNA, Qualitative PCR HBV DNA not detected   HBV DNA not detected    Hep B Core Total Ab Non-Reactive  Reactive !    Hep B S Ab -  Reactive    Hepatitis B Surface Ag Non-Reactive  Non-Reactive    Hepatitis B Surface Antibody Quantitative mIU/mL  863.11    QuantiFERON-TB Gold Plus Negative  Negative    Quantiferon Nil Value  0.42205    TB1 Ag minus Nil  0.67217    TB2 Ag minus Nil  0.70456    Mitogen minus Nil  9.25820    Aldolase 1.2 - 7.6 U/L 3.2    CEE20 Result  Performed    (L): Data is abnormally low  (H): Data is abnormally high  !: Data is abnormal      PFTs        FVC          TLC          RV          DLco     3/17/25    83.5           65.6        55            54.9   1/3/25     79.6           68.6        64.7         53.9  10/8/24    65.7           65           75.9         49.1  8/29/24    53.1           54.1        67.8         34.9  7/30/24    70.3           60           58.5         49.5        6MW 3/17/25:     CLINICAL INTERPRETATION:  Six minute walk distance is 512.06 meters (1680 feet) with light dyspnea.  During exercise, there was no desaturation while breathing room air.  Both blood pressure and heart rate remained stable with walking.  Hypertension was present prior to exercise.  The patient did not report non-pulmonary symptoms during exercise.  Since the previous study in January 2025, exercise capacity is unchanged.  Based upon age and body mass index, exercise capacity is normal.        Last Resulted: 03/17/25 14:00 CDT      EXAMINATION:  CT CHEST WITHOUT CONTRAST     CLINICAL HISTORY:  Interstitial lung disease; Interstitial pulmonary disease, unspecified     TECHNIQUE:  Low dose axial images, sagittal and coronal reformations were obtained from the thoracic inlet to the lung bases. Contrast was not administered.     COMPARISON:  CT chest 2025, 08/30/2024     FINDINGS:  Support tubes and lines: None.     Aorta: Minimal calcific atherosclerosis.  Normal caliber.     Heart: Normal size.     Coronary arteries: Probable left anterior descending stent.  Left-sided coronary artery calcifications.     Pericardium: Normal. No effusion, thickening, or calcification.     Central pulmonary arteries: Normal caliber.     Base of neck/thyroid:  Unremarkable.     Lymph nodes: No supraclavicular, axillary, internal mammary, mediastinal, or hilar adenopathy.     Esophagus: Unremarkable.     Pleura: No effusion, thickening, or calcification.     Upper abdomen: Cholelithiasis.     Body wall: Unremarkable.     Airways: Unremarkable.     Lungs: Mild reticular opacities predominately at the bases of the bilateral lower lobes.  Degree of reticulation appears similar when compared to prior exam 01/08/2025.  No evidence of air trapping on expiratory imaging.  No new patchy ground-glass opacities.     Stable pulmonary micronodule in the right upper lobe (sequence 4 image 113).     Bones: Degenerative changes of the spine.  Degenerative disease noted in the shoulders.     Impression:     Stable interstitial lung disease as described in detail above.     Electronically signed by resident: Alvin Oleary  Date:                                            03/17/2025  Time:                                           14:19     TTE 7/23/24:         Left Ventricle: The left ventricle is normal in size. Normal wall thickness. There is normal systolic function with a visually estimated ejection fraction of 55 - 60%. Global longitudinal strain is normal; -17.0%. There is normal diastolic function.    Right Ventricle: Normal right ventricular cavity size. Wall thickness is normal. Systolic function is normal.    Tricuspid Valve: There is mild regurgitation.    IVC/SVC: Normal venous pressure at 3 mmHg.     Saw Rosemarie White in [facility] 3/17/25:     1. ILD (interstitial lung disease)                Plan:      Patient followed for MCTD-ILD. MDA5 antibody positive dermatomyositis. Initially presented to the clinic in early September for rapidly progressive ILD. Admitted 9/5-9/10 for acute hypoxemic respiratory failure and is now s/p pulse steroid, IVIG, and cytoxan infusions. He is tapering his steroids (currently on pred 7.5 mg daily) and remains on monthly IVIG and rituxan every 6  months. Continue MMF. CT chest with interval resolution of GGOs seen in August. Stable from previous. FVC and DLCO continue to improve. No longer qualifies for oxygen therapy and 6MWT distance was >1600ft. Continue current management.       RTC in 3 months or sooner if needed. 6MWT and PFTs at routine visits.         Rosemarie White PA-C  [facility]      Electronically signed by Rosemarie White PA-C at 3/17/2025  1:55 PM          ASSESSMENT:     Ritixumab 1000mg IV  12/19/24 and 1/9/25           Rash x  Rash not typical of MDA 5 skin biopsy above, now resolved off Bactrim and on atovaquone  Rapidly progressive MDA-5 + ILD  initial  Myomarker MDA-5 negative,  but OMRF positive, fine basilar crackles  now 1/4 way up previously 1/2 way up bilateral,  PFTs significantly improved. And HRCT chest stable   Hand tremor with tacrolimus started 9/27/24 even on just 2 mg twice daily so will not be able to continue long term  stopped 11/21/24  S/p pulse Solu-Medrol 1 g IV x 4 days in [facility] then  prednisone 60mg daily  now tapered from  15mg daily last visit to 7.5mg daily now  S/p IVIg x 5 days in [facility] now 1g/kg IV daily x 2 q 4 wks  S/p cyclophosphamide 750mg/m2 9/12/24, 1000mg/m2 10/10/24 and 11/7/24  The rituximab 1000mg IV 12/19/24 and 1/9/25   PPI  As he still needs triple therapy, and  will need to stop tacrolimus b/o hand tremor, and not a candidate for Toya given CAD, stent, age, best option was to stop cyclophosphamide after his 3 monthly infusions, and changed to rituximab 1000mg IV  wk 0 and 2, and MMF  1500mg twice daily    Raynaud's progressive since 2014  's Hands v. MDA 5 palmar lesions resolved see photos  Subtle  nailfold capillary dropout and minimal capillary dilatation left ring and little finger  PRESTON+ 1:640 speckled neg profile   Proximal muscle weakness  5/5 deltoids, biceps 5/5  triceps 4.8/5  psoas 5/5 bilat others 5/5   HBcAB+ HBV DNA - on tenofovir 25mg daily      PLAN:      rituximab 1000mg IV 6/19/25 and 7/3/25  [facility]  6/23/25 with PFTs  Continue MMF 1500mg po  twice daily    Continue  IVIg (Privigen)1g/kg daily x 2 days q 4 wks.   Continue tenofovir 25mg daily   Change prednisone to hydrocortisone 15mg in am and 5 mg in pm for now and then plan gradual taper  RTC 3 months with CBC, CMP ESR CRP ferritin

## 2025-06-19 ENCOUNTER — INFUSION (OUTPATIENT)
Dept: INFUSION THERAPY | Facility: HOSPITAL | Age: 65
End: 2025-06-19
Attending: INTERNAL MEDICINE
Payer: MEDICARE

## 2025-06-19 VITALS
BODY MASS INDEX: 27.89 KG/M2 | SYSTOLIC BLOOD PRESSURE: 134 MMHG | HEIGHT: 67 IN | TEMPERATURE: 98 F | HEART RATE: 59 BPM | DIASTOLIC BLOOD PRESSURE: 76 MMHG | WEIGHT: 177.69 LBS | RESPIRATION RATE: 16 BRPM | OXYGEN SATURATION: 97 %

## 2025-06-19 DIAGNOSIS — J84.9 ILD (INTERSTITIAL LUNG DISEASE): Primary | ICD-10-CM

## 2025-06-19 DIAGNOSIS — R76.8 MELANOMA DIFFERENTIATION-ASSOCIATED PROTEIN 5 (MDA-5) ANTIBODY POSITIVE: ICD-10-CM

## 2025-06-19 DIAGNOSIS — M60.9 MYOSITIS, UNSPECIFIED MYOSITIS TYPE, UNSPECIFIED SITE: ICD-10-CM

## 2025-06-19 PROCEDURE — 96375 TX/PRO/DX INJ NEW DRUG ADDON: CPT

## 2025-06-19 PROCEDURE — 25000003 PHARM REV CODE 250: Performed by: INTERNAL MEDICINE

## 2025-06-19 PROCEDURE — 63600175 PHARM REV CODE 636 W HCPCS: Mod: JZ,TB | Performed by: INTERNAL MEDICINE

## 2025-06-19 PROCEDURE — 96367 TX/PROPH/DG ADDL SEQ IV INF: CPT

## 2025-06-19 PROCEDURE — 96415 CHEMO IV INFUSION ADDL HR: CPT

## 2025-06-19 PROCEDURE — 96413 CHEMO IV INFUSION 1 HR: CPT

## 2025-06-19 RX ORDER — DIPHENHYDRAMINE HYDROCHLORIDE 50 MG/ML
50 INJECTION, SOLUTION INTRAMUSCULAR; INTRAVENOUS ONCE AS NEEDED
OUTPATIENT
Start: 2025-07-03

## 2025-06-19 RX ORDER — METHYLPREDNISOLONE SOD SUCC 125 MG
80 VIAL (EA) INJECTION
Status: COMPLETED | OUTPATIENT
Start: 2025-06-19 | End: 2025-06-19

## 2025-06-19 RX ORDER — METHYLPREDNISOLONE SOD SUCC 125 MG
100 VIAL (EA) INJECTION
Status: DISCONTINUED | OUTPATIENT
Start: 2025-06-19 | End: 2025-06-19 | Stop reason: HOSPADM

## 2025-06-19 RX ORDER — METHYLPREDNISOLONE SOD SUCC 125 MG
100 VIAL (EA) INJECTION
Status: CANCELLED | OUTPATIENT
Start: 2025-07-03

## 2025-06-19 RX ORDER — ACETAMINOPHEN 325 MG/1
650 TABLET ORAL
OUTPATIENT
Start: 2025-07-03

## 2025-06-19 RX ORDER — SODIUM CHLORIDE 0.9 % (FLUSH) 0.9 %
10 SYRINGE (ML) INJECTION
OUTPATIENT
Start: 2025-07-03

## 2025-06-19 RX ORDER — MEPERIDINE HYDROCHLORIDE 25 MG/ML
25 INJECTION INTRAMUSCULAR; INTRAVENOUS; SUBCUTANEOUS
Status: DISCONTINUED | OUTPATIENT
Start: 2025-06-19 | End: 2025-06-19 | Stop reason: HOSPADM

## 2025-06-19 RX ORDER — EPINEPHRINE 0.3 MG/.3ML
0.3 INJECTION SUBCUTANEOUS ONCE AS NEEDED
Status: DISCONTINUED | OUTPATIENT
Start: 2025-06-19 | End: 2025-06-19 | Stop reason: HOSPADM

## 2025-06-19 RX ORDER — METHYLPREDNISOLONE SOD SUCC 125 MG
80 VIAL (EA) INJECTION
OUTPATIENT
Start: 2025-07-03

## 2025-06-19 RX ORDER — DIPHENHYDRAMINE HYDROCHLORIDE 50 MG/ML
50 INJECTION, SOLUTION INTRAMUSCULAR; INTRAVENOUS ONCE AS NEEDED
Status: DISCONTINUED | OUTPATIENT
Start: 2025-06-19 | End: 2025-06-19 | Stop reason: HOSPADM

## 2025-06-19 RX ORDER — MEPERIDINE HYDROCHLORIDE 50 MG/ML
25 INJECTION INTRAMUSCULAR; INTRAVENOUS; SUBCUTANEOUS
OUTPATIENT
Start: 2025-07-03

## 2025-06-19 RX ORDER — FAMOTIDINE 10 MG/ML
20 INJECTION, SOLUTION INTRAVENOUS
Status: COMPLETED | OUTPATIENT
Start: 2025-06-19 | End: 2025-06-19

## 2025-06-19 RX ORDER — FAMOTIDINE 10 MG/ML
20 INJECTION, SOLUTION INTRAVENOUS
OUTPATIENT
Start: 2025-07-03

## 2025-06-19 RX ORDER — HEPARIN 100 UNIT/ML
500 SYRINGE INTRAVENOUS
OUTPATIENT
Start: 2025-07-03

## 2025-06-19 RX ORDER — EPINEPHRINE 0.3 MG/.3ML
0.3 INJECTION SUBCUTANEOUS ONCE AS NEEDED
OUTPATIENT
Start: 2025-07-03

## 2025-06-19 RX ORDER — ACETAMINOPHEN 325 MG/1
650 TABLET ORAL
Status: COMPLETED | OUTPATIENT
Start: 2025-06-19 | End: 2025-06-19

## 2025-06-19 RX ADMIN — FAMOTIDINE 20 MG: 10 INJECTION, SOLUTION INTRAVENOUS at 07:06

## 2025-06-19 RX ADMIN — SODIUM CHLORIDE 1000 MG: 9 INJECTION, SOLUTION INTRAVENOUS at 08:06

## 2025-06-19 RX ADMIN — DIPHENHYDRAMINE HYDROCHLORIDE 50 MG: 50 INJECTION INTRAMUSCULAR; INTRAVENOUS at 08:06

## 2025-06-19 RX ADMIN — METHYLPREDNISOLONE SODIUM SUCCINATE 80 MG: 125 INJECTION, POWDER, FOR SOLUTION INTRAMUSCULAR; INTRAVENOUS at 08:06

## 2025-06-19 RX ADMIN — ACETAMINOPHEN 650 MG: 325 TABLET ORAL at 07:06

## 2025-06-19 NOTE — PLAN OF CARE
Patient arrived on unit for Rituxan infusion. Patient is awake, alert, and oriented x4, denies any new complaints or worsening signs and symptoms since last visit. Placed 22g PIV in right forearm, administered pre-medication: Tylenol PO, Pepcid IVP, Solu-Medrol IVP, and Benadryl IVPB over 20 min, administered Rituxan IV over ~3 hr titrating rate per order, tolerated well with no signs or symptoms of adverse reaction, removed IV. Patient denies any questions or concerns at this time. Discharged home upon completion of treatments in NAD.    Please see MAR and flowsheets for any additional information.      Problem: Adult Inpatient Plan of Care  Goal: Optimal Comfort and Wellbeing  Outcome: Met

## 2025-06-20 ENCOUNTER — TELEPHONE (OUTPATIENT)
Dept: TRANSPLANT | Facility: CLINIC | Age: 65
End: 2025-06-20
Payer: MEDICARE

## 2025-06-23 ENCOUNTER — HOSPITAL ENCOUNTER (OUTPATIENT)
Dept: PULMONOLOGY | Facility: CLINIC | Age: 65
Discharge: HOME | End: 2025-06-23
Payer: MEDICARE

## 2025-06-23 ENCOUNTER — OFFICE VISIT (OUTPATIENT)
Dept: TRANSPLANT | Facility: CLINIC | Age: 65
End: 2025-06-23
Payer: MEDICARE

## 2025-06-23 VITALS
HEIGHT: 67 IN | SYSTOLIC BLOOD PRESSURE: 121 MMHG | BODY MASS INDEX: 27.62 KG/M2 | TEMPERATURE: 98 F | RESPIRATION RATE: 16 BRPM | WEIGHT: 176 LBS | HEART RATE: 62 BPM | DIASTOLIC BLOOD PRESSURE: 72 MMHG | OXYGEN SATURATION: 98 %

## 2025-06-23 VITALS — WEIGHT: 176 LBS | HEIGHT: 67 IN | BODY MASS INDEX: 27.62 KG/M2

## 2025-06-23 DIAGNOSIS — J84.9 ILD (INTERSTITIAL LUNG DISEASE): ICD-10-CM

## 2025-06-23 DIAGNOSIS — J84.9 ILD (INTERSTITIAL LUNG DISEASE): Primary | ICD-10-CM

## 2025-06-23 DIAGNOSIS — M33.13 DERMATOMYOSITIS: ICD-10-CM

## 2025-06-23 LAB
DLCO ADJ PRE: 16.32 ML/(MIN*MMHG) (ref 18.52–32.37)
DLCO SINGLE BREATH LLN: 18.52
DLCO SINGLE BREATH PRE REF: 63.4 %
DLCO SINGLE BREATH REF: 25.44
DLCOC SBVA LLN: 2.63
DLCOC SBVA PRE REF: 99.3 %
DLCOC SBVA REF: 3.9
DLCOC SINGLE BREATH LLN: 18.52
DLCOC SINGLE BREATH PRE REF: 64.2 %
DLCOC SINGLE BREATH REF: 25.44
DLCOCSBVAULN: 5.17
DLCOCSINGLEBREATHULN: 32.37
DLCOCSINGLEBREATHZSCORE: -2.17
DLCOSINGLEBREATHULN: 32.37
DLCOSINGLEBREATHZSCORE: -2.21
DLCOVA LLN: 2.63
DLCOVA PRE REF: 98.2 %
DLCOVA PRE: 3.83 ML/(MIN*MMHG*L) (ref 2.63–5.17)
DLCOVA REF: 3.9
DLCOVAULN: 5.17
DLVAADJ PRE: 3.88 ML/(MIN*MMHG*L) (ref 2.63–5.17)
ERVN2 LLN: -16448.97
ERVN2 PRE REF: 29.9 %
ERVN2 PRE: 0.31 L (ref -16448.97–16451.03)
ERVN2 REF: 1.03
ERVN2ULN: ABNORMAL
FEF 25 75 LLN: 1.45
FEF 25 75 PRE REF: 52.6 %
FEF 25 75 REF: 3.16
FEV05 LLN: 1.29
FEV05 REF: 2.43
FEV1 FVC LLN: 68
FEV1 FVC PRE REF: 95.2 %
FEV1 FVC REF: 79
FEV1 LLN: 2.02
FEV1 PRE REF: 78.2 %
FEV1 REF: 2.79
FRCN2 LLN: 2.5
FRCN2 PRE REF: 57.6 %
FRCN2 REF: 3.49
FRCN2ULN: 4.47
FVC LLN: 2.61
FVC PRE REF: 82.1 %
FVC REF: 3.52
ICN2REF: 2.85
IVC PRE: 2.87 L (ref 2.61–4.44)
IVC SINGLE BREATH LLN: 2.61
IVC SINGLE BREATH PRE REF: 81.8 %
IVC SINGLE BREATH REF: 3.52
IVCSINGLEBREATHULN: 4.44
LLN IC N2: -9999997.15
PEF LLN: 5.77
PEF PRE REF: 102.4 %
PEF REF: 7.76
PHYSICIAN COMMENT: ABNORMAL
PRE DLCO: 16.14 ML/(MIN*MMHG) (ref 18.52–32.37)
PRE FEF 25 75: 1.66 L/S (ref 1.45–4.87)
PRE FET 100: 6.38 SEC
PRE FEV05 REF: 70.5 %
PRE FEV1 FVC: 75.46 % (ref 67.84–89.51)
PRE FEV1: 2.18 L (ref 2.02–3.5)
PRE FEV5: 1.71 L (ref 1.29–3.56)
PRE FRC N2: 2.01 L (ref 2.5–4.47)
PRE FVC: 2.89 L (ref 2.61–4.44)
PRE IC N2: 2.6 L (ref -9999997.15–#######.####)
PRE PEF: 7.94 L/S (ref 5.77–9.75)
PRE REF IC N2: 91.2 %
RVN2 LLN: 1.78
RVN2 PRE REF: 69.2 %
RVN2 PRE: 1.7 L (ref 1.78–3.13)
RVN2 REF: 2.45
RVN2TLCN2 LLN: 30.72
RVN2TLCN2 PRE REF: 92.8 %
RVN2TLCN2 PRE: 36.85 % (ref 30.72–48.68)
RVN2TLCN2 REF: 39.7
RVN2TLCN2ULN: 48.68
RVN2ULN: 3.13
TLCN2 LLN: 5.37
TLCN2 PRE REF: 70.7 %
TLCN2 PRE: 4.61 L (ref 5.37–7.67)
TLCN2 REF: 6.52
TLCN2ULN: 7.67
ULN IC N2: ABNORMAL
VA PRE: 4.21 L (ref 6.37–6.37)
VA SINGLE BREATH LLN: 6.37
VA SINGLE BREATH PRE REF: 66.1 %
VA SINGLE BREATH REF: 6.37
VASINGLEBREATHULN: 6.37
VCMAXN2 LLN: 2.61
VCMAXN2 PRE REF: 82.7 %
VCMAXN2 PRE: 2.91 L (ref 2.61–4.44)
VCMAXN2 REF: 3.52
VCMAXN2ULN: 4.44

## 2025-06-23 PROCEDURE — 3288F FALL RISK ASSESSMENT DOCD: CPT | Mod: CPTII,NTX,S$GLB, | Performed by: PHYSICIAN ASSISTANT

## 2025-06-23 PROCEDURE — 99214 OFFICE O/P EST MOD 30 MIN: CPT | Mod: 25,NTX,S$GLB, | Performed by: PHYSICIAN ASSISTANT

## 2025-06-23 PROCEDURE — 99999 PR PBB SHADOW E&M-EST. PATIENT-LVL III: CPT | Mod: PBBFAC,TXP,, | Performed by: PHYSICIAN ASSISTANT

## 2025-06-23 PROCEDURE — 94618 PULMONARY STRESS TESTING: CPT | Mod: NTX,S$GLB,, | Performed by: INTERNAL MEDICINE

## 2025-06-23 PROCEDURE — 1101F PT FALLS ASSESS-DOCD LE1/YR: CPT | Mod: CPTII,NTX,S$GLB, | Performed by: PHYSICIAN ASSISTANT

## 2025-06-23 PROCEDURE — 3008F BODY MASS INDEX DOCD: CPT | Mod: CPTII,NTX,S$GLB, | Performed by: PHYSICIAN ASSISTANT

## 2025-06-23 PROCEDURE — 3078F DIAST BP <80 MM HG: CPT | Mod: CPTII,NTX,S$GLB, | Performed by: PHYSICIAN ASSISTANT

## 2025-06-23 PROCEDURE — 1160F RVW MEDS BY RX/DR IN RCRD: CPT | Mod: CPTII,NTX,S$GLB, | Performed by: PHYSICIAN ASSISTANT

## 2025-06-23 PROCEDURE — 94010 BREATHING CAPACITY TEST: CPT | Mod: 59,NTX,S$GLB, | Performed by: INTERNAL MEDICINE

## 2025-06-23 PROCEDURE — 1159F MED LIST DOCD IN RCRD: CPT | Mod: CPTII,NTX,S$GLB, | Performed by: PHYSICIAN ASSISTANT

## 2025-06-23 PROCEDURE — 3074F SYST BP LT 130 MM HG: CPT | Mod: CPTII,NTX,S$GLB, | Performed by: PHYSICIAN ASSISTANT

## 2025-06-23 PROCEDURE — 94727 GAS DIL/WSHOT DETER LNG VOL: CPT | Mod: NTX,S$GLB,, | Performed by: INTERNAL MEDICINE

## 2025-06-23 PROCEDURE — 94729 DIFFUSING CAPACITY: CPT | Mod: NTX,S$GLB,, | Performed by: INTERNAL MEDICINE

## 2025-06-23 PROCEDURE — 1126F AMNT PAIN NOTED NONE PRSNT: CPT | Mod: CPTII,NTX,S$GLB, | Performed by: PHYSICIAN ASSISTANT

## 2025-06-23 RX ORDER — AMLODIPINE BESYLATE 10 MG/1
10 TABLET ORAL DAILY
COMMUNITY
Start: 2024-09-01

## 2025-06-23 NOTE — LETTER
June 23, 2025                      [facility] 1st [facility]  [address]  [facility] [address]  Phone: [phone]   Patient: Darrell Corona   MR Number: [id number]   YOB: 1960   Date of Visit: 6/23/2025       Dear       Thank you for referring Darrell Corona to me for evaluation. Attached you will find relevant portions of my assessment and plan of care.    If you have questions, please do not hesitate to call me. I look forward to following Darrell Almazano along with you.    Sincerely,    Rosemarie White PA-C    Enclosure    If you would like to receive this communication electronically, please contact [email or web address] or [phone] to request myParcelDelivery Link access.    myParcelDelivery Link is a tool which provides read-only access to select patient information with whom you have a relationship. Its easy to use and provides real time access to review your patients record including encounter summaries, notes, results, and demographic information.    If you feel you have received this communication in error or would no longer like to receive these types of communications, please e-mail [email or web address]

## 2025-06-23 NOTE — PROCEDURES
Darrell Corona is a 65 y.o.   male patient, who presents for a 6 minute walk test ordered by PAU White.  The diagnosis is Interstitial Lung Disease.  The patient's BMI is 27.6 kg/m2.  Predicted distance (lower limit of normal) is 374.12 meters.      Test Results:    The test was completed without stopping.  The total time walked was 360 seconds.  During walking, the patient reported:  No complaints.  The patient used no assistive devices during testing.     06/23/2025---------Distance: 457.2 meters (1500 feet)     Lap Walk Time O2 Sat % Supplemental Oxygen Heart Rate Blood Pressure Alexa Scale Comment   Pre-exercise  (Resting) 0 0 98 % Room Air 63 bpm 154/87 mmHg 0.5    During Exercise 1 45 sec 98 % Room Air 70 bpm      During Exercise 2 92 sec 93 % Room Air 78 bpm      During Exercise 3 140 sec 92 % Room Air 57 bpm      During Exercise 4 185 sec 94 % Room Air 58 bpm      During Exercise 5 235 sec 93 % Room Air 56 bpm      During Exercise 6 280 sec 94 % Room Air 58 bpm      During Exercise 7 325 sec 94 % Room Air 70 bpm      End of Exercise  360 sec 95 % Room Air 94 bpm 163/82 mmHg 1    Post-exercise  (Recovery)   98 % Room Air  66 bpm        Recovery Time: 60 seconds    Performing nurse/tech: Blair MILLER      PREVIOUS STUDY:   03/17/2025---------Distance: 512.06 meters (1680 feet)       O2 Sat % Supplemental Oxygen Heart Rate Blood Pressure Alexa Scale   Pre-exercise  (Resting) 100 % Room Air 67 bpm 151/79 mmHg 1   During Exercise 100 % Room Air 88 bpm 166/83 mmHg 2   Post-exercise  (Recovery) 100 % Room Air  76 bpm           CLINICAL INTERPRETATION:  Six minute walk distance is 457.2 meters (1500 feet) with very light dyspnea.  During exercise, there was significant desaturation while breathing room air.  Blood pressure remained stable and Heart rate increased significantly with walking.  Hypertension was present prior to exercise.  The patient did not report non-pulmonary symptoms during exercise.  Since the previous  study in March 2025, exercise capacity may be somewhat worse.  Based upon age and body mass index, exercise capacity is normal.

## 2025-06-23 NOTE — PROGRESS NOTES
ADVANCED LUNG DISEASE FOLLOW UP VISIT                                                                                                                                            Reason for Visit:  ILD    Referring Physician: Rosemarie White P*    History of Present Illness: Darerll Corona is a 65 y.o. male who is on 0L of oxygen.  He is on no assisted ventilation.  His New York Heart Association Class is III and a Karnofsky score of 70% - Cares for self: Unable to carry on normal activity or active work. He is not diabetic.    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 Continues to improve from a respiratory standpoint. He states he is almost 100% back at his respiratory baseline. He has been transitioned from cytoxan and tacrolimus to rituximab and MMF. Currently on MMF 1500 mg BID and tolerating well. Completed prolonged steroid taper without progression of his dyspnea. Tolerating rituxan infusions. Remains on monthly IVIG.     No exacerbations/hospitalizations since his last visit.     PER INITIAL HPI:    Pt presents today for initial evaluation of pulmonary fibrosis.  He states that he was in his usual state of health until June, when he went on a trip to [place].  On that trip, he noticed that when he walked up 5 flights of stairs he became very winded.  He usually walks up to 5 miles a day and is able to walk 10-15 flights of stairs without difficulty.  When he returned to [place], he was seen by his PCP who noted abnormal breath sounds and referred him to pulmonary.  He was seen by Dr. Watts and was noted to have pulmonary fibrosis on his CT chest.  He desaturated to 93% on 6MWT.  He had an PRESTON that was positive and a negative RF.  He has continued to have ongoing dyspnea that has been progressive since June.  He denies any illnesses around the  onset of symptoms or since onset of symptoms.  Denies any lower extremity swelling.  No palpitations but does monitor his pulse ox and notices tachycardia after he exerts himself.  He states that he monitors his oxygen and will see dips into the low 80's after he exerts himself.  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  He sat in the car and returned to baseline.  He then was seen by Dr. Goyal at [facility] and started on a steroid course.  He has not noticed any difference with steroid use.  Labs for SSc and Sjogren's were checked and are pending.  He was referred to Dr. Scott for clinical trial consideration.  He has never had bronchoscopy or chest biopsy.      He works in [employer] and does not have any occupational exposures.  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  No pulmonary toxic medications.  Does have dogs at home.  No history of dog allergies.  No exposures to birds, chickens, down bedding, or zheng/chicken coops.  No environmental exposures.  No mold in the home.  He previously smoked but quit 30 years ago.  No history of lung disease up to this point.  No family history of lung disease.  No family history of autoimmune disease.  He does endorse Raynaud's.  He does have skin thickening and fissuring of his fingers with thickness on his PIPs.  No rashes.  No muscle symptoms.  Does have fatigue.  No joint pains.  No sinus disease or reflux.       No past medical history on file.    No past surgical history on file.    Allergies: Patient has no known allergies.    Current Outpatient Medications   Medication Sig    atovaquone (MEPRON) 750 mg/5 mL Susp oral liquid Take 10 mLs (1,500 mg total) by mouth once daily.    hydrocortisone (CORTEF) 5 MG Tab Take 3 tablets (15 mg total) by mouth every morning AND 1 tablet (5 mg total) every evening.     mycophenolate (CELLCEPT) 500 mg Tab Take 3 tablets (1,500 mg total) by mouth 2 (two) times daily.    pantoprazole (PROTONIX) 40 MG tablet Take 1 tablet (40 mg total) by mouth once daily.    solifenacin (VESICARE) 10 MG tablet Take 1 tablet (10 mg total) by mouth once daily.    tamsulosin (FLOMAX) 0.4 mg Cap Take 1 capsule (0.4 mg total) by mouth once daily.    tenofovir alafenamide (VEMLIDY) 25 mg Tab Take 1 tablet (25 mg total) by mouth once daily.    amLODIPine (NORVASC) 10 MG tablet Take 10 mg by mouth once daily.    NIFEdipine (PROCARDIA-XL) 60 MG (OSM) 24 hr tablet Take 1 tablet (60 mg total) by mouth once daily. (Patient not taking: Reported on 2025)     No current facility-administered medications for this visit.       Immunization History   Administered Date(s) Administered    COVID-19, subunit, rS-nanoparticle, adjuvanted, PF, 5 mcg/0.5 mL(Novavax) 2024    Pneumococcal Conjugate - 20 Valent 2024     Family History:  No family history on file.  Social History     Substance and Sexual Activity   Alcohol Use Yes    Alcohol/week: 2.0 standard drinks of alcohol    Types: 1 Glasses of wine, 1 Cans of beer per week    Comment: 1 beer and wine every day      Social History     Substance and Sexual Activity   Drug Use Never      Social History     Socioeconomic History    Marital status:    Tobacco Use    Smoking status: Former     Current packs/day: 0.00     Types: Cigarettes     Quit date: 1991     Years since quittin.4    Smokeless tobacco: Never    Tobacco comments:     quit 30yrs ago   Vaping Use    Vaping status: Never Used   Substance and Sexual Activity    Alcohol use: Yes     Alcohol/week: 2.0 standard drinks of alcohol     Types: 1 Glasses of wine, 1 Cans of beer per week     Comment: 1 beer and wine every day    Drug use: Never    Sexual activity: Yes     Partners: Female   Social History Narrative    ** Merged History Encounter **          Social Drivers of Health      Financial Resource Strain: Patient Declined (9/19/2024)    Received from [facility]    Overall Financial Resource Strain (CARDIA)     Difficulty of Paying Living Expenses: Patient declined   Food Insecurity: Patient Declined (9/19/2024)    Received from [facility]    Hunger Vital Sign     Worried About Running Out of Food in the Last Year: Patient declined     Ran Out of Food in the Last Year: Patient declined   Transportation Needs: No Transportation Needs (9/19/2024)    Received from [facility]    PRAPARE - Transportation     Lack of Transportation (Medical): No     Lack of Transportation (Non-Medical): No   Physical Activity: Inactive (9/19/2024)    Received from [facility]    Exercise Vital Sign     Days of Exercise per Week: 0 days     Minutes of Exercise per Session: 0 min   Stress: Stress Concern Present (9/19/2024)    Received from [facility]    [employer] - Occupational Stress Questionnaire     Feeling of Stress : To some extent   Housing Stability: Unknown (9/19/2024)    Received from [facility]    Housing Stability Vital Sign     Unable to Pay for Housing in the Last Year: No     Review of Systems   Constitutional:  Positive for malaise/fatigue. Negative for chills, diaphoresis, fever and weight loss.   HENT:  Negative for congestion, ear discharge, ear pain, hearing loss, nosebleeds, sinus pain, sore throat and tinnitus.    Eyes:  Negative for blurred vision, double vision, photophobia, pain, discharge and redness.   Respiratory:  Positive for cough and shortness of breath. Negative for hemoptysis, sputum production, wheezing and stridor.    Cardiovascular:  Negative for chest pain, palpitations, orthopnea, claudication, leg swelling and PND.   Gastrointestinal:  Negative for abdominal pain, blood in stool, constipation, diarrhea, heartburn, melena, nausea and vomiting.   Genitourinary:  Negative for dysuria, flank pain, frequency, hematuria and urgency.  "  Musculoskeletal:  Negative for back pain, falls, joint pain, myalgias and neck pain.   Skin:  Negative for itching and rash.   Neurological:  Negative for dizziness, tingling, tremors, sensory change, speech change, focal weakness, seizures, loss of consciousness, weakness and headaches.   Endo/Heme/Allergies:  Negative for environmental allergies and polydipsia. Does not bruise/bleed easily.   Psychiatric/Behavioral:  Negative for depression, hallucinations, memory loss, substance abuse and suicidal ideas. The patient is not nervous/anxious and does not have insomnia.      Vitals  /72 (BP Location: Right arm, Patient Position: Sitting)   Pulse 62   Temp 98.3 °F (36.8 °C) (Oral)   Resp 16   Ht 5' 7" (1.702 m)   Wt 79.8 kg (176 lb)   SpO2 98%   BMI 27.57 kg/m²   Physical Exam  Vitals and nursing note reviewed.   Constitutional:       General: He is not in acute distress.     Appearance: He is well-developed. He is not diaphoretic.   HENT:      Head: Normocephalic and atraumatic.      Nose: Nose normal.      Mouth/Throat:      Pharynx: No oropharyngeal exudate.   Eyes:      General: No scleral icterus.     Conjunctiva/sclera: Conjunctivae normal.      Pupils: Pupils are equal, round, and reactive to light.   Neck:      Thyroid: No thyromegaly.      Vascular: No JVD.   Cardiovascular:      Rate and Rhythm: Normal rate and regular rhythm.      Heart sounds: Normal heart sounds. No murmur heard.     No friction rub. No gallop.   Pulmonary:      Effort: Pulmonary effort is normal. No respiratory distress.      Breath sounds: No stridor. Rales (bibasilar) present. No wheezing.   Abdominal:      General: Bowel sounds are normal. There is no distension.      Palpations: Abdomen is soft.      Tenderness: There is no abdominal tenderness.   Musculoskeletal:         General: Normal range of motion.      Cervical back: Normal range of motion and neck supple.   Skin:     General: Skin is warm and dry.      " Findings: No erythema.      Comments: Skin thickening and fissuring of fingers and hands bilaterally.  Consistent with mechanics hands   Neurological:      Mental Status: He is alert and oriented to person, place, and time.      Cranial Nerves: No cranial nerve deficit.   Psychiatric:         Judgment: Judgment normal.         Labs:  Hospital Outpatient Visit on 06/23/2025   Component Date Value    Pre FVC 06/23/2025 2.89     PRE FEV5 06/23/2025 1.71     Pre FEV1 06/23/2025 2.18     Pre FEV1 FVC 06/23/2025 75.46     Pre FEF 25 75 06/23/2025 1.66     Pre PEF 06/23/2025 7.94     Pre  06/23/2025 6.38     Pre DLCO 06/23/2025 16.14 (L)     DLCO ADJ PRE 06/23/2025 16.32 (L)     DLCOVA PRE 06/23/2025 3.83     DLVAAdj PRE 06/23/2025 3.88     VA PRE 06/23/2025 4.21 (L)     IVC PRE 06/23/2025 2.87     TLCN2 PRE 06/23/2025 4.61 (L)     VCMAXN2 PRE 06/23/2025 2.91     PRE IC N2 06/23/2025 2.60     Pre FRC N2 06/23/2025 2.01 (L)     ERVN2 PRE 06/23/2025 0.31     RVN2 PRE 06/23/2025 1.70 (L)     [id number] PRE 06/23/2025 36.85     FVC Ref 06/23/2025 3.52     FVC LLN 06/23/2025 2.61     FVC Pre Ref 06/23/2025 82.1     FEV05 REF 06/23/2025 2.43     FEV05 LLN 06/23/2025 1.29     PRE FEV05 REF 06/23/2025 70.5     FEV1 Ref 06/23/2025 2.79     FEV1 LLN 06/23/2025 2.02     FEV1 Pre Ref 06/23/2025 78.2     FEV1 FVC Ref 06/23/2025 79     FEV1 FVC LLN 06/23/2025 68     FEV1 FVC Pre Ref 06/23/2025 95.2     FEF 25 75 Ref 06/23/2025 3.16     FEF 25 75 LLN 06/23/2025 1.45     FEF 25 75 Pre Ref 06/23/2025 52.6     PEF Ref 06/23/2025 7.76     PEF LLN 06/23/2025 5.77     PEF Pre Ref 06/23/2025 102.4     TLCN2 Ref 06/23/2025 6.52     TLCN2 LLN 06/23/2025 5.37     TLC N2 ULN 06/23/2025 7.67     TLCN2 Pre Ref 06/23/2025 70.7     VCMAXN2 Ref 06/23/2025 3.52     VCMAXN2 LLN 06/23/2025 2.61     VCMAX N2 ULN 06/23/2025 4.44     VCMAXN2 Pre Ref 06/23/2025 82.7     QQJ8LTT 06/23/2025 2.85     LLN IC N2 06/23/2025 -9125114.15     ULN IC N2 06/23/2025  41439116.85     PRE REF IC N2 06/23/2025 91.2     FRCN2 Ref 06/23/2025 3.49     FRCN2 LLN 06/23/2025 2.50     FRC N2 ULN 06/23/2025 4.47     FRCN2 Pre Ref 06/23/2025 57.6     ERVN2 Ref 06/23/2025 1.03     ERVN2 LLN 06/23/2025 -51316.97     ERV N2 ULN 06/23/2025 19741.03     ERVN2 Pre Ref 06/23/2025 29.9     RVN2 Ref 06/23/2025 2.45     RVN2 LLN 06/23/2025 1.78     RV N2 ULN 06/23/2025 3.13     RVN2 Pre Ref 06/23/2025 69.2     [id number] Ref 06/23/2025 39.70     RNT0GENS4 LLN 06/23/2025 30.72     RV N2 TLC N2 ULN 06/23/2025 48.68     OVX4GWAH3 Pre Ref 06/23/2025 92.8     DLCO Single Breath Ref 06/23/2025 25.44     DLCO Single Breath LLN 06/23/2025 18.52     DLCO SINGLEBREATH ULN 06/23/2025 32.37     DLCO Single Breath Pre R* 06/23/2025 63.4     DLCOc Single Breath Ref 06/23/2025 25.44     DLCOc Single Breath LLN 06/23/2025 18.52     DLCOC SINGLEBREATH ULN 06/23/2025 32.37     DLCOc Single Breath Pre * 06/23/2025 64.2     DLCOVA Ref 06/23/2025 3.90     DLCOVA LLN 06/23/2025 2.63     DLCOVA ULN 06/23/2025 5.17     DLCOVA Pre Ref 06/23/2025 98.2     DLCOc SBVA Ref 06/23/2025 3.90     DLCOc SBVA LLN 06/23/2025 2.63     DLCOCSBVA ULN 06/23/2025 5.17     DLCOc SBVA Pre Ref 06/23/2025 99.3     VA Single Breath Ref 06/23/2025 6.37     VA Single Breath LLN 06/23/2025 6.37     VA SINGLEBREATH ULN 06/23/2025 6.37     VA Single Breath Pre Ref 06/23/2025 66.1     IVC Single Breath Ref 06/23/2025 3.52     IVC Single Breath LLN 06/23/2025 2.61     IVC SINGLEBREATH ULN 06/23/2025 4.44     IVC Single Breath Pre Ref 06/23/2025 81.8     DLCOSINGLEBREATHZSCORE 06/23/2025 -2.21     DLCOcSingleBreathZSCORE 06/23/2025 -2.17    Lab Visit on 06/17/2025   Component Date Value    Sed Rate 06/17/2025 12     WBC 06/17/2025 4.19     RBC 06/17/2025 4.84     HGB 06/17/2025 14.2     HCT 06/17/2025 45.1     MCV 06/17/2025 93     MCH 06/17/2025 29.3     MCHC 06/17/2025 31.5 (L)     RDW 06/17/2025 13.5     Platelet Count 06/17/2025 228     MPV  06/17/2025 10.2     Nucleated RBC 06/17/2025 0     Neut % 06/17/2025 70.7     Lymph % 06/17/2025 15.5 (L)     Mono % 06/17/2025 12.6     Eos % 06/17/2025 0.5     Basophil % 06/17/2025 0.5     Imm Grans % 06/17/2025 0.2     Neut # 06/17/2025 2.96     Lymph # 06/17/2025 0.65 (L)     Mono # 06/17/2025 0.53     Eos # 06/17/2025 0.02     Baso # 06/17/2025 0.02     Imm Grans # 06/17/2025 0.01            6/23/2025    12:23 PM 3/17/2025    12:24 PM 10/8/2024    11:30 AM 8/29/2024     4:19 PM 7/30/2024     4:20 PM   Pulmonary Function Tests   FVC 2.89 liters 2.94 liters 2.32 liters 1.88 liters 2.49 liters   FEV1 2.18 liters 2.2 liters 1.69 liters 1.39 liters 1.85 liters   TLC (liters) 4.61 liters 4.28 liters 4.24 liters 3.53 liters 3.91 liters   DLCO (ml/mmHg sec) 16.14 ml/mmHg sec 14.08 ml/mmHg sec 12.6 ml/mmHg sec 8.95 ml/mmHg sec 12.7 ml/mmHg sec   FVC% 82.1 83.5 65.7 53 70   FEV1% 78.2 78.6 60 49 65   FEF 25-75 1.66 1.63 1.18 1.06 1.39   FEF 25-75% 52.6 51 36.9 33 43   TLC% 70.7 65.6 65 54 60   RV 1.7 1.34 1.84 1.65 1.42   RV% 69.2 55 75.9 67.8 58   DLCO% 63.4 54.9 49.1 34 49         6/23/2025    11:50 AM 3/17/2025    12:31 PM 1/3/2025    10:32 AM 10/8/2024     9:37 AM 8/29/2024     3:35 PM 7/30/2024     1:06 PM   6MW   6MWT Status completed without stopping completed without stopping completed without stopping completed without stopping completed without stopping completed without stopping   Patient Reported No complaints Dyspnea No complaints Dyspnea Dyspnea Dyspnea    Was O2 used? No No No No No No   6MW Distance walked (feet) 1500 feet 1680 feet 1562 feet 1400 feet 1300 feet 1400 feet   Distance walked (meters) 457.2 meters 512.06 meters 476.1 meters 426.72 meters 396.24 meters 426.72 meters   Did patient stop? No No No No No No   Oxygen Saturation 98 % 100 % 98 % 96 % 95 % 99 %   Supplemental Oxygen Room Air Room Air Room Air Room Air Room Air Room Air    Heart Rate 63 bpm 67 bpm 64 bpm 76 bpm 84 bpm 66 bpm   Blood  Pressure 154/87 151/79 153/87 163/78 108/67 118/69   Alexa Dyspnea Rating  very, very light (just noticeable) very light light moderate somewhat heavy moderate   Oxygen Saturation 95 % 100 % 98 % 93 % 88 % 93 %   Supplemental Oxygen Room Air Room Air Room Air Room Air Room Air Room Air    Heart Rate 94 bpm 88 bpm 81 bpm 91 bpm 98 bpm 85 bpm   Blood Pressure 163/82 166/83 176/89 160/83 130/71 157/79   Alexa Dyspnea Rating   light very light somewhat heavy very heavy heavy   Recovery Time (seconds) 60 seconds 53 seconds 53 seconds 53 seconds 95 seconds 85 seconds   Oxygen Saturation 98 % 100 % 98 % 96 % 96 % 97 %   Supplemental Oxygen Room Air Room Air Room Air Room Air Room Air Room Air    Heart Rate 66 bpm 76 bpm 79 bpm 82 bpm 84 bpm 73 bpm       Data saved with a previous flowsheet row definition       Imaging:  Results for orders placed during the hospital encounter of 07/30/24    CT Chest Without Contrast    Narrative  EXAMINATION:  CT CHEST WITHOUT CONTRAST    CLINICAL HISTORY:  Interstitial lung disease; Interstitial pulmonary disease, unspecified    TECHNIQUE:  Noncontrast images were obtained through the chest per protocol.  Coronal and sagittal images were reviewed.    COMPARISON:  None.    FINDINGS:  Structures at the base of the neck are unremarkable.  No axillary adenopathy.  Few shotty nonenlarged mediastinal nodes, nonspecific and possibly reactive.  No anterior pericardial effusion.  Aortic arch and coronary calcific atherosclerosis.  The trachea is clear.    The lungs are symmetrically expanded.  Patchy peripheral and lower lung predominant interstitial thickening and opacities.  More mild degree of ground-glass and reticulation peripherally at the upper lungs and apices.  A few subpleural subcentimeter nodular foci, for reference at the upper lobe measuring 0.5 cm (series 2, image 39).  A few punctate right upper lobe nodules (for reference series 3, image 87).  No evident honeycombing, bronchiectasis,  or air trapping.  No pleural effusion or pneumothorax.    Limited images through the unenhanced upper abdomen demonstrate no acute abnormality.  No aggressive osseous lesion.  Suspected degree of ossification at the posterior longitudinal ligament at C6-C7.  Multilevel spine DJD.    Impression  Patchy peripheral and lower lung interstitial thickening and opacities.  Overall appearance is nonspecific and could relate to sequela of fibrotic change versus other infectious or non-infectious inflammatory sequela.  No evident honeycombing, bronchiectasis, or significant air trapping by this exam.    Few scattered subcentimeter pulmonary nodules, technically indeterminate.  Attention at follow-up.    Additional findings as above.      Electronically signed by: Osvaldo Daniel  Date:    07/31/2024  Time:    11:21        Cardiodiagnostics:  Results for orders placed during the hospital encounter of 07/23/24    Echo    Interpretation Summary    Left Ventricle: The left ventricle is normal in size. Normal wall thickness. There is normal systolic function with a visually estimated ejection fraction of 55 - 60%. Global longitudinal strain is normal; -17.0%. There is normal diastolic function.    Right Ventricle: Normal right ventricular cavity size. Wall thickness is normal. Systolic function is normal.    Tricuspid Valve: There is mild regurgitation.    IVC/SVC: Normal venous pressure at 3 mmHg.        Assessment:  1. ILD (interstitial lung disease)    2. Dermatomyositis          Plan:     Patient followed for MCTD-ILD. MDA5 antibody positive dermatomyositis. Initially presented to the clinic in early September for rapidly progressive ILD. Admitted 9/5-9/10 for acute hypoxemic respiratory failure and is now s/p pulse steroid, IVIG, and cytoxan infusions. Completed steroid taper. Remains on monthly IVIG and rituxan every 6 months. Continue MMF. CT chest with interval resolution of GGOs seen in August. Stable from previous. FVC  and DLCO stable. No longer qualifies for oxygen therapy and 6MWT distance is stable >1500. Continue current management.      RTC in 3 months or sooner if needed. 6MWT and PFTs at routine visits.       Rosemarie White PA-C  [facility]

## 2025-06-25 ENCOUNTER — INFUSION (OUTPATIENT)
Dept: INFUSION THERAPY | Facility: HOSPITAL | Age: 65
End: 2025-06-25
Attending: INTERNAL MEDICINE
Payer: MEDICARE

## 2025-06-25 VITALS
SYSTOLIC BLOOD PRESSURE: 150 MMHG | OXYGEN SATURATION: 98 % | BODY MASS INDEX: 27.55 KG/M2 | HEART RATE: 59 BPM | WEIGHT: 175.94 LBS | DIASTOLIC BLOOD PRESSURE: 76 MMHG | TEMPERATURE: 98 F | RESPIRATION RATE: 17 BRPM

## 2025-06-25 DIAGNOSIS — J84.9 ILD (INTERSTITIAL LUNG DISEASE): ICD-10-CM

## 2025-06-25 DIAGNOSIS — R76.8 MELANOMA DIFFERENTIATION-ASSOCIATED PROTEIN 5 (MDA-5) ANTIBODY POSITIVE: ICD-10-CM

## 2025-06-25 DIAGNOSIS — M33.13 DERMATOMYOSITIS: Primary | ICD-10-CM

## 2025-06-25 PROCEDURE — 63600175 PHARM REV CODE 636 W HCPCS: Performed by: INTERNAL MEDICINE

## 2025-06-25 PROCEDURE — 96365 THER/PROPH/DIAG IV INF INIT: CPT

## 2025-06-25 PROCEDURE — 96366 THER/PROPH/DIAG IV INF ADDON: CPT

## 2025-06-25 PROCEDURE — 96375 TX/PRO/DX INJ NEW DRUG ADDON: CPT

## 2025-06-25 PROCEDURE — 25000003 PHARM REV CODE 250: Performed by: INTERNAL MEDICINE

## 2025-06-25 RX ORDER — SODIUM CHLORIDE 0.9 % (FLUSH) 0.9 %
10 SYRINGE (ML) INJECTION
Status: CANCELLED | OUTPATIENT
Start: 2025-06-25

## 2025-06-25 RX ORDER — DIPHENHYDRAMINE HYDROCHLORIDE 50 MG/ML
25 INJECTION, SOLUTION INTRAMUSCULAR; INTRAVENOUS
Status: CANCELLED | OUTPATIENT
Start: 2025-06-25

## 2025-06-25 RX ORDER — ACETAMINOPHEN 325 MG/1
650 TABLET ORAL
Status: CANCELLED | OUTPATIENT
Start: 2025-06-25

## 2025-06-25 RX ORDER — ACETAMINOPHEN 325 MG/1
650 TABLET ORAL
Status: COMPLETED | OUTPATIENT
Start: 2025-06-25 | End: 2025-06-25

## 2025-06-25 RX ORDER — FAMOTIDINE 10 MG/ML
20 INJECTION, SOLUTION INTRAVENOUS
Status: CANCELLED | OUTPATIENT
Start: 2025-06-25

## 2025-06-25 RX ORDER — FAMOTIDINE 10 MG/ML
20 INJECTION, SOLUTION INTRAVENOUS
Status: COMPLETED | OUTPATIENT
Start: 2025-06-25 | End: 2025-06-25

## 2025-06-25 RX ORDER — DIPHENHYDRAMINE HYDROCHLORIDE 50 MG/ML
25 INJECTION, SOLUTION INTRAMUSCULAR; INTRAVENOUS
Status: COMPLETED | OUTPATIENT
Start: 2025-06-25 | End: 2025-06-25

## 2025-06-25 RX ORDER — HEPARIN 100 UNIT/ML
500 SYRINGE INTRAVENOUS
Status: CANCELLED | OUTPATIENT
Start: 2025-06-25

## 2025-06-25 RX ADMIN — FAMOTIDINE 20 MG: 10 INJECTION, SOLUTION INTRAVENOUS at 08:06

## 2025-06-25 RX ADMIN — DIPHENHYDRAMINE HYDROCHLORIDE 25 MG: 50 INJECTION INTRAMUSCULAR; INTRAVENOUS at 08:06

## 2025-06-25 RX ADMIN — ACETAMINOPHEN 650 MG: 325 TABLET ORAL at 08:06

## 2025-06-25 RX ADMIN — HUMAN IMMUNOGLOBULIN G 80 G: 40 LIQUID INTRAVENOUS at 08:06

## 2025-06-25 NOTE — PLAN OF CARE
Pt ambulatory to unit, AAOx4. Denies any new or worsening of symptoms since last visit. Pt received Privigen infusion via 24g L wrist, titrated per order parameters. Dsg c/d/i; no s/s of infection or infiltration noted. PIV flushed and patent with blood return. Pt tolerated medication well. No S&S of an adverse reaction, VSS. Denies pain or discomfort. Care plan discussed with pt. Pt verbalized understanding. Pt aware of upcoming appointment via [facility]. Pt ambulatory upon discharge in [facility].

## 2025-06-26 ENCOUNTER — INFUSION (OUTPATIENT)
Dept: INFUSION THERAPY | Facility: HOSPITAL | Age: 65
End: 2025-06-26
Attending: INTERNAL MEDICINE
Payer: MEDICARE

## 2025-06-26 VITALS
OXYGEN SATURATION: 99 % | HEART RATE: 64 BPM | TEMPERATURE: 98 F | BODY MASS INDEX: 27.52 KG/M2 | WEIGHT: 175.69 LBS | SYSTOLIC BLOOD PRESSURE: 147 MMHG | RESPIRATION RATE: 17 BRPM | DIASTOLIC BLOOD PRESSURE: 77 MMHG

## 2025-06-26 DIAGNOSIS — J84.9 ILD (INTERSTITIAL LUNG DISEASE): ICD-10-CM

## 2025-06-26 DIAGNOSIS — M33.13 DERMATOMYOSITIS: Primary | ICD-10-CM

## 2025-06-26 DIAGNOSIS — R76.8 MELANOMA DIFFERENTIATION-ASSOCIATED PROTEIN 5 (MDA-5) ANTIBODY POSITIVE: ICD-10-CM

## 2025-06-26 PROCEDURE — 96366 THER/PROPH/DIAG IV INF ADDON: CPT

## 2025-06-26 PROCEDURE — 96375 TX/PRO/DX INJ NEW DRUG ADDON: CPT

## 2025-06-26 PROCEDURE — 63600175 PHARM REV CODE 636 W HCPCS: Performed by: INTERNAL MEDICINE

## 2025-06-26 PROCEDURE — 25000003 PHARM REV CODE 250: Performed by: INTERNAL MEDICINE

## 2025-06-26 RX ORDER — SODIUM CHLORIDE 0.9 % (FLUSH) 0.9 %
10 SYRINGE (ML) INJECTION
OUTPATIENT
Start: 2025-06-26

## 2025-06-26 RX ORDER — ACETAMINOPHEN 325 MG/1
650 TABLET ORAL
Status: COMPLETED | OUTPATIENT
Start: 2025-06-26 | End: 2025-06-26

## 2025-06-26 RX ORDER — HEPARIN 100 UNIT/ML
500 SYRINGE INTRAVENOUS
OUTPATIENT
Start: 2025-06-26

## 2025-06-26 RX ORDER — DIPHENHYDRAMINE HYDROCHLORIDE 50 MG/ML
25 INJECTION, SOLUTION INTRAMUSCULAR; INTRAVENOUS
OUTPATIENT
Start: 2025-06-26

## 2025-06-26 RX ORDER — ACETAMINOPHEN 325 MG/1
650 TABLET ORAL
OUTPATIENT
Start: 2025-06-26

## 2025-06-26 RX ORDER — FAMOTIDINE 10 MG/ML
20 INJECTION, SOLUTION INTRAVENOUS
OUTPATIENT
Start: 2025-06-26

## 2025-06-26 RX ORDER — DIPHENHYDRAMINE HYDROCHLORIDE 50 MG/ML
25 INJECTION, SOLUTION INTRAMUSCULAR; INTRAVENOUS
Status: COMPLETED | OUTPATIENT
Start: 2025-06-26 | End: 2025-06-26

## 2025-06-26 RX ORDER — FAMOTIDINE 10 MG/ML
20 INJECTION, SOLUTION INTRAVENOUS
Status: COMPLETED | OUTPATIENT
Start: 2025-06-26 | End: 2025-06-26

## 2025-06-26 RX ADMIN — HUMAN IMMUNOGLOBULIN G 80 G: 40 LIQUID INTRAVENOUS at 08:06

## 2025-06-26 RX ADMIN — ACETAMINOPHEN 650 MG: 325 TABLET ORAL at 08:06

## 2025-06-26 RX ADMIN — DIPHENHYDRAMINE HYDROCHLORIDE 25 MG: 50 INJECTION INTRAMUSCULAR; INTRAVENOUS at 08:06

## 2025-06-26 RX ADMIN — FAMOTIDINE 20 MG: 10 INJECTION, SOLUTION INTRAVENOUS at 08:06

## 2025-06-26 NOTE — PLAN OF CARE
Pt ambulatory to unit, AAOx4. Denies any new or worsening of symptoms since last visit. Pt received Privigen infusion via 24g R FA, titrated per order parameters. Dsg c/d/i; no s/s of infection or infiltration noted. PIV flushed and patent with blood return. Pt tolerated medication well. No S&S of an adverse reaction, VSS. Denies pain or discomfort. Care plan discussed with pt. Pt verbalized understanding. Pt aware of upcoming appointment via [facility]. Pt ambulatory upon discharge in [facility].

## 2025-07-03 ENCOUNTER — INFUSION (OUTPATIENT)
Dept: INFUSION THERAPY | Facility: HOSPITAL | Age: 65
End: 2025-07-03
Attending: INTERNAL MEDICINE
Payer: MEDICARE

## 2025-07-03 VITALS
RESPIRATION RATE: 18 BRPM | DIASTOLIC BLOOD PRESSURE: 70 MMHG | TEMPERATURE: 98 F | WEIGHT: 177.5 LBS | BODY MASS INDEX: 27.86 KG/M2 | HEIGHT: 67 IN | HEART RATE: 63 BPM | SYSTOLIC BLOOD PRESSURE: 149 MMHG | OXYGEN SATURATION: 99 %

## 2025-07-03 DIAGNOSIS — R76.8 MELANOMA DIFFERENTIATION-ASSOCIATED PROTEIN 5 (MDA-5) ANTIBODY POSITIVE: ICD-10-CM

## 2025-07-03 DIAGNOSIS — J84.9 ILD (INTERSTITIAL LUNG DISEASE): Primary | ICD-10-CM

## 2025-07-03 DIAGNOSIS — M60.9 MYOSITIS, UNSPECIFIED MYOSITIS TYPE, UNSPECIFIED SITE: ICD-10-CM

## 2025-07-03 PROCEDURE — 25000003 PHARM REV CODE 250: Performed by: INTERNAL MEDICINE

## 2025-07-03 PROCEDURE — 96367 TX/PROPH/DG ADDL SEQ IV INF: CPT

## 2025-07-03 PROCEDURE — 96375 TX/PRO/DX INJ NEW DRUG ADDON: CPT

## 2025-07-03 PROCEDURE — 63600175 PHARM REV CODE 636 W HCPCS: Performed by: INTERNAL MEDICINE

## 2025-07-03 PROCEDURE — 96415 CHEMO IV INFUSION ADDL HR: CPT

## 2025-07-03 PROCEDURE — 96413 CHEMO IV INFUSION 1 HR: CPT

## 2025-07-03 RX ORDER — FAMOTIDINE 10 MG/ML
20 INJECTION, SOLUTION INTRAVENOUS
Status: COMPLETED | OUTPATIENT
Start: 2025-07-03 | End: 2025-07-03

## 2025-07-03 RX ORDER — ACETAMINOPHEN 325 MG/1
650 TABLET ORAL
Status: COMPLETED | OUTPATIENT
Start: 2025-07-03 | End: 2025-07-03

## 2025-07-03 RX ORDER — HEPARIN 100 UNIT/ML
500 SYRINGE INTRAVENOUS
OUTPATIENT
Start: 2025-07-03

## 2025-07-03 RX ORDER — DIPHENHYDRAMINE HYDROCHLORIDE 50 MG/ML
50 INJECTION, SOLUTION INTRAMUSCULAR; INTRAVENOUS ONCE AS NEEDED
OUTPATIENT
Start: 2025-07-03

## 2025-07-03 RX ORDER — EPINEPHRINE 0.3 MG/.3ML
0.3 INJECTION SUBCUTANEOUS ONCE AS NEEDED
OUTPATIENT
Start: 2025-07-03

## 2025-07-03 RX ORDER — ACETAMINOPHEN 325 MG/1
650 TABLET ORAL
Status: CANCELLED | OUTPATIENT
Start: 2025-07-03

## 2025-07-03 RX ORDER — METHYLPREDNISOLONE SOD SUCC 125 MG
80 VIAL (EA) INJECTION
Status: CANCELLED | OUTPATIENT
Start: 2025-07-03

## 2025-07-03 RX ORDER — METHYLPREDNISOLONE SOD SUCC 125 MG
80 VIAL (EA) INJECTION
Status: COMPLETED | OUTPATIENT
Start: 2025-07-03 | End: 2025-07-03

## 2025-07-03 RX ORDER — SODIUM CHLORIDE 0.9 % (FLUSH) 0.9 %
10 SYRINGE (ML) INJECTION
OUTPATIENT
Start: 2025-07-03

## 2025-07-03 RX ORDER — MEPERIDINE HYDROCHLORIDE 50 MG/ML
25 INJECTION INTRAMUSCULAR; INTRAVENOUS; SUBCUTANEOUS
Status: CANCELLED | OUTPATIENT
Start: 2025-07-03

## 2025-07-03 RX ORDER — FAMOTIDINE 10 MG/ML
20 INJECTION, SOLUTION INTRAVENOUS
Status: CANCELLED | OUTPATIENT
Start: 2025-07-03

## 2025-07-03 RX ORDER — EPINEPHRINE 0.3 MG/.3ML
0.3 INJECTION SUBCUTANEOUS ONCE AS NEEDED
Status: DISCONTINUED | OUTPATIENT
Start: 2025-07-03 | End: 2025-07-03 | Stop reason: HOSPADM

## 2025-07-03 RX ORDER — DIPHENHYDRAMINE HYDROCHLORIDE 50 MG/ML
50 INJECTION, SOLUTION INTRAMUSCULAR; INTRAVENOUS ONCE AS NEEDED
Status: DISCONTINUED | OUTPATIENT
Start: 2025-07-03 | End: 2025-07-03 | Stop reason: HOSPADM

## 2025-07-03 RX ORDER — MEPERIDINE HYDROCHLORIDE 25 MG/ML
25 INJECTION INTRAMUSCULAR; INTRAVENOUS; SUBCUTANEOUS
Status: DISCONTINUED | OUTPATIENT
Start: 2025-07-03 | End: 2025-07-03 | Stop reason: HOSPADM

## 2025-07-03 RX ADMIN — METHYLPREDNISOLONE SODIUM SUCCINATE 80 MG: 125 INJECTION, POWDER, FOR SOLUTION INTRAMUSCULAR; INTRAVENOUS at 07:07

## 2025-07-03 RX ADMIN — ACETAMINOPHEN 650 MG: 325 TABLET ORAL at 07:07

## 2025-07-03 RX ADMIN — FAMOTIDINE 20 MG: 10 INJECTION, SOLUTION INTRAVENOUS at 07:07

## 2025-07-03 RX ADMIN — SODIUM CHLORIDE 1000 MG: 9 INJECTION, SOLUTION INTRAVENOUS at 08:07

## 2025-07-03 RX ADMIN — DIPHENHYDRAMINE HYDROCHLORIDE 50 MG: 50 INJECTION INTRAMUSCULAR; INTRAVENOUS at 08:07

## 2025-07-03 NOTE — PLAN OF CARE
Pt scheduled for Rituxan IV Infusion for Dx Interstitial Lung Disease. AA&Ox4, denies falls. Pt voiced that he does experience neuropathy sensations to hands & feet, and does have cold sensitivity, which is not new onset. Administered the following pre-meds: Tylenol po, Famotidine IVP, Benadryl IVPB, and Methylprednisolone IVP. Tolerated Rituxan infusion starting at 100ml/hr x 30 min, increased to 200ml/hr x 30 min, 300ml/hr x 30 min, then final rate increase to 400ml/hr until completion. All VS WNL. Tolerated well with no side effects or adverse rx's noted. Pt uses portal for appt schedule.

## 2025-07-08 DIAGNOSIS — M33.13 DERMATOMYOSITIS: Primary | ICD-10-CM

## 2025-07-09 ENCOUNTER — TELEPHONE (OUTPATIENT)
Dept: TRANSPLANT | Facility: CLINIC | Age: 65
End: 2025-07-09
Payer: MEDICARE

## 2025-07-10 DIAGNOSIS — J84.9 ILD (INTERSTITIAL LUNG DISEASE): ICD-10-CM

## 2025-07-10 DIAGNOSIS — R76.8 MDA5 ANTIBODY POSITIVE: ICD-10-CM

## 2025-07-10 DIAGNOSIS — M33.91 DERMATOMYOSITIS WITH RESPIRATORY INVOLVEMENT: ICD-10-CM

## 2025-07-14 ENCOUNTER — OFFICE VISIT (OUTPATIENT)
Dept: CARDIOLOGY | Facility: CLINIC | Age: 65
End: 2025-07-14
Payer: MEDICARE

## 2025-07-14 ENCOUNTER — PATIENT MESSAGE (OUTPATIENT)
Dept: TRANSPLANT | Facility: CLINIC | Age: 65
End: 2025-07-14
Payer: MEDICARE

## 2025-07-14 VITALS
HEART RATE: 54 BPM | DIASTOLIC BLOOD PRESSURE: 66 MMHG | SYSTOLIC BLOOD PRESSURE: 128 MMHG | BODY MASS INDEX: 27.83 KG/M2 | WEIGHT: 177.69 LBS

## 2025-07-14 DIAGNOSIS — M33.91 DERMATOMYOSITIS WITH RESPIRATORY INVOLVEMENT: ICD-10-CM

## 2025-07-14 DIAGNOSIS — J84.9 ILD (INTERSTITIAL LUNG DISEASE): ICD-10-CM

## 2025-07-14 DIAGNOSIS — I27.20 PULMONARY HYPERTENSION: Primary | ICD-10-CM

## 2025-07-14 DIAGNOSIS — R76.8 MDA5 ANTIBODY POSITIVE: ICD-10-CM

## 2025-07-14 DIAGNOSIS — I10 PRIMARY HYPERTENSION: ICD-10-CM

## 2025-07-14 DIAGNOSIS — I25.10 CORONARY ARTERY DISEASE INVOLVING NATIVE CORONARY ARTERY OF NATIVE HEART WITHOUT ANGINA PECTORIS: ICD-10-CM

## 2025-07-14 DIAGNOSIS — D89.89 ANTISYNTHETASE SYNDROME: ICD-10-CM

## 2025-07-14 DIAGNOSIS — E78.5 HYPERLIPIDEMIA LDL GOAL <70: ICD-10-CM

## 2025-07-14 PROCEDURE — 3008F BODY MASS INDEX DOCD: CPT | Mod: CPTII,NTX,S$GLB, | Performed by: INTERNAL MEDICINE

## 2025-07-14 PROCEDURE — 99214 OFFICE O/P EST MOD 30 MIN: CPT | Mod: NTX,S$GLB,, | Performed by: INTERNAL MEDICINE

## 2025-07-14 PROCEDURE — 1126F AMNT PAIN NOTED NONE PRSNT: CPT | Mod: CPTII,NTX,S$GLB, | Performed by: INTERNAL MEDICINE

## 2025-07-14 PROCEDURE — 3074F SYST BP LT 130 MM HG: CPT | Mod: CPTII,NTX,S$GLB, | Performed by: INTERNAL MEDICINE

## 2025-07-14 PROCEDURE — 1101F PT FALLS ASSESS-DOCD LE1/YR: CPT | Mod: CPTII,NTX,S$GLB, | Performed by: INTERNAL MEDICINE

## 2025-07-14 PROCEDURE — 1159F MED LIST DOCD IN RCRD: CPT | Mod: CPTII,NTX,S$GLB, | Performed by: INTERNAL MEDICINE

## 2025-07-14 PROCEDURE — 99999 PR PBB SHADOW E&M-EST. PATIENT-LVL III: CPT | Mod: PBBFAC,TXP,, | Performed by: INTERNAL MEDICINE

## 2025-07-14 PROCEDURE — 3288F FALL RISK ASSESSMENT DOCD: CPT | Mod: CPTII,NTX,S$GLB, | Performed by: INTERNAL MEDICINE

## 2025-07-14 PROCEDURE — 3078F DIAST BP <80 MM HG: CPT | Mod: CPTII,NTX,S$GLB, | Performed by: INTERNAL MEDICINE

## 2025-07-14 RX ORDER — VALSARTAN 80 MG/1
80 TABLET ORAL DAILY
Qty: 90 TABLET | Refills: 3 | Status: SHIPPED | OUTPATIENT
Start: 2025-07-14 | End: 2026-07-14

## 2025-07-14 RX ORDER — AMLODIPINE BESYLATE 5 MG/1
5 TABLET ORAL DAILY
Qty: 90 TABLET | Refills: 3 | Status: SHIPPED | OUTPATIENT
Start: 2025-07-14 | End: 2026-07-14

## 2025-07-14 RX ORDER — SILDENAFIL 100 MG/1
100 TABLET, FILM COATED ORAL DAILY PRN
Qty: 5 TABLET | Refills: 3 | Status: SHIPPED | OUTPATIENT
Start: 2025-07-14 | End: 2026-07-14

## 2025-07-14 RX ORDER — MYCOPHENOLATE MOFETIL 500 MG/1
1500 TABLET ORAL 2 TIMES DAILY
Qty: 180 TABLET | Refills: 3 | OUTPATIENT
Start: 2025-07-14

## 2025-07-14 RX ORDER — MYCOPHENOLATE MOFETIL 500 MG/1
1500 TABLET ORAL 2 TIMES DAILY
Qty: 180 TABLET | Refills: 3 | Status: SHIPPED | OUTPATIENT
Start: 2025-07-14

## 2025-07-14 NOTE — PROGRESS NOTES
Cardiology    7/14/2025  2:59 PM    Problem list  Problem List[1]    CC:  Follow-up    HPI:  Last seen in November.  He has been doing very well.  He denies any chest pain or shortness of breath.  He had a visit with lung transplant last month which showed improvement in his lung function.  He is not requiring any oxygen.  He is getting IVIG infusion once a month and Rituxan 2 treatments every 6 months.  His complaint is mild ankle swelling and also gingival/perio issues.    Medications  Current Medications[2]   Prior to Admission medications    Medication Sig Start Date End Date Taking? Authorizing Provider   atovaquone (MEPRON) 750 mg/5 mL Susp oral liquid Take 10 mLs (1,500 mg total) by mouth once daily. 3/20/25  Yes Mary Trevino MD   hydrocortisone (CORTEF) 5 MG Tab Take 3 tablets (15 mg total) by mouth every morning AND 1 tablet (5 mg total) every evening. 6/17/25  Yes Mary Trevino MD   mycophenolate (CELLCEPT) 500 mg Tab Take 3 tablets (1,500 mg total) by mouth 2 (two) times daily. 1/9/25  Yes Mary Trevino MD   pantoprazole (PROTONIX) 40 MG tablet Take 1 tablet (40 mg total) by mouth once daily. 9/10/24 9/10/25 Yes Mulugeta Francisco MD   solifenacin (VESICARE) 10 MG tablet Take 1 tablet (10 mg total) by mouth once daily. 12/17/24 12/17/25 Yes Kobi Treadwell MD   tamsulosin (FLOMAX) 0.4 mg Cap Take 1 capsule (0.4 mg total) by mouth once daily. 9/10/24 9/10/25 Yes Mulugeta Francisco MD   tenofovir alafenamide (VEMLIDY) 25 mg Tab Take 1 tablet (25 mg total) by mouth once daily. 1/3/25  Yes Stephanie Dalton, OCTAVIO   amLODIPine (NORVASC) 10 MG tablet Take 10 mg by mouth once daily. 9/1/24 7/14/25 Yes Nessa Mason   amLODIPine (NORVASC) 5 MG tablet Take 1 tablet (5 mg total) by mouth once daily. 7/14/25 7/14/26  Avery Boyd MD   sildenafiL (VIAGRA) 100 MG tablet Take 1 tablet (100 mg total) by mouth daily as needed for Erectile Dysfunction. 7/14/25 7/14/26   Avery Boyd MD   valsartan (DIOVAN) 80 MG tablet Take 1 tablet (80 mg total) by mouth once daily. 7/14/25 7/14/26  Avery Boyd MD   NIFEdipine (PROCARDIA-XL) 60 MG (OSM) 24 hr tablet Take 1 tablet (60 mg total) by mouth once daily.  Patient not taking: Reported on 6/23/2025 3/18/25 7/14/25  Avery Boyd MD         History  No past medical history on file.  No past surgical history on file.  Social History[3]      Allergies  Review of patient's allergies indicates:   Allergen Reactions    Bactrim [sulfamethoxazole-trimethoprim] Rash         Review of Systems   Review of Systems   Constitutional: Negative for decreased appetite, fever and weight loss.   HENT:  Negative for congestion and nosebleeds.    Eyes:  Negative for double vision, vision loss in left eye, vision loss in right eye and visual disturbance.   Cardiovascular:  Negative for chest pain, claudication, cyanosis, dyspnea on exertion, irregular heartbeat, leg swelling, near-syncope, orthopnea, palpitations, paroxysmal nocturnal dyspnea and syncope.   Respiratory:  Negative for cough, hemoptysis, shortness of breath, sleep disturbances due to breathing, snoring, sputum production and wheezing.    Endocrine: Negative for cold intolerance and heat intolerance.   Skin:  Negative for nail changes and rash.   Musculoskeletal:  Negative for joint pain, muscle cramps, muscle weakness and myalgias.   Gastrointestinal:  Negative for change in bowel habit, heartburn, hematemesis, hematochezia, hemorrhoids and melena.   Neurological:  Negative for dizziness, focal weakness and headaches.         Physical Exam  Wt Readings from Last 1 Encounters:   07/14/25 80.6 kg (177 lb 11.2 oz)     BP Readings from Last 3 Encounters:   07/14/25 128/66   07/03/25 (!) 149/70   06/26/25 (!) 147/77     Pulse Readings from Last 1 Encounters:   07/14/25 (!) 54     Body mass index is 27.83 kg/m².    Physical Exam  Constitutional:       Appearance: He is well-developed.    HENT:      Head: Atraumatic.   Eyes:      General: No scleral icterus.  Neck:      Vascular: Normal carotid pulses. No carotid bruit, hepatojugular reflux or JVD.   Cardiovascular:      Rate and Rhythm: Normal rate and regular rhythm.      Chest Wall: PMI is not displaced.      Pulses: Intact distal pulses.           Carotid pulses are 2+ on the right side and 2+ on the left side.       Radial pulses are 2+ on the right side and 2+ on the left side.        Dorsalis pedis pulses are 2+ on the right side and 2+ on the left side.      Heart sounds: Normal heart sounds, S1 normal and S2 normal. No murmur heard.     No friction rub.   Pulmonary:      Effort: Pulmonary effort is normal. No respiratory distress.      Breath sounds: No stridor. No wheezing or rales (dry crackles).   Chest:      Chest wall: No tenderness.   Abdominal:      General: Bowel sounds are normal.      Palpations: Abdomen is soft.   Musculoskeletal:      Cervical back: Neck supple. No edema.   Skin:     General: Skin is warm and dry.      Nails: There is no clubbing.   Neurological:      Mental Status: He is alert and oriented to person, place, and time.   Psychiatric:         Behavior: Behavior normal.         Thought Content: Thought content normal.             Assessment  1. Pulmonary hypertension (Primary)  Unchanged  - Echo; Future    2. ILD (interstitial lung disease)  Improved    3. Coronary artery disease involving native coronary artery of native heart without angina pectoris  Stable no angina    4. Hyperlipidemia LDL goal <70  Stable    5. Dermatomyositis  As per rheumatology    6. Primary hypertension  Controlled        Plan and Discussion  Given his ankle swelling in gingival issues, will decrease amlodipine to 5 mg daily (patient benefits from amlodipine with his Raynaud's) instead of stopping altogether.  Will start valsartan 80 mg daily.  Patient will monitor his blood pressure let us know.  Will get an echo to check his pulmonary  hypertension.    Follow Up  1-2 months      Avery Boyd MD, F.A.C.C, F.S.C.A.I.      Total professional time spent for the encounter: 30 minutes  Time was spent preparing to see the patient, reviewing results of prior testing, obtaining and/or reviewing separately obtained history, performing a medically appropriate examination and interview, counseling and educating the patient/family, ordering medications/tests/procedures, referring and communicating with other health care professionals, documenting clinical information in the electronic health record, and independently interpreting results.    Disclaimer: This document was created using voice recognition software ([employer]). Although it may be edited, this document may contain errors related to incorrect recognition of the spoken word. Please call the physician if clarification is needed.          [1]   Patient Active Problem List  Diagnosis    Coronary artery disease involving native coronary artery without angina pectoris    Hyperlipidemia LDL goal <70    Dyspnea on exertion    Restrictive lung disease    ILD (interstitial lung disease)    Acute hypoxemic respiratory failure    Melanoma differentiation-associated protein 5 (MDA-5) antibody positive    Antisynthetase syndrome    Hepatitis B core antibody positive    Immunosuppression due to drug therapy    Dermatomyositis    Pulmonary hypertension    Primary hypertension   [2]   Current Outpatient Medications   Medication Sig Dispense Refill    atovaquone (MEPRON) 750 mg/5 mL Susp oral liquid Take 10 mLs (1,500 mg total) by mouth once daily. 210 mL 3    hydrocortisone (CORTEF) 5 MG Tab Take 3 tablets (15 mg total) by mouth every morning AND 1 tablet (5 mg total) every evening. 120 tablet 2    mycophenolate (CELLCEPT) 500 mg Tab Take 3 tablets (1,500 mg total) by mouth 2 (two) times daily. 180 tablet 3    pantoprazole (PROTONIX) 40 MG tablet Take 1 tablet (40 mg total) by mouth once daily. 90  tablet 3    solifenacin (VESICARE) 10 MG tablet Take 1 tablet (10 mg total) by mouth once daily. 30 tablet 11    tamsulosin (FLOMAX) 0.4 mg Cap Take 1 capsule (0.4 mg total) by mouth once daily. 30 capsule 11    tenofovir alafenamide (VEMLIDY) 25 mg Tab Take 1 tablet (25 mg total) by mouth once daily. 30 tablet 11    amLODIPine (NORVASC) 5 MG tablet Take 1 tablet (5 mg total) by mouth once daily. 90 tablet 3    sildenafiL (VIAGRA) 100 MG tablet Take 1 tablet (100 mg total) by mouth daily as needed for Erectile Dysfunction. 5 tablet 3    valsartan (DIOVAN) 80 MG tablet Take 1 tablet (80 mg total) by mouth once daily. 90 tablet 3     No current facility-administered medications for this visit.   [3]   Social History  Socioeconomic History    Marital status:    Tobacco Use    Smoking status: Former     Current packs/day: 0.00     Types: Cigarettes     Quit date: 1991     Years since quittin.5    Smokeless tobacco: Never    Tobacco comments:     quit 30yrs ago   Vaping Use    Vaping status: Never Used   Substance and Sexual Activity    Alcohol use: Yes     Alcohol/week: 2.0 standard drinks of alcohol     Types: 1 Glasses of wine, 1 Cans of beer per week     Comment: 1 beer and wine every day    Drug use: Never    Sexual activity: Yes     Partners: Female   Social History Narrative    ** Merged History Encounter **          Social Drivers of Health     Financial Resource Strain: Low Risk  (2025)    Overall Financial Resource Strain (CARDIA)     Difficulty of Paying Living Expenses: Not hard at all   Food Insecurity: No Food Insecurity (2025)    Hunger Vital Sign     Worried About Running Out of Food in the Last Year: Never true     Ran Out of Food in the Last Year: Never true   Transportation Needs: No Transportation Needs (2025)    PRAPARE - Transportation     Lack of Transportation (Medical): No     Lack of Transportation (Non-Medical): No   Physical Activity: Sufficiently Active  (7/14/2025)    Exercise Vital Sign     Days of Exercise per Week: 5 days     Minutes of Exercise per Session: 30 min   Stress: No Stress Concern Present (7/14/2025)    [place] citizen [place] [employer] - Occupational Stress Questionnaire     Feeling of Stress : Only a little   Housing Stability: Low Risk  (7/14/2025)    Housing Stability Vital Sign     Unable to Pay for Housing in the Last Year: No     Number of Times Moved in the Last Year: 0     Homeless in the Last Year: No

## 2025-07-23 ENCOUNTER — INFUSION (OUTPATIENT)
Dept: INFUSION THERAPY | Facility: HOSPITAL | Age: 65
End: 2025-07-23
Attending: INTERNAL MEDICINE
Payer: MEDICARE

## 2025-07-23 DIAGNOSIS — R76.8 MELANOMA DIFFERENTIATION-ASSOCIATED PROTEIN 5 (MDA-5) ANTIBODY POSITIVE: ICD-10-CM

## 2025-07-23 DIAGNOSIS — J84.9 ILD (INTERSTITIAL LUNG DISEASE): ICD-10-CM

## 2025-07-23 DIAGNOSIS — M33.13 DERMATOMYOSITIS: Primary | ICD-10-CM

## 2025-07-23 PROCEDURE — 96366 THER/PROPH/DIAG IV INF ADDON: CPT

## 2025-07-23 PROCEDURE — 96375 TX/PRO/DX INJ NEW DRUG ADDON: CPT

## 2025-07-23 PROCEDURE — 63600175 PHARM REV CODE 636 W HCPCS: Performed by: INTERNAL MEDICINE

## 2025-07-23 PROCEDURE — 25000003 PHARM REV CODE 250: Performed by: INTERNAL MEDICINE

## 2025-07-23 PROCEDURE — 96365 THER/PROPH/DIAG IV INF INIT: CPT

## 2025-07-23 RX ORDER — FAMOTIDINE 10 MG/ML
20 INJECTION, SOLUTION INTRAVENOUS
Status: CANCELLED | OUTPATIENT
Start: 2025-07-23

## 2025-07-23 RX ORDER — ACETAMINOPHEN 325 MG/1
650 TABLET ORAL
Status: COMPLETED | OUTPATIENT
Start: 2025-07-23 | End: 2025-07-23

## 2025-07-23 RX ORDER — DIPHENHYDRAMINE HYDROCHLORIDE 50 MG/ML
25 INJECTION, SOLUTION INTRAMUSCULAR; INTRAVENOUS
Status: COMPLETED | OUTPATIENT
Start: 2025-07-23 | End: 2025-07-23

## 2025-07-23 RX ORDER — FAMOTIDINE 10 MG/ML
20 INJECTION, SOLUTION INTRAVENOUS
Status: COMPLETED | OUTPATIENT
Start: 2025-07-23 | End: 2025-07-23

## 2025-07-23 RX ORDER — DIPHENHYDRAMINE HYDROCHLORIDE 50 MG/ML
25 INJECTION, SOLUTION INTRAMUSCULAR; INTRAVENOUS
Status: CANCELLED | OUTPATIENT
Start: 2025-07-23

## 2025-07-23 RX ORDER — ACETAMINOPHEN 325 MG/1
650 TABLET ORAL
Status: CANCELLED | OUTPATIENT
Start: 2025-07-23

## 2025-07-23 RX ORDER — HEPARIN 100 UNIT/ML
500 SYRINGE INTRAVENOUS
Status: CANCELLED | OUTPATIENT
Start: 2025-07-23

## 2025-07-23 RX ORDER — SODIUM CHLORIDE 0.9 % (FLUSH) 0.9 %
10 SYRINGE (ML) INJECTION
Status: CANCELLED | OUTPATIENT
Start: 2025-07-23

## 2025-07-23 RX ADMIN — ACETAMINOPHEN 650 MG: 325 TABLET ORAL at 08:07

## 2025-07-23 RX ADMIN — HUMAN IMMUNOGLOBULIN G 80 G: 40 LIQUID INTRAVENOUS at 08:07

## 2025-07-23 RX ADMIN — DIPHENHYDRAMINE HYDROCHLORIDE 25 MG: 50 INJECTION INTRAMUSCULAR; INTRAVENOUS at 08:07

## 2025-07-23 RX ADMIN — FAMOTIDINE 20 MG: 10 INJECTION, SOLUTION INTRAVENOUS at 08:07

## 2025-07-23 NOTE — PLAN OF CARE
Pt arrived to the Infusion unit for maintenance iv treatment (q4w). Day 1 of 2 day treatment. Pt is awake, alert, and oriented x4. Ambulates independently with steady gait. Pt reports no new allergies, symptoms, or concerns at this time. Pt denies previous reactions to medication and consents to plan of care for today. PIV 24g placed in (R) posterior forearm. Pt given pre-medications: Tylenol 650mg PO, Pepcid 20mg IVP, and Benadryl 25mg IVP. Weight obtained: 80.1 kg. Pt administered weight-based Privigen 10%, 80g starting at rate of 24.03mL/hr. Infusion titrated per protocol to max rate of 192mL/hr. Pt tolerated infusion well with no adverse reactions. Pt asymptomatic, Vital signs stable during and post tx. Pt aware of follow-up appt tomorrow for day 2 of treatment. Verbalizes no additional needs at this time. Pt ambulatory at discharge in no acute distress.      Problem: Adult Inpatient Plan of Care  Goal: Optimal Comfort and Wellbeing  Outcome: Met

## 2025-07-24 ENCOUNTER — INFUSION (OUTPATIENT)
Dept: INFUSION THERAPY | Facility: HOSPITAL | Age: 65
End: 2025-07-24
Attending: INTERNAL MEDICINE
Payer: MEDICARE

## 2025-07-24 VITALS
HEART RATE: 54 BPM | BODY MASS INDEX: 27.71 KG/M2 | OXYGEN SATURATION: 96 % | SYSTOLIC BLOOD PRESSURE: 135 MMHG | TEMPERATURE: 98 F | RESPIRATION RATE: 18 BRPM | HEIGHT: 67 IN | WEIGHT: 176.56 LBS | DIASTOLIC BLOOD PRESSURE: 69 MMHG

## 2025-07-24 VITALS
OXYGEN SATURATION: 99 % | WEIGHT: 176.56 LBS | TEMPERATURE: 99 F | RESPIRATION RATE: 18 BRPM | HEART RATE: 66 BPM | SYSTOLIC BLOOD PRESSURE: 134 MMHG | BODY MASS INDEX: 27.66 KG/M2 | DIASTOLIC BLOOD PRESSURE: 64 MMHG

## 2025-07-24 DIAGNOSIS — R76.8 MELANOMA DIFFERENTIATION-ASSOCIATED PROTEIN 5 (MDA-5) ANTIBODY POSITIVE: ICD-10-CM

## 2025-07-24 DIAGNOSIS — J84.9 ILD (INTERSTITIAL LUNG DISEASE): ICD-10-CM

## 2025-07-24 DIAGNOSIS — M33.13 DERMATOMYOSITIS: Primary | ICD-10-CM

## 2025-07-24 PROCEDURE — 96366 THER/PROPH/DIAG IV INF ADDON: CPT

## 2025-07-24 PROCEDURE — 96365 THER/PROPH/DIAG IV INF INIT: CPT

## 2025-07-24 PROCEDURE — 96375 TX/PRO/DX INJ NEW DRUG ADDON: CPT

## 2025-07-24 PROCEDURE — 25000003 PHARM REV CODE 250: Performed by: INTERNAL MEDICINE

## 2025-07-24 PROCEDURE — 63600175 PHARM REV CODE 636 W HCPCS: Performed by: INTERNAL MEDICINE

## 2025-07-24 RX ORDER — FAMOTIDINE 10 MG/ML
20 INJECTION, SOLUTION INTRAVENOUS
Status: COMPLETED | OUTPATIENT
Start: 2025-07-24 | End: 2025-07-24

## 2025-07-24 RX ORDER — SODIUM CHLORIDE 0.9 % (FLUSH) 0.9 %
10 SYRINGE (ML) INJECTION
OUTPATIENT
Start: 2025-07-24

## 2025-07-24 RX ORDER — FAMOTIDINE 10 MG/ML
20 INJECTION, SOLUTION INTRAVENOUS
OUTPATIENT
Start: 2025-07-24

## 2025-07-24 RX ORDER — DIPHENHYDRAMINE HYDROCHLORIDE 50 MG/ML
25 INJECTION, SOLUTION INTRAMUSCULAR; INTRAVENOUS
Status: COMPLETED | OUTPATIENT
Start: 2025-07-24 | End: 2025-07-24

## 2025-07-24 RX ORDER — HEPARIN 100 UNIT/ML
500 SYRINGE INTRAVENOUS
OUTPATIENT
Start: 2025-07-24

## 2025-07-24 RX ORDER — DIPHENHYDRAMINE HYDROCHLORIDE 50 MG/ML
25 INJECTION, SOLUTION INTRAMUSCULAR; INTRAVENOUS
OUTPATIENT
Start: 2025-07-24

## 2025-07-24 RX ORDER — ACETAMINOPHEN 325 MG/1
650 TABLET ORAL
OUTPATIENT
Start: 2025-07-24

## 2025-07-24 RX ORDER — ACETAMINOPHEN 325 MG/1
650 TABLET ORAL
Status: COMPLETED | OUTPATIENT
Start: 2025-07-24 | End: 2025-07-24

## 2025-07-24 RX ADMIN — FAMOTIDINE 20 MG: 10 INJECTION, SOLUTION INTRAVENOUS at 08:07

## 2025-07-24 RX ADMIN — ACETAMINOPHEN 650 MG: 325 TABLET ORAL at 08:07

## 2025-07-24 RX ADMIN — DIPHENHYDRAMINE HYDROCHLORIDE 25 MG: 50 INJECTION INTRAMUSCULAR; INTRAVENOUS at 08:07

## 2025-07-24 RX ADMIN — HUMAN IMMUNOGLOBULIN G 80 G: 40 LIQUID INTRAVENOUS at 08:07

## 2025-07-24 NOTE — PLAN OF CARE
Ambulated to Infusion Clinic independently; AA&Ox4. Denies falls. Verbalized does experience numbness and tingling to hands and feet when questioned. Privigen infusion titrated per protocol, started infusion at 24ml/hr x 30 min, increased to 48ml/hr x 30 min, increased to 96ml/hr x 30 min, with final increase to 192ml for remainder. Tolerated infusion well with no side effects or adverse rx's noted. Pt uses portal for appt schedule.

## 2025-07-28 ENCOUNTER — HOSPITAL ENCOUNTER (OUTPATIENT)
Dept: CARDIOLOGY | Facility: OTHER | Age: 65
Discharge: HOME | End: 2025-07-28
Attending: INTERNAL MEDICINE
Payer: MEDICARE

## 2025-07-28 VITALS
HEIGHT: 67 IN | HEART RATE: 54 BPM | SYSTOLIC BLOOD PRESSURE: 128 MMHG | WEIGHT: 170 LBS | BODY MASS INDEX: 26.68 KG/M2 | DIASTOLIC BLOOD PRESSURE: 66 MMHG

## 2025-07-28 DIAGNOSIS — I27.20 PULMONARY HYPERTENSION: ICD-10-CM

## 2025-07-28 PROCEDURE — 93306 TTE W/DOPPLER COMPLETE: CPT | Mod: 26,NTX,, | Performed by: INTERNAL MEDICINE

## 2025-07-28 PROCEDURE — 93306 TTE W/DOPPLER COMPLETE: CPT | Mod: TXP

## 2025-07-29 LAB
AV INDEX (PROSTH): 0.59
AV MEAN GRADIENT: 3 MMHG
AV PEAK GRADIENT: 7 MMHG
AV VALVE AREA BY VELOCITY RATIO: 2.1 CM²
AV VALVE AREA: 2 CM²
AV VELOCITY RATIO: 0.62
BSA FOR ECHO PROCEDURE: 1.91 M2
CV ECHO LV RWT: 0.38 CM
DOP CALC AO PEAK VEL: 1.3 M/S
DOP CALC AO VTI: 30 CM
DOP CALC LVOT AREA: 3.5 CM2
DOP CALC LVOT DIAMETER: 2.1 CM
DOP CALC LVOT PEAK VEL: 0.8 M/S
DOP CALC LVOT STROKE VOLUME: 61.3 CM3
DOP CALCLVOT PEAK VEL VTI: 17.7 CM
E WAVE DECELERATION TIME: 201 MSEC
E/A RATIO: 1.48
E/E' RATIO: 8 M/S
ECHO LV POSTERIOR WALL: 0.9 CM (ref 0.6–1.1)
FRACTIONAL SHORTENING: 40.4 % (ref 28–44)
INTERVENTRICULAR SEPTUM: 1 CM (ref 0.6–1.1)
IVC DIAMETER: 1.12 CM
IVRT: 84 MSEC
LA MAJOR: 4.7 CM
LA MINOR: 4.7 CM
LA WIDTH: 4.8 CM
LEFT ATRIUM AREA SYSTOLIC (APICAL 2 CHAMBER): 17.17 CM2
LEFT ATRIUM AREA SYSTOLIC (APICAL 4 CHAMBER): 17.1 CM2
LEFT ATRIUM SIZE: 3.8 CM
LEFT ATRIUM VOLUME INDEX MOD: 30 ML/M2
LEFT ATRIUM VOLUME INDEX: 39 ML/M2
LEFT ATRIUM VOLUME MOD: 56 ML
LEFT ATRIUM VOLUME: 73 CM3
LEFT INTERNAL DIMENSION IN SYSTOLE: 2.8 CM (ref 2.1–4)
LEFT VENTRICLE DIASTOLIC VOLUME INDEX: 55.56 ML/M2
LEFT VENTRICLE DIASTOLIC VOLUME: 105 ML
LEFT VENTRICLE END SYSTOLIC VOLUME APICAL 2 CHAMBER: 48.77 ML
LEFT VENTRICLE END SYSTOLIC VOLUME APICAL 4 CHAMBER: 57.59 ML
LEFT VENTRICLE MASS INDEX: 81.2 G/M2
LEFT VENTRICLE SYSTOLIC VOLUME INDEX: 15.3 ML/M2
LEFT VENTRICLE SYSTOLIC VOLUME: 29 ML
LEFT VENTRICULAR INTERNAL DIMENSION IN DIASTOLE: 4.7 CM (ref 3.5–6)
LEFT VENTRICULAR MASS: 153.4 G
LV LATERAL E/E' RATIO: 6.9 M/S
LV SEPTAL E/E' RATIO: 10.4 M/S
LVED V (TEICH): 104.54 ML
LVES V (TEICH): 29.13 ML
LVOT MG: 1.56 MMHG
LVOT MV: 0.6 CM/S
LVOT STOKE VOLUME INDEX: 32.4 ML/M2
MV PEAK A VEL: 0.56 M/S
MV PEAK E VEL: 0.83 M/S
MV STENOSIS PRESSURE HALF TIME: 56.65 MS
MV VALVE AREA P 1/2 METHOD: 3.88 CM2
OHS CV CPX PATIENT HEIGHT IN: 67
PISA MRMAX VEL: 4.94 M/S
PISA TR MAX VEL: 2.8 M/S
PULM VEIN S/D RATIO: 1.54
PV PEAK D VEL: 0.54 M/S
PV PEAK GRADIENT: 3 MMHG
PV PEAK S VEL: 0.83 M/S
PV PEAK VELOCITY: 0.87 M/S
RA MAJOR: 5.26 CM
RA PRESSURE ESTIMATED: 3 MMHG
RA WIDTH: 3.3 CM
RV TB RVSP: 6 MMHG
TDI LATERAL: 0.12 M/S
TDI SEPTAL: 0.08 M/S
TDI: 0.1 M/S
TR MAX PG: 31 MMHG
TV REST PULMONARY ARTERY PRESSURE: 34 MMHG
Z-SCORE OF LEFT VENTRICULAR DIMENSION IN END DIASTOLE: -1.18
Z-SCORE OF LEFT VENTRICULAR DIMENSION IN END SYSTOLE: -1.2

## 2025-08-06 DIAGNOSIS — Z79.899 DRUG-INDUCED IMMUNODEFICIENCY: ICD-10-CM

## 2025-08-06 DIAGNOSIS — M33.91 DERMATOMYOSITIS WITH RESPIRATORY INVOLVEMENT: ICD-10-CM

## 2025-08-06 DIAGNOSIS — D84.821 DRUG-INDUCED IMMUNODEFICIENCY: ICD-10-CM

## 2025-08-08 RX ORDER — ATOVAQUONE 750 MG/5ML
1500 SUSPENSION ORAL DAILY
Qty: 210 ML | Refills: 5 | Status: SHIPPED | OUTPATIENT
Start: 2025-08-08

## 2025-08-08 NOTE — TELEPHONE ENCOUNTER
Please see the attached refill request. LOV 6.25 Labs 6.25 Hey he was never scheduled for his two month. Please advise if you need me move some things around or double book.

## 2025-08-20 ENCOUNTER — INFUSION (OUTPATIENT)
Dept: INFUSION THERAPY | Facility: HOSPITAL | Age: 65
End: 2025-08-20
Payer: MEDICARE

## 2025-08-20 VITALS
RESPIRATION RATE: 16 BRPM | HEART RATE: 56 BPM | DIASTOLIC BLOOD PRESSURE: 71 MMHG | WEIGHT: 178.13 LBS | SYSTOLIC BLOOD PRESSURE: 138 MMHG | OXYGEN SATURATION: 100 % | HEIGHT: 67 IN | TEMPERATURE: 98 F | BODY MASS INDEX: 27.96 KG/M2

## 2025-08-20 DIAGNOSIS — M33.13 DERMATOMYOSITIS: Primary | ICD-10-CM

## 2025-08-20 DIAGNOSIS — R76.8 MELANOMA DIFFERENTIATION-ASSOCIATED PROTEIN 5 (MDA-5) ANTIBODY POSITIVE: ICD-10-CM

## 2025-08-20 DIAGNOSIS — J84.9 ILD (INTERSTITIAL LUNG DISEASE): ICD-10-CM

## 2025-08-20 PROCEDURE — 96375 TX/PRO/DX INJ NEW DRUG ADDON: CPT

## 2025-08-20 PROCEDURE — 96366 THER/PROPH/DIAG IV INF ADDON: CPT

## 2025-08-20 PROCEDURE — 25000003 PHARM REV CODE 250: Performed by: INTERNAL MEDICINE

## 2025-08-20 PROCEDURE — 63600175 PHARM REV CODE 636 W HCPCS: Performed by: INTERNAL MEDICINE

## 2025-08-20 PROCEDURE — 96365 THER/PROPH/DIAG IV INF INIT: CPT

## 2025-08-20 RX ORDER — FAMOTIDINE 10 MG/ML
20 INJECTION, SOLUTION INTRAVENOUS
Status: CANCELLED | OUTPATIENT
Start: 2025-08-20 | End: 2025-08-20

## 2025-08-20 RX ORDER — DIPHENHYDRAMINE HYDROCHLORIDE 50 MG/ML
25 INJECTION, SOLUTION INTRAMUSCULAR; INTRAVENOUS
Status: COMPLETED | OUTPATIENT
Start: 2025-08-20 | End: 2025-08-20

## 2025-08-20 RX ORDER — DIPHENHYDRAMINE HYDROCHLORIDE 50 MG/ML
25 INJECTION, SOLUTION INTRAMUSCULAR; INTRAVENOUS
Status: CANCELLED | OUTPATIENT
Start: 2025-08-20 | End: 2025-08-20

## 2025-08-20 RX ORDER — SODIUM CHLORIDE 0.9 % (FLUSH) 0.9 %
10 SYRINGE (ML) INJECTION
Status: CANCELLED | OUTPATIENT
Start: 2025-08-20

## 2025-08-20 RX ORDER — HEPARIN 100 UNIT/ML
500 SYRINGE INTRAVENOUS
Status: CANCELLED | OUTPATIENT
Start: 2025-08-20

## 2025-08-20 RX ORDER — ACETAMINOPHEN 325 MG/1
650 TABLET ORAL
Status: CANCELLED | OUTPATIENT
Start: 2025-08-20 | End: 2025-08-20

## 2025-08-20 RX ORDER — FAMOTIDINE 10 MG/ML
20 INJECTION, SOLUTION INTRAVENOUS
Status: COMPLETED | OUTPATIENT
Start: 2025-08-20 | End: 2025-08-20

## 2025-08-20 RX ORDER — ACETAMINOPHEN 325 MG/1
650 TABLET ORAL
Status: COMPLETED | OUTPATIENT
Start: 2025-08-20 | End: 2025-08-20

## 2025-08-20 RX ADMIN — FAMOTIDINE 20 MG: 10 INJECTION, SOLUTION INTRAVENOUS at 08:08

## 2025-08-20 RX ADMIN — DIPHENHYDRAMINE HYDROCHLORIDE 25 MG: 50 INJECTION INTRAMUSCULAR; INTRAVENOUS at 08:08

## 2025-08-20 RX ADMIN — ACETAMINOPHEN 650 MG: 325 TABLET ORAL at 08:08

## 2025-08-20 RX ADMIN — HUMAN IMMUNOGLOBULIN G 80 G: 40 LIQUID INTRAVENOUS at 09:08

## 2025-08-21 ENCOUNTER — INFUSION (OUTPATIENT)
Dept: INFUSION THERAPY | Facility: HOSPITAL | Age: 65
End: 2025-08-21
Attending: INTERNAL MEDICINE
Payer: MEDICARE

## 2025-08-21 VITALS
WEIGHT: 176.81 LBS | RESPIRATION RATE: 16 BRPM | DIASTOLIC BLOOD PRESSURE: 85 MMHG | BODY MASS INDEX: 27.69 KG/M2 | SYSTOLIC BLOOD PRESSURE: 163 MMHG | TEMPERATURE: 98 F | HEART RATE: 59 BPM | OXYGEN SATURATION: 100 %

## 2025-08-21 DIAGNOSIS — J84.9 ILD (INTERSTITIAL LUNG DISEASE): ICD-10-CM

## 2025-08-21 DIAGNOSIS — R76.8 MELANOMA DIFFERENTIATION-ASSOCIATED PROTEIN 5 (MDA-5) ANTIBODY POSITIVE: ICD-10-CM

## 2025-08-21 DIAGNOSIS — M33.13 DERMATOMYOSITIS: Primary | ICD-10-CM

## 2025-08-21 PROCEDURE — 63600175 PHARM REV CODE 636 W HCPCS: Performed by: INTERNAL MEDICINE

## 2025-08-21 PROCEDURE — 96366 THER/PROPH/DIAG IV INF ADDON: CPT

## 2025-08-21 PROCEDURE — 25000003 PHARM REV CODE 250: Performed by: INTERNAL MEDICINE

## 2025-08-21 PROCEDURE — 96375 TX/PRO/DX INJ NEW DRUG ADDON: CPT

## 2025-08-21 PROCEDURE — 96365 THER/PROPH/DIAG IV INF INIT: CPT

## 2025-08-21 RX ORDER — FAMOTIDINE 10 MG/ML
20 INJECTION, SOLUTION INTRAVENOUS
Status: COMPLETED | OUTPATIENT
Start: 2025-08-21 | End: 2025-08-21

## 2025-08-21 RX ORDER — HEPARIN 100 UNIT/ML
500 SYRINGE INTRAVENOUS
OUTPATIENT
Start: 2025-08-21

## 2025-08-21 RX ORDER — FAMOTIDINE 10 MG/ML
20 INJECTION, SOLUTION INTRAVENOUS
OUTPATIENT
Start: 2025-08-21 | End: 2025-08-21

## 2025-08-21 RX ORDER — ACETAMINOPHEN 325 MG/1
650 TABLET ORAL
OUTPATIENT
Start: 2025-08-21 | End: 2025-08-21

## 2025-08-21 RX ORDER — DIPHENHYDRAMINE HYDROCHLORIDE 50 MG/ML
25 INJECTION, SOLUTION INTRAMUSCULAR; INTRAVENOUS
Status: COMPLETED | OUTPATIENT
Start: 2025-08-21 | End: 2025-08-21

## 2025-08-21 RX ORDER — SODIUM CHLORIDE 0.9 % (FLUSH) 0.9 %
10 SYRINGE (ML) INJECTION
OUTPATIENT
Start: 2025-08-21

## 2025-08-21 RX ORDER — DIPHENHYDRAMINE HYDROCHLORIDE 50 MG/ML
25 INJECTION, SOLUTION INTRAMUSCULAR; INTRAVENOUS
OUTPATIENT
Start: 2025-08-21 | End: 2025-08-21

## 2025-08-21 RX ORDER — ACETAMINOPHEN 325 MG/1
650 TABLET ORAL
Status: COMPLETED | OUTPATIENT
Start: 2025-08-21 | End: 2025-08-21

## 2025-08-21 RX ADMIN — FAMOTIDINE 20 MG: 10 INJECTION, SOLUTION INTRAVENOUS at 08:08

## 2025-08-21 RX ADMIN — ACETAMINOPHEN 650 MG: 325 TABLET ORAL at 08:08

## 2025-08-21 RX ADMIN — DIPHENHYDRAMINE HYDROCHLORIDE 25 MG: 50 INJECTION INTRAMUSCULAR; INTRAVENOUS at 08:08

## 2025-08-21 RX ADMIN — HUMAN IMMUNOGLOBULIN G 80 G: 40 LIQUID INTRAVENOUS at 09:08
